# Patient Record
Sex: FEMALE | Race: WHITE | NOT HISPANIC OR LATINO | Employment: UNEMPLOYED | URBAN - NONMETROPOLITAN AREA
[De-identification: names, ages, dates, MRNs, and addresses within clinical notes are randomized per-mention and may not be internally consistent; named-entity substitution may affect disease eponyms.]

---

## 2017-01-17 ENCOUNTER — OFFICE VISIT (OUTPATIENT)
Dept: MEDICAL GROUP | Facility: PHYSICIAN GROUP | Age: 52
End: 2017-01-17
Payer: COMMERCIAL

## 2017-01-17 VITALS
HEIGHT: 70 IN | SYSTOLIC BLOOD PRESSURE: 140 MMHG | BODY MASS INDEX: 32.78 KG/M2 | OXYGEN SATURATION: 95 % | WEIGHT: 229 LBS | DIASTOLIC BLOOD PRESSURE: 90 MMHG | TEMPERATURE: 98.3 F | HEART RATE: 83 BPM

## 2017-01-17 DIAGNOSIS — E03.9 HYPOTHYROIDISM, UNSPECIFIED TYPE: ICD-10-CM

## 2017-01-17 DIAGNOSIS — Z86.718 HISTORY OF VENOUS THROMBOEMBOLISM: ICD-10-CM

## 2017-01-17 DIAGNOSIS — E78.5 HYPERLIPIDEMIA, UNSPECIFIED HYPERLIPIDEMIA TYPE: ICD-10-CM

## 2017-01-17 DIAGNOSIS — G62.9 NEUROPATHY: ICD-10-CM

## 2017-01-17 DIAGNOSIS — E11.22 CKD STAGE 4 DUE TO TYPE 2 DIABETES MELLITUS (HCC): ICD-10-CM

## 2017-01-17 DIAGNOSIS — E55.9 VITAMIN D DEFICIENCY DISEASE: ICD-10-CM

## 2017-01-17 DIAGNOSIS — F51.01 PRIMARY INSOMNIA: ICD-10-CM

## 2017-01-17 DIAGNOSIS — Z23 NEED FOR PNEUMOCOCCAL VACCINATION: ICD-10-CM

## 2017-01-17 DIAGNOSIS — E55.9 VITAMIN D DEFICIENCY: ICD-10-CM

## 2017-01-17 DIAGNOSIS — I10 ESSENTIAL HYPERTENSION: ICD-10-CM

## 2017-01-17 DIAGNOSIS — N18.4 CKD STAGE 4 DUE TO TYPE 2 DIABETES MELLITUS (HCC): ICD-10-CM

## 2017-01-17 DIAGNOSIS — D68.59 HYPERCOAGULABLE STATE (HCC): ICD-10-CM

## 2017-01-17 PROCEDURE — 99215 OFFICE O/P EST HI 40 MIN: CPT | Performed by: INTERNAL MEDICINE

## 2017-01-17 RX ORDER — DULOXETIN HYDROCHLORIDE 60 MG/1
60 CAPSULE, DELAYED RELEASE ORAL DAILY
Qty: 90 CAP | Refills: 2 | Status: SHIPPED | OUTPATIENT
Start: 2017-01-17 | End: 2017-04-14 | Stop reason: SDUPTHER

## 2017-01-17 RX ORDER — AMLODIPINE BESYLATE 10 MG/1
10 TABLET ORAL DAILY
COMMUNITY
End: 2017-02-09 | Stop reason: SDUPTHER

## 2017-01-17 RX ORDER — LEVOTHYROXINE SODIUM 112 UG/1
112 TABLET ORAL DAILY
Qty: 90 TAB | Refills: 2 | Status: SHIPPED | OUTPATIENT
Start: 2017-01-17 | End: 2017-04-14 | Stop reason: SDUPTHER

## 2017-01-17 RX ORDER — PRAVASTATIN SODIUM 40 MG
40 TABLET ORAL DAILY
Qty: 90 TAB | Refills: 3 | Status: ON HOLD | OUTPATIENT
Start: 2017-01-17 | End: 2018-07-09

## 2017-01-17 RX ORDER — CARVEDILOL 6.25 MG/1
6.25 TABLET ORAL 2 TIMES DAILY WITH MEALS
Qty: 180 TAB | Refills: 3 | Status: SHIPPED | OUTPATIENT
Start: 2017-01-17 | End: 2017-04-14

## 2017-01-17 RX ORDER — GABAPENTIN 600 MG/1
900 TABLET ORAL 3 TIMES DAILY
COMMUNITY
End: 2017-01-17 | Stop reason: SDUPTHER

## 2017-01-17 RX ORDER — ERGOCALCIFEROL 1.25 MG/1
50000 CAPSULE ORAL
Qty: 13 CAP | Refills: 1 | Status: ON HOLD | OUTPATIENT
Start: 2017-01-17 | End: 2018-07-09

## 2017-01-17 RX ORDER — MIRTAZAPINE 15 MG/1
15-30 TABLET, FILM COATED ORAL
Qty: 60 TAB | Refills: 3 | Status: SHIPPED | OUTPATIENT
Start: 2017-01-17 | End: 2017-04-14

## 2017-01-17 RX ORDER — CARVEDILOL 3.12 MG/1
3.12 TABLET ORAL 2 TIMES DAILY WITH MEALS
Qty: 180 TAB | Refills: 2 | Status: SHIPPED | OUTPATIENT
Start: 2017-01-17 | End: 2017-01-17 | Stop reason: SDUPTHER

## 2017-01-17 RX ORDER — GABAPENTIN 600 MG/1
900 TABLET ORAL 3 TIMES DAILY
Qty: 90 TAB | Refills: 2 | Status: SHIPPED | OUTPATIENT
Start: 2017-01-17 | End: 2017-01-23 | Stop reason: SDUPTHER

## 2017-01-17 NOTE — PATIENT INSTRUCTIONS
1. Cut trazodone to 1 pill per night for 1-2 nights, then switch to mirtazepine 1-2 pills at night for sleep.    2. Have labs today to assess kidney function and options for anti-coagulation.    3. Increase carvedilol to 6.25mg twice per day and decrease amlodipine to 5mg.    4. Follow up in 4 weeks.

## 2017-01-17 NOTE — PROGRESS NOTES
RN-FLAVIO Note  Type 2 Diabetes since 2007  Subjective:     Health changes since last visit/interval Hx: General Health good.  States went to [place] and detoxed off her pain medications.  She is now on Gabapentin for her neuropathy pain.    Medications (including changes made today)  Current Outpatient Prescriptions   Medication Sig Dispense Refill   • gabapentin (NEURONTIN) 600 MG tablet Take 900 mg by mouth 3 times a day.     • NOVOLOG, insulin aspart, (NOVOLOG FLEXPEN) 100 UNIT/ML Solution Pen-injector injection Inject 15-20 Units as instructed 3 times a day before meals.     • Insulin Glargine (TOUJEO SOLOSTAR) 300 UNIT/ML Solution Pen-injector Inject 60 Units as instructed every day. 18 mL 6   • vitamin D, Ergocalciferol, (DRISDOL) 15475 UNITS Cap capsule Take 1 Cap by mouth every 7 days. 13 Cap 1   • carvedilol (COREG) 3.125 MG Tab TAKE ONE TABLET BY MOUTH TWO TIMES A DAY WITH MEALS 60 Tab 5   • ergocalciferol (DRISDOL) 42709 UNIT capsule Take 1 Cap by mouth every 7 days. 12 Cap 0   • levothyroxine (SYNTHROID) 112 MCG Tab TAKE ONE TABLET BY MOUTH EVERY DAY. 90 Tab 1   • polyethylene glycol 3350 (MIRALAX) Powder Take 17 g by mouth 2 times a day. 3 Bottle 3   • pravastatin (PRAVACHOL) 40 MG tablet TAKE ONE TABLET BY MOUTH EVERY DAY. 90 Tab 3   • triamcinolone acetonide (KENALOG) 0.1 % CREA Apply in a thin layer to psoriasis twice per day x 2 weeks on then 2 weeks off 1 Tube 3   • glucose blood (FREESTYLE LITE) strip 1 Each by Other route 4 times a day.     • aspirin EC (ECOTRIN) 81 MG TBEC Take 81 mg by mouth every day.     • DULoxetine HCl (CYMBALTA PO) Take 90 mg by mouth every day.     • Misc. Devices Misc Freestyle lite test strips; patient has DM type 2 and tests 3 times per day 100 Strip 3   • Misc. Devices Misc Unilateral diagnostic mammogram right breast with ultrasound if indicated; fax [phone] 1 Device 0   • lisinopril (PRINIVIL, ZESTRIL) 40 MG tablet Take 1 Tab by mouth every day. 90 Tab 1   • lisinopril  (PRINIVIL) 10 MG Tab TAKE ONE TABLET BY MOUTH EVERY DAY. 90 Tab 1   • fluticasone (FLONASE) 50 MCG/ACT nasal spray Spray 2 Sprays in nose every day. 16 g 11     No current facility-administered medications for this visit.         Taking daily ASA: Yes  Taking above medications as prescribed: Yes   Patient Denies side effects of medication.    Exercise: Going to the gym and doing yoga.  Diet: meals per day on average: 2 meals.    Health Maintenance:   Health Maintenance Topics with due status: Overdue       Topic Date Due    IMM HEP B VACCINE 1965    RETINAL SCREENING 07/19/1982    IMM DTaP/Tdap/Td Vaccine 07/19/1983    IMM PNEUMOCOCCAL 19-64 (ADULT) HIGHEST RISK SERIES 03/17/2011    COLONOSCOPY 07/19/2014    MAMMOGRAM 04/17/2015    IMM INFLUENZA 09/01/2016    A1C SCREENING 01/01/2017         DM:   Last A1c:   Lab Results   Component Value Date/Time    GLYCOHEMOGLOBIN 11.5* 07/01/2016 08:34 AM      A1c goal: < 7    Glucose monitoring frequency: Testing 3 times daily  fasting range: .  Running under 200 for the rest of the day.  Hypoglycemic episodes: yes - Decreased Lantus/Toujeo to 50 units      Last Retinal Exam: October 2015 Provider: Charlee  Daily Foot Exam: yes  Routine Dental Exams: yes    Lab Results   Component Value Date/Time    MICRO ALB CREAT RATIO 301* 07/01/2016 08:33 AM    MICROALBUMIN, URINE RANDOM 27.8 07/01/2016 08:33 AM        ACR Albumin/Creatinine Ratio goal <30     Diabetic complications: peripheral neuropathy    HTN:   Blood pressure goal <140/<80 .   Currently Rx ACE/ARB: No, potassium high    Dyslipidemia:    Lab Results   Component Value Date/Time    CHOLESTEROL, 07/01/2016 08:34 AM    LDL see below 07/01/2016 08:34 AM    HDL 35* 07/01/2016 08:34 AM    TRIGLYCERIDES 529* 07/01/2016 08:34 AM       Lab Results   Component Value Date/Time    SODIUM 133* 07/01/2016 08:34 AM    POTASSIUM 5.0 07/01/2016 08:34 AM    CHLORIDE 102 07/01/2016 08:34 AM    CO2 22 07/01/2016 08:34 AM     GLUCOSE 265* 07/01/2016 08:34 AM    BUN 35* 07/01/2016 08:34 AM    CREATININE 2.18* 07/01/2016 08:34 AM     Lab Results   Component Value Date/Time    ALKALINE PHOSPHATASE 103* 07/01/2016 08:34 AM    AST(SGOT) 9* 07/01/2016 08:34 AM    ALT(SGPT) 6 07/01/2016 08:34 AM    TOTAL BILIRUBIN 0.3 07/01/2016 08:34 AM        Currently Rx Statin: Yes      She  reports that she has never smoked. She has never used smokeless tobacco.    Objective:     Exam:  Monofilament: done  Monofilament testing with a 10 gram force: sensation intact: decreased bilaterally  Visual Inspection: Feet without maceration, ulcers, fissures.  Feet swelling since started Norvasc.   Pedal pulses: intact bilaterally      Plan:     - Diabetic diet discussed in detail-plate method.  - Home glucose monitoring.  - She will test and log  - She will walk for 20-30 minutes daily.  - Reviewed medications and advised to take metformin after meals to decrease   G.I.upset.   - Discussed importance of immunizations and yearly eye exams. Has not had her flu vaccine.  - Encouraged patient to attend diabetes education program.   -Educational material distributed.   - Advised daily foot exams. Educated on signs of infection.       Recommended medication changes: Needs flu vaccine.  Her blood pressure is running high and her feet swelling since she stopped her Lisinopril and was started on Norvasc 10 mg due to high potassium.  She will need her labs drawn .

## 2017-01-17 NOTE — MR AVS SNAPSHOT
"        Bryan Garcia   2017 2:00 PM   Office Visit   MRN: [id number]    Department:  [facility]   Dept Phone:  [phone]    Description:  Female : 1965   Provider:  Susy Eckert M.D.; [facility] RN           Reason for Visit     Diabetes           Allergies as of 2017     Allergen Noted Reactions    Toradol 2012   Hives    Levaquin 10/05/2011       Blood boils    Tape 2012         You were diagnosed with     Uncontrolled type 2 diabetes mellitus with complication, with long-term current use of insulin (CMS-HCC)   [1250054]       History of venous thromboembolism   [373308]       CKD stage 4 due to type 2 diabetes mellitus (CMS-HCC)   [9008568]       Primary insomnia   [375331]       Vitamin D deficiency disease   [546834]       Essential hypertension   [3248338]       Need for pneumococcal vaccination   [719827]         Vital Signs     Blood Pressure Pulse Temperature Height Weight Body Mass Index    140/90 mmHg 83 36.8 °C (98.3 °F) 1.778 m (5' 10\") 103.874 kg (229 lb) 32.86 kg/m2    Oxygen Saturation Smoking Status                95% Never Smoker           Basic Information     Date Of Birth Sex Race Ethnicity Preferred Language    1965 Female White Non- English      Your appointments     2017  2:40 PM   Established Patient with Susy Eckert M.D.   [address] [address] [phone]           You will be receiving a confirmation call a few days before your appointment from our automated call confirmation system.              Problem List              ICD-10-CM Priority Class Noted - Resolved    Hypothyroid E03.9   10/25/2011 - Present    GERD (gastroesophageal reflux disease) K21.9   10/25/2011 - Present    HTN (hypertension) I10   10/25/2011 - Present    Hyperlipemia E78.5   10/25/2011 - Present    Neuropathy in diabetes (CMS-HCC) E11.40   10/25/2011 - Present    Elevated " serum hCG in female, not pregnant E34.9   2/21/2013 - Present    Debilitated patient R53.81   2/10/2014 - Present    PKD (polycystic kidney disease) Q61.3   2/10/2014 - Present    Diabetes mellitus type 2, uncontrolled, with complications (CMS-HCC) E11.8, E11.65   2/10/2014 - Present    History of venous thromboembolism Z86.718   2/10/2014 - Present    Dyslipidemia E78.5   2/23/2014 - Present    Obesity (BMI 30-39.9) E66.9   2/23/2014 - Present    Nocturnal hypoxia G47.34   3/26/2014 - Present    Tibial plateau fracture S82.143A   3/31/2015 - Present    Slow transit constipation K59.01   5/12/2016 - Present    Seasonal allergic rhinitis J30.2   5/12/2016 - Present    Vitamin D deficiency E55.9   10/4/2016 - Present    CKD stage 4 due to type 2 diabetes mellitus E11.22, N18.4   10/4/2016 - Present    Lump of right breast N63   10/4/2016 - Present    Essential hypertension I10   10/4/2016 - Present      Health Maintenance        Date Due Completion Dates    IMM HEP B VACCINE (1 of 3 - Primary Series) 1965 ---    RETINAL SCREENING 7/19/1982 ---    IMM DTaP/Tdap/Td Vaccine (1 - Tdap) 7/19/1983 ---    IMM PNEUMOCOCCAL 19-64 (ADULT) HIGHEST RISK SERIES (2 of 3 - PCV13) 3/17/2011 3/17/2010    COLONOSCOPY 7/19/2014 ---    MAMMOGRAM 4/17/2015 4/17/2014    IMM INFLUENZA (1) 9/1/2016 2/20/2013, 3/17/2012    A1C SCREENING 1/1/2017 7/1/2016, 2/10/2014, 2/20/2013, 3/17/2012    FASTING LIPID PROFILE 7/1/2017 7/1/2016, 3/26/2014, 3/18/2012    URINE ACR / MICROALBUMIN 7/1/2017 7/1/2016, 2/10/2014    SERUM CREATININE 7/1/2017 7/1/2016, 3/26/2014, 2/10/2014, 2/23/2013, 2/21/2013, 2/20/2013, 2/20/2013, 2/19/2013, 5/8/2012, 5/7/2012, 5/6/2012, 5/5/2012, 3/27/2012, 3/26/2012, 3/25/2012, 3/24/2012, 3/23/2012, 3/22/2012, 3/21/2012, 3/20/2012, 3/19/2012, 3/18/2012, 3/17/2012    DIABETES MONOFILAMENT / LE EXAM 10/4/2017 10/4/2016            Current Immunizations     Influenza TIV (IM) 2/20/2013  3:48 AM, 3/17/2012  4:58 AM     Pneumococcal polysaccharide vaccine (PPSV-23)  Incomplete, 3/17/2010      Below and/or attached are the medications your provider expects you to take. Review all of your home medications and newly ordered medications with your provider and/or pharmacist. Follow medication instructions as directed by your provider and/or pharmacist. Please keep your medication list with you and share with your provider. Update the information when medications are discontinued, doses are changed, or new medications (including over-the-counter products) are added; and carry medication information at all times in the event of emergency situations     Allergies:  TORADOL - Hives     LEVAQUIN - (reactions not documented)     TAPE - (reactions not documented)               Medications  Valid as of: January 17, 2017 -  2:55 PM    Generic Name Brand Name Tablet Size Instructions for use    AmLODIPine Besylate (Tab) NORVASC 10 MG Take 10 mg by mouth every day.        Aspirin (Tablet Delayed Response) ECOTRIN 81 MG Take 81 mg by mouth every day.        Carvedilol (Tab) COREG 6.25 MG Take 1 Tab by mouth 2 times a day, with meals.        DULoxetine HCl   Take 90 mg by mouth every day.        DULoxetine HCl (Cap DR Particles) CYMBALTA 60 MG Take 1 Cap by mouth every day.        Ergocalciferol (Cap) DRISDOL 35709 UNIT Take 1 Cap by mouth every 7 days.        Ergocalciferol (Cap) DRISDOL 70491 UNITS Take 1 Cap by mouth every 7 days.        Fluticasone Propionate (Suspension) FLONASE 50 MCG/ACT Spray 2 Sprays in nose every day.        Gabapentin (Tab) NEURONTIN 600 MG Take 1.5 Tabs by mouth 3 times a day.        Glucose Blood (Strip) glucose blood  1 Each by Other route 4 times a day.        Insulin Aspart (Solution Pen-injector) NOVOLOG 100 UNIT/ML Inject 15-20 Units as instructed 3 times a day before meals.        Insulin Glargine (Solution Pen-injector) Insulin Glargine 300 UNIT/ML Inject 60 Units as instructed every day.        Insulin Glargine  (Solution Pen-injector) Insulin Glargine 300 UNIT/ML Inject 60 Units as instructed 1 time daily as needed.        Levothyroxine Sodium (Tab) SYNTHROID 112 MCG Take 1 Tab by mouth every day.        Lisinopril (Tab) PRINIVIL 10 MG TAKE ONE TABLET BY MOUTH EVERY DAY.        Lisinopril (Tab) PRINIVIL, ZESTRIL 40 MG Take 1 Tab by mouth every day.        Mirtazapine (Tab) REMERON 15 MG Take 1-2 Tabs by mouth every bedtime.        Misc. Devices (Misc) Misc. Devices  Unilateral diagnostic mammogram right breast with ultrasound if indicated; fax [phone]        Misc. Devices (Misc) Misc. Devices  Freestyle lite test strips; patient has DM type 2 and tests 3 times per day        Polyethylene Glycol 3350 (Powder) MIRALAX  Take 17 g by mouth 2 times a day.        Pravastatin Sodium (Tab) PRAVACHOL 40 MG Take 1 Tab by mouth every day.        Triamcinolone Acetonide (Cream) KENALOG 0.1 % Apply in a thin layer to psoriasis twice per day x 2 weeks on then 2 weeks off        .                 Medicines prescribed today were sent to:     [facility], [place] - [address] [place] [address]    Phone: [phone] Fax: [phone]    Open 24 Hours?: No      Medication refill instructions:       If your prescription bottle indicates you have medication refills left, it is not necessary to call your provider’s office. Please contact your pharmacy and they will refill your medication.    If your prescription bottle indicates you do not have any refills left, you may request refills at any time through one of the following ways: The online [employer] system (except Urgent Care), by calling your provider’s office, or by asking your pharmacy to contact your provider’s office with a refill request. Medication refills are processed only during regular business hours and may not be available until the next business day. Your provider may request additional information or to have a  follow-up visit with you prior to refilling your medication.   *Please Note: Medication refills are assigned a new Rx number when refilled electronically. Your pharmacy may indicate that no refills were authorized even though a new prescription for the same medication is available at the pharmacy. Please request the medicine by name with the pharmacy before contacting your provider for a refill.        Your To Do List     Future Labs/Procedures Complete By Expires    CBC WITH DIFFERENTIAL  As directed 1/17/2018    COMP METABOLIC PANEL  As directed 1/17/2018    HEMOGLOBIN A1C  As directed 1/17/2018    TSH  As directed 1/17/2018    VITAMIN D,25 HYDROXY  As directed 1/17/2018      Instructions    1. Cut trazodone to 1 pill per night for 1-2 nights, then switch to mirtazepine 1-2 pills at night for sleep.    2. Have labs today to assess kidney function and options for anti-coagulation.    3. Increase carvedilol to 6.25mg twice per day and decrease amlodipine to 5mg.    4. Follow up in 4 weeks.              TrialScope Access Code: [email or web address]  Expires: 1/21/2017  9:16 AM    TrialScope  A secure, online tool to manage your health information     [employer]’s TrialScope® is a secure, online tool that connects you to your personalized health information from the privacy of your home -- day or night - making it very easy for you to manage your healthcare. Once the activation process is completed, you can even access your medical information using the TrialScope colette, which is available for free in the Apple Colette store or Google Play store.     TrialScope provides the following levels of access (as shown below):   My Chart Features   [facility] Doctor [facility]  Urgent  Care [facility]  Primary Care  Doctor   Email your healthcare team securely and privately 24/7 X X X    Manage appointments: schedule your next appointment; view details of past/upcoming appointments X      Request prescription refills. X       View recent personal medical records, including lab and immunizations X X X X   View health record, including health history, allergies, medications X X X X   Read reports about your outpatient visits, procedures, consult and ER notes X X X X   See your discharge summary, which is a recap of your hospital and/or ER visit that includes your diagnosis, lab results, and care plan. X X       How to register for [employer]:  1. Go to  [email or web address].  2. Click on the Sign Up Now box, which takes you to the New Member Sign Up page. You will need to provide the following information:  a. Enter your SpunLive Access Code exactly as it appears at the top of this page. (You will not need to use this code after you’ve completed the sign-up process. If you do not sign up before the expiration date, you must request a new code.)   b. Enter your date of birth.   c. Enter your home email address.   d. Click Submit, and follow the next screen’s instructions.  3. Create a SpunLive ID. This will be your SpunLive login ID and cannot be changed, so think of one that is secure and easy to remember.  4. Create a SpunLive password. You can change your password at any time.  5. Enter your Password Reset Question and Answer. This can be used at a later time if you forget your password.   6. Enter your e-mail address. This allows you to receive e-mail notifications when new information is available in SpunLive.  7. Click Sign Up. You can now view your health information.    For assistance activating your SpunLive account, call [phone]

## 2017-01-18 NOTE — PROGRESS NOTES
Chief Complaint   Patient presents with   • Diabetes       HISTORY OF PRESENT ILLNESS: Patient is a 51 y.o. female established patient who presents today to be seen for acute and chronic issues.    Diabetes mellitus type 2, uncontrolled, with complications  Patient is a 51-year-old female with type 2 diabetes that has been poorly controlled. In the past she is on Coumadin and once year when I do not refill her medications without a visit. Patient had been on chronic narcotics for years for neuropathy. When I had seen her in the past she has been wheelchair-bound. She has been on toujeo 60 units a day and NovoLog 20 units prior to meals. Patient comes in today and looks remarkably improved. Since her last visit in October, she traveled to [place] and did an intensive pain medication rehabilitation program. Patient has now completely off of narcotics. She is not in a wheelchair and is walking fine. She notes that she feels like she got her life back. She is overall very happy with the change in her lifestyle. Her sugars based on her reports have been looking much improved. We discussed checking labs on her today. She does continue on statin.    Hypercoagulable state (CMS-HCC)  Patient is a history of saddle pulmonary embolism and DVT about 6 years ago. At that point she had a IVC filter placed. Initially she was supposed to have it removed but she never did. She saw vascular surgery when she was in [place]. They recommended that she be on eliquis to thin her blood. She had a bleed in the past on Coumadin. I discussed with her I would like to see her kidney function prior to starting the medication.    CKD stage 4 due to type 2 diabetes mellitus  Patient has had stage IV kidney disease on her most recent labs. Again she has not followed up on a regular basis. We discussed checking labs today. She still is in stage IV disease, she needs to establish with nephrology.    Essential hypertension  While in [place],  the patient had an elevated potassium of 7 and was taken off her ACE inhibitor. She is currently on amlodipine 10 mg a day and carvedilol 3.125 mg twice per day. She's had ankle swelling bilaterally on the amlodipine. We discussed decreasing this medication to 5 mg a day and increasing her carvedilol to 6.25 mg a day.      Patient Active Problem List    Diagnosis Date Noted   • Hypercoagulable state (CMS-HCC) 01/17/2017   • Vitamin D deficiency 10/04/2016   • CKD stage 4 due to type 2 diabetes mellitus 10/04/2016   • Lump of right breast 10/04/2016   • Essential hypertension 10/04/2016   • Slow transit constipation 05/12/2016   • Seasonal allergic rhinitis 05/12/2016   • Tibial plateau fracture 03/31/2015   • Nocturnal hypoxia 03/26/2014   • Dyslipidemia 02/23/2014   • Obesity (BMI 30-39.9) 02/23/2014   • Debilitated patient 02/10/2014   • PKD (polycystic kidney disease) 02/10/2014   • Diabetes mellitus type 2, uncontrolled, with complications (CMS-HCC) 02/10/2014   • History of venous thromboembolism 02/10/2014   • Elevated serum hCG in female, not pregnant 02/21/2013   • Hypothyroid 10/25/2011   • GERD (gastroesophageal reflux disease) 10/25/2011   • HTN (hypertension) 10/25/2011   • Hyperlipemia 10/25/2011   • Neuropathy in diabetes (CMS-HCC) 10/25/2011       Allergies:Toradol; Levaquin; and Tape    Current Outpatient Prescriptions Ordered in [facility]   Medication Sig Dispense Refill   • amlodipine (NORVASC) 10 MG Tab Take 10 mg by mouth every day.     • mirtazapine (REMERON) 15 MG Tab Take 1-2 Tabs by mouth every bedtime. 60 Tab 3   • carvedilol (COREG) 6.25 MG Tab Take 1 Tab by mouth 2 times a day, with meals. 180 Tab 3   • Insulin Glargine (TOUJEO SOLOSTAR) 300 UNIT/ML Solution Pen-injector Inject 60 Units as instructed every day. 18 mL 6   • ergocalciferol (DRISDOL) 85720 UNIT capsule Take 1 Cap by mouth every 7 days. 12 Cap 0   • polyethylene glycol 3350 (MIRALAX) Powder Take 17 g by mouth 2 times a day. 3  Bottle 3   • triamcinolone acetonide (KENALOG) 0.1 % CREA Apply in a thin layer to psoriasis twice per day x 2 weeks on then 2 weeks off 1 Tube 3   • glucose blood (FREESTYLE LITE) strip 1 Each by Other route 4 times a day.     • aspirin EC (ECOTRIN) 81 MG TBEC Take 81 mg by mouth every day.     • DULoxetine HCl (CYMBALTA PO) Take 90 mg by mouth every day.     • vitamin D, Ergocalciferol, (DRISDOL) 45293 UNITS Cap capsule Take 1 Cap by mouth every 7 days. 13 Cap 1   • pravastatin (PRAVACHOL) 40 MG tablet Take 1 Tab by mouth every day. 90 Tab 3   • NOVOLOG, insulin aspart, (NOVOLOG FLEXPEN) 100 UNIT/ML Solution Pen-injector injection Inject 15-20 Units as instructed 3 times a day before meals. 45 mL 2   • levothyroxine (SYNTHROID) 112 MCG Tab Take 1 Tab by mouth every day. 90 Tab 2   • gabapentin (NEURONTIN) 600 MG tablet Take 1.5 Tabs by mouth 3 times a day. 90 Tab 2   • Insulin Glargine (TOUJEO SOLOSTAR) 300 UNIT/ML Solution Pen-injector Inject 60 Units as instructed 1 time daily as needed. 54 mL ml   • duloxetine (CYMBALTA) 60 MG Cap DR Particles delayed-release capsule Take 1 Cap by mouth every day. 90 Cap 2   • Misc. Devices Misc Freestyle lite test strips; patient has DM type 2 and tests 3 times per day 100 Strip 3   • Misc. Devices Misc Unilateral diagnostic mammogram right breast with ultrasound if indicated; fax [phone] 1 Device 0   • lisinopril (PRINIVIL, ZESTRIL) 40 MG tablet Take 1 Tab by mouth every day. 90 Tab 1   • lisinopril (PRINIVIL) 10 MG Tab TAKE ONE TABLET BY MOUTH EVERY DAY. 90 Tab 1   • fluticasone (FLONASE) 50 MCG/ACT nasal spray Spray 2 Sprays in nose every day. 16 g 11     No current Epic-ordered facility-administered medications on file.       Past Medical History   Diagnosis Date   • Type II or unspecified type diabetes mellitus without mention of complication, not stated as uncontrolled 10/25/2011   • HTN (hypertension) 10/25/2011   • Neuropathy in diabetes (CMS-HCC) 10/25/2011   •  "Presence of IVC filter    • DVT (deep venous thrombosis) (CMS-HCC)    • PE (pulmonary embolism)    • Renal disorder    • Pneumonia    • Unspecified disorder of thyroid    • Other specified symptom associated with female genital organs        Social History   Substance Use Topics   • Smoking status: Never Smoker    • Smokeless tobacco: Never Used   • Alcohol Use: No       Family Status   Relation Status Death Age   • Mother     • Father Alive      Family History   Problem Relation Age of Onset   • Cancer Mother      Bone   • Heart Disease Father    • Cancer Father      Bladder, Prostate       ROS:  Review of Systems   Constitutional: Negative for fever and malaise/fatigue.   HENT: Negative for congestion  Respiratory: Negative for cough  Cardiovascular: Negative for chest pain  Gastrointestinal: Negative for nausea, vomiting and abdominal pain.  Musculoskeletal: Positive for neuropathy in feet  All other systems reviewed and are negative except as in HPI.      Exam:  Blood pressure 140/90, pulse 83, temperature 36.8 °C (98.3 °F), height 1.778 m (5' 10\"), weight 103.874 kg (229 lb), SpO2 95 %.  General: Obese female who looks markedly improved from last visit in NAD  Head is grossly normal.  Neck: Supple without JVD   Pulmonary: Clear to ausculation and percussion.  Normal effort. No rales, ronchi, or wheezing.  Cardiovascular: Regular rate and rhythm without murmur. Carotid and radial pulses are intact and equal bilaterally.  Extremities: no clubbing, cyanosis, or edema.    Hospital Outpatient Visit on 2016   Component Date Value Ref Range Status   • Glycohemoglobin 2016 11.5* 0.0 - 5.6 % Final    Comment: Increased risk for diabetes:  5.7 -6.4%  Diabetes:  >6.4%  Glycemic control for adults with diabetes:  <7.0%  The above interpretations are per ADA guidelines.  Diagnosis  of diabetes mellitus on the basis of elevated Hemoglobin A1c  should be confirmed by repeating the Hb A1c test.     • Est " Avg Glucose 07/01/2016 283   Final    Comment: The eAG calculation is based on the A1c-Derived Daily Glucose  (ADAG) study.  See the ADA's website for additional information.     • Cholesterol,Tot 07/01/2016 166  100 - 199 mg/dL Final   • Triglycerides 07/01/2016 529* 0 - 149 mg/dL Final   • HDL 07/01/2016 35* >=40 mg/dL Final   • LDL 07/01/2016 see below  <100 mg/dL Final    Comment: The calculated LDL value is invalid due to the triglyceride  value of >400 mg/dL.     • Sodium 07/01/2016 133* 135 - 145 mmol/L Final   • Potassium 07/01/2016 5.0  3.6 - 5.5 mmol/L Final   • Chloride 07/01/2016 102  96 - 112 mmol/L Final   • Co2 07/01/2016 22  20 - 33 mmol/L Final   • Anion Gap 07/01/2016 9.0  0.0 - 11.9 Final   • Glucose 07/01/2016 265* 65 - 99 mg/dL Final   • Bun 07/01/2016 35* 8 - 22 mg/dL Final   • Creatinine 07/01/2016 2.18* 0.50 - 1.40 mg/dL Final   • Calcium 07/01/2016 9.1  8.5 - 10.5 mg/dL Final   • AST(SGOT) 07/01/2016 9* 12 - 45 U/L Final   • ALT(SGPT) 07/01/2016 6  2 - 50 U/L Final   • Alkaline Phosphatase 07/01/2016 103* 30 - 99 U/L Final   • Total Bilirubin 07/01/2016 0.3  0.1 - 1.5 mg/dL Final   • Albumin 07/01/2016 3.7  3.2 - 4.9 g/dL Final   • Total Protein 07/01/2016 7.3  6.0 - 8.2 g/dL Final   • Globulin 07/01/2016 3.6* 1.9 - 3.5 g/dL Final   • A-G Ratio 07/01/2016 1.0   Final   • Creatinine, Urine 07/01/2016 92.30   Final   • Microalbumin, Urine Random 07/01/2016 27.8   Final   • Micro Alb Creat Ratio 07/01/2016 301* 0 - 30 mg/g Final   • WBC 07/01/2016 7.2  4.8 - 10.8 K/uL Final   • RBC 07/01/2016 4.97  4.20 - 5.40 M/uL Final   • Hemoglobin 07/01/2016 15.1  12.0 - 16.0 g/dL Final   • Hematocrit 07/01/2016 48.3* 37.0 - 47.0 % Final   • MCV 07/01/2016 97.2  81.4 - 97.8 fL Final   • MCH 07/01/2016 30.4  27.0 - 33.0 pg Final   • MCHC 07/01/2016 31.3* 33.6 - 35.0 g/dL Final   • RDW 07/01/2016 49.2  35.9 - 50.0 fL Final   • Platelet Count 07/01/2016 255  164 - 446 K/uL Final   • MPV 07/01/2016 11.4  9.0 -  12.9 fL Final   • Neutrophils-Polys 07/01/2016 44.00  44.00 - 72.00 % Final   • Lymphocytes 07/01/2016 43.20* 22.00 - 41.00 % Final   • Monocytes 07/01/2016 7.10  0.00 - 13.40 % Final   • Eosinophils 07/01/2016 4.20  0.00 - 6.90 % Final   • Basophils 07/01/2016 1.40  0.00 - 1.80 % Final   • Immature Granulocytes 07/01/2016 0.10  0.00 - 0.90 % Final   • Nucleated RBC 07/01/2016 0.00   Final   • Neutrophils (Absolute) 07/01/2016 3.15  2.00 - 7.15 K/uL Final    Includes immature neutrophils, if present.   • Lymphs (Absolute) 07/01/2016 3.09  1.00 - 4.80 K/uL Final   • Monos (Absolute) 07/01/2016 0.51  0.00 - 0.85 K/uL Final   • Eos (Absolute) 07/01/2016 0.30  0.00 - 0.51 K/uL Final   • Baso (Absolute) 07/01/2016 0.10  0.00 - 0.12 K/uL Final   • Immature Granulocytes (abs) 07/01/2016 0.01  0.00 - 0.11 K/uL Final   • NRBC (Absolute) 07/01/2016 0.00   Final   • 25-Hydroxy   Vitamin D 25 07/01/2016 4* 30 - 100 ng/mL Final    Comment: Adult Ranges:   <20 ng/mL - Deficiency  20-29 ng/mL - Insufficiency   ng/mL - Sufficiency  The [employer] Centaur Vitamin D Assay is standardized to the  [facility] reference measurement procedures, a  reference method for the Vitamin D Standardization Program  (VDSP).  The VDSP aligns patient results among 25 (OH)  Vitamin D methods.     • TSH 07/01/2016 2.730  0.300 - 3.700 uIU/mL Final   • GFR If  07/01/2016 29* >60 mL/min/1.73 m 2 Final   • GFR If Non  07/01/2016 24* >60 mL/min/1.73 m 2 Final     RN-CDE notes reviewed and discussed.    The total time spent seeing the patient in consultation, and formulating an action plan for this visit was 40 minutes.  50% of this time was spent counseling, discussing problems documented above, coordinating care and answering questions by the provider and registered nurse--certified diabetes educator.         Assessment/Plan:  1. Uncontrolled type 2 diabetes mellitus with complication, with long-term current use of  insulin (CMS-[facility])  COMP METABOLIC PANEL    HEMOGLOBIN A1C    TSH    Uncontrolled, will check labs   2. History of venous thromboembolism  CBC WITH DIFFERENTIAL    Uncontrolled, will get outside records, consider anticoagulation   3. CKD stage 4 due to type 2 diabetes mellitus      Uncontrolled, due for labs   4. Primary insomnia  mirtazapine (REMERON) 15 MG Tab    Uncontrolled, we'll try Remeron   5. Vitamin D deficiency disease  VITAMIN D,25 HYDROXY   6. Essential hypertension  carvedilol (COREG) 6.25 MG Tab    Uncontrolled, decreasing amlodipine and increasing carvedilol   7. Need for pneumococcal vaccination  PneumoVax PPV23 =>1yo   8. Hypercoagulable state (CMS-HCC)      Uncontrolled, will consider eliquis     Please note that this dictation was created using voice recognition software. I have made every reasonable attempt to correct obvious errors, but I expect that there are errors of grammar and possibly content that I did not discover before finalizing the note.

## 2017-01-18 NOTE — ASSESSMENT & PLAN NOTE
Patient is a 51-year-old female with type 2 diabetes that has been poorly controlled. In the past she is on Coumadin and once year when I do not refill her medications without a visit. Patient had been on chronic narcotics for years for neuropathy. When I had seen her in the past she has been wheelchair-bound. She has been on toujeo 60 units a day and NovoLog 20 units prior to meals. Patient comes in today and looks remarkably improved. Since her last visit in October, she traveled to [place] and did an intensive pain medication rehabilitation program. Patient has now completely off of narcotics. She is not in a wheelchair and is walking fine. She notes that she feels like she got her life back. She is overall very happy with the change in her lifestyle. Her sugars based on her reports have been looking much improved. We discussed checking labs on her today. She does continue on statin.

## 2017-01-18 NOTE — ASSESSMENT & PLAN NOTE
Patient is a history of saddle pulmonary embolism and DVT about 6 years ago. At that point she had a IVC filter placed. Initially she was supposed to have it removed but she never did. She saw vascular surgery when she was in [place]. They recommended that she be on eliquis to thin her blood. She had a bleed in the past on Coumadin. I discussed with her I would like to see her kidney function prior to starting the medication.

## 2017-01-18 NOTE — ASSESSMENT & PLAN NOTE
Patient has had stage IV kidney disease on her most recent labs. Again she has not followed up on a regular basis. We discussed checking labs today. She still is in stage IV disease, she needs to establish with nephrology.

## 2017-01-18 NOTE — ASSESSMENT & PLAN NOTE
While in [place], the patient had an elevated potassium of 7 and was taken off her ACE inhibitor. She is currently on amlodipine 10 mg a day and carvedilol 3.125 mg twice per day. She's had ankle swelling bilaterally on the amlodipine. We discussed decreasing this medication to 5 mg a day and increasing her carvedilol to 6.25 mg a day.

## 2017-01-19 ENCOUNTER — TELEPHONE (OUTPATIENT)
Dept: MEDICAL GROUP | Facility: PHYSICIAN GROUP | Age: 52
End: 2017-01-19

## 2017-01-19 NOTE — TELEPHONE ENCOUNTER
Tc with [facility] [phone] needing clarification on Coreg, Received 2 RX's, 1 for 3.125mg BID and 1 for 6.25 mg BID. Please clarify which is correct.

## 2017-01-23 DIAGNOSIS — G62.9 NEUROPATHY: ICD-10-CM

## 2017-01-23 RX ORDER — GABAPENTIN 600 MG/1
900 TABLET ORAL 4 TIMES DAILY
Qty: 540 TAB | Refills: 3 | Status: SHIPPED | OUTPATIENT
Start: 2017-01-23 | End: 2017-04-14 | Stop reason: SDUPTHER

## 2017-01-23 NOTE — TELEPHONE ENCOUNTER
1. Caller Name: [facility] in [place]                      Call Back Number: [phone]    2. Message: She is taking gabapentin 600 MG, 1.5 tabs 4 times a day.  It was sent over as 3 times a day. Could you please resend it?  Thank you!    3. Patient approves office to leave a detailed voicemail/MyChart message: N\A

## 2017-02-09 ENCOUNTER — OFFICE VISIT (OUTPATIENT)
Dept: MEDICAL GROUP | Facility: PHYSICIAN GROUP | Age: 52
End: 2017-02-09
Payer: COMMERCIAL

## 2017-02-09 VITALS
HEART RATE: 86 BPM | HEIGHT: 70 IN | DIASTOLIC BLOOD PRESSURE: 90 MMHG | SYSTOLIC BLOOD PRESSURE: 144 MMHG | BODY MASS INDEX: 32.35 KG/M2 | RESPIRATION RATE: 16 BRPM | TEMPERATURE: 98.8 F | OXYGEN SATURATION: 97 % | WEIGHT: 226 LBS

## 2017-02-09 DIAGNOSIS — I10 ESSENTIAL HYPERTENSION: ICD-10-CM

## 2017-02-09 DIAGNOSIS — S93.402A SPRAIN OF LEFT ANKLE, UNSPECIFIED LIGAMENT, INITIAL ENCOUNTER: ICD-10-CM

## 2017-02-09 DIAGNOSIS — F11.21 NARCOTIC DEPENDENCE, IN REMISSION (HCC): ICD-10-CM

## 2017-02-09 PROCEDURE — 99214 OFFICE O/P EST MOD 30 MIN: CPT | Performed by: INTERNAL MEDICINE

## 2017-02-09 RX ORDER — DICLOFENAC SODIUM 75 MG/1
75 TABLET, DELAYED RELEASE ORAL 2 TIMES DAILY
Qty: 60 TAB | Refills: 3 | Status: ON HOLD | OUTPATIENT
Start: 2017-02-09 | End: 2018-07-09

## 2017-02-09 RX ORDER — AMLODIPINE BESYLATE 10 MG/1
10 TABLET ORAL DAILY
Qty: 90 TAB | Refills: 3 | Status: SHIPPED | OUTPATIENT
Start: 2017-02-09

## 2017-02-09 RX ORDER — LIDOCAINE 50 MG/G
2 PATCH TOPICAL EVERY 24 HOURS
Qty: 20 PATCH | Refills: 3 | Status: ON HOLD | OUTPATIENT
Start: 2017-02-09 | End: 2020-10-12

## 2017-02-09 NOTE — PATIENT INSTRUCTIONS
1. Add back amlodipine; start with 1/2 tab once per day for a few days. If BP is still >140/90, then go up to 1 full pill.    2. Follow up in 2 weeks.

## 2017-02-09 NOTE — MR AVS SNAPSHOT
"Bryan Garcia   2017 1:20 PM   Office Visit   MRN: [id number]    Department:  [facility]   Dept Phone:  [phone]    Description:  Female : 1965   Provider:  Susy Eckert M.D.           Reason for Visit     Hospital Follow-up fv vasculitis, elevated BP, L foot injury      Allergies as of 2017     Allergen Noted Reactions    Toradol 2012   Hives    Levaquin 10/05/2011       Blood boils    Lisinopril 2017   Unspecified    Caused potassium build up in blood    Tape 2012         You were diagnosed with     Sprain of left ankle, unspecified ligament, initial encounter   [1918167]   Uncontrolled, will try anti-inflammatories, lidocaine patches. Patient will follow-up in 2 weeks    Essential hypertension   [5896213]   Uncontrolled, decreasing amlodipine and increasing carvedilol    Narcotic dependence, in remission (CMS-HCC)   [[id number]]   controlled, no narcotics given today      Vital Signs     Blood Pressure Pulse Temperature Respirations Height Weight    144/90 mmHg 86 37.1 °C (98.8 °F) 16 1.778 m (5' 10\") 102.513 kg (226 lb)    Body Mass Index Oxygen Saturation Smoking Status             32.43 kg/m2 97% Never Smoker          Basic Information     Date Of Birth Sex Race Ethnicity Preferred Language    1965 Female White Non- English      Your appointments     2017  2:40 PM   Established Patient with Susy Eckert M.D.   [address] [address] [phone]           You will be receiving a confirmation call a few days before your appointment from our automated call confirmation system.              Problem List              ICD-10-CM Priority Class Noted - Resolved    Hypothyroid E03.9   10/25/2011 - Present    GERD (gastroesophageal reflux disease) K21.9   10/25/2011 - Present    HTN (hypertension) I10   10/25/2011 - Present    Hyperlipemia E78.5   10/25/2011 - Present   " Neuropathy in diabetes (CMS-HCC) E11.40   10/25/2011 - Present    Elevated serum hCG in female, not pregnant E34.9   2/21/2013 - Present    Debilitated patient R53.81   2/10/2014 - Present    PKD (polycystic kidney disease) Q61.3   2/10/2014 - Present    Diabetes mellitus type 2, uncontrolled, with complications (CMS-HCC) E11.8, E11.65 High  2/10/2014 - Present    History of venous thromboembolism Z86.718   2/10/2014 - Present    Dyslipidemia E78.5   2/23/2014 - Present    Obesity (BMI 30-39.9) E66.9   2/23/2014 - Present    Nocturnal hypoxia G47.34   3/26/2014 - Present    Tibial plateau fracture S82.143A   3/31/2015 - Present    Slow transit constipation K59.01   5/12/2016 - Present    Seasonal allergic rhinitis J30.2   5/12/2016 - Present    Vitamin D deficiency E55.9   10/4/2016 - Present    CKD stage 4 due to type 2 diabetes mellitus E11.22, N18.4 High  10/4/2016 - Present    Lump of right breast N63   10/4/2016 - Present    Essential hypertension I10   10/4/2016 - Present    Hypercoagulable state (CMS-HCC) D68.59   1/17/2017 - Present    Sprain of left ankle S93.402A   2/9/2017 - Present    Narcotic dependence, in remission (CMS-HCC) F19.21 High  2/9/2017 - Present      Health Maintenance        Date Due Completion Dates    IMM HEP B VACCINE (1 of 3 - Primary Series) 1965 ---    RETINAL SCREENING 7/19/1982 ---    IMM DTaP/Tdap/Td Vaccine (1 - Tdap) 7/19/1983 ---    IMM PNEUMOCOCCAL 19-64 (ADULT) HIGHEST RISK SERIES (2 of 3 - PCV13) 3/17/2011 3/17/2010    MAMMOGRAM 4/17/2015 4/17/2014    IMM INFLUENZA (1) 9/1/2016 2/20/2013, 3/17/2012    A1C SCREENING 1/1/2017 7/1/2016, 2/10/2014, 2/20/2013, 3/17/2012    FASTING LIPID PROFILE 7/1/2017 7/1/2016, 3/26/2014, 3/18/2012    URINE ACR / MICROALBUMIN 7/1/2017 7/1/2016, 2/10/2014    SERUM CREATININE 7/1/2017 7/1/2016, 3/26/2014, 2/10/2014, 2/23/2013, 2/21/2013, 2/20/2013, 2/20/2013, 2/19/2013, 5/8/2012, 5/7/2012, 5/6/2012, 5/5/2012, 3/27/2012, 3/26/2012, 3/25/2012,  3/24/2012, 3/23/2012, 3/22/2012, 3/21/2012, 3/20/2012, 3/19/2012, 3/18/2012, 3/17/2012    DIABETES MONOFILAMENT / LE EXAM 10/4/2017 10/4/2016    COLONOSCOPY 2/22/2023 2/22/2013            Current Immunizations     Influenza TIV (IM) 2/20/2013  3:48 AM, 3/17/2012  4:58 AM    Pneumococcal polysaccharide vaccine (PPSV-23) 3/17/2010      Below and/or attached are the medications your provider expects you to take. Review all of your home medications and newly ordered medications with your provider and/or pharmacist. Follow medication instructions as directed by your provider and/or pharmacist. Please keep your medication list with you and share with your provider. Update the information when medications are discontinued, doses are changed, or new medications (including over-the-counter products) are added; and carry medication information at all times in the event of emergency situations     Allergies:  TORADOL - Hives     LEVAQUIN - (reactions not documented)     LISINOPRIL - Unspecified     TAPE - (reactions not documented)               Medications  Valid as of: February 09, 2017 -  1:51 PM    Generic Name Brand Name Tablet Size Instructions for use    AmLODIPine Besylate (Tab) NORVASC 10 MG Take 1 Tab by mouth every day.        Aspirin (Tablet Delayed Response) ECOTRIN 81 MG Take 81 mg by mouth every day.        Carvedilol (Tab) COREG 6.25 MG Take 1 Tab by mouth 2 times a day, with meals.        Diclofenac Sodium (Tablet Delayed Response) VOLTAREN 75 MG Take 1 Tab by mouth 2 times a day.        DULoxetine HCl   Take 90 mg by mouth every day.        DULoxetine HCl (Cap DR Particles) CYMBALTA 60 MG Take 1 Cap by mouth every day.        Ergocalciferol (Cap) DRISDOL 68578 UNIT Take 1 Cap by mouth every 7 days.        Ergocalciferol (Cap) DRISDOL 89967 UNITS Take 1 Cap by mouth every 7 days.        Fluticasone Propionate (Suspension) FLONASE 50 MCG/ACT Spray 2 Sprays in nose every day.        Gabapentin (Tab) NEURONTIN  600 MG Take 1.5 Tabs by mouth 4 times a day for 90 days.        Glucose Blood (Strip) glucose blood  1 Each by Other route 4 times a day.        Insulin Aspart (Solution Pen-injector) NOVOLOG 100 UNIT/ML Inject 15-20 Units as instructed 3 times a day before meals.        Insulin Glargine (Solution Pen-injector) Insulin Glargine 300 UNIT/ML Inject 60 Units as instructed every day.        Insulin Glargine (Solution Pen-injector) Insulin Glargine 300 UNIT/ML Inject 60 Units as instructed 1 time daily as needed.        Levothyroxine Sodium (Tab) SYNTHROID 112 MCG Take 1 Tab by mouth every day.        Lidocaine (Patch) LIDODERM 5 % Apply 2 Patches to skin as directed every 24 hours.        Lisinopril (Tab) PRINIVIL 10 MG TAKE ONE TABLET BY MOUTH EVERY DAY.        Lisinopril (Tab) PRINIVIL, ZESTRIL 40 MG Take 1 Tab by mouth every day.        Mirtazapine (Tab) REMERON 15 MG Take 1-2 Tabs by mouth every bedtime.        Misc. Devices (Misc) Misc. Devices  Unilateral diagnostic mammogram right breast with ultrasound if indicated; fax [phone]        Misc. Devices (Misc) Misc. Devices  Freestyle lite test strips; patient has DM type 2 and tests 3 times per day        Polyethylene Glycol 3350 (Powder) MIRALAX  Take 17 g by mouth 2 times a day.        Pravastatin Sodium (Tab) PRAVACHOL 40 MG Take 1 Tab by mouth every day.        Triamcinolone Acetonide (Cream) KENALOG 0.1 % Apply in a thin layer to psoriasis twice per day x 2 weeks on then 2 weeks off        .                 Medicines prescribed today were sent to:     [facility] [place] - [address]    [address] [facility] [address]    Phone: [phone] Fax: [phone]    Open 24 Hours?: No      Medication refill instructions:       If your prescription bottle indicates you have medication refills left, it is not necessary to call your provider’s office. Please contact your pharmacy and they will refill your medication.    If  your prescription bottle indicates you do not have any refills left, you may request refills at any time through one of the following ways: The online [facility] system (except Urgent Care), by calling your provider’s office, or by asking your pharmacy to contact your provider’s office with a refill request. Medication refills are processed only during regular business hours and may not be available until the next business day. Your provider may request additional information or to have a follow-up visit with you prior to refilling your medication.   *Please Note: Medication refills are assigned a new Rx number when refilled electronically. Your pharmacy may indicate that no refills were authorized even though a new prescription for the same medication is available at the pharmacy. Please request the medicine by name with the pharmacy before contacting your provider for a refill.        Instructions    1. Add back amlodipine; start with 1/2 tab once per day for a few days. If BP is still >140/90, then go up to 1 full pill.    2. Follow up in 2 weeks.          [facility] Status: Patient Declined

## 2017-02-09 NOTE — PROGRESS NOTES
Chief Complaint   Patient presents with   • Hospital Follow-up     fv vasculitis, elevated BP, L foot injury       HISTORY OF PRESENT ILLNESS: Patient is a 51 y.o. female established patient who presents today to be seen for acute and chronic issues.    Sprain of left ankle  Patient is a 51-year-old female who comes in today to follow-up from an ER visit. She notes about a month ago she developed a red rash on her legs bilaterally. It was very painful and when she went to the emergency room they give her prednisone which improved the rash. 2 weeks later she developed a GI bug and had nausea and vomiting. Patient notes she frequently has syncope when she is ill like that. She fainted and fell backwards onto her left ankle. Patient had the onset of left ankle pain. She went to the emergency room where x-ray did not show a fracture that she was diagnosed with a sprain. This occurred 3 days ago. Patient notes that since then she's had worsening pain in her ankle and swelling. She is on crutches and staying off of the joint. She cannot walk on it at this time. We had an extensive discussion today about ankle sprains in the length of time it can take to improve. I told the patient she may be on crutches for several weeks. She is not to weight-bear until she can do so without pain. She will start doing range of motion exercises next week when pain has improved. Patient is to use ice on the joint. She was given hydrocodone by the emergency room and did request medication for me today but the patient recently went through narcotic detox in [facility]. I discussed with her that I do not think narcotics are a good idea in her given the fact that she just underwent detoxification to get off of chronic narcotic use. Instead we will try diclofenac and lidocaine patches.    Essential hypertension  Patient notes her blood pressures been very elevated since she came back from [place]. She was taken off of lisinopril as  she had hyperkalemia there. She is currently only taking carvedilol 6.25 mg a day and is not on amlodipine which she was previously on is well. We discussed restarting her amlodipine.    Narcotic dependence, in remission (CMS-HCC)  As noted, patient recently was admitted for voluntary detoxification from narcotics. She had been on large doses from pain management for several years for neuropathy. I discussed with her today that I think we need to avoid them at all costs and that includes right now for the ankle sprain.      Patient Active Problem List    Diagnosis Date Noted   • Narcotic dependence, in remission (CMS-HCC) 02/09/2017     Priority: High   • CKD stage 4 due to type 2 diabetes mellitus 10/04/2016     Priority: High   • Diabetes mellitus type 2, uncontrolled, with complications (CMS-HCC) 02/10/2014     Priority: High   • Sprain of left ankle 02/09/2017   • Hypercoagulable state (CMS-HCC) 01/17/2017   • Vitamin D deficiency 10/04/2016   • Lump of right breast 10/04/2016   • Essential hypertension 10/04/2016   • Slow transit constipation 05/12/2016   • Seasonal allergic rhinitis 05/12/2016   • Tibial plateau fracture 03/31/2015   • Nocturnal hypoxia 03/26/2014   • Dyslipidemia 02/23/2014   • Obesity (BMI 30-39.9) 02/23/2014   • Debilitated patient 02/10/2014   • PKD (polycystic kidney disease) 02/10/2014   • History of venous thromboembolism 02/10/2014   • Elevated serum hCG in female, not pregnant 02/21/2013   • Hypothyroid 10/25/2011   • GERD (gastroesophageal reflux disease) 10/25/2011   • HTN (hypertension) 10/25/2011   • Hyperlipemia 10/25/2011   • Neuropathy in diabetes (CMS-HCC) 10/25/2011       Allergies:Toradol; Levaquin; Lisinopril; and Tape    Current Outpatient Prescriptions Ordered in [facility]   Medication Sig Dispense Refill   • diclofenac EC (VOLTAREN) 75 MG Tablet Delayed Response Take 1 Tab by mouth 2 times a day. 60 Tab 3   • lidocaine (LIDODERM) 5 % Patch Apply 2 Patches to skin as directed  every 24 hours. 20 Patch 3   • amlodipine (NORVASC) 10 MG Tab Take 1 Tab by mouth every day. 90 Tab 3   • gabapentin (NEURONTIN) 600 MG tablet Take 1.5 Tabs by mouth 4 times a day for 90 days. 540 Tab 3   • vitamin D, Ergocalciferol, (DRISDOL) 29559 UNITS Cap capsule Take 1 Cap by mouth every 7 days. 13 Cap 1   • pravastatin (PRAVACHOL) 40 MG tablet Take 1 Tab by mouth every day. 90 Tab 3   • NOVOLOG, insulin aspart, (NOVOLOG FLEXPEN) 100 UNIT/ML Solution Pen-injector injection Inject 15-20 Units as instructed 3 times a day before meals. 45 mL 2   • levothyroxine (SYNTHROID) 112 MCG Tab Take 1 Tab by mouth every day. 90 Tab 2   • Insulin Glargine (TOUJEO SOLOSTAR) 300 UNIT/ML Solution Pen-injector Inject 60 Units as instructed 1 time daily as needed. 54 mL ml   • duloxetine (CYMBALTA) 60 MG Cap DR Particles delayed-release capsule Take 1 Cap by mouth every day. 90 Cap 2   • mirtazapine (REMERON) 15 MG Tab Take 1-2 Tabs by mouth every bedtime. 60 Tab 3   • carvedilol (COREG) 6.25 MG Tab Take 1 Tab by mouth 2 times a day, with meals. 180 Tab 3   • Insulin Glargine (TOUJEO SOLOSTAR) 300 UNIT/ML Solution Pen-injector Inject 60 Units as instructed every day. 18 mL 6   • ergocalciferol (DRISDOL) 47252 UNIT capsule Take 1 Cap by mouth every 7 days. 12 Cap 0   • polyethylene glycol 3350 (MIRALAX) Powder Take 17 g by mouth 2 times a day. 3 Bottle 3   • fluticasone (FLONASE) 50 MCG/ACT nasal spray Spray 2 Sprays in nose every day. 16 g 11   • triamcinolone acetonide (KENALOG) 0.1 % CREA Apply in a thin layer to psoriasis twice per day x 2 weeks on then 2 weeks off 1 Tube 3   • glucose blood (FREESTYLE LITE) strip 1 Each by Other route 4 times a day.     • aspirin EC (ECOTRIN) 81 MG TBEC Take 81 mg by mouth every day.     • Misc. Devices Misc Freestyle lite test strips; patient has DM type 2 and tests 3 times per day 100 Strip 3   • Misc. Devices Misc Unilateral diagnostic mammogram right breast with ultrasound if indicated;  "fax [phone] 1 Device 0   • lisinopril (PRINIVIL, ZESTRIL) 40 MG tablet Take 1 Tab by mouth every day. 90 Tab 1   • lisinopril (PRINIVIL) 10 MG Tab TAKE ONE TABLET BY MOUTH EVERY DAY. 90 Tab 1   • DULoxetine HCl (CYMBALTA PO) Take 90 mg by mouth every day.       No current [facility]-ordered facility-administered medications on file.       Past Medical History   Diagnosis Date   • Type II or unspecified type diabetes mellitus without mention of complication, not stated as uncontrolled 10/25/2011   • HTN (hypertension) 10/25/2011   • Neuropathy in diabetes (CMS-HCC) 10/25/2011   • Presence of IVC filter    • DVT (deep venous thrombosis) (CMS-HCC)    • PE (pulmonary embolism)    • Renal disorder    • Pneumonia    • Unspecified disorder of thyroid    • Other specified symptom associated with female genital organs        Social History   Substance Use Topics   • Smoking status: Never Smoker    • Smokeless tobacco: Never Used   • Alcohol Use: No       Family Status   Relation Status Death Age   • Mother     • Father Alive      Family History   Problem Relation Age of Onset   • Cancer Mother      Bone   • Heart Disease Father    • Cancer Father      Bladder, Prostate       ROS:  Review of Systems   Constitutional: Negative for fever and malaise/fatigue.   HENT: Negative for congestion  Respiratory: Negative for cough  Cardiovascular: Negative for chest pain  Gastrointestinal: Negative for nausea, vomiting and abdominal pain.  Musculoskeletal: positive for left ankle pain  All other systems reviewed and are negative except as in HPI.      Exam:  Blood pressure 144/90, pulse 86, temperature 37.1 °C (98.8 °F), resp. rate 16, height 1.778 m (5' 10\"), weight 102.513 kg (226 lb), SpO2 97 %.  General:  obese female in NAD  Head is grossly normal.  Neck: Supple without JVD   Pulmonary: Clear to ausculation and percussion.  Normal effort. No rales, ronchi, or wheezing.  Cardiovascular: Regular rate and rhythm without murmur. " Carotid and radial pulses are intact and equal bilaterally.  Extremities: swelling of the left ankle, pain with palpation over the joint        Assessment/Plan:  1. Sprain of left ankle, unspecified ligament, initial encounter  diclofenac EC (VOLTAREN) 75 MG Tablet Delayed Response    lidocaine (LIDODERM) 5 % Patch    Uncontrolled, will try anti-inflammatories, lidocaine patches. Patient will follow-up in 2 weeks   2. Essential hypertension  amlodipine (NORVASC) 10 MG Tab    Uncontrolled, decreasing amlodipine and increasing carvedilol   3. Narcotic dependence, in remission (CMS-HCC)      controlled, no narcotics given today     Please note that this dictation was created using voice recognition software. I have made every reasonable attempt to correct obvious errors, but I expect that there are errors of grammar and possibly content that I did not discover before finalizing the note.

## 2017-02-09 NOTE — ASSESSMENT & PLAN NOTE
As noted, patient recently was admitted for voluntary detoxification from narcotics. She had been on large doses from pain management for several years for neuropathy. I discussed with her today that I think we need to avoid them at all costs and that includes right now for the ankle sprain.

## 2017-02-09 NOTE — ASSESSMENT & PLAN NOTE
Patient notes her blood pressures been very elevated since she came back from [place]. She was taken off of lisinopril as she had hyperkalemia there. She is currently only taking carvedilol 6.25 mg a day and is not on amlodipine which she was previously on is well. We discussed restarting her amlodipine.

## 2017-04-11 ENCOUNTER — TELEPHONE (OUTPATIENT)
Dept: MEDICAL GROUP | Facility: PHYSICIAN GROUP | Age: 52
End: 2017-04-11

## 2017-04-14 ENCOUNTER — OFFICE VISIT (OUTPATIENT)
Dept: MEDICAL GROUP | Facility: PHYSICIAN GROUP | Age: 52
End: 2017-04-14
Payer: COMMERCIAL

## 2017-04-14 VITALS
HEART RATE: 109 BPM | TEMPERATURE: 97.7 F | RESPIRATION RATE: 16 BRPM | SYSTOLIC BLOOD PRESSURE: 140 MMHG | OXYGEN SATURATION: 97 % | DIASTOLIC BLOOD PRESSURE: 90 MMHG

## 2017-04-14 DIAGNOSIS — E11.22 CKD STAGE 4 DUE TO TYPE 2 DIABETES MELLITUS (HCC): ICD-10-CM

## 2017-04-14 DIAGNOSIS — S82.892D CLOSED FRACTURE OF LEFT ANKLE WITH ROUTINE HEALING: ICD-10-CM

## 2017-04-14 DIAGNOSIS — R10.84 GENERALIZED ABDOMINAL PAIN: ICD-10-CM

## 2017-04-14 DIAGNOSIS — I10 ESSENTIAL HYPERTENSION: ICD-10-CM

## 2017-04-14 DIAGNOSIS — E78.5 HYPERLIPIDEMIA, UNSPECIFIED HYPERLIPIDEMIA TYPE: ICD-10-CM

## 2017-04-14 DIAGNOSIS — G62.9 NEUROPATHY: ICD-10-CM

## 2017-04-14 DIAGNOSIS — R19.7 DIARRHEA, UNSPECIFIED TYPE: ICD-10-CM

## 2017-04-14 DIAGNOSIS — N18.4 CKD STAGE 4 DUE TO TYPE 2 DIABETES MELLITUS (HCC): ICD-10-CM

## 2017-04-14 DIAGNOSIS — R60.9 EDEMA, UNSPECIFIED TYPE: ICD-10-CM

## 2017-04-14 DIAGNOSIS — E03.9 HYPOTHYROIDISM, UNSPECIFIED TYPE: ICD-10-CM

## 2017-04-14 DIAGNOSIS — F41.9 ANXIETY: ICD-10-CM

## 2017-04-14 PROCEDURE — 99214 OFFICE O/P EST MOD 30 MIN: CPT | Performed by: NURSE PRACTITIONER

## 2017-04-14 RX ORDER — FUROSEMIDE 40 MG/1
40 TABLET ORAL DAILY
COMMUNITY
End: 2017-04-14 | Stop reason: SDUPTHER

## 2017-04-14 RX ORDER — METRONIDAZOLE 500 MG/1
500 TABLET ORAL 3 TIMES DAILY
Qty: 30 TAB | Refills: 0 | Status: ON HOLD | OUTPATIENT
Start: 2017-04-14 | End: 2018-07-09

## 2017-04-14 RX ORDER — FUROSEMIDE 40 MG/1
40 TABLET ORAL DAILY
Qty: 90 TAB | Refills: 3 | Status: ON HOLD | OUTPATIENT
Start: 2017-04-14 | End: 2018-07-09

## 2017-04-14 RX ORDER — HYDROXYZINE HYDROCHLORIDE 25 MG/1
25 TABLET, FILM COATED ORAL 3 TIMES DAILY PRN
Qty: 60 TAB | Refills: 2 | Status: ON HOLD | OUTPATIENT
Start: 2017-04-14 | End: 2018-07-09

## 2017-04-14 RX ORDER — GABAPENTIN 600 MG/1
900 TABLET ORAL 4 TIMES DAILY
Qty: 540 TAB | Refills: 3 | Status: SHIPPED | OUTPATIENT
Start: 2017-04-14 | End: 2017-07-13

## 2017-04-14 RX ORDER — DULOXETIN HYDROCHLORIDE 60 MG/1
60 CAPSULE, DELAYED RELEASE ORAL DAILY
Qty: 90 CAP | Refills: 3 | Status: ON HOLD | OUTPATIENT
Start: 2017-04-14 | End: 2019-08-23

## 2017-04-14 RX ORDER — LEVOTHYROXINE SODIUM 112 UG/1
112 TABLET ORAL DAILY
Qty: 90 TAB | Refills: 3 | Status: SHIPPED | OUTPATIENT
Start: 2017-04-14

## 2017-04-14 RX ORDER — TEMAZEPAM 15 MG/1
15 CAPSULE ORAL NIGHTLY PRN
Status: ON HOLD | COMMUNITY
End: 2019-08-23

## 2017-04-14 RX ORDER — METOPROLOL SUCCINATE 25 MG/1
25 TABLET, EXTENDED RELEASE ORAL DAILY
COMMUNITY
End: 2017-04-14 | Stop reason: SDUPTHER

## 2017-04-14 RX ORDER — METOPROLOL SUCCINATE 25 MG/1
25 TABLET, EXTENDED RELEASE ORAL DAILY
Qty: 90 TAB | Refills: 3 | Status: ON HOLD | OUTPATIENT
Start: 2017-04-14 | End: 2018-07-09

## 2017-04-14 ASSESSMENT — PAIN SCALES - GENERAL: PAINLEVEL: 6=MODERATE PAIN

## 2017-04-14 ASSESSMENT — PATIENT HEALTH QUESTIONNAIRE - PHQ9: CLINICAL INTERPRETATION OF PHQ2 SCORE: 1

## 2017-04-14 NOTE — ASSESSMENT & PLAN NOTE
Chronic, stable on Norvasc 5 mg & metoprolol 25 mg daily. Her lisinopril was discontinued in [place] due to hyperkalemia. She has stage IV kidney disease due to type 2 diabetes and uncontrolled hypertension, she continues follow-up with her nephrologist Dr. Kumar who is managing her hypertension this point.

## 2017-04-14 NOTE — ASSESSMENT & PLAN NOTE
Patient continues on pravastatin 40 mg daily, her total cholesterol and LDL are within normal limits. When last checked July 2016, her triglycerides were elevated in the 500's. She will be due for repeat labs at follow up.

## 2017-04-14 NOTE — ASSESSMENT & PLAN NOTE
This is a chronic condition which is well controlled on medications. Patient is tolerating medications without side effects.  Will be due for repeat TSH 7/2017

## 2017-04-14 NOTE — ASSESSMENT & PLAN NOTE
This is a 51-year-old female who is here for follow-up of left ankle fracture. She reports that she had a fall in February 2017, she was diagnosed with an ankle sprain and walked around on the ankle for 2 weeks in a walking boot prior to having repeat x-ray which showed fractures in 3 places. She saw Dr. Jamil who placed external fixator and hardware. She continues in a walking boot, she is not using narcotics as she recently went through narcotic detox. She has been quite anxious since this injury and is requesting a something to calm her down. She does take temazepam for sleep but also reports that this helps calm her down. She is to follow-up with Dr. Jamil in 4 weeks.

## 2017-04-14 NOTE — PROGRESS NOTES
Reason ongoing symptoms now [facility]  Primary Care Office Visit - Problem-Oriented        History:     Bryan Garcia is a 51 y.o. female who is here today to discuss Establish Care and Foot Problem      Closed fracture of left ankle with routine healing  This is a 51-year-old female who is here for follow-up of left ankle fracture. She reports that she had a fall in February 2017, she was diagnosed with an ankle sprain and walked around on the ankle for 2 weeks in a walking boot prior to having repeat x-ray which showed fractures in 3 places. She saw Dr. Jamil who placed external fixator and hardware. She continues in a walking boot, she is not using narcotics as she recently went through narcotic detox. She has been quite anxious since this injury and is requesting a something to calm her down. She does take temazepam for sleep but also reports that this helps calm her down. She is to follow-up with Dr. Jamil in 4 weeks.          Generalized abdominal pain  As noted this is a 51-year-old female who is here for follow-up of left ankle fracture. She was discharged from hospital 2 weeks ago following external fixation of left ankle by Dr. Jamil. She was discharged on dual antibiotics, she was also treated with antibiotics in the hospital following surgery. Since discharge she has had 2 weeks of watery, frothy, foul-smelling, yellow diarrhea. She has had no fevers, hematochezia, rectal pain, unintentional weight loss.  Initially she did have some generalized abdominal cramping but this has resolved. We discussed stool studies, C. Difficile. I will treat her for C. Difficile with Flagyl given her recent hospitalization and multiple courses of antibiotics. She is to follow-up in 2-3 weeks, sooner if needed.    CKD stage 4 due to type 2 diabetes mellitus  Patient continues to be followed by nephrology, Dr. Kumar.     Hypothyroid  This is a chronic condition which is well controlled on  medications. Patient is tolerating medications without side effects.  Will be due for repeat TSH 7/2017     Essential hypertension  Chronic, stable on Norvasc 5 mg & metoprolol 25 mg daily. Her lisinopril was discontinued in [place] due to hyperkalemia. She has stage IV kidney disease due to type 2 diabetes and uncontrolled hypertension, she continues follow-up with her nephrologist Dr. Kumar who is managing her hypertension this point.    Hyperlipemia  Patient continues on pravastatin 40 mg daily, her total cholesterol and LDL are within normal limits. When last checked July 2016, her triglycerides were elevated in the 500's. She will be due for repeat labs at follow up.           Past Medical History   Diagnosis Date   • Type II or unspecified type diabetes mellitus without mention of complication, not stated as uncontrolled 10/25/2011   • HTN (hypertension) 10/25/2011   • Neuropathy in diabetes (CMS-HCC) 10/25/2011   • Presence of IVC filter    • DVT (deep venous thrombosis) (CMS-[facility])    • PE (pulmonary embolism)    • Renal disorder    • Pneumonia    • Unspecified disorder of thyroid    • Other specified symptom associated with female genital organs      Past Surgical History   Procedure Laterality Date   • Abdominal hysterectomy total       Right ovary remains   • Knee arthroscopy       3 surgeries right knee   • Cholecystectomy     • Gastroscopy  2/22/2013     Performed by Reid Yi D.O. at [facility]   • Colonoscopy  2/22/2013     Performed by Reid Yi D.O. at [facility]     Social History     Social History   • Marital Status:      Spouse Name: N/A   • Number of Children: N/A   • Years of Education: N/A     Occupational History   • Not on file.     Social History Main Topics   • Smoking status: Never Smoker    • Smokeless tobacco: Never Used   • Alcohol Use: No   • Drug Use: No   • Sexual Activity:     Partners: Male     Birth Control/ Protection:  Post-Menopausal     Other Topics Concern   • Not on file     Social History Narrative     History   Smoking status   • Never Smoker    Smokeless tobacco   • Never Used     Family History   Problem Relation Age of Onset   • Cancer Mother      Bone   • Heart Disease Father    • Cancer Father      Bladder, Prostate     Allergies   Allergen Reactions   • Toradol Hives   • Levaquin      Blood boils   • Lisinopril Unspecified     Caused potassium build up in blood   • Tape        Problem List:     Patient Active Problem List    Diagnosis Date Noted   • Narcotic dependence, in remission (CMS-HCC) 02/09/2017     Priority: High   • CKD stage 4 due to type 2 diabetes mellitus 10/04/2016     Priority: High   • Diabetes mellitus type 2, uncontrolled, with complications (CMS-HCC) 02/10/2014     Priority: High   • Closed fracture of left ankle with routine healing 04/14/2017   • Generalized abdominal pain 04/14/2017   • Sprain of left ankle 02/09/2017   • Hypercoagulable state (CMS-HCC) 01/17/2017   • Vitamin D deficiency 10/04/2016   • Lump of right breast 10/04/2016   • Essential hypertension 10/04/2016   • Slow transit constipation 05/12/2016   • Seasonal allergic rhinitis 05/12/2016   • Tibial plateau fracture 03/31/2015   • Nocturnal hypoxia 03/26/2014   • Dyslipidemia 02/23/2014   • Obesity (BMI 30-39.9) 02/23/2014   • Debilitated patient 02/10/2014   • PKD (polycystic kidney disease) 02/10/2014   • History of venous thromboembolism 02/10/2014   • Elevated serum hCG in female, not pregnant 02/21/2013   • Hypothyroid 10/25/2011   • GERD (gastroesophageal reflux disease) 10/25/2011   • Hyperlipemia 10/25/2011   • Neuropathy in diabetes (CMS-HCC) 10/25/2011         Medications:     Current outpatient prescriptions:   •  temazepam (RESTORIL) 15 MG Cap, Take 15 mg by mouth at bedtime as needed for Sleep., Disp: , Rfl:   •  hydrOXYzine (ATARAX) 25 MG Tab, Take 1 Tab by mouth 3 times a day as needed for Itching., Disp: 60 Tab,  Rfl: 2  •  metronidazole (FLAGYL) 500 MG Tab, Take 1 Tab by mouth 3 times a day., Disp: 30 Tab, Rfl: 0  •  metoprolol SR (TOPROL XL) 25 MG TABLET SR 24 HR, Take 1 Tab by mouth every day., Disp: 90 Tab, Rfl: 3  •  furosemide (LASIX) 40 MG Tab, Take 1 Tab by mouth every day., Disp: 90 Tab, Rfl: 3  •  gabapentin (NEURONTIN) 600 MG tablet, Take 1.5 Tabs by mouth 4 times a day for 90 days., Disp: 540 Tab, Rfl: 3  •  levothyroxine (SYNTHROID) 112 MCG Tab, Take 1 Tab by mouth every day., Disp: 90 Tab, Rfl: 3  •  Insulin Glargine (TOUJEO SOLOSTAR) 300 UNIT/ML Solution Pen-injector, Inject 60 Units as instructed 1 time daily as needed., Disp: 54 mL, Rfl: 5  •  duloxetine (CYMBALTA) 60 MG Cap DR Particles delayed-release capsule, Take 1 Cap by mouth every day., Disp: 90 Cap, Rfl: 3  •  NOVOLOG, insulin aspart, (NOVOLOG FLEXPEN) 100 UNIT/ML Solution Pen-injector injection, Inject 15-20 Units as instructed 3 times a day before meals., Disp: 45 mL, Rfl: 5  •  amlodipine (NORVASC) 10 MG Tab, Take 1 Tab by mouth every day., Disp: 90 Tab, Rfl: 3  •  vitamin D, Ergocalciferol, (DRISDOL) 65652 UNITS Cap capsule, Take 1 Cap by mouth every 7 days., Disp: 13 Cap, Rfl: 1  •  pravastatin (PRAVACHOL) 40 MG tablet, Take 1 Tab by mouth every day., Disp: 90 Tab, Rfl: 3  •  triamcinolone acetonide (KENALOG) 0.1 % CREA, Apply in a thin layer to psoriasis twice per day x 2 weeks on then 2 weeks off, Disp: 1 Tube, Rfl: 3  •  aspirin EC (ECOTRIN) 81 MG TBEC, Take 81 mg by mouth every day., Disp: , Rfl:   •  diclofenac EC (VOLTAREN) 75 MG Tablet Delayed Response, Take 1 Tab by mouth 2 times a day., Disp: 60 Tab, Rfl: 3  •  lidocaine (LIDODERM) 5 % Patch, Apply 2 Patches to skin as directed every 24 hours., Disp: 20 Patch, Rfl: 3  •  Misc. Devices Misc, Freestyle lite test strips; patient has DM type 2 and tests 3 times per day, Disp: 100 Strip, Rfl: 3  •  Insulin Glargine (TOUJEO SOLOSTAR) 300 UNIT/ML Solution Pen-injector, Inject 60 Units as  instructed every day., Disp: 18 mL, Rfl: 6  •  Misc. Devices Misc, Unilateral diagnostic mammogram right breast with ultrasound if indicated; fax [phone], Disp: 1 Device, Rfl: 0  •  ergocalciferol (DRISDOL) 18045 UNIT capsule, Take 1 Cap by mouth every 7 days., Disp: 12 Cap, Rfl: 0  •  polyethylene glycol 3350 (MIRALAX) Powder, Take 17 g by mouth 2 times a day., Disp: 3 Bottle, Rfl: 3  •  fluticasone (FLONASE) 50 MCG/ACT nasal spray, Spray 2 Sprays in nose every day., Disp: 16 g, Rfl: 11  •  glucose blood (FREESTYLE LITE) strip, 1 Each by Other route 4 times a day., Disp: , Rfl:       Review of Systems:     (positives in bold), all other systems reviewed and WNL     GI: nausea, vomiting, abdominal pain, greasy stools, blood in stool, diarrhea, constipation  MSK: muscle/joint pain, joint swelling  Psych: + anxiety     Physical Assessment:     VS: /90 mmHg  Pulse 109  Temp(Src) 36.5 °C (97.7 °F)  Resp 16  Ht   Wt   SpO2 97%    General: Well-developed, well-nourished female, sitting in WC   Head: PERRL, EOMI. Normocephalic. No facial asymmetry noted.  Cardiovasc:No JVD.  RRR, no MRG. No thrills or bruits. Pulses 2+ and symmetric at all distal extremities.  Pulmonary: Lungs clear bilaterally.  Normal respiratory effort. No wheeze or crackles.   Extremities: LLE walking boot.   Neuro: Alert and oriented  Skin:No rashes noted. Skin warm, dry, intact    Psych: Dressed appropriately for the weather, pleasant and conversant.  Affect, mood & judgment appropriate.      Assessment/Plan:   Bryan Carmona was seen today for establish care and foot problem.    Diagnoses and all orders for this visit:    Closed fracture of left ankle with routine healing, stable  - Continue to follow up with orthopedics, Dr. Jamil     Diarrhea, unspecified type, new   - Concern for C. difficile colitis given recent hospitalization and multiple courses of antibiotics. We'll obtain stool studies. Start Flagyl 500 mg 3 times a day ×10  days. Follow-up in 2 weeks if symptoms persist.  -     CDIFF BY PCR; Future  -     CULTURE STOOL; Future  -     metronidazole (FLAGYL) 500 MG Tab; Take 1 Tab by mouth 3 times a day.    Anxiety, ongoing, uncontrolled   -     hydrOXYzine (ATARAX) 25 MG Tab; Take 1 Tab by mouth 3 times a day as needed for Itching.    Uncontrolled type 2 diabetes mellitus with diabetic polyneuropathy, with long-term current use of insulin (CMS-[facility]), ongoing, unclear control   - continue current treatment, managed by nephrology? will need repeat labs at follow up  -     Insulin Glargine (TOUJEO SOLOSTAR) 300 UNIT/ML Solution Pen-injector; Inject 60 Units as instructed 1 time daily as needed.  -     NOVOLOG, insulin aspart, (NOVOLOG FLEXPEN) 100 UNIT/ML Solution Pen-injector injection; Inject 15-20 Units as instructed 3 times a day before meals.    Neuropathy (CMS-[facility]), chronic, stable   -     gabapentin (NEURONTIN) 600 MG tablet; Take 1.5 Tabs by mouth 4 times a day for 90 days.  -     duloxetine (CYMBALTA) 60 MG Cap DR Particles delayed-release capsule; Take 1 Cap by mouth every day.    Hypothyroidism, unspecified type, chronic, stable  -Continue current treatment, due for repeat labs  -     levothyroxine (SYNTHROID) 112 MCG Tab; Take 1 Tab by mouth every day.    Essential hypertension, chronic, stable on present medications  -     metoprolol SR (TOPROL XL) 25 MG TABLET SR 24 HR; Take 1 Tab by mouth every day.    Edema, unspecified type, chronic, intermittent, stable with when necessary Lasix  -     furosemide (LASIX) 40 MG Tab; Take 1 Tab by mouth every day.    CKD stage 4 due to type 2 diabetes mellitus, chronic, unclear control  - Managed by nephrology    Hyperlipidemia, unspecified hyperlipidemia type, chronic, unclear control  - Continue statin, due for repeat labs at follow-up.      Patient is agreeable to the above plan and voiced understanding. All questions answered.     Please note that this dictation was created using voice  recognition software. I have made every reasonable attempt to correct obvious errors, but I expect that there are errors of grammar and possibly content that I did not discover before finalizing the note.      HAO Timmons  4/14/2017, 9:31 AM

## 2017-04-14 NOTE — MR AVS SNAPSHOT
Bryan Carmona Jose   2017 8:00 AM   Office Visit   MRN: [id number]    Department:  [facility]   Dept Phone:  [phone]    Description:  Female : 1965   Provider:  ARIANE Melendrez           Reason for Visit     Establish Care [facility] - abdominal pain/diarrhea.     Foot Problem Broke in 3 places.       Allergies as of 2017     Allergen Noted Reactions    Toradol 2012   Hives    Levaquin 10/05/2011       Blood boils    Lisinopril 2017   Unspecified    Caused potassium build up in blood    Tape 2012         You were diagnosed with     Closed fracture of left ankle with routine healing   [4966067]       Generalized abdominal pain   [855008]       Anxiety   [630652]       Diarrhea, unspecified type   [6708916]       Neuropathy (CMS-HCC)   [776142]       Hypothyroidism, unspecified type   [3870907]       Uncontrolled type 2 diabetes mellitus with diabetic polyneuropathy, with long-term current use of insulin (CMS-HCC)   [8048582]       Essential hypertension   [0183608]       Edema, unspecified type   [3675470]         Vital Signs     Blood Pressure Pulse Temperature Respirations          140/90 mmHg 109 36.5 °C (97.7 °F) 16      Oxygen Saturation Smoking Status                97% Never Smoker           Basic Information     Date Of Birth Sex Race Ethnicity Preferred Language    1965 Female White Non- English      Problem List              ICD-10-CM Priority Class Noted - Resolved    Hypothyroid E03.9   10/25/2011 - Present    GERD (gastroesophageal reflux disease) K21.9   10/25/2011 - Present    HTN (hypertension) I10   10/25/2011 - Present    Hyperlipemia E78.5   10/25/2011 - Present    Neuropathy in diabetes (CMS-[facility]) E11.40   10/25/2011 - Present    Elevated serum hCG in female, not pregnant E34.9   2013 - Present    Debilitated patient R53.81   2/10/2014 - Present    PKD (polycystic kidney disease) Q61.3   2/10/2014 - Present    Diabetes mellitus type 2, uncontrolled, with complications (CMS-HCC) E11.8, E11.65 High  2/10/2014 - Present    History of venous thromboembolism Z86.718   2/10/2014 - Present    Dyslipidemia E78.5   2/23/2014 - Present    Obesity (BMI 30-39.9) E66.9   2/23/2014 - Present    Nocturnal hypoxia G47.34   3/26/2014 - Present    Tibial plateau fracture S82.143A   3/31/2015 - Present    Slow transit constipation K59.01   5/12/2016 - Present    Seasonal allergic rhinitis J30.2   5/12/2016 - Present    Vitamin D deficiency E55.9   10/4/2016 - Present    CKD stage 4 due to type 2 diabetes mellitus E11.22, N18.4 High  10/4/2016 - Present    Lump of right breast N63   10/4/2016 - Present    Essential hypertension I10   10/4/2016 - Present    Hypercoagulable state (CMS-HCC) D68.59   1/17/2017 - Present    Sprain of left ankle S93.402A   2/9/2017 - Present    Narcotic dependence, in remission (CMS-HCC) F19.21 High  2/9/2017 - Present    Closed fracture of left ankle with routine healing S82.892D   4/14/2017 - Present    Generalized abdominal pain R10.84   4/14/2017 - Present      Health Maintenance        Date Due Completion Dates    IMM HEP B VACCINE (1 of 3 - Primary Series) 1965 ---    RETINAL SCREENING 7/19/1982 ---    IMM DTaP/Tdap/Td Vaccine (1 - Tdap) 7/19/1983 ---    IMM PNEUMOCOCCAL 19-64 (ADULT) HIGHEST RISK SERIES (2 of 3 - PCV13) 3/17/2011 3/17/2010    MAMMOGRAM 4/17/2015 4/17/2014    A1C SCREENING 1/1/2017 7/1/2016, 2/10/2014, 2/20/2013, 3/17/2012    FASTING LIPID PROFILE 7/1/2017 7/1/2016, 3/26/2014, 3/18/2012    URINE ACR / MICROALBUMIN 7/1/2017 7/1/2016, 2/10/2014    SERUM CREATININE 7/1/2017 7/1/2016, 3/26/2014, 2/10/2014, 2/23/2013, 2/21/2013, 2/20/2013, 2/20/2013, 2/19/2013, 5/8/2012, 5/7/2012, 5/6/2012, 5/5/2012, 3/27/2012, 3/26/2012, 3/25/2012, 3/24/2012, 3/23/2012, 3/22/2012, 3/21/2012, 3/20/2012, 3/19/2012, 3/18/2012, 3/17/2012    DIABETES MONOFILAMENT / LE EXAM 10/4/2017 10/4/2016    COLONOSCOPY  2/22/2023 2/22/2013            Current Immunizations     Influenza TIV (IM) 2/20/2013  3:48 AM, 3/17/2012  4:58 AM    Pneumococcal polysaccharide vaccine (PPSV-23) 3/17/2010      Below and/or attached are the medications your provider expects you to take. Review all of your home medications and newly ordered medications with your provider and/or pharmacist. Follow medication instructions as directed by your provider and/or pharmacist. Please keep your medication list with you and share with your provider. Update the information when medications are discontinued, doses are changed, or new medications (including over-the-counter products) are added; and carry medication information at all times in the event of emergency situations     Allergies:  TORADOL - Hives     LEVAQUIN - (reactions not documented)     LISINOPRIL - Unspecified     TAPE - (reactions not documented)               Medications  Valid as of: April 14, 2017 -  8:30 AM    Generic Name Brand Name Tablet Size Instructions for use    AmLODIPine Besylate (Tab) NORVASC 10 MG Take 1 Tab by mouth every day.        Aspirin (Tablet Delayed Response) ECOTRIN 81 MG Take 81 mg by mouth every day.        Diclofenac Sodium (Tablet Delayed Response) VOLTAREN 75 MG Take 1 Tab by mouth 2 times a day.        DULoxetine HCl (Cap DR Particles) CYMBALTA 60 MG Take 1 Cap by mouth every day.        Ergocalciferol (Cap) DRISDOL 14184 UNIT Take 1 Cap by mouth every 7 days.        Ergocalciferol (Cap) DRISDOL 91478 UNITS Take 1 Cap by mouth every 7 days.        Fluticasone Propionate (Suspension) FLONASE 50 MCG/ACT Spray 2 Sprays in nose every day.        Furosemide (Tab) LASIX 40 MG Take 1 Tab by mouth every day.        Gabapentin (Tab) NEURONTIN 600 MG Take 1.5 Tabs by mouth 4 times a day for 90 days.        Glucose Blood (Strip) glucose blood  1 Each by Other route 4 times a day.        HydrOXYzine HCl (Tab) ATARAX 25 MG Take 1 Tab by mouth 3 times a day as needed for  Itching.        Insulin Aspart (Solution Pen-injector) NOVOLOG 100 UNIT/ML Inject 15-20 Units as instructed 3 times a day before meals.        Insulin Glargine (Solution Pen-injector) Insulin Glargine 300 UNIT/ML Inject 60 Units as instructed every day.        Insulin Glargine (Solution Pen-injector) Insulin Glargine 300 UNIT/ML Inject 60 Units as instructed 1 time daily as needed.        Levothyroxine Sodium (Tab) SYNTHROID 112 MCG Take 1 Tab by mouth every day.        Lidocaine (Patch) LIDODERM 5 % Apply 2 Patches to skin as directed every 24 hours.        Metoprolol Succinate (TABLET SR 24 HR) TOPROL XL 25 MG Take 1 Tab by mouth every day.        MetroNIDAZOLE (Tab) FLAGYL 500 MG Take 1 Tab by mouth 3 times a day.        Misc. Devices (Misc) Misc. Devices  Unilateral diagnostic mammogram right breast with ultrasound if indicated; fax [phone]        Misc. Devices (Misc) Misc. Devices  Freestyle lite test strips; patient has DM type 2 and tests 3 times per day        Polyethylene Glycol 3350 (Powder) MIRALAX  Take 17 g by mouth 2 times a day.        Pravastatin Sodium (Tab) PRAVACHOL 40 MG Take 1 Tab by mouth every day.        Temazepam (Cap) RESTORIL 15 MG Take 15 mg by mouth at bedtime as needed for Sleep.        Triamcinolone Acetonide (Cream) KENALOG 0.1 % Apply in a thin layer to psoriasis twice per day x 2 weeks on then 2 weeks off        .                 Medicines prescribed today were sent to:     [facility] [facility] [place] - [address] [facility] [address]    [address] [place] [address]    Phone: [phone] Fax: [phone]    Open 24 Hours?: No      Medication refill instructions:       If your prescription bottle indicates you have medication refills left, it is not necessary to call your provider’s office. Please contact your pharmacy and they will refill your medication.    If your prescription bottle indicates you do not have any refills left, you may request refills at  any time through one of the following ways: The online YellowBrck system (except Urgent Care), by calling your provider’s office, or by asking your pharmacy to contact your provider’s office with a refill request. Medication refills are processed only during regular business hours and may not be available until the next business day. Your provider may request additional information or to have a follow-up visit with you prior to refilling your medication.   *Please Note: Medication refills are assigned a new Rx number when refilled electronically. Your pharmacy may indicate that no refills were authorized even though a new prescription for the same medication is available at the pharmacy. Please request the medicine by name with the pharmacy before contacting your provider for a refill.        Your To Do List     Future Labs/Procedures Complete By Expires    CDIFF BY PCR  As directed 4/15/2017    CULTURE STOOL  As directed 4/15/2018         YellowBrck Access Code: [email or web address]  Expires: 4/18/2017 10:50 AM    YellowBrck  A secure, online tool to manage your health information     [employer]’s YellowBrck® is a secure, online tool that connects you to your personalized health information from the privacy of your home -- day or night - making it very easy for you to manage your healthcare. Once the activation process is completed, you can even access your medical information using the YellowBrck colette, which is available for free in the Apple Colette store or Google Play store.     YellowBrck provides the following levels of access (as shown below):   My Chart Features   [facility] Doctor [facility]  Specialists [facility]  Urgent  Care Non-[facility]  Primary Care  Doctor   Email your healthcare team securely and privately 24/7 X X X    Manage appointments: schedule your next appointment; view details of past/upcoming appointments X      Request prescription refills. X      View recent personal medical records, including lab and  immunizations X X X X   View health record, including health history, allergies, medications X X X X   Read reports about your outpatient visits, procedures, consult and ER notes X X X X   See your discharge summary, which is a recap of your hospital and/or ER visit that includes your diagnosis, lab results, and care plan. X X       How to register for [employer]:  1. Go to  [email or web address].  2. Click on the Sign Up Now box, which takes you to the New Member Sign Up page. You will need to provide the following information:  a. Enter your [employer] Access Code exactly as it appears at the top of this page. (You will not need to use this code after you’ve completed the sign-up process. If you do not sign up before the expiration date, you must request a new code.)   b. Enter your date of birth.   c. Enter your home email address.   d. Click Submit, and follow the next screen’s instructions.  3. Create a [employer] ID. This will be your [employer] login ID and cannot be changed, so think of one that is secure and easy to remember.  4. Create a [employer] password. You can change your password at any time.  5. Enter your Password Reset Question and Answer. This can be used at a later time if you forget your password.   6. Enter your e-mail address. This allows you to receive e-mail notifications when new information is available in [employer].  7. Click Sign Up. You can now view your health information.    For assistance activating your [employer] account, call [phone]

## 2017-04-14 NOTE — ASSESSMENT & PLAN NOTE
As noted this is a 51-year-old female who is here for follow-up of left ankle fracture. She was discharged from hospital 2 weeks ago following external fixation of left ankle by Dr. Jamil. She was discharged on dual antibiotics, she was also treated with antibiotics in the hospital following surgery. Since discharge she has had 2 weeks of watery, frothy, foul-smelling, yellow diarrhea. She has had no fevers, hematochezia, rectal pain, unintentional weight loss.  Initially she did have some generalized abdominal cramping but this has resolved. We discussed stool studies, C. Difficile. I will treat her for C. Difficile with Flagyl given her recent hospitalization and multiple courses of antibiotics. She is to follow-up in 2-3 weeks, sooner if needed.

## 2017-12-14 RX ORDER — BLOOD-GLUCOSE METER
KIT MISCELLANEOUS
Qty: 200 STRIP | Refills: 2 | Status: SHIPPED | OUTPATIENT
Start: 2017-12-14 | End: 2018-04-30 | Stop reason: SDUPTHER

## 2017-12-14 NOTE — TELEPHONE ENCOUNTER
Was the patient seen in the last year in this department? Yes Lov 4/14/2017    Does patient have an active prescription for medications requested? No     Received Request Via: Pharmacy

## 2018-01-05 ENCOUNTER — HOSPITAL ENCOUNTER (OUTPATIENT)
Dept: HOSPITAL 8 - CFH | Age: 53
End: 2018-01-05
Attending: ORTHOPAEDIC SURGERY
Payer: COMMERCIAL
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
## 2018-01-05 DIAGNOSIS — M19.071: ICD-10-CM

## 2018-01-05 DIAGNOSIS — S82.842P: Primary | ICD-10-CM

## 2018-04-30 NOTE — TELEPHONE ENCOUNTER
Was the patient seen in the last year in this department? No     Does patient have an active prescription for medications requested? No     Received Request Via: Pharmacy     Pt has not been seen since 4/14/17 does not have OV scheduled.

## 2018-06-19 ENCOUNTER — HOSPITAL ENCOUNTER (OUTPATIENT)
Facility: MEDICAL CENTER | Age: 53
DRG: 064 | End: 2018-06-19
Admitting: HOSPITALIST
Payer: COMMERCIAL

## 2018-06-19 ENCOUNTER — HOSPITAL ENCOUNTER (INPATIENT)
Facility: MEDICAL CENTER | Age: 53
LOS: 20 days | DRG: 064 | End: 2018-07-09
Attending: HOSPITALIST | Admitting: FAMILY MEDICINE
Payer: COMMERCIAL

## 2018-06-19 DIAGNOSIS — S82.892D CLOSED FRACTURE OF LEFT ANKLE WITH ROUTINE HEALING: ICD-10-CM

## 2018-06-19 DIAGNOSIS — E13.49 OTHER DIABETIC NEUROLOGICAL COMPLICATION ASSOCIATED WITH OTHER SPECIFIED DIABETES MELLITUS (HCC): ICD-10-CM

## 2018-06-19 DIAGNOSIS — H53.8 VISION BLURRED: ICD-10-CM

## 2018-06-19 DIAGNOSIS — I63.9 CEREBROVASCULAR ACCIDENT (CVA), UNSPECIFIED MECHANISM (HCC): ICD-10-CM

## 2018-06-19 DIAGNOSIS — I63.89 CEREBROVASCULAR ACCIDENT (CVA) DUE TO OTHER MECHANISM (HCC): ICD-10-CM

## 2018-06-19 DIAGNOSIS — K59.01 SLOW TRANSIT CONSTIPATION: ICD-10-CM

## 2018-06-19 DIAGNOSIS — Z87.19 HISTORY OF GI BLEED: ICD-10-CM

## 2018-06-19 DIAGNOSIS — R11.2 NON-INTRACTABLE VOMITING WITH NAUSEA, UNSPECIFIED VOMITING TYPE: ICD-10-CM

## 2018-06-19 DIAGNOSIS — R13.10 DYSPHAGIA, UNSPECIFIED TYPE: ICD-10-CM

## 2018-06-19 PROCEDURE — 99223 1ST HOSP IP/OBS HIGH 75: CPT | Performed by: FAMILY MEDICINE

## 2018-06-19 PROCEDURE — 770020 HCHG ROOM/CARE - TELE (206)

## 2018-06-19 ASSESSMENT — COGNITIVE AND FUNCTIONAL STATUS - GENERAL
TURNING FROM BACK TO SIDE WHILE IN FLAT BAD: A LITTLE
PERSONAL GROOMING: A LITTLE
MOBILITY SCORE: 17
EATING MEALS: A LITTLE
MOVING FROM LYING ON BACK TO SITTING ON SIDE OF FLAT BED: A LITTLE
DRESSING REGULAR UPPER BODY CLOTHING: A LITTLE
DRESSING REGULAR LOWER BODY CLOTHING: A LITTLE
SUGGESTED CMS G CODE MODIFIER MOBILITY: CK
STANDING UP FROM CHAIR USING ARMS: A LITTLE
DAILY ACTIVITIY SCORE: 17
TOILETING: A LOT
HELP NEEDED FOR BATHING: A LITTLE
WALKING IN HOSPITAL ROOM: A LOT
SUGGESTED CMS G CODE MODIFIER DAILY ACTIVITY: CK
CLIMB 3 TO 5 STEPS WITH RAILING: A LOT

## 2018-06-19 ASSESSMENT — PAIN SCALES - GENERAL: PAINLEVEL_OUTOF10: 7

## 2018-06-19 ASSESSMENT — LIFESTYLE VARIABLES
ALCOHOL_USE: NO
EVER_SMOKED: NEVER

## 2018-06-19 ASSESSMENT — PATIENT HEALTH QUESTIONNAIRE - PHQ9
1. LITTLE INTEREST OR PLEASURE IN DOING THINGS: NOT AT ALL
2. FEELING DOWN, DEPRESSED, IRRITABLE, OR HOPELESS: NOT AT ALL
SUM OF ALL RESPONSES TO PHQ9 QUESTIONS 1 AND 2: 0

## 2018-06-20 ENCOUNTER — APPOINTMENT (OUTPATIENT)
Dept: RADIOLOGY | Facility: MEDICAL CENTER | Age: 53
DRG: 064 | End: 2018-06-20
Attending: NURSE PRACTITIONER
Payer: COMMERCIAL

## 2018-06-20 ENCOUNTER — APPOINTMENT (OUTPATIENT)
Dept: RADIOLOGY | Facility: MEDICAL CENTER | Age: 53
DRG: 064 | End: 2018-06-20
Attending: HOSPITALIST
Payer: COMMERCIAL

## 2018-06-20 PROBLEM — Z87.19 HISTORY OF GI BLEED: Status: ACTIVE | Noted: 2018-06-20

## 2018-06-20 PROBLEM — E11.9 TYPE II DIABETES MELLITUS (HCC): Chronic | Status: ACTIVE | Noted: 2018-06-20

## 2018-06-20 PROBLEM — R13.10 DYSPHAGIA: Status: ACTIVE | Noted: 2018-06-20

## 2018-06-20 PROBLEM — Z86.718 HISTORY OF DVT (DEEP VEIN THROMBOSIS): Chronic | Status: ACTIVE | Noted: 2018-06-20

## 2018-06-20 PROBLEM — I63.9 STROKE (HCC): Status: ACTIVE | Noted: 2018-06-20

## 2018-06-20 LAB
ALBUMIN SERPL BCP-MCNC: 3 G/DL (ref 3.2–4.9)
ALBUMIN/GLOB SERPL: 1.1 G/DL
ALP SERPL-CCNC: 78 U/L (ref 30–99)
ALT SERPL-CCNC: <5 U/L (ref 2–50)
ANION GAP SERPL CALC-SCNC: 9 MMOL/L (ref 0–11.9)
AST SERPL-CCNC: 5 U/L (ref 12–45)
BASOPHILS # BLD AUTO: 0.7 % (ref 0–1.8)
BASOPHILS # BLD: 0.06 K/UL (ref 0–0.12)
BILIRUB SERPL-MCNC: 0.4 MG/DL (ref 0.1–1.5)
BNP SERPL-MCNC: 104 PG/ML (ref 0–100)
BUN SERPL-MCNC: 41 MG/DL (ref 8–22)
CALCIUM SERPL-MCNC: 8.4 MG/DL (ref 8.5–10.5)
CHLORIDE SERPL-SCNC: 112 MMOL/L (ref 96–112)
CO2 SERPL-SCNC: 22 MMOL/L (ref 20–33)
CREAT SERPL-MCNC: 2.59 MG/DL (ref 0.5–1.4)
CREAT UR-MCNC: 86.3 MG/DL
EOSINOPHIL # BLD AUTO: 0.13 K/UL (ref 0–0.51)
EOSINOPHIL NFR BLD: 1.5 % (ref 0–6.9)
ERYTHROCYTE [DISTWIDTH] IN BLOOD BY AUTOMATED COUNT: 50.9 FL (ref 35.9–50)
EST. AVERAGE GLUCOSE BLD GHB EST-MCNC: 192 MG/DL
GLOBULIN SER CALC-MCNC: 2.8 G/DL (ref 1.9–3.5)
GLUCOSE BLD-MCNC: 132 MG/DL (ref 65–99)
GLUCOSE BLD-MCNC: 145 MG/DL (ref 65–99)
GLUCOSE BLD-MCNC: 152 MG/DL (ref 65–99)
GLUCOSE BLD-MCNC: 98 MG/DL (ref 65–99)
GLUCOSE SERPL-MCNC: 178 MG/DL (ref 65–99)
HBA1C MFR BLD: 8.3 % (ref 0–5.6)
HCT VFR BLD AUTO: 45.6 % (ref 37–47)
HGB BLD-MCNC: 14.5 G/DL (ref 12–16)
IMM GRANULOCYTES # BLD AUTO: 0.03 K/UL (ref 0–0.11)
IMM GRANULOCYTES NFR BLD AUTO: 0.3 % (ref 0–0.9)
LYMPHOCYTES # BLD AUTO: 2.37 K/UL (ref 1–4.8)
LYMPHOCYTES NFR BLD: 27.6 % (ref 22–41)
MCH RBC QN AUTO: 30 PG (ref 27–33)
MCHC RBC AUTO-ENTMCNC: 31.8 G/DL (ref 33.6–35)
MCV RBC AUTO: 94.2 FL (ref 81.4–97.8)
MICROALBUMIN UR-MCNC: 632.6 MG/DL
MICROALBUMIN/CREAT UR: 7330 MG/G (ref 0–30)
MONOCYTES # BLD AUTO: 0.52 K/UL (ref 0–0.85)
MONOCYTES NFR BLD AUTO: 6.1 % (ref 0–13.4)
NEUTROPHILS # BLD AUTO: 5.47 K/UL (ref 2–7.15)
NEUTROPHILS NFR BLD: 63.8 % (ref 44–72)
NRBC # BLD AUTO: 0 K/UL
NRBC BLD-RTO: 0 /100 WBC
PLATELET # BLD AUTO: 214 K/UL (ref 164–446)
PMV BLD AUTO: 10.6 FL (ref 9–12.9)
POTASSIUM SERPL-SCNC: 4.7 MMOL/L (ref 3.6–5.5)
PROCALCITONIN SERPL-MCNC: 1.77 NG/ML
PROT SERPL-MCNC: 5.8 G/DL (ref 6–8.2)
RBC # BLD AUTO: 4.84 M/UL (ref 4.2–5.4)
SODIUM SERPL-SCNC: 143 MMOL/L (ref 135–145)
TSH SERPL DL<=0.005 MIU/L-ACNC: 1.75 UIU/ML (ref 0.38–5.33)
WBC # BLD AUTO: 8.6 K/UL (ref 4.8–10.8)

## 2018-06-20 PROCEDURE — A9270 NON-COVERED ITEM OR SERVICE: HCPCS | Performed by: FAMILY MEDICINE

## 2018-06-20 PROCEDURE — 99233 SBSQ HOSP IP/OBS HIGH 50: CPT | Performed by: HOSPITALIST

## 2018-06-20 PROCEDURE — 82570 ASSAY OF URINE CREATININE: CPT

## 2018-06-20 PROCEDURE — 99255 IP/OBS CONSLTJ NEW/EST HI 80: CPT | Performed by: INTERNAL MEDICINE

## 2018-06-20 PROCEDURE — G8987 SELF CARE CURRENT STATUS: HCPCS | Mod: CJ

## 2018-06-20 PROCEDURE — 92610 EVALUATE SWALLOWING FUNCTION: CPT

## 2018-06-20 PROCEDURE — 85025 COMPLETE CBC W/AUTO DIFF WBC: CPT

## 2018-06-20 PROCEDURE — A9270 NON-COVERED ITEM OR SERVICE: HCPCS | Performed by: HOSPITALIST

## 2018-06-20 PROCEDURE — 36415 COLL VENOUS BLD VENIPUNCTURE: CPT

## 2018-06-20 PROCEDURE — 82962 GLUCOSE BLOOD TEST: CPT | Mod: 91

## 2018-06-20 PROCEDURE — 700111 HCHG RX REV CODE 636 W/ 250 OVERRIDE (IP): Performed by: FAMILY MEDICINE

## 2018-06-20 PROCEDURE — 82043 UR ALBUMIN QUANTITATIVE: CPT

## 2018-06-20 PROCEDURE — 84443 ASSAY THYROID STIM HORMONE: CPT

## 2018-06-20 PROCEDURE — 83880 ASSAY OF NATRIURETIC PEPTIDE: CPT

## 2018-06-20 PROCEDURE — 700105 HCHG RX REV CODE 258: Performed by: NURSE PRACTITIONER

## 2018-06-20 PROCEDURE — G8996 SWALLOW CURRENT STATUS: HCPCS | Mod: CM

## 2018-06-20 PROCEDURE — G8988 SELF CARE GOAL STATUS: HCPCS | Mod: CI

## 2018-06-20 PROCEDURE — 97165 OT EVAL LOW COMPLEX 30 MIN: CPT

## 2018-06-20 PROCEDURE — 83036 HEMOGLOBIN GLYCOSYLATED A1C: CPT

## 2018-06-20 PROCEDURE — 770020 HCHG ROOM/CARE - TELE (206)

## 2018-06-20 PROCEDURE — 700102 HCHG RX REV CODE 250 W/ 637 OVERRIDE(OP): Performed by: FAMILY MEDICINE

## 2018-06-20 PROCEDURE — 700102 HCHG RX REV CODE 250 W/ 637 OVERRIDE(OP): Performed by: HOSPITALIST

## 2018-06-20 PROCEDURE — 71045 X-RAY EXAM CHEST 1 VIEW: CPT

## 2018-06-20 PROCEDURE — 84145 PROCALCITONIN (PCT): CPT

## 2018-06-20 PROCEDURE — 80053 COMPREHEN METABOLIC PANEL: CPT

## 2018-06-20 PROCEDURE — G8997 SWALLOW GOAL STATUS: HCPCS | Mod: CI

## 2018-06-20 PROCEDURE — 94760 N-INVAS EAR/PLS OXIMETRY 1: CPT

## 2018-06-20 RX ORDER — ASPIRIN 81 MG/1
324 TABLET, CHEWABLE ORAL DAILY
Status: DISCONTINUED | OUTPATIENT
Start: 2018-06-21 | End: 2018-07-09 | Stop reason: HOSPADM

## 2018-06-20 RX ORDER — AMOXICILLIN 250 MG
2 CAPSULE ORAL 2 TIMES DAILY
Status: DISCONTINUED | OUTPATIENT
Start: 2018-06-20 | End: 2018-06-20

## 2018-06-20 RX ORDER — INSULIN GLARGINE 100 [IU]/ML
48 INJECTION, SOLUTION SUBCUTANEOUS DAILY
Status: DISCONTINUED | OUTPATIENT
Start: 2018-06-20 | End: 2018-06-25

## 2018-06-20 RX ORDER — PROMETHAZINE HYDROCHLORIDE 25 MG/1
12.5-25 TABLET ORAL EVERY 4 HOURS PRN
Status: DISCONTINUED | OUTPATIENT
Start: 2018-06-20 | End: 2018-06-20

## 2018-06-20 RX ORDER — TEMAZEPAM 15 MG/1
15 CAPSULE ORAL NIGHTLY PRN
Status: DISCONTINUED | OUTPATIENT
Start: 2018-06-20 | End: 2018-06-20

## 2018-06-20 RX ORDER — ONDANSETRON 4 MG/1
4 TABLET, ORALLY DISINTEGRATING ORAL EVERY 4 HOURS PRN
Status: DISCONTINUED | OUTPATIENT
Start: 2018-06-20 | End: 2018-07-09 | Stop reason: HOSPADM

## 2018-06-20 RX ORDER — PROMETHAZINE HYDROCHLORIDE 25 MG/1
12.5-25 TABLET ORAL EVERY 4 HOURS PRN
Status: DISCONTINUED | OUTPATIENT
Start: 2018-06-20 | End: 2018-07-09 | Stop reason: HOSPADM

## 2018-06-20 RX ORDER — FUROSEMIDE 40 MG/1
40 TABLET ORAL DAILY
Status: DISCONTINUED | OUTPATIENT
Start: 2018-06-21 | End: 2018-06-21

## 2018-06-20 RX ORDER — ASPIRIN 300 MG/1
300 SUPPOSITORY RECTAL DAILY
Status: DISCONTINUED | OUTPATIENT
Start: 2018-06-20 | End: 2018-06-20

## 2018-06-20 RX ORDER — ONDANSETRON 2 MG/ML
4 INJECTION INTRAMUSCULAR; INTRAVENOUS EVERY 4 HOURS PRN
Status: DISCONTINUED | OUTPATIENT
Start: 2018-06-20 | End: 2018-07-09 | Stop reason: HOSPADM

## 2018-06-20 RX ORDER — ATORVASTATIN CALCIUM 80 MG/1
80 TABLET, FILM COATED ORAL EVERY EVENING
Status: DISCONTINUED | OUTPATIENT
Start: 2018-06-20 | End: 2018-06-20

## 2018-06-20 RX ORDER — LEVOTHYROXINE SODIUM 112 UG/1
112 TABLET ORAL DAILY
Status: DISCONTINUED | OUTPATIENT
Start: 2018-06-20 | End: 2018-06-20

## 2018-06-20 RX ORDER — HYDROXYZINE HYDROCHLORIDE 25 MG/1
25 TABLET, FILM COATED ORAL 3 TIMES DAILY PRN
Status: DISCONTINUED | OUTPATIENT
Start: 2018-06-20 | End: 2018-06-20

## 2018-06-20 RX ORDER — DULOXETIN HYDROCHLORIDE 60 MG/1
60 CAPSULE, DELAYED RELEASE ORAL DAILY
Status: DISCONTINUED | OUTPATIENT
Start: 2018-06-21 | End: 2018-06-21

## 2018-06-20 RX ORDER — DULOXETIN HYDROCHLORIDE 60 MG/1
60 CAPSULE, DELAYED RELEASE ORAL DAILY
Status: DISCONTINUED | OUTPATIENT
Start: 2018-06-20 | End: 2018-06-20

## 2018-06-20 RX ORDER — BISACODYL 10 MG
10 SUPPOSITORY, RECTAL RECTAL
Status: DISCONTINUED | OUTPATIENT
Start: 2018-06-20 | End: 2018-06-20

## 2018-06-20 RX ORDER — HYDRALAZINE HYDROCHLORIDE 20 MG/ML
10 INJECTION INTRAMUSCULAR; INTRAVENOUS
Status: DISCONTINUED | OUTPATIENT
Start: 2018-06-20 | End: 2018-06-21

## 2018-06-20 RX ORDER — SODIUM CHLORIDE 9 MG/ML
INJECTION, SOLUTION INTRAVENOUS CONTINUOUS
Status: DISCONTINUED | OUTPATIENT
Start: 2018-06-20 | End: 2018-06-21

## 2018-06-20 RX ORDER — POLYETHYLENE GLYCOL 3350 17 G/17G
1 POWDER, FOR SOLUTION ORAL
Status: DISCONTINUED | OUTPATIENT
Start: 2018-06-20 | End: 2018-06-20

## 2018-06-20 RX ORDER — DEXTROSE MONOHYDRATE 25 G/50ML
25 INJECTION, SOLUTION INTRAVENOUS
Status: DISCONTINUED | OUTPATIENT
Start: 2018-06-20 | End: 2018-07-09 | Stop reason: HOSPADM

## 2018-06-20 RX ORDER — ASPIRIN 300 MG/1
300 SUPPOSITORY RECTAL DAILY
Status: DISCONTINUED | OUTPATIENT
Start: 2018-06-21 | End: 2018-07-09 | Stop reason: HOSPADM

## 2018-06-20 RX ORDER — TEMAZEPAM 15 MG/1
15 CAPSULE ORAL NIGHTLY PRN
Status: DISCONTINUED | OUTPATIENT
Start: 2018-06-20 | End: 2018-07-09 | Stop reason: HOSPADM

## 2018-06-20 RX ORDER — LABETALOL HYDROCHLORIDE 5 MG/ML
10 INJECTION, SOLUTION INTRAVENOUS EVERY 4 HOURS PRN
Status: DISCONTINUED | OUTPATIENT
Start: 2018-06-20 | End: 2018-07-09 | Stop reason: HOSPADM

## 2018-06-20 RX ORDER — ASPIRIN 81 MG/1
324 TABLET, CHEWABLE ORAL DAILY
Status: DISCONTINUED | OUTPATIENT
Start: 2018-06-20 | End: 2018-06-20

## 2018-06-20 RX ORDER — HEPARIN SODIUM 5000 [USP'U]/ML
5000 INJECTION, SOLUTION INTRAVENOUS; SUBCUTANEOUS EVERY 8 HOURS
Status: DISCONTINUED | OUTPATIENT
Start: 2018-06-20 | End: 2018-07-09 | Stop reason: HOSPADM

## 2018-06-20 RX ORDER — LEVOTHYROXINE SODIUM 112 UG/1
112 TABLET ORAL DAILY
Status: DISCONTINUED | OUTPATIENT
Start: 2018-06-21 | End: 2018-07-09 | Stop reason: HOSPADM

## 2018-06-20 RX ORDER — ASPIRIN 325 MG
325 TABLET ORAL DAILY
Status: DISCONTINUED | OUTPATIENT
Start: 2018-06-21 | End: 2018-07-09 | Stop reason: HOSPADM

## 2018-06-20 RX ORDER — MORPHINE SULFATE 4 MG/ML
4 INJECTION, SOLUTION INTRAMUSCULAR; INTRAVENOUS EVERY 4 HOURS PRN
Status: DISCONTINUED | OUTPATIENT
Start: 2018-06-20 | End: 2018-06-23

## 2018-06-20 RX ORDER — ATORVASTATIN CALCIUM 80 MG/1
80 TABLET, FILM COATED ORAL EVERY EVENING
Status: DISCONTINUED | OUTPATIENT
Start: 2018-06-20 | End: 2018-07-09 | Stop reason: HOSPADM

## 2018-06-20 RX ORDER — BISACODYL 10 MG
10 SUPPOSITORY, RECTAL RECTAL
Status: DISCONTINUED | OUTPATIENT
Start: 2018-06-20 | End: 2018-07-08

## 2018-06-20 RX ORDER — POLYETHYLENE GLYCOL 3350 17 G/17G
1 POWDER, FOR SOLUTION ORAL
Status: DISCONTINUED | OUTPATIENT
Start: 2018-06-20 | End: 2018-07-08

## 2018-06-20 RX ORDER — FUROSEMIDE 40 MG/1
40 TABLET ORAL DAILY
Status: DISCONTINUED | OUTPATIENT
Start: 2018-06-20 | End: 2018-06-20

## 2018-06-20 RX ORDER — HYDROXYZINE HYDROCHLORIDE 25 MG/1
25 TABLET, FILM COATED ORAL 3 TIMES DAILY PRN
Status: DISCONTINUED | OUTPATIENT
Start: 2018-06-20 | End: 2018-07-09 | Stop reason: HOSPADM

## 2018-06-20 RX ORDER — PROMETHAZINE HYDROCHLORIDE 25 MG/1
12.5-25 SUPPOSITORY RECTAL EVERY 4 HOURS PRN
Status: DISCONTINUED | OUTPATIENT
Start: 2018-06-20 | End: 2018-07-09 | Stop reason: HOSPADM

## 2018-06-20 RX ORDER — AMOXICILLIN 250 MG
2 CAPSULE ORAL 2 TIMES DAILY
Status: DISCONTINUED | OUTPATIENT
Start: 2018-06-20 | End: 2018-07-08

## 2018-06-20 RX ORDER — FAMOTIDINE 20 MG/1
20 TABLET, FILM COATED ORAL DAILY
Status: DISCONTINUED | OUTPATIENT
Start: 2018-06-20 | End: 2018-06-21

## 2018-06-20 RX ORDER — ASPIRIN 325 MG
325 TABLET ORAL DAILY
Status: DISCONTINUED | OUTPATIENT
Start: 2018-06-20 | End: 2018-06-20

## 2018-06-20 RX ADMIN — ONDANSETRON 4 MG: 2 INJECTION INTRAMUSCULAR; INTRAVENOUS at 11:04

## 2018-06-20 RX ADMIN — ONDANSETRON 4 MG: 2 INJECTION INTRAMUSCULAR; INTRAVENOUS at 15:57

## 2018-06-20 RX ADMIN — SODIUM CHLORIDE: 9 INJECTION, SOLUTION INTRAVENOUS at 12:29

## 2018-06-20 RX ADMIN — MORPHINE SULFATE 4 MG: 4 INJECTION INTRAVENOUS at 20:50

## 2018-06-20 RX ADMIN — ATORVASTATIN CALCIUM 80 MG: 80 TABLET, FILM COATED ORAL at 21:19

## 2018-06-20 RX ADMIN — MORPHINE SULFATE 4 MG: 4 INJECTION INTRAVENOUS at 11:04

## 2018-06-20 RX ADMIN — SODIUM CHLORIDE: 9 INJECTION, SOLUTION INTRAVENOUS at 20:50

## 2018-06-20 RX ADMIN — HEPARIN SODIUM 5000 UNITS: 5000 INJECTION, SOLUTION INTRAVENOUS; SUBCUTANEOUS at 21:31

## 2018-06-20 RX ADMIN — ASPIRIN 300 MG: 300 SUPPOSITORY RECTAL at 08:50

## 2018-06-20 RX ADMIN — SENNOSIDES AND DOCUSATE SODIUM 2 TABLET: 8.6; 5 TABLET ORAL at 21:19

## 2018-06-20 RX ADMIN — MORPHINE SULFATE 4 MG: 4 INJECTION INTRAVENOUS at 15:57

## 2018-06-20 RX ADMIN — ONDANSETRON 4 MG: 2 INJECTION INTRAMUSCULAR; INTRAVENOUS at 01:45

## 2018-06-20 RX ADMIN — HEPARIN SODIUM 5000 UNITS: 5000 INJECTION, SOLUTION INTRAVENOUS; SUBCUTANEOUS at 15:57

## 2018-06-20 RX ADMIN — MORPHINE SULFATE 4 MG: 4 INJECTION INTRAVENOUS at 06:21

## 2018-06-20 RX ADMIN — MORPHINE SULFATE 4 MG: 4 INJECTION INTRAVENOUS at 00:58

## 2018-06-20 RX ADMIN — HEPARIN SODIUM 5000 UNITS: 5000 INJECTION, SOLUTION INTRAVENOUS; SUBCUTANEOUS at 06:14

## 2018-06-20 ASSESSMENT — LIFESTYLE VARIABLES: EVER_SMOKED: NEVER

## 2018-06-20 ASSESSMENT — PATIENT HEALTH QUESTIONNAIRE - PHQ9
2. FEELING DOWN, DEPRESSED, IRRITABLE, OR HOPELESS: NOT AT ALL
SUM OF ALL RESPONSES TO PHQ9 QUESTIONS 1 AND 2: 0
1. LITTLE INTEREST OR PLEASURE IN DOING THINGS: NOT AT ALL

## 2018-06-20 ASSESSMENT — COGNITIVE AND FUNCTIONAL STATUS - GENERAL
DRESSING REGULAR LOWER BODY CLOTHING: A LITTLE
HELP NEEDED FOR BATHING: A LITTLE
DAILY ACTIVITIY SCORE: 22
SUGGESTED CMS G CODE MODIFIER DAILY ACTIVITY: CJ

## 2018-06-20 ASSESSMENT — ENCOUNTER SYMPTOMS
SPEECH CHANGE: 0
PALPITATIONS: 0
BRUISES/BLEEDS EASILY: 0
DIZZINESS: 1
DEPRESSION: 0
ABDOMINAL PAIN: 0
HEARTBURN: 0
BLURRED VISION: 1
STRIDOR: 0
WEIGHT LOSS: 0
BLOOD IN STOOL: 0
SHORTNESS OF BREATH: 1
SORE THROAT: 1
MYALGIAS: 0
SPUTUM PRODUCTION: 1
NAUSEA: 1
CHILLS: 0
DIZZINESS: 0
HEMOPTYSIS: 0
NAUSEA: 0
LOSS OF CONSCIOUSNESS: 0
PHOTOPHOBIA: 0
FOCAL WEAKNESS: 0
WHEEZING: 0
FEVER: 0
NECK PAIN: 0
VOMITING: 0
DOUBLE VISION: 0
COUGH: 0
COUGH: 1
ORTHOPNEA: 0
DOUBLE VISION: 1
SEIZURES: 0
HEADACHES: 0

## 2018-06-20 ASSESSMENT — PAIN SCALES - GENERAL
PAINLEVEL_OUTOF10: 7
PAINLEVEL_OUTOF10: 0
PAINLEVEL_OUTOF10: 5
PAINLEVEL_OUTOF10: 4
PAINLEVEL_OUTOF10: 8
PAINLEVEL_OUTOF10: 9
PAINLEVEL_OUTOF10: 7
PAINLEVEL_OUTOF10: 9

## 2018-06-20 ASSESSMENT — COPD QUESTIONNAIRES
DO YOU EVER COUGH UP ANY MUCUS OR PHLEGM?: NO/ONLY WITH OCCASIONAL COLDS OR INFECTIONS
HAVE YOU SMOKED AT LEAST 100 CIGARETTES IN YOUR ENTIRE LIFE: NO/DON'T KNOW
DURING THE PAST 4 WEEKS HOW MUCH DID YOU FEEL SHORT OF BREATH: SOME OF THE TIME
COPD SCREENING SCORE: 2

## 2018-06-20 ASSESSMENT — ACTIVITIES OF DAILY LIVING (ADL): TOILETING: INDEPENDENT

## 2018-06-20 NOTE — DISCHARGE PLANNING
PMR order received from Dr. Jay.  No Payor is shown for her medical provider.  Presented from an outside hospital for a medial left cerebellar acute or subacute infarction with no evidence of hemorrhage.  Work-up remains in progress.  Would welcome [place] adilson once appropriate. This referral will not be forwarded to Physiatry.  TCC remains following.  I do appreciate the referral.

## 2018-06-20 NOTE — DIETARY
Nutrition Services: Nutrition Support Assessment  Day 1 of admit.  Bryan Garcia is a 52 y.o. female with admitting DX of Cerebellar Stroke, Stroke due to embolism of cerebellar artery      Current problem list:  1. Stroke  2. CKD stage 4 due to type 2 diabetes mellitus  3. Type 2 diabetes mellitus  4. History of DVT  5. History of GI bleed  6. Essential HTN  7. Hypothyroid  8. Hyperlipidemia     Assessment:  Estimated Nutritional Needs: based on: Ht: 177.8 cm, Wt : 87.5 kg via stand up scale, IBW: 68.04 kg, BMI: 27.68     Calculation/Equation: REE per MSJ x 1.2 = 1874 kcal/day  Total Calories / day: 1800 - 2100 (Calories / k - 24)  Total Grams Protein / day: 88 - 105 (Grams Protein / k - 1.2)  Total Fluids ml / day: 2191.4 ml    Evaluation:   1. Consult received for TF assessment. Pt failed SLP eval.   2. Enteral access: Cortrak to be placed.    3. Labs: Glucose 178, BUN 41, Creatinine 2.59, POC glucose 145  4. Meds: lipitor, cymbalta, pepcid, lasix, lantus, humulin R, synthroid, pericolace  5. Skin: Per flow sheets: pressure ulcer sacrum DTI POA, wound POA left medial foot. - Wound team consult pending, will await wound staging to make recommendations if appropriate.   6. Diabetisource AC appropriate to aide in blood glucose control.      Recommendations/Plan:  Start Diabetisource AC @ 25 ml/hr and advance per protocol to 65 ml/hr (goal rate) to provide 1872 kcal (21 kcal/kg), 94 grams protein (1.1 gm/kg), and 1279 ml free water per day.   Fluids per MD.   Diet upgrades per SLP  Monitor weight.      RD following

## 2018-06-20 NOTE — ASSESSMENT & PLAN NOTE
SLP following, failed swallow, patient has PEG .  Nausea better- cw tfeeds  IV reglan  Rubinol to decrease secretions.   Treat constipation.

## 2018-06-20 NOTE — ASSESSMENT & PLAN NOTE
A1c 8.3 - good BS control  cw Insulin-sliding scale as needed  Serial accu-checks   hypoglycemia protocol.

## 2018-06-20 NOTE — ASSESSMENT & PLAN NOTE
Hx of DVT ? PE , s/p IVC filter placement.  Hx of GI bleeding in past, Anticoagulation contraindicated.  No signs of active bleed.   Follow clinically

## 2018-06-20 NOTE — ASSESSMENT & PLAN NOTE
MRI of the brain outlying facility w Left medial cerebellar acute or subacute infarction with no hemorrhage- improved strength w dysarthria, dysphagia, left weakness.   Carotid Duplex: Antegrade left vertebral flow with high resistant may indicate distal occlusion  Continue ASA/Statin.  Pt/OT  cw G tube feeds.

## 2018-06-20 NOTE — PROGRESS NOTES
Direct Admit from Dr. Richter at [facility]. Dr. Beltran accepting for Cerebellar Stroke. ADT signed and held and will need to be released upon patient arrival. Patient arriving via ground transport.

## 2018-06-20 NOTE — PROGRESS NOTES
[facility] Hospitalist Progress Note    Date of Service: 2018    Chief Complaint  52 y.o. female admitted 2018 with left medial left cerebellar CVA. Patient was initially seen at an outside facility for nausea, vomiting, vision changes and vertigo. 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     Interval Problem Update  Cerebellar infarct - Neuro consulted - workup ongoing. No images on file.  IDDM - SSI  CKD-IV - Neph consulted  Hx GIB - no active signs of bleeding - restarted ASA  HTN - stable  Lipids - pending. Max statin    Consultants/Specialty  Neuro  Neph    Disposition  Rehab        Review of Systems   Constitutional: Negative for chills, fever and weight loss.   HENT: Positive for sore throat.         Positive for dysphonia   Eyes: Positive for blurred vision. Negative for double vision and photophobia.   Respiratory: Positive for cough, sputum production and shortness of breath. Negative for wheezing and stridor.    Cardiovascular: Negative for chest pain, palpitations and orthopnea.   Gastrointestinal: Positive for nausea. Negative for abdominal pain, blood in stool, heartburn, melena and vomiting.   Genitourinary: Negative for dysuria.   Neurological: Positive for dizziness. Negative for speech change, focal weakness, seizures, loss of consciousness and headaches.   Endo/Heme/Allergies: Does not bruise/bleed easily.      Physical Exam  Laboratory/Imaging   Hemodynamics  Temp (24hrs), Av.8 °C (98.3 °F), Min:36.4 °C (97.6 °F), Max:37.5 °C (99.5 °F)   Temperature: 36.8 °C (98.2 °F)  Pulse  Av  Min: 57  Max: 64    Blood Pressure: 153/66      Respiratory      Respiration: 16, Pulse Oximetry: 95 %        RUL Breath Sounds: Coarse Crackles, RML Breath Sounds: Diminished, RLL Breath Sounds: Diminished, DIANNE Breath Sounds: Coarse Crackles, LLL Breath  Sounds: Diminished    Fluids  No intake or output data in the 24 hours ending 06/20/18 1046    Nutrition  Orders Placed This Encounter   Procedures   • Diet NPO     Standing Status:   Standing     Number of Occurrences:   1     Order Specific Question:   Restrict to:     Answer:   Strict [1]     Physical Exam   Constitutional: She is oriented to person, place, and time. She appears well-developed and well-nourished. No distress.   HENT:   Head: Normocephalic and atraumatic.   Eyes: EOM are normal. Pupils are equal, round, and reactive to light.   Neck: Neck supple.   Cardiovascular: Normal rate, regular rhythm and normal heart sounds.    Pulmonary/Chest: Effort normal and breath sounds normal. No respiratory distress. She has no wheezes. She has no rales.   Abdominal: Soft. She exhibits no distension. There is no tenderness.   Neurological: She is alert and oriented to person, place, and time. No cranial nerve deficit. GCS eye subscore is 4. GCS verbal subscore is 5. GCS motor subscore is 6.   Left dysmetria   Skin: Skin is warm and dry.   Psychiatric: She has a normal mood and affect. Her speech is normal and behavior is normal. Thought content normal. Cognition and memory are normal.       Recent Labs      06/20/18   0433   WBC  8.6   RBC  4.84   HEMOGLOBIN  14.5   HEMATOCRIT  45.6   MCV  94.2   MCH  30.0   MCHC  31.8*   RDW  50.9*   PLATELETCT  214   MPV  10.6     Recent Labs      06/20/18   0434   SODIUM  143   POTASSIUM  4.7   CHLORIDE  112   CO2  22   GLUCOSE  178*   BUN  41*   CREATININE  2.59*   CALCIUM  8.4*                      Assessment/Plan     * Stroke (HCC)- (present on admission)   Assessment & Plan    MRI of the brain from other facility showed left medial cerebellar acute or subacute infarction with no hemorrhage.   Aspirin initiated at 325 - carefull observation for bleed.   PT/OT. Speech.  Carotid - pend  Echo - pend  Statin - max          CKD stage 4 due to type 2 diabetes mellitus- (present on  admission)   Assessment & Plan    Avoid nephrotoxins. Renal dose all medications.  Nephrology consult        Dysphagia   Assessment & Plan    Failed SLP swallow eval  Cortrak and nutrition consult  CXR 6/20 neg for asp PNA - follow closely  Procalcitonin pending          History of GI bleed   Assessment & Plan    Pepcid for PUD prophylaxis        History of DVT (deep vein thrombosis)   Assessment & Plan    And PE. Status post IVC filter placement. Intolerant to anticoagulation due to history of GI bleed.  Heparin Q8 with close monitoring for bleeding  Prior hyper-coaguable state workup from 3/2012 negative        Type II diabetes mellitus (HCC)   Assessment & Plan    Check hemoglobin A1c. Continue with sliding scale insulin.        Essential hypertension- (present on admission)   Assessment & Plan    Monitor blood pressure. Allow for permissive hypertension for now.        Hyperlipemia- (present on admission)   Assessment & Plan    Statin - max dose        Hypothyroid- (present on admission)   Assessment & Plan    Cont replacement  Normal TSH on admit          Quality-Core Measures   Reviewed items::  EKG reviewed, Labs reviewed, Medications reviewed and Radiology images reviewed  Wallace catheter::  No Wallace  DVT prophylaxis pharmacological::  Heparin  Ulcer Prophylaxis::  Yes  Assessed for rehabilitation services:  Patient was assess for and/or received rehabilitation services during this hospitalization

## 2018-06-20 NOTE — THERAPY
"Speech Language Therapy Clinical Swallow Evaluation completed.  Functional Status: Clinical swallow evaluation completed on this date.  Patient pleasant and agreeable.  Vocal quality characterized as hoarse and whispered.  She followed directives to the oral [facility] exam with no gross deficits appreciated.  Prior to PO, the patient stated she was having difficulty managing her secretions; suction had been set up at bedside. Presentation of PO included ice chips x5 and nectars x2 sips.  The patient presented with OVERT s/sx of aspiration as seen by wet, gurgly vocal quality, coughing and sputtering.  She completed oral suctioning multiple times via [employer] but continued to have wet gurgly coughing for several minutes.  Swallow was noisy, initiation of swallow trigger delayed up to 3 seconds and laryngeal elevation was palpated as weak.  Provided instruction and training to dysphagia exercises with written directives left at bedside.  At this time, the patient is NOT at the level for a PO diet, given overt s/sx of aspiration.  Recommend she continue NPO with alternative source of nutrition via Cortrak.  SLP following closely for aggressive dysphagia therapy.     Recommendations - Diet: Diet / Liquid Recommendation: NPO, Pre-Feeding Trials with SLP Only                          Strategies: To be assessed                          Medication Administration: Medication Administration : Via Gastric Tube  Plan of Care: Will benefit from Speech Therapy 5 times per week  Post-Acute Therapy: To be determined.     See \"Rehab Therapy-Acute\" Patient Summary Report for complete documentation.   "

## 2018-06-20 NOTE — H&P
Hospital Medicine History and Physical    Date of Service  6/19/2018    Chief Complaint  Nausea and vomiting, Vision changes.    History of Presenting Illness  52 y.o. female with past medical history of type II diabetes mellitus, hypertension, history of DVT and PE status post IVC filter placement not tolerant to anticoagulation due to history of GI bleed who was transferred from another facility for acute stroke. Patient was at the other facility for evaluation of nausea and vomiting then developed vision changes. CT scan of the brain was negative for any acute intracranial abnormalities. MRI of the brain showed medial left cerebellar acute or subacute infarction with no evidence of hemorrhage. Patient was transferred here for higher level of care. Apparently patient was out of the window for TPA. Upon arrival here her NIH score was between 0 and 1. Patient does not show any focal deficit at this point.   Primary Care Physician  ORA Melendrez.    Consultants  Neurology    Code Status  Full    Review of Systems  Review of Systems   Constitutional: Negative for chills and fever.   HENT: Negative for hearing loss and tinnitus.    Eyes: Positive for blurred vision and double vision.   Respiratory: Negative for cough and hemoptysis.    Cardiovascular: Negative for chest pain.   Gastrointestinal: Negative for heartburn and nausea.   Genitourinary: Negative for dysuria and urgency.   Musculoskeletal: Negative for myalgias and neck pain.   Skin: Negative for rash.   Neurological: Negative for dizziness and headaches.   Endo/Heme/Allergies: Negative for environmental allergies. Does not bruise/bleed easily.   Psychiatric/Behavioral: Negative for depression and suicidal ideas.        Past Medical History  Past Medical History:   Diagnosis Date   • Type II or unspecified type diabetes mellitus without mention of complication, not stated as uncontrolled 10/25/2011   • HTN (hypertension) 10/25/2011   • Neuropathy  in diabetes (HCC) 10/25/2011   • Arthritis    • Cataract    • DVT (deep venous thrombosis) ([facility])    • Other specified symptom associated with female genital organs    • PE (pulmonary embolism)    • Pneumonia    • Presence of IVC filter    • Renal disorder    • Unspecified disorder of thyroid        Surgical History  Past Surgical History:   Procedure Laterality Date   • GASTROSCOPY  2/22/2013    Performed by Reid Yi D.O. at [facility]   • COLONOSCOPY  2/22/2013    Performed by Reid Yi D.O. at [facility]   • ABDOMINAL HYSTERECTOMY TOTAL      Right ovary remains   • CHOLECYSTECTOMY     • KNEE ARTHROSCOPY      3 surgeries right knee       Medications  No current facility-administered medications on file prior to encounter.      Current Outpatient Prescriptions on File Prior to Encounter   Medication Sig Dispense Refill   • glucose blood (FREESTYLE LITE) strip TEST BLOOD SUGAR THREE TIMES A  Strip 0   • temazepam (RESTORIL) 15 MG Cap Take 15 mg by mouth at bedtime as needed for Sleep.     • hydrOXYzine (ATARAX) 25 MG Tab Take 1 Tab by mouth 3 times a day as needed for Itching. 60 Tab 2   • metronidazole (FLAGYL) 500 MG Tab Take 1 Tab by mouth 3 times a day. 30 Tab 0   • metoprolol SR (TOPROL XL) 25 MG TABLET SR 24 HR Take 1 Tab by mouth every day. 90 Tab 3   • furosemide (LASIX) 40 MG Tab Take 1 Tab by mouth every day. 90 Tab 3   • levothyroxine (SYNTHROID) 112 MCG Tab Take 1 Tab by mouth every day. 90 Tab 3   • Insulin Glargine (TOUJEO SOLOSTAR) 300 UNIT/ML Solution Pen-injector Inject 60 Units as instructed 1 time daily as needed. 54 mL 5   • duloxetine (CYMBALTA) 60 MG Cap DR Particles delayed-release capsule Take 1 Cap by mouth every day. 90 Cap 3   • NOVOLOG, insulin aspart, (NOVOLOG FLEXPEN) 100 UNIT/ML Solution Pen-injector injection Inject 15-20 Units as instructed 3 times a day before meals. 45 mL 5   • diclofenac EC (VOLTAREN) 75 MG Tablet Delayed  Response Take 1 Tab by mouth 2 times a day. 60 Tab 3   • lidocaine (LIDODERM) 5 % Patch Apply 2 Patches to skin as directed every 24 hours. 20 Patch 3   • amlodipine (NORVASC) 10 MG Tab Take 1 Tab by mouth every day. 90 Tab 3   • vitamin D, Ergocalciferol, (DRISDOL) 94800 UNITS Cap capsule Take 1 Cap by mouth every 7 days. 13 Cap 1   • pravastatin (PRAVACHOL) 40 MG tablet Take 1 Tab by mouth every day. 90 Tab 3   • Misc. Devices Misc Freestyle lite test strips; patient has DM type 2 and tests 3 times per day 100 Strip 3   • Insulin Glargine (TOUJEO SOLOSTAR) 300 UNIT/ML Solution Pen-injector Inject 60 Units as instructed every day. 18 mL 6   • Misc. Devices Misc Unilateral diagnostic mammogram right breast with ultrasound if indicated; fax [phone] Device 0   • ergocalciferol (DRISDOL) 65731 UNIT capsule Take 1 Cap by mouth every 7 days. 12 Cap 0   • polyethylene glycol 3350 (MIRALAX) Powder Take 17 g by mouth 2 times a day. 3 Bottle 3   • fluticasone (FLONASE) 50 MCG/ACT nasal spray Spray 2 Sprays in nose every day. 16 g 11   • triamcinolone acetonide (KENALOG) 0.1 % CREA Apply in a thin layer to psoriasis twice per day x 2 weeks on then 2 weeks off 1 Tube 3   • aspirin EC (ECOTRIN) 81 MG TBEC Take 81 mg by mouth every day.         Family History  Family History   Problem Relation Age of Onset   • Cancer Mother      Bone   • Heart Disease Father    • Cancer Father      Bladder, Prostate       Social History  Social History   Substance Use Topics   • Smoking status: Never Smoker   • Smokeless tobacco: Never Used   • Alcohol use No       Allergies  Allergies   Allergen Reactions   • Toradol Hives   • Levaquin      Blood boils   • Lisinopril Unspecified     Caused potassium build up in blood   • Tape         Physical Exam  Laboratory   Hemodynamics  Temp (24hrs), Av.5 °C (97.7 °F), Min:36.4 °C (97.6 °F), Max:36.6 °C (97.8 °F)   Temperature: 36.4 °C (97.6 °F)  Pulse  Av.5  Min: 57  Max: 60    Blood Pressure:  150/70      Respiratory      Respiration: 16, Pulse Oximetry: 98 %        RUL Breath Sounds: Diminished, RML Breath Sounds: Diminished, RLL Breath Sounds: Diminished, DIANNE Breath Sounds: Diminished, LLL Breath Sounds: Diminished    Physical Exam   Constitutional: She is oriented to person, place, and time. She appears well-developed. No distress.   HENT:   Head: Normocephalic.   Mouth/Throat: No oropharyngeal exudate.   Eyes: Pupils are equal, round, and reactive to light. No scleral icterus.   Neck: No thyromegaly present.   Cardiovascular: Normal rate and regular rhythm.  Exam reveals no friction rub.    Pulmonary/Chest: Effort normal. No respiratory distress.   Abdominal: Soft. She exhibits no distension.   Musculoskeletal: Normal range of motion. She exhibits no deformity.   Neurological: She is alert and oriented to person, place, and time. No cranial nerve deficit.   No pronator drift.  No dysmetria.   Skin: Skin is dry. No erythema.   Psychiatric: She has a normal mood and affect. Her behavior is normal.               No results for input(s): ALTSGPT, ASTSGOT, ALKPHOSPHAT, TBILIRUBIN, DBILIRUBIN, GAMMAGT, AMYLASE, LIPASE, ALB, PREALBUMIN, GLUCOSE in the last 72 hours.              Lab Results   Component Value Date    TROPONINI 0.01 03/20/2012     Urinalysis:    Lab Results  Component Value Date/Time   SPECGRAVITY 1.009 02/20/2013 1130   GLUCOSEUR 150 (A) 02/20/2013 1130   KETONES Negative 02/20/2013 1130   NITRITE Negative 02/20/2013 1130   WBCURINE 2-5 03/18/2012 1120   RBCURINE 0-2 03/18/2012 1120   BACTERIA Few (A) 03/18/2012 1120   EPITHELCELL Few 03/18/2012 1120        Imaging  Reviewed    Assessment/Plan     I anticipate this patient will require at least 2 midnight stay for appropriate medical management.    * Stroke (HCC)- (present on admission)   Assessment & Plan    MRI of the brain from other facility showed left medial cerebellar acute or subacute infarction with no hemorrhage. Aspirin stand.  PT/OT. Speech.        CKD stage 4 due to type 2 diabetes mellitus- (present on admission)   Assessment & Plan    Avoid nephrotoxins. Renal dose all medications.        History of DVT (deep vein thrombosis)   Assessment & Plan    And PE. Status post IVC filter placement. Intolerant to anticoagulation due to history of GI bleed.        Type II diabetes mellitus (HCC)   Assessment & Plan    Check hemoglobin A1c. Continue with sliding scale insulin.        Essential hypertension- (present on admission)   Assessment & Plan    Monitor blood pressure. Allow for permissive hypertension for now.            VTE prophylaxis: Heparin

## 2018-06-20 NOTE — WOUND TEAM
"[facility]  Inpatient Services  Initial Wound and Skin Care Evaluation    Admission Date:    06/19/2018  HPI, PMH, SH: Reviewed  Unit where seen by Wound Team: Neuroscience.    WOUND CONSULT RELATED TO:  Left ankle wound.     SUBJECTIVE:  \"I had surgery on my foot, and there is hardware in there. The surgical boot I was wearing rubbed a wound on the ankle.\"    Self Report / Pain Level: 0/10 at wound.    OBJECTIVE: Very pleasant, currently able to verbalize in a whisper.     WOUND TYPE, LOCATION, CHARACTERISTICS (Pressure ulcers: location, stage, POA or date identified)    Location and type of wound:  Left medial malleolus: Stage 3 pressure injury. POA.        Periwound:     Hyperpigmented, scarring.  Drainage:     Scant, serous.  Tissue Type and %:    100% red.  Wound Edges:    Attached.  Odor:      None.  Exposed structure(s):  None.  S&S of Infection:   None.      Measurements:   06/20/2018.  Length:     0.5 cm  Width:      0.7 cm  Depth:     0.1 cm      INTERVENTIONS BY WOUND TEAM: Removed sock and dressing. Assessed wound while patient described cause of wound. Patient had been treating it with hydrofera blue and adhesive foams. Cleaned wound with NS and gauze. Dressed wound with hydrofera blue and a small adhesive silicone foam. Replaced sock.       Interdisciplinary consultation: Patient and nursing.     EVALUATION: Hydrofera blue provides topical antimicrobial treatment and absorption of exudate.     Factors affecting wound healing: Pressure and friction, neuropathy.   Goals: Wound will decrease in measurements weekly.     NURSING PLAN OF CARE ORDERS (X):    Dressing changes: See Dressing Care orders: X  Skin care: See Skin Care orders:   Rectal tube care: See Rectal Tube Care orders:   Other orders:    RSKIN: CURRENT (X) ORDERED (O)  Q shift Manuel:  X  Q shift pressure point assessments:  X  Pressure redistribution mattress     X   JON      Bariatric JON      Bariatric foam        Heel float " boots       Heels floated on pillows      Barrier wipes      Barrier Cream      Barrier paste      Sacral silicone dressing      Silicone O2 tubing    X  Anchorfast      Trach with Optifoam split foam       Waffle cushion      Rectal tube or BMS      Antifungal tx    Turn q 2 hours   X  Up to chair     Ambulate   PT/OT     Dietician      PO     TF X   TPN     PVN    NPO   # days   Other       WOUND TEAM PLAN OF CARE (X):   NPWT change 3 x week:        Dressing changes by wound team:       Follow up as needed:   X    Other (explain):    Anticipated discharge plans (X):  To be determined.  SNF:           Home Care:           Outpatient Wound Center:            Self Care:            Other:

## 2018-06-20 NOTE — CARE PLAN
Problem: Knowledge Deficit  Goal: Knowledge of disease process/condition, treatment plan, diagnostic tests, and medications will improve    Intervention: Assess knowledge level of disease process/condition, treatment plan, diagnostic tests, and medications  Provided stroke education to pt, reviewed POC, all questions answered at this time      Problem: Pain Management  Goal: Pain level will decrease to patient's comfort goal    Intervention: Follow pain managment plan developed in collaboration with patient and Interdisciplinary Team  Medicating pt per MAR for headache, pt in low-light setting, decreased stimuli

## 2018-06-21 ENCOUNTER — APPOINTMENT (OUTPATIENT)
Dept: RADIOLOGY | Facility: MEDICAL CENTER | Age: 53
DRG: 064 | End: 2018-06-21
Attending: NURSE PRACTITIONER
Payer: COMMERCIAL

## 2018-06-21 ENCOUNTER — HOSPITAL ENCOUNTER (INPATIENT)
Facility: REHABILITATION | Age: 53
End: 2018-06-21
Attending: PHYSICAL MEDICINE & REHABILITATION | Admitting: PHYSICAL MEDICINE & REHABILITATION
Payer: COMMERCIAL

## 2018-06-21 LAB
ALBUMIN SERPL BCP-MCNC: 3 G/DL (ref 3.2–4.9)
ALBUMIN/GLOB SERPL: 1 G/DL
ALP SERPL-CCNC: 85 U/L (ref 30–99)
ALT SERPL-CCNC: <5 U/L (ref 2–50)
ANION GAP SERPL CALC-SCNC: 13 MMOL/L (ref 0–11.9)
AST SERPL-CCNC: 8 U/L (ref 12–45)
BASOPHILS # BLD AUTO: 0.8 % (ref 0–1.8)
BASOPHILS # BLD: 0.05 K/UL (ref 0–0.12)
BILIRUB SERPL-MCNC: 0.5 MG/DL (ref 0.1–1.5)
BNP SERPL-MCNC: 59 PG/ML (ref 0–100)
BUN SERPL-MCNC: 43 MG/DL (ref 8–22)
CALCIUM SERPL-MCNC: 8.6 MG/DL (ref 8.5–10.5)
CHLORIDE SERPL-SCNC: 113 MMOL/L (ref 96–112)
CHOLEST SERPL-MCNC: 211 MG/DL (ref 100–199)
CO2 SERPL-SCNC: 18 MMOL/L (ref 20–33)
CREAT SERPL-MCNC: 2.96 MG/DL (ref 0.5–1.4)
EOSINOPHIL # BLD AUTO: 0.1 K/UL (ref 0–0.51)
EOSINOPHIL NFR BLD: 1.6 % (ref 0–6.9)
ERYTHROCYTE [DISTWIDTH] IN BLOOD BY AUTOMATED COUNT: 51.3 FL (ref 35.9–50)
GLOBULIN SER CALC-MCNC: 3 G/DL (ref 1.9–3.5)
GLUCOSE BLD-MCNC: 127 MG/DL (ref 65–99)
GLUCOSE BLD-MCNC: 130 MG/DL (ref 65–99)
GLUCOSE BLD-MCNC: 142 MG/DL (ref 65–99)
GLUCOSE BLD-MCNC: 142 MG/DL (ref 65–99)
GLUCOSE SERPL-MCNC: 176 MG/DL (ref 65–99)
HCT VFR BLD AUTO: 43.6 % (ref 37–47)
HDLC SERPL-MCNC: 37 MG/DL
HGB BLD-MCNC: 13.8 G/DL (ref 12–16)
IMM GRANULOCYTES # BLD AUTO: 0.02 K/UL (ref 0–0.11)
IMM GRANULOCYTES NFR BLD AUTO: 0.3 % (ref 0–0.9)
LDLC SERPL CALC-MCNC: 135 MG/DL
LV EJECT FRACT  99904: 75
LV EJECT FRACT MOD 2C 99903: 77.52
LV EJECT FRACT MOD 4C 99902: 76.11
LV EJECT FRACT MOD BP 99901: 75.98
LYMPHOCYTES # BLD AUTO: 1.06 K/UL (ref 1–4.8)
LYMPHOCYTES NFR BLD: 16.9 % (ref 22–41)
MAGNESIUM SERPL-MCNC: 1.8 MG/DL (ref 1.5–2.5)
MCH RBC QN AUTO: 30.1 PG (ref 27–33)
MCHC RBC AUTO-ENTMCNC: 31.7 G/DL (ref 33.6–35)
MCV RBC AUTO: 95 FL (ref 81.4–97.8)
MONOCYTES # BLD AUTO: 0.32 K/UL (ref 0–0.85)
MONOCYTES NFR BLD AUTO: 5.1 % (ref 0–13.4)
NEUTROPHILS # BLD AUTO: 4.71 K/UL (ref 2–7.15)
NEUTROPHILS NFR BLD: 75.3 % (ref 44–72)
NRBC # BLD AUTO: 0 K/UL
NRBC BLD-RTO: 0 /100 WBC
PHOSPHATE SERPL-MCNC: 3.3 MG/DL (ref 2.5–4.5)
PLATELET # BLD AUTO: 206 K/UL (ref 164–446)
PMV BLD AUTO: 10.7 FL (ref 9–12.9)
POTASSIUM SERPL-SCNC: 4.6 MMOL/L (ref 3.6–5.5)
PROT SERPL-MCNC: 6 G/DL (ref 6–8.2)
RBC # BLD AUTO: 4.59 M/UL (ref 4.2–5.4)
SODIUM SERPL-SCNC: 144 MMOL/L (ref 135–145)
TRIGL SERPL-MCNC: 195 MG/DL (ref 0–149)
WBC # BLD AUTO: 6.3 K/UL (ref 4.8–10.8)

## 2018-06-21 PROCEDURE — 700105 HCHG RX REV CODE 258: Performed by: NURSE PRACTITIONER

## 2018-06-21 PROCEDURE — 700102 HCHG RX REV CODE 250 W/ 637 OVERRIDE(OP): Performed by: INTERNAL MEDICINE

## 2018-06-21 PROCEDURE — 80053 COMPREHEN METABOLIC PANEL: CPT

## 2018-06-21 PROCEDURE — 92526 ORAL FUNCTION THERAPY: CPT

## 2018-06-21 PROCEDURE — 99232 SBSQ HOSP IP/OBS MODERATE 35: CPT | Performed by: HOSPITALIST

## 2018-06-21 PROCEDURE — 700102 HCHG RX REV CODE 250 W/ 637 OVERRIDE(OP): Performed by: HOSPITALIST

## 2018-06-21 PROCEDURE — 80061 LIPID PANEL: CPT

## 2018-06-21 PROCEDURE — G8978 MOBILITY CURRENT STATUS: HCPCS | Mod: CK

## 2018-06-21 PROCEDURE — 97162 PT EVAL MOD COMPLEX 30 MIN: CPT

## 2018-06-21 PROCEDURE — 93306 TTE W/DOPPLER COMPLETE: CPT

## 2018-06-21 PROCEDURE — 700102 HCHG RX REV CODE 250 W/ 637 OVERRIDE(OP): Performed by: NURSE PRACTITIONER

## 2018-06-21 PROCEDURE — 700111 HCHG RX REV CODE 636 W/ 250 OVERRIDE (IP): Performed by: FAMILY MEDICINE

## 2018-06-21 PROCEDURE — A9270 NON-COVERED ITEM OR SERVICE: HCPCS | Performed by: INTERNAL MEDICINE

## 2018-06-21 PROCEDURE — 770020 HCHG ROOM/CARE - TELE (206)

## 2018-06-21 PROCEDURE — 99233 SBSQ HOSP IP/OBS HIGH 50: CPT | Performed by: INTERNAL MEDICINE

## 2018-06-21 PROCEDURE — G8979 MOBILITY GOAL STATUS: HCPCS | Mod: CI

## 2018-06-21 PROCEDURE — 700111 HCHG RX REV CODE 636 W/ 250 OVERRIDE (IP): Performed by: INTERNAL MEDICINE

## 2018-06-21 PROCEDURE — A9270 NON-COVERED ITEM OR SERVICE: HCPCS | Performed by: HOSPITALIST

## 2018-06-21 PROCEDURE — 36415 COLL VENOUS BLD VENIPUNCTURE: CPT

## 2018-06-21 PROCEDURE — 82962 GLUCOSE BLOOD TEST: CPT

## 2018-06-21 PROCEDURE — 83880 ASSAY OF NATRIURETIC PEPTIDE: CPT

## 2018-06-21 PROCEDURE — 700102 HCHG RX REV CODE 250 W/ 637 OVERRIDE(OP): Performed by: FAMILY MEDICINE

## 2018-06-21 PROCEDURE — 700105 HCHG RX REV CODE 258: Performed by: INTERNAL MEDICINE

## 2018-06-21 PROCEDURE — 83735 ASSAY OF MAGNESIUM: CPT

## 2018-06-21 PROCEDURE — 71045 X-RAY EXAM CHEST 1 VIEW: CPT

## 2018-06-21 PROCEDURE — A9270 NON-COVERED ITEM OR SERVICE: HCPCS | Performed by: NURSE PRACTITIONER

## 2018-06-21 PROCEDURE — 93306 TTE W/DOPPLER COMPLETE: CPT | Mod: 26 | Performed by: INTERNAL MEDICINE

## 2018-06-21 PROCEDURE — 93880 EXTRACRANIAL BILAT STUDY: CPT

## 2018-06-21 PROCEDURE — 85025 COMPLETE CBC W/AUTO DIFF WBC: CPT

## 2018-06-21 PROCEDURE — 84100 ASSAY OF PHOSPHORUS: CPT

## 2018-06-21 RX ORDER — CLONIDINE HYDROCHLORIDE 0.1 MG/1
0.1 TABLET ORAL EVERY 6 HOURS PRN
Status: DISCONTINUED | OUTPATIENT
Start: 2018-06-21 | End: 2018-07-09 | Stop reason: HOSPADM

## 2018-06-21 RX ORDER — BUTALBITAL, ACETAMINOPHEN AND CAFFEINE 50; 325; 40 MG/1; MG/1; MG/1
1 TABLET ORAL EVERY 6 HOURS PRN
Status: DISCONTINUED | OUTPATIENT
Start: 2018-06-21 | End: 2018-07-09 | Stop reason: HOSPADM

## 2018-06-21 RX ORDER — HYDRALAZINE HYDROCHLORIDE 20 MG/ML
20 INJECTION INTRAMUSCULAR; INTRAVENOUS EVERY 6 HOURS PRN
Status: DISCONTINUED | OUTPATIENT
Start: 2018-06-21 | End: 2018-07-06

## 2018-06-21 RX ORDER — AMLODIPINE BESYLATE 5 MG/1
10 TABLET ORAL
Status: DISCONTINUED | OUTPATIENT
Start: 2018-06-22 | End: 2018-07-09 | Stop reason: HOSPADM

## 2018-06-21 RX ORDER — DULOXETIN HYDROCHLORIDE 30 MG/1
30 CAPSULE, DELAYED RELEASE ORAL DAILY
Status: DISPENSED | OUTPATIENT
Start: 2018-06-22 | End: 2018-06-24

## 2018-06-21 RX ORDER — SODIUM CHLORIDE 450 MG/100ML
INJECTION, SOLUTION INTRAVENOUS CONTINUOUS
Status: DISCONTINUED | OUTPATIENT
Start: 2018-06-21 | End: 2018-06-23

## 2018-06-21 RX ORDER — FAMOTIDINE 20 MG/1
20 TABLET, FILM COATED ORAL DAILY
Status: DISCONTINUED | OUTPATIENT
Start: 2018-06-22 | End: 2018-07-09 | Stop reason: HOSPADM

## 2018-06-21 RX ORDER — FUROSEMIDE 20 MG/1
20 TABLET ORAL DAILY
Status: DISCONTINUED | OUTPATIENT
Start: 2018-06-21 | End: 2018-06-21

## 2018-06-21 RX ORDER — AMLODIPINE BESYLATE 5 MG/1
10 TABLET ORAL
Status: DISCONTINUED | OUTPATIENT
Start: 2018-06-22 | End: 2018-06-21

## 2018-06-21 RX ORDER — BUTALBITAL, ACETAMINOPHEN AND CAFFEINE 50; 325; 40 MG/1; MG/1; MG/1
1 TABLET ORAL EVERY 6 HOURS PRN
Status: DISCONTINUED | OUTPATIENT
Start: 2018-06-21 | End: 2018-06-21

## 2018-06-21 RX ORDER — CLONIDINE HYDROCHLORIDE 0.1 MG/1
0.1 TABLET ORAL EVERY 6 HOURS PRN
Status: DISCONTINUED | OUTPATIENT
Start: 2018-06-21 | End: 2018-06-21

## 2018-06-21 RX ORDER — AMLODIPINE BESYLATE 5 MG/1
5 TABLET ORAL
Status: DISCONTINUED | OUTPATIENT
Start: 2018-06-21 | End: 2018-06-21

## 2018-06-21 RX ORDER — DULOXETIN HYDROCHLORIDE 30 MG/1
30 CAPSULE, DELAYED RELEASE ORAL DAILY
Status: DISCONTINUED | OUTPATIENT
Start: 2018-06-21 | End: 2018-06-21

## 2018-06-21 RX ADMIN — BUTALBITAL, ACETAMINOPHEN, AND CAFFEINE 1 TABLET: 50; 325; 40 TABLET ORAL at 22:24

## 2018-06-21 RX ADMIN — SODIUM CHLORIDE: 4.5 INJECTION, SOLUTION INTRAVENOUS at 17:17

## 2018-06-21 RX ADMIN — MORPHINE SULFATE 4 MG: 4 INJECTION INTRAVENOUS at 07:08

## 2018-06-21 RX ADMIN — LEVOTHYROXINE SODIUM 112 MCG: 112 TABLET ORAL at 08:17

## 2018-06-21 RX ADMIN — MORPHINE SULFATE 4 MG: 4 INJECTION INTRAVENOUS at 02:26

## 2018-06-21 RX ADMIN — ATORVASTATIN CALCIUM 80 MG: 80 TABLET, FILM COATED ORAL at 20:22

## 2018-06-21 RX ADMIN — HEPARIN SODIUM 5000 UNITS: 5000 INJECTION, SOLUTION INTRAVENOUS; SUBCUTANEOUS at 13:56

## 2018-06-21 RX ADMIN — SODIUM CHLORIDE: 9 INJECTION, SOLUTION INTRAVENOUS at 04:40

## 2018-06-21 RX ADMIN — HEPARIN SODIUM 5000 UNITS: 5000 INJECTION, SOLUTION INTRAVENOUS; SUBCUTANEOUS at 20:22

## 2018-06-21 RX ADMIN — INSULIN GLARGINE 48 UNITS: 100 INJECTION, SOLUTION SUBCUTANEOUS at 08:31

## 2018-06-21 RX ADMIN — MORPHINE SULFATE 4 MG: 4 INJECTION INTRAVENOUS at 15:19

## 2018-06-21 RX ADMIN — SENNOSIDES AND DOCUSATE SODIUM 2 TABLET: 8.6; 5 TABLET ORAL at 20:22

## 2018-06-21 RX ADMIN — MORPHINE SULFATE 4 MG: 4 INJECTION INTRAVENOUS at 11:08

## 2018-06-21 RX ADMIN — ONDANSETRON 4 MG: 2 INJECTION INTRAMUSCULAR; INTRAVENOUS at 07:08

## 2018-06-21 RX ADMIN — ONDANSETRON 4 MG: 2 INJECTION INTRAMUSCULAR; INTRAVENOUS at 11:08

## 2018-06-21 RX ADMIN — ASPIRIN 325 MG: 325 TABLET ORAL at 08:17

## 2018-06-21 RX ADMIN — FAMOTIDINE 20 MG: 20 TABLET ORAL at 08:17

## 2018-06-21 RX ADMIN — HYDRALAZINE HYDROCHLORIDE 20 MG: 20 INJECTION INTRAMUSCULAR; INTRAVENOUS at 01:49

## 2018-06-21 RX ADMIN — AMLODIPINE BESYLATE 5 MG: 5 TABLET ORAL at 11:08

## 2018-06-21 RX ADMIN — ONDANSETRON 4 MG: 2 INJECTION INTRAMUSCULAR; INTRAVENOUS at 00:42

## 2018-06-21 RX ADMIN — ONDANSETRON 4 MG: 2 INJECTION INTRAMUSCULAR; INTRAVENOUS at 15:18

## 2018-06-21 RX ADMIN — DULOXETINE HYDROCHLORIDE 30 MG: 30 CAPSULE, DELAYED RELEASE ORAL at 10:08

## 2018-06-21 RX ADMIN — SENNOSIDES AND DOCUSATE SODIUM 2 TABLET: 8.6; 5 TABLET ORAL at 08:17

## 2018-06-21 RX ADMIN — ONDANSETRON 4 MG: 2 INJECTION INTRAMUSCULAR; INTRAVENOUS at 19:11

## 2018-06-21 RX ADMIN — ONDANSETRON 4 MG: 2 INJECTION INTRAMUSCULAR; INTRAVENOUS at 23:38

## 2018-06-21 RX ADMIN — MORPHINE SULFATE 4 MG: 4 INJECTION INTRAVENOUS at 23:38

## 2018-06-21 RX ADMIN — HYDRALAZINE HYDROCHLORIDE 20 MG: 20 INJECTION INTRAMUSCULAR; INTRAVENOUS at 15:18

## 2018-06-21 RX ADMIN — HEPARIN SODIUM 5000 UNITS: 5000 INJECTION, SOLUTION INTRAVENOUS; SUBCUTANEOUS at 05:44

## 2018-06-21 RX ADMIN — MORPHINE SULFATE 4 MG: 4 INJECTION INTRAVENOUS at 19:11

## 2018-06-21 RX ADMIN — BUTALBITAL, ACETAMINOPHEN, AND CAFFEINE 1 TABLET: 50; 325; 40 TABLET ORAL at 01:13

## 2018-06-21 ASSESSMENT — ENCOUNTER SYMPTOMS
SPEECH CHANGE: 0
FEVER: 0
VOMITING: 0
MYALGIAS: 0
DOUBLE VISION: 0
FOCAL WEAKNESS: 0
CHILLS: 0
ABDOMINAL PAIN: 0
WEIGHT LOSS: 0
COUGH: 0
HEADACHES: 0
SORE THROAT: 1
WHEEZING: 0
DIZZINESS: 1
HEARTBURN: 0
SHORTNESS OF BREATH: 0
BLURRED VISION: 1
BLOOD IN STOOL: 0
ORTHOPNEA: 0
PHOTOPHOBIA: 0
BACK PAIN: 0
BRUISES/BLEEDS EASILY: 0
STRIDOR: 0
NAUSEA: 1
SEIZURES: 0
SENSORY CHANGE: 0
FLANK PAIN: 0
LOSS OF CONSCIOUSNESS: 0
COUGH: 1
PALPITATIONS: 0
NAUSEA: 0

## 2018-06-21 ASSESSMENT — COGNITIVE AND FUNCTIONAL STATUS - GENERAL
WALKING IN HOSPITAL ROOM: A LOT
SUGGESTED CMS G CODE MODIFIER MOBILITY: CK
STANDING UP FROM CHAIR USING ARMS: A LITTLE
MOVING FROM LYING ON BACK TO SITTING ON SIDE OF FLAT BED: A LITTLE
MOBILITY SCORE: 18
CLIMB 3 TO 5 STEPS WITH RAILING: A LOT

## 2018-06-21 ASSESSMENT — PAIN SCALES - GENERAL
PAINLEVEL_OUTOF10: 10
PAINLEVEL_OUTOF10: 9
PAINLEVEL_OUTOF10: 8
PAINLEVEL_OUTOF10: 0
PAINLEVEL_OUTOF10: 8
PAINLEVEL_OUTOF10: 5
PAINLEVEL_OUTOF10: 0
PAINLEVEL_OUTOF10: 10
PAINLEVEL_OUTOF10: 9

## 2018-06-21 ASSESSMENT — GAIT ASSESSMENTS
DEVIATION: STEP TO;BRADYKINETIC;DECREASED HEEL STRIKE;DECREASED TOE OFF
ASSISTIVE DEVICE: FRONT WHEEL WALKER
DISTANCE (FEET): 8
GAIT LEVEL OF ASSIST: MINIMAL ASSIST

## 2018-06-21 NOTE — CONSULTS
DATE OF SERVICE:  06/20/2018    REQUESTING PHYSICIAN:  Yoni Amador MD    REASON FOR CONSULTATION:  Stroke.    HISTORY OF PRESENT ILLNESS:  The patient is a 52-year-old right-handed female   with past medical history significant for poorly controlled diabetes   associated with diabetic neuropathy, history of hypertension, history of DVT   and PE in the past, status post IVC filter placement who is not on   anticoagulation due to history of gastrointestinal bleed, was transferred from   outside hospital for evaluation of acute stroke.  Apparently, she had some   visual changes associated with nausea and vomiting.  She went to outside   facility and underwent a brain CT, which did not reveal acute abnormality.    Subsequently, she had a brain MRI, which revealed medial left cerebellar   acute/subacute infarction without evidence of hemorrhage.  The patient was   transferred to [facility] for stroke workup and management.  Apparently, she was   not a candidate for administration of TPA.  She tells me at the time she had   PE and DVT, she underwent hypercoagulable workup, which was unrevealing.    PAST MEDICAL HISTORY:  Significant for  1.  Type 2 diabetes associated with diabetic neuropathy.  2.  History of hypertension.  3.  History of arthritis.  4.  History of deep venous thrombosis and pulmonary embolism in the past.  5.  Status post IVC filter placement.    MEDICATIONS:  Reviewed as per MAR.    ALLERGIES:  TORADOL, LEVAQUIN, LISINOPRIL, and TAPE.    SOCIAL HISTORY:  She has no history of smoking, drinking, or drug abuse.    FAMILY HISTORY:  Reviewed and noncontributory.  There is no history of   premature heart attack or stroke.    REVIEW OF SYSTEMS:  As above.  All other systems are normal.  Please also see   Dr. Jay review of systems in H and P dated 06/19/2018.    PHYSICAL EXAMINATION:  VITAL SIGNS:  Today, heart rate 70 and regular, blood pressure 168/65,   respirations 16, O2 sat 94% on room air,  temperature 98.3.  NECK:  Supple.  No carotid bruit.  CARDIOVASCULAR:  Regular rate and rhythm.  LUNGS:  Clear.  ABDOMEN:  Soft.  EXTREMITIES:  No cyanosis or clubbing.  NEUROLOGIC:  She is awake, alert, and fully oriented.  Speech and memory   within normal limits.  Facial motor and sensation intact.  Extraocular   movements are full.  Visual fields are full to confrontation.  Hearing is   intact to finger rub bilaterally.  Tongue midline and protrudes symmetrically.    Palate elevates symmetrically.  Shoulder shrugs are normal.  Motor   examination revealed normal strength to direct testing of both upper and lower   extremities, proximal and distal.  Sensation intact to light touch,   temperature, and pinprick, although she has a slight decreased sensation in   stocking pattern in the lower extremity.  Coordination:  She has slight   dysmetria to finger-to-nose and heel-to-shin testing on the left side.  She   performed finger-to-nose and heel-to-shin testing without problem on the right   side.  Deep tendon reflexes are diffusely diminished.  Plantar reflexes are   downgoing bilaterally.    ASSESSMENT AND PLAN:  A 52-year-old female with evidence of medial left   cerebellar acute/subacute infarct on MRI without hemorrhage.  Etiology likely   atherosclerotic disease due to multiple poorly controlled risk factors   including hypertension, diabetes, and hyperlipidemia.  She was started on   aspirin and statin.  She will undergo echocardiogram and carotid ultrasound.    Apparently, she had hypercoagulable workup in the past, which has been   unremarkable.  She will be evaluated by physical therapy and occupational   therapy.  She will be on sequential compression devices for deep venous   thrombosis prevention.       ____________________________________     MD TREVA Morris / DAYAN    DD:  06/20/2018 17:12:04  DT:  06/20/2018 17:34:05    D#:  [id number]  Job#:  [id number]

## 2018-06-21 NOTE — THERAPY
"Speech Language Therapy dysphagia treatment completed.     Functional Status:  Pt continues to have whispered, dysphonic vocal quality, however appears to be managing secretions with improvement.  Pt completed exercises targeting base of tongue retraction, laryngeal elevation and pharyngeal constriction with \"fair to good\" accuracy, 5/5 times each.  Pt reports practicing her exercises with regularity.  Pt was given 4 single ice chips, and pt continues to present with OVERT s/sx of aspiration in 100% of trials, even with use of breath hold and effortful swallow strategies.  Pt is not safe to start a PO diet.  Recommend to continue and NPO/cortrak.  Pt was educated regarding her current swallow status, risks of aspiration and recs, and verbalized good understanding.  She is motivated to improve.      Recommendations: NPO/cortrak     Plan of Care: Will benefit from Speech Therapy 5 times per week    Post-Acute Therapy: Discharge to a transitional care facility for continued skilled therapy services.    See \"Rehab Therapy-Acute\" Patient Summary Report for complete documentation.     "

## 2018-06-21 NOTE — PROGRESS NOTES
Pt is A&Ox4 pt denies pain at this time. Pt has cortrak in place and tube feeding going. Hourly rounding in place.

## 2018-06-21 NOTE — CONSULTS
Medical chart review completed. Patient is a 52 y.o. year-old female  with  a past medical history significant for adult onset diabetes mellitus, recent GI bleed hypertension, hyperlipidemia history of DVT and PE status post IVC C filter placement which can't tolerate anticoagulation admitted to [facility]  On 6/19/2018  5:20 PM who was transferred from another facility where she originally presented for workup of nausea and vomiting. CT scan of the head was unremarkable on MRI of the brain demonstrated a medial left cerebellar acute/subacute infarction without evidence of hemorrhage. She was transferred to our facility for further care. Physical medicine rehabilitation is being consulted for further recommendations regarding rehabilitative care    Furthering physician we discussed is that she was noted to have an elevated creatinine and was seen by nephrology hypothesizing and acute kidney injury superimposed on chronic kidney injury stage IV    PMH:  Past Medical History:   Diagnosis Date   • Arthritis    • Cataract    • DVT (deep venous thrombosis) ([facility])    • HTN (hypertension) 10/25/2011   • Neuropathy in diabetes (HCC) 10/25/2011   • Other specified symptom associated with female genital organs    • PE (pulmonary embolism)    • Pneumonia    • Presence of IVC filter    • Renal disorder    • Type II or unspecified type diabetes mellitus without mention of complication, not stated as uncontrolled 10/25/2011   • Unspecified disorder of thyroid        PSH:  Past Surgical History:   Procedure Laterality Date   • GASTROSCOPY  2/22/2013    Performed by Reid Yi D.O. at SURGERY [facility]   • COLONOSCOPY  2/22/2013    Performed by Reid Yi D.O. at SURGERY [facility]   • ABDOMINAL HYSTERECTOMY TOTAL      Right ovary remains   • CHOLECYSTECTOMY     • KNEE ARTHROSCOPY      3 surgeries right knee       FAMILY HISTORY:  Is positive for hypertension, cancer and cardiac  disease    MEDICATIONS:  Current Facility-Administered Medications   Medication Dose   • hydrALAZINE (APRESOLINE) injection 20 mg  20 mg   • acetaminophen/caffeine/butalbital 325-40-50 mg (FIORICET) -40 MG per tablet 1 Tab  1 Tab   • DULoxetine (CYMBALTA) capsule 30 mg  30 mg   • amLODIPine (NORVASC) tablet 5 mg  5 mg   • morphine (pf) 4 mg/ml injection 4 mg  4 mg   • labetalol (NORMODYNE,TRANDATE) injection 10 mg  10 mg   • heparin injection 5,000 Units  5,000 Units   • ondansetron (ZOFRAN) syringe/vial injection 4 mg  4 mg   • ondansetron (ZOFRAN ODT) dispertab 4 mg  4 mg   • promethazine (PHENERGAN) suppository 12.5-25 mg  12.5-25 mg   • prochlorperazine (COMPAZINE) injection 5-10 mg  5-10 mg   • insulin regular (HUMULIN R) injection 1-6 Units  1-6 Units    And   • glucose 4 g chewable tablet 16 g  16 g    And   • dextrose 50% (D50W) injection 25 mL  25 mL   • Respiratory Care per Protocol     • insulin glargine (LANTUS) injection 48 Units  48 Units   • Pharmacy Consult: Enteral tube feeding - review meds/change route/product selection  1 Each   • magnesium hydroxide (MILK OF MAGNESIA) suspension 30 mL  30 mL    And   • senna-docusate (PERICOLACE or SENOKOT S) 8.6-50 MG per tablet 2 Tab  2 Tab    And   • polyethylene glycol/lytes (MIRALAX) PACKET 1 Packet  1 Packet    And   • bisacodyl (DULCOLAX) suppository 10 mg  10 mg   • levothyroxine (SYNTHROID) tablet 112 mcg  112 mcg   • promethazine (PHENERGAN) tablet 12.5-25 mg  12.5-25 mg   • aspirin (ASA) chewable tab 324 mg  324 mg    Or   • aspirin (ASA) tablet 325 mg  325 mg    Or   • aspirin (ASA) suppository 300 mg  300 mg   • temazepam (RESTORIL) capsule 15 mg  15 mg   • hydrOXYzine HCl (ATARAX) tablet 25 mg  25 mg   • atorvastatin (LIPITOR) tablet 80 mg  80 mg   • famotidine (PEPCID) tablet 20 mg  20 mg       ALLERGIES:  Toradol; Levaquin; Lisinopril; and Tape    PSYCHOSOCIAL HISTORY:  Living Site:  Home  Living With: 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 Alcohol and tobacco      The patient presents  with   functional deficits in mobility/self-cares/swallowing/speech, and Minimal  de-conditioning. 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 The patient's current diet is nothing by mouth. Today The patient is   a Good candidate for an acute inpatient rehabilitation program with a coordinated program of care at an intensity and frequency not available at a lower level of care. However she is at such a high level there is a possibility of the time insurance approves her she may be too high level and can be seen as an outpatient     This recommendation is substantiated by the patient's current medical condition with intervention and assessment of medical issues requiring an acute level of care for patient's safety and maximum outcome. A coordinated program of care will be provided by an interdisciplinary team including physical therapy, occupational therapy, speech language pathology, physiatry, rehab nursing and rehab psychology. Rehab goals include improved swallowing, mobility, self-care management, strength and conditioning/endurance, pain management, bowel and bladder management, mood and affect, and safety with independent home management including caregiver training. Estimated length of stay is approximately 7 days. Rehab potential: Good. Disposition: to pre-morbid independent living setting with supportive care of patient's  family and community resources. We will continue to follow with you in anticipation of discharge to acute inpatient rehabilitation when medically stable to do so at the discretion of her attending physician. Thank you for allowing us to participate in her care. Please call with any questions regarding this recommendation.    Ren Alcala M.D.

## 2018-06-21 NOTE — CARE PLAN
Problem: Pain Management  Goal: Pain level will decrease to patient's comfort goal  Outcome: PROGRESSING AS EXPECTED      Problem: Fluid Volume:  Goal: Will maintain balanced intake and output  Outcome: PROGRESSING AS EXPECTED

## 2018-06-21 NOTE — PROGRESS NOTES
[facility] Hospitalist Progress Note    Date of Service: 2018    Chief Complaint  52 y.o. female admitted 2018 with acute to subacute left medial cerebellar CVA. Patient was initially seen at an outside facility for nausea, vomiting, vision changes and vertigo. 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 Prior hyper-coaguable state workup negative.    Interval Problem Update  Cerebellar infarct - Neuro consulted - workup ongoing. Reports in chart.  IDDM - SSI  CKD-IV - Neph consulted. Creatinine rising - tighten BP control. DC fluids/lasix  Hx GIB - no active signs of bleeding - restarted ASA  HTN - worsening - added CCB.   Lipids - uncontrolled. Max statin    Consultants/Specialty  Neuro  Neph    Disposition  Rehab        Review of Systems   Constitutional: Negative for chills, fever and weight loss.   HENT: Positive for sore throat.         Positive for dysphonia   Eyes: Positive for blurred vision. Negative for double vision and photophobia.   Respiratory: Positive for cough. Negative for wheezing and stridor.    Cardiovascular: Negative for chest pain, palpitations and orthopnea.   Gastrointestinal: Positive for nausea. Negative for abdominal pain, blood in stool, heartburn, melena and vomiting.   Genitourinary: Negative for dysuria.   Neurological: Positive for dizziness. Negative for speech change, focal weakness, seizures, loss of consciousness and headaches.   Endo/Heme/Allergies: Does not bruise/bleed easily.      Physical Exam  Laboratory/Imaging   Hemodynamics  Temp (24hrs), Av.8 °C (98.2 °F), Min:36.2 °C (97.2 °F), Max:37.1 °C (98.8 °F)   Temperature: 36.2 °C (97.2 °F)  Pulse  Av.9  Min: 57  Max: 84    Blood Pressure: 157/63      Respiratory      Respiration: 15, Pulse Oximetry: 95 %, O2 Daily Delivery Respiratory : Silicone Nasal Cannula      Work Of Breathing / Effort: Mild  RUL Breath Sounds: Coarse Crackles, RML Breath Sounds: Coarse Crackles, RLL Breath Sounds: Diminished, DIANNE Breath Sounds: Coarse Crackles, LLL Breath Sounds: Diminished    Fluids    Intake/Output Summary (Last 24 hours) at 06/21/18 1146  Last data filed at 06/21/18 0835   Gross per 24 hour   Intake             1750 ml   Output                0 ml   Net             1750 ml       Nutrition  Orders Placed This Encounter   Procedures   • Diet NPO     Standing Status:   Standing     Number of Occurrences:   1     Order Specific Question:   Restrict to:     Answer:   Strict [1]     Physical Exam   Constitutional: She is oriented to person, place, and time. She appears well-developed and well-nourished. No distress.   HENT:   Head: Normocephalic and atraumatic.   Eyes: EOM are normal. Pupils are equal, round, and reactive to light.   Neck: Neck supple.   Cardiovascular: Normal rate, regular rhythm and normal heart sounds.    Pulmonary/Chest: Effort normal and breath sounds normal. No respiratory distress. She has no wheezes. She has no rales.   Abdominal: Soft. She exhibits no distension. There is no tenderness.   Neurological: She is alert and oriented to person, place, and time. No cranial nerve deficit. GCS eye subscore is 4. GCS verbal subscore is 5. GCS motor subscore is 6.   Left dysmetria   Skin: Skin is warm and dry.   Psychiatric: She has a normal mood and affect. Her speech is normal and behavior is normal. Thought content normal. Cognition and memory are normal.       Recent Labs      06/20/18   0433  06/21/18   0453   WBC  8.6  6.3   RBC  4.84  4.59   HEMOGLOBIN  14.5  13.8   HEMATOCRIT  45.6  43.6   MCV  94.2  95.0   MCH  30.0  30.1   MCHC  31.8*  31.7*   RDW  50.9*  51.3*   PLATELETCT  214  206   MPV  10.6  10.7     Recent Labs      06/20/18   0434  06/21/18   0453   SODIUM  143  144   POTASSIUM  4.7  4.6   CHLORIDE  112  113*   CO2  22  18*   GLUCOSE  178*  176*   BUN  41*   43*   CREATININE  2.59*  2.96*   CALCIUM  8.4*  8.6         Recent Labs      06/20/18   0433   BNPBTYPENAT  104*     Recent Labs      06/21/18   0453   TRIGLYCERIDE  195*   HDL  37*   LDL  135*          Assessment/Plan     * Stroke (HCC)- (present on admission)   Assessment & Plan    MRI of the brain from other facility showed left medial cerebellar acute or subacute infarction with no hemorrhage.   Aspirin initiated at 325 - carefull observation for bleed.   PT/OT. Speech.  Carotid - pend  Echo - pend  Statin - max          CKD stage 4 due to type 2 diabetes mellitus- (present on admission)   Assessment & Plan    Acute on chronic renal failure - worsening  Discussed w Neph - advancing HTN control  Goal Sys 140  Daily BNP/BMP  Strict I/Os  Daily weight  DC lasix  Avoid nephrotoxins. Renal dose medications  Appreciate nephrology assistance          Dysphagia   Assessment & Plan    Failed SLP swallow eval  Cortrak and nutrition consult  CXR 6/20 neg for asp PNA - follow closely - repeat today also neg            History of GI bleed   Assessment & Plan    Pepcid for PUD prophylaxis        History of DVT (deep vein thrombosis)   Assessment & Plan    And PE. Status post IVC filter placement. Intolerant to anticoagulation due to history of GI bleed.  Heparin Q8 with close monitoring for bleeding  Prior hyper-coaguable state workup from 3/2012 negative        Type II diabetes mellitus (HCC)   Assessment & Plan    A1c 8.3   Sliding scale insulin.  POC  - well controlled          Essential hypertension- (present on admission)   Assessment & Plan    Will carefully titrate BP meds to goal sys 140  Added Norvasc 6/21  ACE or ARB when creatinine stablizes        Hyperlipemia- (present on admission)   Assessment & Plan    Statin - max dose        Hypothyroid- (present on admission)   Assessment & Plan    Cont replacement  Normal TSH on admit        Diabetic neuropathy (HCC)- (present on admission)   Assessment & Plan     Decreased Cymbalta from 60mg to 30 given acute/chronic renal failure  Consider switch to Gabapentin - max dose 300mg/24hr          Quality-Core Measures   Reviewed items::  EKG reviewed, Labs reviewed, Medications reviewed and Radiology images reviewed  Wallace catheter::  No Wallace  DVT prophylaxis pharmacological::  Heparin  DVT prophylaxis - mechanical:  SCDs  Ulcer Prophylaxis::  Yes  Assessed for rehabilitation services:  Patient was assess for and/or received rehabilitation services during this hospitalization

## 2018-06-21 NOTE — PROGRESS NOTES
Pt A&Ox4, denies N/T, c/o occasional nausea that is alleviated with Zofran. Pt c/o headache, attempted to alleviate with Fioricet, ordered overnight with MD Johnson, no positive results. Continues to medicate with Morphine with positive results. Pt continues to have vertigo and unsteadiness when ambulating. Pt using bedpan, calls appropriately. TF running at 25, rate changed at 0530 to 50 ml/hr, tolerating well. Bed alarm on, call light and personal belongings within reach

## 2018-06-21 NOTE — THERAPY
"Physical Therapy Evaluation completed.   Bed Mobility:  Supine to Sit: Stand by Assist  Transfers: Sit to Stand: Stand by Assist  Gait: Level Of Assist: Minimal Assist with Front-Wheel Walker       Plan of Care: Will benefit from Physical Therapy 4 times per week  Discharge Recommendations: Equipment: Will Continue to Assess for Equipment Needs.     See \"Rehab Therapy-Acute\" Patient Summary Report for complete documentation.     RN notified of PT visit, cleared for PT evaluation. Pt presented with for primary risk reduction for LOB/falling and with a recent diagnosis of a cerebellar CVA. Pt presented with impaired balance, imparied coordination, impaired gait, nausea/dizziness, and poor activity tolerance. Pt was primarily limited by nausea and dizziness during functional mobility. The eplys manuvear was implemented prior to functional mobility. Pt reported of decreased dizziness symptoms when turning her head to the left. Attempted ambulation, however, pt reported of dizziness/nausea once she got to door and reqiured a seat. Required Min A for standing and ambulation at this time due to nausea and dizziness. Attempted eply manuvear a second time and pt reported of improved symptoms. Pt reports she had her L ankle recently fused and reports she is only allowed to put 30 lbs on the L ankle. No WB precautions have been mentioned in the chart, RN is aware of WB status situation. Pt will benefit from continued skilled PT while in house, will recommend post acute rehab therapy services at this time given current objective findings. Pt appears to be able to tolerate 3 hours of intensive therapy at this time. Will cotinue to follow and update POC as pt improves in activity tolerance.   "

## 2018-06-21 NOTE — PROGRESS NOTES
NEUROLOGY PROGRESS NOTE      Background:  52 y.o. female was admitted on 6/19/2018  5:20 PM for Cerebellar Stroke.    SUBJECTIVE: No significant changes, no new complaints.  She failed swallow eval and has a core track in place.    NEUROLOGICAL EXAM:  She is awake, alert, and fully oriented.  Speech and memory   within normal limits.  Facial motor and sensation intact.  Extraocular   movements are full.  Visual fields are full to confrontation.  Hearing is   intact to finger rub bilaterally.  Tongue midline and protrudes symmetrically.    Palate elevates symmetrically.  Shoulder shrugs are normal.  Motor   examination revealed normal strength to direct testing of both upper and lower   extremities, proximal and distal.  Sensation intact to light touch,   temperature, and pinprick, although she has a slight decreased sensation in   stocking pattern in the lower extremity.  Coordination:  She has slight   dysmetria to finger-to-nose and heel-to-shin testing on the left side.  She   performed finger-to-nose and heel-to-shin testing without problem on the right   side.  Deep tendon reflexes are diffusely diminished.  Plantar reflexes are   downgoing bilaterally.    OBJECTIVE:     MEDICATIONS:  Current Facility-Administered Medications   Medication Dose   • hydrALAZINE (APRESOLINE) injection 20 mg  20 mg   • acetaminophen/caffeine/butalbital 325-40-50 mg (FIORICET) -40 MG per tablet 1 Tab  1 Tab   • DULoxetine (CYMBALTA) capsule 30 mg  30 mg   • morphine (pf) 4 mg/ml injection 4 mg  4 mg   • labetalol (NORMODYNE,TRANDATE) injection 10 mg  10 mg   • heparin injection 5,000 Units  5,000 Units   • ondansetron (ZOFRAN) syringe/vial injection 4 mg  4 mg   • ondansetron (ZOFRAN ODT) dispertab 4 mg  4 mg   • promethazine (PHENERGAN) suppository 12.5-25 mg  12.5-25 mg   • prochlorperazine (COMPAZINE) injection 5-10 mg  5-10 mg   • insulin regular (HUMULIN R) injection 1-6 Units  1-6 Units    And   • glucose 4 g chewable  "tablet 16 g  16 g    And   • dextrose 50% (D50W) injection 25 mL  25 mL   • Respiratory Care per Protocol     • insulin glargine (LANTUS) injection 48 Units  48 Units   • Pharmacy Consult: Enteral tube feeding - review meds/change route/product selection  1 Each   • magnesium hydroxide (MILK OF MAGNESIA) suspension 30 mL  30 mL    And   • senna-docusate (PERICOLACE or SENOKOT S) 8.6-50 MG per tablet 2 Tab  2 Tab    And   • polyethylene glycol/lytes (MIRALAX) PACKET 1 Packet  1 Packet    And   • bisacodyl (DULCOLAX) suppository 10 mg  10 mg   • levothyroxine (SYNTHROID) tablet 112 mcg  112 mcg   • promethazine (PHENERGAN) tablet 12.5-25 mg  12.5-25 mg   • aspirin (ASA) chewable tab 324 mg  324 mg    Or   • aspirin (ASA) tablet 325 mg  325 mg    Or   • aspirin (ASA) suppository 300 mg  300 mg   • temazepam (RESTORIL) capsule 15 mg  15 mg   • hydrOXYzine HCl (ATARAX) tablet 25 mg  25 mg   • atorvastatin (LIPITOR) tablet 80 mg  80 mg   • famotidine (PEPCID) tablet 20 mg  20 mg       Blood pressure 157/63, pulse 76, temperature 36.2 °C (97.2 °F), resp. rate 15, height 1.778 m (5' 10\"), weight 87.5 kg (192 lb 14.4 oz), SpO2 95 %, not currently breastfeeding.    Recent Labs      06/20/18 0433   BNPBTYPENAT  104*     Recent Labs      06/20/18 0433 06/21/18 0453   WBC  8.6  6.3   RBC  4.84  4.59   HEMOGLOBIN  14.5  13.8   HEMATOCRIT  45.6  43.6   MCV  94.2  95.0   MCH  30.0  30.1   MCHC  31.8*  31.7*   RDW  50.9*  51.3*   PLATELETCT  214  206   MPV  10.6  10.7     Recent Labs      06/20/18 0434  06/21/18 0453   SODIUM  143  144   POTASSIUM  4.7  4.6   CHLORIDE  112  113*   CO2  22  18*   GLUCOSE  178*  176*   BUN  41*  43*       Results for orders placed or performed during the hospital encounter of 02/19/13   ECHOCARDIOGRAM COMP W/O CONT   Result Value Ref Range    Eject.Frac. MOD 4C 72.73         ASSESSMENT AND PLAN:   A 52-year-old female with evidence of medial left   cerebellar acute/subacute infarct on MRI " without hemorrhage.  Etiology likely   atherosclerotic disease due to multiple poorly controlled risk factors   including hypertension, diabetes, and hyperlipidemia.    She will continue with aspirin and Lipitor.  Echocardiogram and carotid ultrasounds are pending.  She will continue with physical therapy and occupational   therapy and speech therapy.  She will be on sequential compression devices for deep venous   thrombosis prevention.

## 2018-06-21 NOTE — PROGRESS NOTES
Nephrology Progress Note, Adult, Complex               Author: Darlyn Dominguezziprema Date & Time created: 6/21/2018  3:46 PM     Interval History:  51 y/o female with CAMPOS/CKD IV, DMN, HTN, admitted with left cerebellar stroke  Elevated BP over night  Creat worse  UOP -not monitored  Renal US susp for PCKD  Review of Systems:  Review of Systems   Constitutional: Negative for chills and fever.   HENT: Negative.    Respiratory: Negative for cough and shortness of breath.    Cardiovascular: Negative for chest pain, palpitations, orthopnea and leg swelling.   Gastrointestinal: Negative for abdominal pain, nausea and vomiting.   Genitourinary: Negative for dysuria, flank pain, frequency, hematuria and urgency.   Musculoskeletal: Negative for back pain, joint pain and myalgias.   Skin: Negative.    Neurological: Positive for dizziness. Negative for sensory change, focal weakness and headaches.       Physical Exam:  Physical Exam   Constitutional: She is oriented to person, place, and time. She appears well-developed and well-nourished. No distress.   HENT:   Head: Normocephalic and atraumatic.   Nose: Nose normal.   Mouth/Throat: Oropharynx is clear and moist.   NGT   Eyes: Conjunctivae and EOM are normal. Pupils are equal, round, and reactive to light. No scleral icterus.   Neck: Normal range of motion. Neck supple. No thyromegaly present.   Cardiovascular: Normal rate and regular rhythm.  Exam reveals no gallop and no friction rub.    Pulmonary/Chest: Effort normal and breath sounds normal. No respiratory distress. She has no wheezes. She has no rales.   Abdominal: Soft. Bowel sounds are normal. She exhibits no distension. There is no tenderness.   Musculoskeletal: Normal range of motion. She exhibits no edema.   Lymphadenopathy:     She has no cervical adenopathy.   Neurological: She is alert and oriented to person, place, and time.   Skin: Skin is warm. No rash noted. No erythema.   Nursing note and vitals reviewed.      Labs:         Invalid input(s): [id number]  Recent Labs      18   BNPBTYPENAT  104*  59     Recent Labs      18   SODIUM  143  144   POTASSIUM  4.7  4.6   CHLORIDE  112  113*   CO2  22  18*   BUN  41*  43*   CREATININE  2.59*  2.96*   MAGNESIUM   --   1.8   PHOSPHORUS   --   3.3   CALCIUM  8.4*  8.6     Recent Labs      18   ALTSGPT  <5  <5   ASTSGOT  5*  8*   ALKPHOSPHAT  78  85   TBILIRUBIN  0.4  0.5   GLUCOSE  178*  176*     Recent Labs      18   RBC  4.84  4.59   HEMOGLOBIN  14.5  13.8   HEMATOCRIT  45.6  43.6   PLATELETCT  214  206     Recent Labs      18   WBC  8.6   --   6.3   NEUTSPOLYS  63.80   --   75.30*   LYMPHOCYTES  27.60   --   16.90*   MONOCYTES  6.10   --   5.10   EOSINOPHILS  1.50   --   1.60   BASOPHILS  0.70   --   0.80   ASTSGOT   --   5*  8*   ALTSGPT   --   <5  <5   ALKPHOSPHAT   --   78  85   TBILIRUBIN   --   0.4  0.5           Hemodynamics:  Temp (24hrs), Av.7 °C (98.1 °F), Min:36.2 °C (97.2 °F), Max:37.1 °C (98.8 °F)  Temperature: 36.3 °C (97.4 °F)  Pulse  Av.6  Min: 57  Max: 84   Blood Pressure: (!) 181/76     Respiratory:    Respiration: 16, Pulse Oximetry: 93 %     Work Of Breathing / Effort: Mild  RUL Breath Sounds: Coarse Crackles, RML Breath Sounds: Coarse Crackles, RLL Breath Sounds: Diminished, DIANNE Breath Sounds: Coarse Crackles, LLL Breath Sounds: Diminished  Fluids:    Intake/Output Summary (Last 24 hours) at 18 1546  Last data filed at 18 1330   Gross per 24 hour   Intake             1750 ml   Output              200 ml   Net             1550 ml        GI/Nutrition:  Orders Placed This Encounter   Procedures   • Diet NPO     Standing Status:   Standing     Number of Occurrences:   1     Order Specific Question:   Restrict to:     Answer:   Strict [1]     Medical Decision Making, by Problem:  Active  Hospital Problems    Diagnosis   • *Stroke (HCC) [I63.9]   • CKD stage 4 due to type 2 diabetes mellitus [E11.22, N18.4]   • Dysphagia [R13.10]   • Diabetic neuropathy (HCC) [E11.40]   • Type II diabetes mellitus (HCC) [E11.9]   • History of DVT (deep vein thrombosis) [Z86.718]   • History of GI bleed [Z87.19]   • Essential hypertension [I10]   • Hyperlipemia [E78.5]   • Hypothyroid [E03.9]       Quality-Core Measures   Reviewed items::  Labs reviewed and Medications reviewed    Assessment and plan:    1.CAMPOS/CKD IV /PCKD --restart IVF with 1/2 NS at 80 cc/hr  2.HTN: elevated BP -add amlodipine  3.Electrolytes: borderline elevated Na -d/c NS -change to 1/2  4.Anemia: Hb   5.Volume:low BNP -restart IVF      Recs: amlodipine 5 mg po QD -increase to 10 mg if BP still elevated             Daily BMP             1/2 NS at 80 cc/hr              Free water 200 cc Q 4 H              Will follow

## 2018-06-21 NOTE — PREADMISSION SCREENING NOTE
Pre-Admission Screening Form    Patient Information:   Name: Bryan Garcia     MRN: [id number]       : 1965      Age: 52 y.o.   Gender: female      Race: White [7]       Marital Status:  [2]  Family Contact: Francisco GarciaYuliana AlanisKristine        Relationship: Spouse [17]  Relative [11]  Grandparent [7]  Home Phone: [phone]           Cell Phone:       Advanced Directives: None  Code Status:  FULL  Current Attending Provider: Yoni Amador M.D.  Referring Physician: Dr. Jay   Physiatrist Consult: Dr. Alcala    Referral Date: 18  Primary Payor Source:  [employer]ET[employer]  Secondary Payor Source:      Medical Information:   Date of Admission to Acute Care [place]  Room Number: S192/01  Rehabilitation Diagnosis: 01.2 (R) Body Involvement (L) Brain   Immunization History   Administered Date(s) Administered   • Influenza TIV (IM) 2012, 2013   • Pneumococcal polysaccharide vaccine (PPSV-23) 2010     Allergies   Allergen Reactions   • Toradol Hives   • Levaquin      Blood boils   • Lisinopril Unspecified     Caused potassium build up in blood   • Tape      Past Medical History:   Diagnosis Date   • Arthritis    • Cataract    • DVT (deep venous thrombosis) (HCC)    • HTN (hypertension) 10/25/2011   • Neuropathy in diabetes (HCC) 10/25/2011   • Other specified symptom associated with female genital organs    • PE (pulmonary embolism)    • Pneumonia    • Presence of IVC filter    • Renal disorder    • Type II or unspecified type diabetes mellitus without mention of complication, not stated as uncontrolled 10/25/2011   • Unspecified disorder of thyroid      Past Surgical History:   Procedure Laterality Date   • GASTROSCOPY  2013    Performed by Reid Yi D.O. at SURGERY [facility]   • COLONOSCOPY  2013    Performed by Reid Yi D.O. at SURGERY [facility]   • ABDOMINAL HYSTERECTOMY TOTAL      Right ovary  remains   • CHOLECYSTECTOMY     • KNEE ARTHROSCOPY      3 surgeries right knee       History Leading to Admission, Conditions that Caused the Need for Rehab (CMS):     Michelle Jay M.D. [facility]ician [facility]  H&P Date of Service: 6/19/2018 10:12 PM      Expand All Collapse All    []Hide copied text  []Hover for attribution information   [facility] Medicine History and Physical     Date of Service  6/19/2018     Chief Complaint  Nausea and vomiting, Vision changes.     History of Presenting Illness  52 y.o. female with past medical history of type II diabetes mellitus, hypertension, history of DVT and PE status post IVC filter placement not tolerant to anticoagulation due to history of GI bleed who was transferred from another facility for acute stroke. Patient was at the other facility for evaluation of nausea and vomiting then developed vision changes. CT scan of the brain was negative for any acute intracranial abnormalities. MRI of the brain showed medial left cerebellar acute or subacute infarction with no evidence of hemorrhage. Patient was transferred here for higher level of care. Apparently patient was out of the window for TPA. Upon arrival here her NIH score was between 0 and 1. Patient does not show any focal deficit at this point.    Primary Care Physician  ORA Melendrez.     Consultants  Neurology     Code Status  Full     Review of Systems  Review of Systems   Constitutional: Negative for chills and fever.   HENT: Negative for hearing loss and tinnitus.    Eyes: Positive for blurred vision and double vision.   Respiratory: Negative for cough and hemoptysis.    Cardiovascular: Negative for chest pain.   Gastrointestinal: Negative for heartburn and nausea.   Genitourinary: Negative for dysuria and urgency.   Musculoskeletal: Negative for myalgias and neck pain.   Skin: Negative for rash.   Neurological: Negative for dizziness and headaches.   Endo/Heme/Allergies: Negative  for environmental allergies. Does not bruise/bleed easily.   Psychiatric/Behavioral: Negative for depression and suicidal ideas.          Past Medical History       Past Medical History:   Diagnosis Date   • Type II or unspecified type diabetes mellitus without mention of complication, not stated as uncontrolled 10/25/2011   • HTN (hypertension) 10/25/2011   • Neuropathy in diabetes (HCC) 10/25/2011   • Arthritis     • Cataract     • DVT (deep venous thrombosis) ([facility])     • Other specified symptom associated with female genital organs     • PE (pulmonary embolism)     • Pneumonia     • Presence of IVC filter     • Renal disorder     • Unspecified disorder of thyroid           Surgical History        Past Surgical History:   Procedure Laterality Date   • GASTROSCOPY   2/22/2013     Performed by Reid Yi D.O. at SURGERY [facility]   • COLONOSCOPY   2/22/2013     Performed by Reid Yi D.O. at SURGERY [facility]   • ABDOMINAL HYSTERECTOMY TOTAL         Right ovary remains   • CHOLECYSTECTOMY       • KNEE ARTHROSCOPY         3 surgeries right knee         Medications  No current facility-administered medications on file prior to encounter.                 Current Outpatient Prescriptions on File Prior to Encounter   Medication Sig Dispense Refill   • glucose blood (FREESTYLE LITE) strip TEST BLOOD SUGAR THREE TIMES A  Strip 0   • temazepam (RESTORIL) 15 MG Cap Take 15 mg by mouth at bedtime as needed for Sleep.       • hydrOXYzine (ATARAX) 25 MG Tab Take 1 Tab by mouth 3 times a day as needed for Itching. 60 Tab 2   • metronidazole (FLAGYL) 500 MG Tab Take 1 Tab by mouth 3 times a day. 30 Tab 0   • metoprolol SR (TOPROL XL) 25 MG TABLET SR 24 HR Take 1 Tab by mouth every day. 90 Tab 3   • furosemide (LASIX) 40 MG Tab Take 1 Tab by mouth every day. 90 Tab 3   • levothyroxine (SYNTHROID) 112 MCG Tab Take 1 Tab by mouth every day. 90 Tab 3   • Insulin Glargine (TOUJEO SOLOSTAR) 300  UNIT/ML Solution Pen-injector Inject 60 Units as instructed 1 time daily as needed. 54 mL 5   • duloxetine (CYMBALTA) 60 MG Cap DR Particles delayed-release capsule Take 1 Cap by mouth every day. 90 Cap 3   • NOVOLOG, insulin aspart, (NOVOLOG FLEXPEN) 100 UNIT/ML Solution Pen-injector injection Inject 15-20 Units as instructed 3 times a day before meals. 45 mL 5   • diclofenac EC (VOLTAREN) 75 MG Tablet Delayed Response Take 1 Tab by mouth 2 times a day. 60 Tab 3   • lidocaine (LIDODERM) 5 % Patch Apply 2 Patches to skin as directed every 24 hours. 20 Patch 3   • amlodipine (NORVASC) 10 MG Tab Take 1 Tab by mouth every day. 90 Tab 3   • vitamin D, Ergocalciferol, (DRISDOL) 40597 UNITS Cap capsule Take 1 Cap by mouth every 7 days. 13 Cap 1   • pravastatin (PRAVACHOL) 40 MG tablet Take 1 Tab by mouth every day. 90 Tab 3   • Misc. Devices Misc Freestyle lite test strips; patient has DM type 2 and tests 3 times per day 100 Strip 3   • Insulin Glargine (TOUJEO SOLOSTAR) 300 UNIT/ML Solution Pen-injector Inject 60 Units as instructed every day. 18 mL 6   • Misc. Devices Misc Unilateral diagnostic mammogram right breast with ultrasound if indicated; fax [phone] Device 0   • ergocalciferol (DRISDOL) 00174 UNIT capsule Take 1 Cap by mouth every 7 days. 12 Cap 0   • polyethylene glycol 3350 (MIRALAX) Powder Take 17 g by mouth 2 times a day. 3 Bottle 3   • fluticasone (FLONASE) 50 MCG/ACT nasal spray Spray 2 Sprays in nose every day. 16 g 11   • triamcinolone acetonide (KENALOG) 0.1 % CREA Apply in a thin layer to psoriasis twice per day x 2 weeks on then 2 weeks off 1 Tube 3   • aspirin EC (ECOTRIN) 81 MG TBEC Take 81 mg by mouth every day.              Family History  Family History            Family History   Problem Relation Age of Onset   • Cancer Mother         Bone   • Heart Disease Father     • Cancer Father         Bladder, Prostate            Social History       Social History   Substance Use Topics   • Smoking  status: Never Smoker   • Smokeless tobacco: Never Used   • Alcohol use No         Allergies        Allergies   Allergen Reactions   • Toradol Hives   • Levaquin         Blood boils   • Lisinopril Unspecified       Caused potassium build up in blood   • Tape            Physical Exam   Laboratory   Hemodynamics  Temp (24hrs), Av.5 °C (97.7 °F), Min:36.4 °C (97.6 °F), Max:36.6 °C (97.8 °F)   Temperature: 36.4 °C (97.6 °F)  Pulse  Av.5  Min: 57  Max: 60    Blood Pressure: 150/70       Respiratory      Respiration: 16, Pulse Oximetry: 98 %  RUL Breath Sounds: Diminished, RML Breath Sounds: Diminished, RLL Breath Sounds: Diminished, DIANNE Breath Sounds: Diminished, LLL Breath Sounds: Diminished     Physical Exam   Constitutional: She is oriented to person, place, and time. She appears well-developed. No distress.   HENT:   Head: Normocephalic.   Mouth/Throat: No oropharyngeal exudate.   Eyes: Pupils are equal, round, and reactive to light. No scleral icterus.   Neck: No thyromegaly present.   Cardiovascular: Normal rate and regular rhythm.  Exam reveals no friction rub.    Pulmonary/Chest: Effort normal. No respiratory distress.   Abdominal: Soft. She exhibits no distension.   Musculoskeletal: Normal range of motion. She exhibits no deformity.   Neurological: She is alert and oriented to person, place, and time. No cranial nerve deficit.   No pronator drift.  No dysmetria.   Skin: Skin is dry. No erythema.   Psychiatric: She has a normal mood and affect. Her behavior is normal.                  No results for input(s): ALTSGPT, ASTSGOT, ALKPHOSPHAT, TBILIRUBIN, DBILIRUBIN, GAMMAGT, AMYLASE, LIPASE, ALB, PREALBUMIN, GLUCOSE in the last 72 hours.                    Lab Results   Component Value Date     TROPONINI 0.01 2012      Urinalysis:     Lab Results  Component Value Date/Time   SPECGRAVITY 1.009 2013 1130   GLUCOSEUR 150 (A) 2013 1130   KETONES Negative 2013 1130   NITRITE Negative  02/20/2013 1130   WBCURINE 2-5 03/18/2012 1120   RBCURINE 0-2 03/18/2012 1120   BACTERIA Few (A) 03/18/2012 1120   EPITHELCELL Few 03/18/2012 1120         Imaging  Reviewed    Assessment/Plan       I anticipate this patient will require at least 2 midnight stay for appropriate medical management.         * Stroke (HCC)- (present on admission)   Assessment & Plan     MRI of the brain from other facility showed left medial cerebellar acute or subacute infarction with no hemorrhage. Aspirin stand. PT/OT. Speech.          CKD stage 4 due to type 2 diabetes mellitus- (present on admission)   Assessment & Plan     Avoid nephrotoxins. Renal dose all medications.          History of DVT (deep vein thrombosis)   Assessment & Plan     And PE. Status post IVC filter placement. Intolerant to anticoagulation due to history of GI bleed.          Type II diabetes mellitus (HCC)   Assessment & Plan     Check hemoglobin A1c. Continue with sliding scale insulin.          Essential hypertension- (present on admission)   Assessment & Plan     Monitor blood pressure. Allow for permissive hypertension for now.                VTE prophylaxis: Heparin                 Ren Alcala M.D. Physician Signed Physical Medicine & Rehab  Consults Date of Service: 6/21/2018  1:08 PM   Consult Orders:   IP CONSULT FOR PHYSIATRY [[id number]] ordered by Michelle Jay M.D. at 06/21/18 0848      Expand All Collapse All    []Hide copied text  []Hover for attribution information  Medical chart review completed. Patient is a 52 y.o. year-old female  with  a past medical history significant for adult onset diabetes mellitus, recent GI bleed hypertension, hyperlipidemia history of DVT and PE status post IVC C filter placement which can't tolerate anticoagulation admitted to [facility]  On 6/19/2018  5:20 PM who was transferred from another facility where she originally presented for workup of nausea and vomiting. CT scan of the head was  unremarkable on MRI of the brain demonstrated a medial left cerebellar acute/subacute infarction without evidence of hemorrhage. She was transferred to our facility for further care. Physical medicine rehabilitation is being consulted for further recommendations regarding rehabilitative care     Furthering physician we discussed is that she was noted to have an elevated creatinine and was seen by nephrology hypothesizing and acute kidney injury superimposed on chronic kidney injury stage IV     PMH:  Past Medical History        Past Medical History:   Diagnosis Date   • Arthritis     • Cataract     • DVT (deep venous thrombosis) ([facility])     • HTN (hypertension) 10/25/2011   • Neuropathy in diabetes (HCC) 10/25/2011   • Other specified symptom associated with female genital organs     • PE (pulmonary embolism)     • Pneumonia     • Presence of IVC filter     • Renal disorder     • Type II or unspecified type diabetes mellitus without mention of complication, not stated as uncontrolled 10/25/2011   • Unspecified disorder of thyroid              PSH:  Past Surgical History         Past Surgical History:   Procedure Laterality Date   • GASTROSCOPY   2/22/2013     Performed by Reid Yi D.O. at [facility]   • COLONOSCOPY   2/22/2013     Performed by Reid Yi D.O. at SURGERY [facility]   • ABDOMINAL HYSTERECTOMY TOTAL         Right ovary remains   • CHOLECYSTECTOMY       • KNEE ARTHROSCOPY         3 surgeries right knee            FAMILY HISTORY:  Is positive for hypertension, cancer and cardiac disease     MEDICATIONS:       Current Facility-Administered Medications   Medication Dose   • hydrALAZINE (APRESOLINE) injection 20 mg  20 mg   • acetaminophen/caffeine/butalbital 325-40-50 mg (FIORICET) -40 MG per tablet 1 Tab  1 Tab   • DULoxetine (CYMBALTA) capsule 30 mg  30 mg   • amLODIPine (NORVASC) tablet 5 mg  5 mg   • morphine (pf) 4 mg/ml injection 4 mg  4 mg   • labetalol  (NORMODYNE,TRANDATE) injection 10 mg  10 mg   • heparin injection 5,000 Units  5,000 Units   • ondansetron (ZOFRAN) syringe/vial injection 4 mg  4 mg   • ondansetron (ZOFRAN ODT) dispertab 4 mg  4 mg   • promethazine (PHENERGAN) suppository 12.5-25 mg  12.5-25 mg   • prochlorperazine (COMPAZINE) injection 5-10 mg  5-10 mg   • insulin regular (HUMULIN R) injection 1-6 Units  1-6 Units     And   • glucose 4 g chewable tablet 16 g  16 g     And   • dextrose 50% (D50W) injection 25 mL  25 mL   • Respiratory Care per Protocol     • insulin glargine (LANTUS) injection 48 Units  48 Units   • Pharmacy Consult: Enteral tube feeding - review meds/change route/product selection  1 Each   • magnesium hydroxide (MILK OF MAGNESIA) suspension 30 mL  30 mL     And   • senna-docusate (PERICOLACE or SENOKOT S) 8.6-50 MG per tablet 2 Tab  2 Tab     And   • polyethylene glycol/lytes (MIRALAX) PACKET 1 Packet  1 Packet     And   • bisacodyl (DULCOLAX) suppository 10 mg  10 mg   • levothyroxine (SYNTHROID) tablet 112 mcg  112 mcg   • promethazine (PHENERGAN) tablet 12.5-25 mg  12.5-25 mg   • aspirin (ASA) chewable tab 324 mg  324 mg     Or   • aspirin (ASA) tablet 325 mg  325 mg     Or   • aspirin (ASA) suppository 300 mg  300 mg   • temazepam (RESTORIL) capsule 15 mg  15 mg   • hydrOXYzine HCl (ATARAX) tablet 25 mg  25 mg   • atorvastatin (LIPITOR) tablet 80 mg  80 mg   • famotidine (PEPCID) tablet 20 mg  20 mg         ALLERGIES:  Toradol; Levaquin; Lisinopril; and Tape     PSYCHOSOCIAL HISTORY:  Living Site:  Home  Living With: 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 Alcohol and tobacco        The patient presents  with   functional deficits in mobility/self-cares/swallowing/speech, and Minimal  de-conditioning. 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 The patient's current diet is nothing by mouth. Today The patient is   a Good candidate for an acute inpatient rehabilitation program with a coordinated program of care at an intensity and frequency not available at a lower level of care. However she is at such a high level there is a possibility of the time insurance approves her she may be too high level and can be seen as an outpatient      This recommendation is substantiated by the patient's current medical condition with intervention and assessment of medical issues requiring an acute level of care for patient's safety and maximum outcome. A coordinated program of care will be provided by an interdisciplinary team including physical therapy, occupational therapy, speech language pathology, physiatry, rehab nursing and rehab psychology. Rehab goals include improved swallowing, mobility, self-care management, strength and conditioning/endurance, pain management, bowel and bladder management, mood and affect, and safety with independent home management including caregiver training. Estimated length of stay is approximately 7 days. Rehab potential: Good. Disposition: to pre-morbid independent living setting with supportive care of patient's family and community resources. We will continue to follow with you in anticipation of discharge to acute inpatient rehabilitation when medically stable to do so at the discretion of her attending physician. Thank you for allowing us to participate in her care. Please call with any questions regarding this recommendation.     Ren Alcala M.D.        Dr. Sykes  consult:  ASSESSMENT AND PLAN:  A 52-year-old female with evidence of medial left   cerebellar acute/subacute infarct on MRI without hemorrhage.  Etiology likely   atherosclerotic disease due to multiple poorly controlled risk factors   including hypertension, diabetes, and hyperlipidemia.  She was started on   aspirin and statin.  She will undergo echocardiogram and carotid ultrasound.    Apparently, she had hypercoagulable workup in the past, which has been   unremarkable.  She will be evaluated by physical therapy and occupational   therapy.  She will be on sequential compression devices for deep venous   thrombosis prevention    ASSESSMENT AND PLAN:  The patient is a 52-year-old female with long-term   history of diabetes mellitus type 2, hypertension, also with chronic kidney   disease stage IV, with worsening serum creatinine level, admitted with   left-sided cerebral stroke.     PROBLEMS:  1.  Acute kidney injury on chronic kidney disease stage IV versus progression   of advanced kidney disease.  To monitor closely.  Provide education.  Monitor   basic metabolic panel daily.  2.  Electrolytes.  Remain well controlled  3.  Anemia.  Hemoglobin within normal limits.  4.  Diabetes mellitus type 2 with microalbuminuria.  Monitor closely.  5.  Hypertension.  Slightly elevated blood pressure.  To monitor.  6.  Volume.  Continue IV fluids.     RECOMMENDATIONS:  Continue current treatment.  Monitor daily basic metabolic   panel.  Monitor her urine microalbumin creatinine ratio.  We will follow the   patient closely.    Co-morbidities: See PMH  Potential Risk - Complications: Aphasia, Cognitive Impairment, Contractures, Deep Vein Thrombosis, Dysphagia, Incontinence, Malnutrition, Pain, Perceptual Impairment, Pneumonia, Pressure Ulcer and Urinary Tract Infection  Level of Risk: High    Ongoing Medical Management Needed (Medical/Nursing Needs):   Patient Active Problem List    Diagnosis Date Noted   • Stroke (HCC)  "06/20/2018     Priority: High   • Narcotic dependence, in remission ([facility]) 02/09/2017     Priority: High   • CKD stage 4 due to type 2 diabetes mellitus 10/04/2016     Priority: High   • Diabetes mellitus type 2, uncontrolled, with complications ([facility]) 02/10/2014     Priority: High   • Dysphagia 06/20/2018     Priority: Medium   • Diabetic neuropathy ([facility]) 10/25/2011     Priority: Low   • Type II diabetes mellitus ([facility]) 06/20/2018   • History of DVT (deep vein thrombosis) 06/20/2018   • History of GI bleed 06/20/2018   • Closed fracture of left ankle with routine healing 04/14/2017   • Generalized abdominal pain 04/14/2017   • Sprain of left ankle 02/09/2017   • Hypercoagulable state ([facility]) 01/17/2017   • Vitamin D deficiency 10/04/2016   • Lump of right breast 10/04/2016   • Essential hypertension 10/04/2016   • Slow transit constipation 05/12/2016   • Seasonal allergic rhinitis 05/12/2016   • Tibial plateau fracture 03/31/2015   • Nocturnal hypoxia 03/26/2014   • Dyslipidemia 02/23/2014   • Obesity (BMI 30-39.9) 02/23/2014   • Debilitated patient 02/10/2014   • PKD (polycystic kidney disease) 02/10/2014   • History of venous thromboembolism 02/10/2014   • Elevated serum hCG in female, not pregnant 02/21/2013   • Hypothyroid 10/25/2011   • GERD (gastroesophageal reflux disease) 10/25/2011   • Hyperlipemia 10/25/2011     A & O    Current Vital Signs:   Temperature: 36.3 °C (97.4 °F) Pulse: 66 Respiration: 16 Blood Pressure: (!) 181/76  Weight: 87.5 kg (192 lb 14.4 oz) Height: 177.8 cm (5' 10\")  Pulse Oximetry: 93 % O2 (LPM): 1      Completed Laboratory Reports:  Recent Labs      06/20/18   0433  06/20/18   0434  06/20/18   0612  06/20/18   1225  06/20/18   1719  06/20/18   2136  06/21/18   0453  06/21/18   0550  06/21/18   1107   WBC  8.6   --    --    --    --    --   6.3   --    --    HEMOGLOBIN  14.5   --    --    --    --    --   13.8   --    --    HEMATOCRIT  45.6   --    --    --    --    --   43.6   --    --  "   PLATELETCT  214   --    --    --    --    --   206   --    --    SODIUM   --   143   --    --    --    --   144   --    --    POTASSIUM   --   4.7   --    --    --    --   4.6   --    --    BUN   --   41*   --    --    --    --   43*   --    --    CREATININE   --   2.59*   --    --    --    --   2.96*   --    --    ALBUMIN   --   3.0*   --    --    --    --   3.0*   --    --    GLUCOSE   --   178*   --    --    --    --   176*   --    --    POCGLUCOSE   --    --   145*  152*  132*  98   --   142*  130*     Additional Labs: Not Applicable    Prior Living Situation:   Housing / Facility: 1 Story House  Steps Into Home: 4  Lives with - Patient's Self Care Capacity: Other (Comments), Adult Children  Equipment Owned: 4-Wheel Walker    Prior Level of Function / Living Situation:   Physical Therapy: Prior Services: None  Housing / Facility: 1 Story House  Steps Into Home: 4  Rail: Right Rail (Steps into Home)  Equipment Owned: 4-Wheel Walker  Lives with - Patient's Self Care Capacity: Other (Comments), Adult Children  Bed Mobility: Independent  Transfer Status: Independent  Ambulation: Independent  Distance Ambulation (Feet):  (community )  Assistive Devices Used: 4-Wheel Walker  Stairs: Independent  Current Level of Function:   Level Of Assist: Minimal Assist  Assistive Device: Front Wheel Walker  Distance (Feet): 8  Deviation: Step To, Bradykinetic, Decreased Heel Strike, Decreased Toe Off  Weight Bearing Status: fwb  Supine to Sit: Stand by Assist  Sit to Supine: Stand by Assist  Scooting: Stand by Assist  Rolling: Supervised  Sit to Stand: Stand by Assist  Bed, Chair, Wheelchair Transfer: Minimal Assist  Sitting in Chair: NT  Sitting Edge of Bed: 10 mins  Standing: 3 mins   Occupational Therapy:   Self Feeding: Independent  Grooming / Hygiene: Independent  Bathing: Independent  Dressing: Independent  Toileting: Independent  Medication Management: Independent  Laundry: Independent  Kitchen Mobility:  Independent  Finances: Independent  Home Management: Independent  Shopping: Independent  Prior Level Of Mobility: Independent Without Device in Community  Prior Services: None  Housing / Facility: 1 Story House  Occupation (Pre-Hospital Vocational): Not Employed  Current Level of Function:   Upper Body Dressing: Supervision  Lower Body Dressing: Contact Guard Assist  Speech Language Pathology:   Assessment : Clinical swallow evaluation completed on this date.  Patient pleasant and agreeable.  Vocal quality characterized as hoarse and whispered.  She followed directives to the oral [facility] exam with no gross deficits appreciated.  Prior to PO, the patient stated she was having difficulty managing her secretions; suction had been set up at bedside. Presentation of PO included ice chips x5 and nectars x2 sips.  The patient presented with OVERT s/sx of aspiration as seen by wet, gurgly vocal quality, coughing and sputtering.  She completed oral suctioning multiple times via [place] but continued to have wet gurgly coughing for several minutes.  Swallow was noisy, initiation of swallow trigger delayed up to 3 seconds and laryngeal elevation was palpated as weak.  Provided instruction and training to dysphagia exercises with written directives left at bedside.  At this time, the patient is NOT at the level for a PO diet, given overt s/sx of aspiration.  Recommend she continue NPO with alternative source of nutrition via Cortrak.  SLP following closely for aggressive dysphagia therapy.     Problem List: Dysphagia  Diet / Liquid Recommendation: NPO, Pre-Feeding Trials with SLP Only  Comments: Pt was seen for dysphagia therapy this date.  Pt continues to have whispered, dysphonic vocal quality, however appears to be managing secretions with improvement.  Pt completed exercises targeting base of tongue retraction, laryngeal elevation and pharyngeal constriction  Rehabilitation Prognosis/Potential: Good  Estimated Length of  Stay: 7 days    Nursing:   Orientation : Oriented x 4  Continent    Scope/Intensity of Services Recommended:  Physical Therapy: 1 hr / day  5 days / week. Therapeutic Interventions Required: Maximize Endurance, Mobility, Strength and Safety  Occupational Therapy: 1 hr / day 5 days / week. Therapeutic Interventions Required: Maximize Self Care, ADLs, IADLs and Energy Conservation  Speech & Language Pathology: 1 hr / day 5 days / week. Therapeutic Interventions Required: Maximize Cognition, Swallowing and Safety  Rehabilitation Nursin/7. Therapeutic Interventions Required: Monitor Pain, Skin, Vital Signs, Intake and Output, Labs, Safety, Aspiration Risk and Family Training  Rehabilitation Physician: 3 - 5 days / week. Therapeutic Interventions Required: Medical Management  Respiratory Care: Pulmonary Toileting. Therapeutic Interventions Required: Pulmonary Toileting, O2 Weaning and Aspiration Risk  Dietician: Tube Feedings. Therapeutic Interventions Required: .    Rehabilitation Goals and Plan (Expected frequency & duration of treatment in the IRF):   Return to the Community, Modified Independent Level of Care and Family Able to Provide  Assistance  Anticipated Date of Rehabilitation Admission: 18  Patient/Family oriented IRF level of care/facility/plan: Yes  Patient/Family willing to participate in IRF care/facility/plan: Yes  Patient able to tolerate IRF level of care proposed: Yes  Patient has potential to benefit IRF level of care proposed: Yes  Comments: Not Applicable    Special Needs or Precautions - Medical Necessity:  Tube Feedings   Safety Concerns/Precautions:  Fall Risk / High Risk for Falls, Balance, Cognition and Bed / Chair Alarm  Pain Management  Special Equipment: R Cortrak  IV Site: Peripheral  Requires Oxygen  Current Medications:    Current Facility-Administered Medications Ordered in Epic   Medication Dose Route Frequency Provider Last Rate Last Dose   • hydrALAZINE (APRESOLINE)  injection 20 mg  20 mg Intravenous Q6HRS REED Johnson M.D.   20 mg at 06/21/18 0149   • acetaminophen/caffeine/butalbital 325-40-50 mg (FIORICET) -40 MG per tablet 1 Tab  1 Tab Oral Q6HRS REED Johnson M.D.   1 Tab at 06/21/18 0113   • DULoxetine (CYMBALTA) capsule 30 mg  30 mg Oral DAILY Alvarado Greene, A.P.N.   30 mg at 06/21/18 1008   • amLODIPine (NORVASC) tablet 5 mg  5 mg Oral Q DAY Alvarado Greene, A.P.N.   5 mg at 06/21/18 1108   • morphine (pf) 4 mg/ml injection 4 mg  4 mg Intravenous Q4HRS REED Jay M.D.   4 mg at 06/21/18 1108   • labetalol (NORMODYNE,TRANDATE) injection 10 mg  10 mg Intravenous Q4HRS REED Jay M.D.       • heparin injection 5,000 Units  5,000 Units Subcutaneous Q8HRS Michelle Jay M.D.   5,000 Units at 06/21/18 1356   • ondansetron (ZOFRAN) syringe/vial injection 4 mg  4 mg Intravenous Q4HRS REED Jya M.D.   4 mg at 06/21/18 1108   • ondansetron (ZOFRAN ODT) dispertab 4 mg  4 mg Oral Q4HRS REED Jay M.D.       • promethazine (PHENERGAN) suppository 12.5-25 mg  12.5-25 mg Rectal Q4HRS REED Jay M.D.       • prochlorperazine (COMPAZINE) injection 5-10 mg  5-10 mg Intravenous Q4HRS REED Jay M.D.       • insulin regular (HUMULIN R) injection 1-6 Units  1-6 Units Subcutaneous 4X/DAY BRE Jay M.D.   Stopped at 06/20/18 0700    And   • glucose 4 g chewable tablet 16 g  16 g Oral Q15 MIN PRN Michelle Jay M.D.        And   • dextrose 50% (D50W) injection 25 mL  25 mL Intravenous Q15 MIN PRN Michelle Jay M.D.       • Respiratory Care per Protocol   Nebulization Continuous RT Alvarado Greene, A.P.N.       • insulin glargine (LANTUS) injection 48 Units  48 Units Subcutaneous DAILY Michelle Jay M.D.   48 Units at 06/21/18 0831   • Pharmacy Consult: Enteral tube feeding - review meds/change route/product selection  1 Each Other PRN Alvarado Greene,  A.P.N.       • magnesium hydroxide (MILK OF MAGNESIA) suspension 30 mL  30 mL Per NG Tube QDAY PRN Yoni Amador M.D.        And   • senna-docusate (PERICOLACE or SENOKOT S) 8.6-50 MG per tablet 2 Tab  2 Tab Per NG Tube BID Yoni Amador M.D.   2 Tab at 06/21/18 0817    And   • polyethylene glycol/lytes (MIRALAX) PACKET 1 Packet  1 Packet Per NG Tube QDAY PRN Yoni Amador M.D.        And   • bisacodyl (DULCOLAX) suppository 10 mg  10 mg Rectal QDAY PRN Yoni Amador M.D.       • levothyroxine (SYNTHROID) tablet 112 mcg  112 mcg Per NG Tube DAILY Yoni Amador M.D.   112 mcg at 06/21/18 0817   • promethazine (PHENERGAN) tablet 12.5-25 mg  12.5-25 mg Per NG Tube Q4HRS PRN Yoni Amador M.D.       • aspirin (ASA) chewable tab 324 mg  324 mg Per NG Tube DAILY Yoni Amador M.D.        Or   • aspirin (ASA) tablet 325 mg  325 mg Per NG Tube DAILY Yoni Amador M.D.   325 mg at 06/21/18 0817    Or   • aspirin (ASA) suppository 300 mg  300 mg Rectal DAILY Yoni Amador M.D.       • temazepam (RESTORIL) capsule 15 mg  15 mg Per NG Tube HS PRN Yoni Amador M.D.       • hydrOXYzine HCl (ATARAX) tablet 25 mg  25 mg Per NG Tube TID PRN Yoni Amador M.D.       • atorvastatin (LIPITOR) tablet 80 mg  80 mg Per NG Tube Q EVENING Yoni Amador M.D.   80 mg at 06/20/18 2119   • famotidine (PEPCID) tablet 20 mg  20 mg Oral DAILY Alvarado Greene A.P.N.   20 mg at 06/21/18 0817     No current [facility]-ordered outpatient prescriptions on file.     Diet:   DIET ORDERS (Through next 24h)    Start     Ordered    06/20/18 1117  DIET TUBE FEEDING  CONTINUOUS DIET     Question Answer Comment   Which Rate/Volume? 65 ml/hr    Formula: DIABETISOURCE AC    Specify Type: CONTINUOUS        06/20/18 1116    06/20/18 0120  Diet NPO  ALL MEALS     Question:  Restrict to:  Answer:  Strict    06/20/18 0120          Anticipated Discharge Destination / Patient/Family Goal:  Destination: Home with Assistance Support System: Family   Anticipated home  health services: OT, PT and SLP  Previously used HH service/ provider: Not Applicable  Anticipated DME Needs: Oxygen, Walker and Life [employer]  Outpatient Services: OT, PT and SLP  Alternative resources to address additional identified needs:     Pre-Screen Completed: 6/21/2018 2:32 PM Claudio Sahy L.P.N.

## 2018-06-21 NOTE — CONSULTS
DATE OF SERVICE:  06/20/2018    NEPHROLOGY CONSULTATION    REQUESTING PHYSICIAN:  Dr. Yoni Amador.    REASON FOR CONSULTATION:  To evaluate the patient with elevated serum   creatinine level.    HISTORY OF PRESENT ILLNESS:  The patient is a 52-year-old female with   longstanding history of diabetes mellitus type 2 and hypertension, noticed   elevated creatinine level in the year of 2014 of 1.3, 1.8, and in the year of   2016, creatinine was up to 2.18 with chronic kidney disease stage IV.    Currently, the creatinine is up to 2.59.  Patient has not been seen by a   nephrologist in the past.  Admitted to the hospital, suffering from left-sided   cerebral stroke.  Currently, the patient is complaining of dizziness, not   feeling well, not able to swallow.  There is NG tube feedings.  No   difficulties in urination.  No dysuria, no increased frequency or urgency.  No   flank pain.    REVIEW OF SYSTEMS:  GENERAL:  No fever or chills.  No weight loss.  Positive for fatigue.  HEENT:  No sore throat.  No sinus pain or congestion, no hearing loss.  Eyes,   positive for double and blurry vision.  RESPIRATORY:  No cough, hemoptysis, or shortness of breath.  CARDIOVASCULAR:  No chest pain, no dyspnea, no edema.  GASTROINTESTINAL:  No heartburn.  No nausea, vomiting, diarrhea.  GENITOURINARY:  No dysuria or increased urgency or frequency to urinate.  MUSCULOSKELETAL:  No myalgias, no arthralgias, no back pain.  SKIN:  No rash, no pruritus.  NEUROLOGICAL:  No headache.  Positive for dizziness, unsteady gait.  All other systems reviewed are negative.    PAST MEDICAL HISTORY:  Diabetes mellitus type 2, hypertension, diabetic   neuropathy, arthritis, deep venous thromboses, pulmonary emboli,   pneumonia, chronic kidney disease stage IV, thyroid disorder.    PAST SURGICAL HISTORY:  Gastroscopy, colonoscopy, hysterectomy,   cholecystectomy, knee arthroscopy.    FAMILY HISTORY:  Positive for prostate and bladder cancer, heart  disease, and   hypertension.    SOCIAL HISTORY:  No tobacco, alcohol, or drug use.    ALLERGIES:  Allergic to TORADOL, LEVAQUIN, LISINOPRIL.    OUTPATIENT MEDICATIONS:  Reviewed in the chart.    PHYSICAL EXAMINATION:  VITAL SIGNS:  Blood pressure 153/60, heart rate 64, temperature 37 Celsius.  GENERAL APPEARANCE:  Well-developed, well-nourished female, in no acute   distress.  HEENT:  Normocephalic, atraumatic.  Pupils equal, round, reactive to light.    Extraocular movement intact.  Nares patent.  NG tube in place.  Oropharynx   clear.  Moist mucosa, no erythema or exudate.  NECK:  Supple, no lymphadenopathy, no thyromegaly appreciated.  LUNGS:  Clear to auscultation bilaterally.  No rales, wheezes, or rhonchi.  HEART:  Regular rate.  No rub or gallop.  ABDOMEN:  Soft, nontender, nondistended.  Bowel sounds present.  EXTREMITIES:  No cyanosis, clubbing, or edema.  NEUROLOGIC:  Alert and oriented x3.  No focal deficits.    LABORATORY RESULTS:  Hemoglobin level 14.5.  Sodium 143, potassium 4.7, BUN   41, creatinine 2.59.    Brain natriuretic peptic 104.    ASSESSMENT AND PLAN:  The patient is a 52-year-old female with long-term   history of diabetes mellitus type 2, hypertension, also with chronic kidney   disease stage IV, with worsening serum creatinine level, admitted with   left-sided cerebral stroke.    PROBLEMS:  1.  Acute kidney injury on chronic kidney disease stage IV versus progression   of advanced kidney disease.  To monitor closely.  Provide education.  Monitor   basic metabolic panel daily.  2.  Electrolytes.  Remain well controlled  3.  Anemia.  Hemoglobin within normal limits.  4.  Diabetes mellitus type 2 with microalbuminuria.  Monitor closely.  5.  Hypertension.  Slightly elevated blood pressure.  To monitor.  6.  Volume.  Continue IV fluids.    RECOMMENDATIONS:  Continue current treatment.  Monitor daily basic metabolic   panel.  Monitor her urine microalbumin creatinine ratio.  We will follow the    patient closely.    Thank you for the consult.       ____________________________________     MD TK KRISHNAMURTHY / DAYAN    DD:  06/20/2018 17:02:40  DT:  06/20/2018 18:11:41    D#:  [id number]  Job#:  [id number]

## 2018-06-21 NOTE — THERAPY
"Occupational Therapy Evaluation completed.   Functional Status:  min A with ADLs and txfs, limited by nausea.  Plan of Care: Will benefit from Occupational Therapy 4 times per week  Discharge Recommendations:  Equipment: Will Continue to Assess for Equipment Needs. Post-acute therapy Discharge to home with outpatient or home health for additional skilled therapy services.    See \"Rehab Therapy-Acute\" Patient Summary Report for complete documentation.    "

## 2018-06-22 LAB
ANION GAP SERPL CALC-SCNC: 12 MMOL/L (ref 0–11.9)
BNP SERPL-MCNC: 118 PG/ML (ref 0–100)
BUN SERPL-MCNC: 44 MG/DL (ref 8–22)
CALCIUM SERPL-MCNC: 8.8 MG/DL (ref 8.5–10.5)
CHLORIDE SERPL-SCNC: 114 MMOL/L (ref 96–112)
CO2 SERPL-SCNC: 17 MMOL/L (ref 20–33)
CREAT SERPL-MCNC: 2.72 MG/DL (ref 0.5–1.4)
GLUCOSE BLD-MCNC: 108 MG/DL (ref 65–99)
GLUCOSE BLD-MCNC: 118 MG/DL (ref 65–99)
GLUCOSE BLD-MCNC: 134 MG/DL (ref 65–99)
GLUCOSE BLD-MCNC: 171 MG/DL (ref 65–99)
GLUCOSE SERPL-MCNC: 186 MG/DL (ref 65–99)
POTASSIUM SERPL-SCNC: 4.4 MMOL/L (ref 3.6–5.5)
SODIUM SERPL-SCNC: 143 MMOL/L (ref 135–145)

## 2018-06-22 PROCEDURE — 92526 ORAL FUNCTION THERAPY: CPT

## 2018-06-22 PROCEDURE — 99232 SBSQ HOSP IP/OBS MODERATE 35: CPT | Performed by: HOSPITALIST

## 2018-06-22 PROCEDURE — 700111 HCHG RX REV CODE 636 W/ 250 OVERRIDE (IP): Performed by: INTERNAL MEDICINE

## 2018-06-22 PROCEDURE — 700102 HCHG RX REV CODE 250 W/ 637 OVERRIDE(OP): Performed by: FAMILY MEDICINE

## 2018-06-22 PROCEDURE — 700105 HCHG RX REV CODE 258: Performed by: INTERNAL MEDICINE

## 2018-06-22 PROCEDURE — 36415 COLL VENOUS BLD VENIPUNCTURE: CPT

## 2018-06-22 PROCEDURE — 700102 HCHG RX REV CODE 250 W/ 637 OVERRIDE(OP): Performed by: HOSPITALIST

## 2018-06-22 PROCEDURE — 99233 SBSQ HOSP IP/OBS HIGH 50: CPT | Performed by: INTERNAL MEDICINE

## 2018-06-22 PROCEDURE — A9270 NON-COVERED ITEM OR SERVICE: HCPCS | Performed by: HOSPITALIST

## 2018-06-22 PROCEDURE — 82962 GLUCOSE BLOOD TEST: CPT

## 2018-06-22 PROCEDURE — 83880 ASSAY OF NATRIURETIC PEPTIDE: CPT

## 2018-06-22 PROCEDURE — 80048 BASIC METABOLIC PNL TOTAL CA: CPT

## 2018-06-22 PROCEDURE — 700111 HCHG RX REV CODE 636 W/ 250 OVERRIDE (IP): Performed by: FAMILY MEDICINE

## 2018-06-22 PROCEDURE — 770020 HCHG ROOM/CARE - TELE (206)

## 2018-06-22 RX ORDER — OXYCODONE HYDROCHLORIDE AND ACETAMINOPHEN 5; 325 MG/1; MG/1
1 TABLET ORAL EVERY 4 HOURS PRN
Status: DISCONTINUED | OUTPATIENT
Start: 2018-06-22 | End: 2018-06-22

## 2018-06-22 RX ORDER — OXYCODONE HYDROCHLORIDE AND ACETAMINOPHEN 5; 325 MG/1; MG/1
1 TABLET ORAL EVERY 4 HOURS PRN
Status: DISCONTINUED | OUTPATIENT
Start: 2018-06-22 | End: 2018-06-23

## 2018-06-22 RX ADMIN — PROCHLORPERAZINE EDISYLATE 10 MG: 5 INJECTION INTRAMUSCULAR; INTRAVENOUS at 19:50

## 2018-06-22 RX ADMIN — SODIUM CHLORIDE: 4.5 INJECTION, SOLUTION INTRAVENOUS at 07:11

## 2018-06-22 RX ADMIN — MORPHINE SULFATE 4 MG: 4 INJECTION INTRAVENOUS at 07:16

## 2018-06-22 RX ADMIN — ONDANSETRON 4 MG: 2 INJECTION INTRAMUSCULAR; INTRAVENOUS at 17:46

## 2018-06-22 RX ADMIN — HEPARIN SODIUM 5000 UNITS: 5000 INJECTION, SOLUTION INTRAVENOUS; SUBCUTANEOUS at 13:39

## 2018-06-22 RX ADMIN — FAMOTIDINE 20 MG: 20 TABLET ORAL at 08:43

## 2018-06-22 RX ADMIN — HEPARIN SODIUM 5000 UNITS: 5000 INJECTION, SOLUTION INTRAVENOUS; SUBCUTANEOUS at 06:07

## 2018-06-22 RX ADMIN — ASPIRIN 325 MG: 325 TABLET ORAL at 08:43

## 2018-06-22 RX ADMIN — HYDRALAZINE HYDROCHLORIDE 20 MG: 20 INJECTION INTRAMUSCULAR; INTRAVENOUS at 01:35

## 2018-06-22 RX ADMIN — BUTALBITAL, ACETAMINOPHEN, AND CAFFEINE 1 TABLET: 50; 325; 40 TABLET ORAL at 07:11

## 2018-06-22 RX ADMIN — ONDANSETRON 4 MG: 2 INJECTION INTRAMUSCULAR; INTRAVENOUS at 07:11

## 2018-06-22 RX ADMIN — ONDANSETRON 4 MG: 2 INJECTION INTRAMUSCULAR; INTRAVENOUS at 23:45

## 2018-06-22 RX ADMIN — ONDANSETRON 4 MG: 2 INJECTION INTRAMUSCULAR; INTRAVENOUS at 13:43

## 2018-06-22 RX ADMIN — SODIUM CHLORIDE: 4.5 INJECTION, SOLUTION INTRAVENOUS at 18:05

## 2018-06-22 RX ADMIN — MORPHINE SULFATE 4 MG: 4 INJECTION INTRAVENOUS at 13:43

## 2018-06-22 RX ADMIN — LEVOTHYROXINE SODIUM 112 MCG: 112 TABLET ORAL at 08:43

## 2018-06-22 RX ADMIN — HEPARIN SODIUM 5000 UNITS: 5000 INJECTION, SOLUTION INTRAVENOUS; SUBCUTANEOUS at 21:12

## 2018-06-22 RX ADMIN — MORPHINE SULFATE 4 MG: 4 INJECTION INTRAVENOUS at 17:46

## 2018-06-22 RX ADMIN — SENNOSIDES AND DOCUSATE SODIUM 2 TABLET: 8.6; 5 TABLET ORAL at 11:04

## 2018-06-22 RX ADMIN — ATORVASTATIN CALCIUM 80 MG: 80 TABLET, FILM COATED ORAL at 19:54

## 2018-06-22 RX ADMIN — INSULIN GLARGINE 48 UNITS: 100 INJECTION, SOLUTION SUBCUTANEOUS at 08:41

## 2018-06-22 RX ADMIN — AMLODIPINE BESYLATE 10 MG: 5 TABLET ORAL at 08:43

## 2018-06-22 RX ADMIN — INSULIN HUMAN 1 UNITS: 100 INJECTION, SOLUTION PARENTERAL at 06:17

## 2018-06-22 ASSESSMENT — ENCOUNTER SYMPTOMS
SHORTNESS OF BREATH: 0
WHEEZING: 0
FOCAL WEAKNESS: 0
PHOTOPHOBIA: 0
VOMITING: 0
ORTHOPNEA: 0
BACK PAIN: 0
ABDOMINAL PAIN: 0
NAUSEA: 0
BLOOD IN STOOL: 0
LOSS OF CONSCIOUSNESS: 0
COUGH: 0
WEIGHT LOSS: 0
HEADACHES: 0
MYALGIAS: 0
HEARTBURN: 0
SORE THROAT: 1
FEVER: 0
SEIZURES: 0
FLANK PAIN: 0
CHILLS: 0
BLURRED VISION: 1
PALPITATIONS: 0
SENSORY CHANGE: 0
DOUBLE VISION: 0
DIZZINESS: 0
SPEECH CHANGE: 0
HEADACHES: 1
BRUISES/BLEEDS EASILY: 0
DIZZINESS: 1
STRIDOR: 0
NAUSEA: 1

## 2018-06-22 ASSESSMENT — PAIN SCALES - GENERAL
PAINLEVEL_OUTOF10: 6
PAINLEVEL_OUTOF10: 5
PAINLEVEL_OUTOF10: 5
PAINLEVEL_OUTOF10: 8
PAINLEVEL_OUTOF10: 0
PAINLEVEL_OUTOF10: 6

## 2018-06-22 NOTE — CARE PLAN
Problem: Nutritional:  Goal: Nutrition support tolerated and meeting greater than 85% of estimated needs  Outcome: MET Date Met: 06/22/18  TF is running @ goal.

## 2018-06-22 NOTE — PROGRESS NOTES
Pt A/Ox4, c/o headache and n&v medicated per MAR. /73 @2330 PRN iv hydralazine administered. Will continue close monitor BP. Pt NPO and self swab. Feeding running at 65.

## 2018-06-22 NOTE — THERAPY
"Speech Language Therapy dysphagia treatment completed.     Functional Status:  Pt was seen for dysphagia therapy on this date. She was AAOx4 and was agreeable to tx. Pt noted to have Cortrak in place and running. Pt exhibited breathy voice, and was unable to maintain consistent voicing during conversation. She reported that she has been practicing her swallow exercises several times per day. Pt consumed PO trials of single ice chips x8 with coughing/choking exhibited post 4/8 trials. Upon palpation, pt's initiation of swallow was minimally delayed and laryngeal elevation was weak. She exhibited 2 swallows per bolus. Pt completed lingual, labial, and laryngeal exercises with \"good\" overall accuracy min assist. Pt is not safe for an oral diet at this time.     Recommendations: Recommend continuation of NPO w/ Cortrak. SLP following for aggressive dysphagia therapy 5xwk. Pt was able to verbalize understanding of all education provided.    Plan of Care: Will benefit from Speech Therapy 5 times per week  Post-Acute Therapy: Discharge to a transitional care facility for continued skilled therapy services.    See \"Rehab Therapy-Acute\" Patient Summary Report for complete documentation.     "

## 2018-06-22 NOTE — PROGRESS NOTES
Nephrology Progress Note, Adult, Complex               Author: Darlyn Patti Date & Time created: 6/22/2018  3:04 PM     Interval History:  53 y/o female with CAMPOS/CKD IV, DMN, HTN, admitted with left cerebellar stroke  Elevated BP under better control  Creat better  UOP -better  Renal US (+) for PCKD -d/w Pt -admitted diagnosed with PCKD several years ago  -no nephrology f/u  FHx: grandmother with PCKD  Review of Systems:  Review of Systems   Constitutional: Negative for chills and fever.   HENT: Negative.    Respiratory: Negative for cough and shortness of breath.    Cardiovascular: Negative for chest pain, palpitations, orthopnea and leg swelling.   Gastrointestinal: Negative for abdominal pain, nausea and vomiting.   Genitourinary: Negative for dysuria, flank pain, frequency, hematuria and urgency.   Musculoskeletal: Negative for back pain, joint pain and myalgias.   Skin: Negative.    Neurological: Negative for dizziness, sensory change, focal weakness and headaches.       Physical Exam:  Physical Exam   Constitutional: She is oriented to person, place, and time. She appears well-developed and well-nourished. No distress.   HENT:   Head: Normocephalic and atraumatic.   Nose: Nose normal.   Mouth/Throat: Oropharynx is clear and moist.   NGT   Eyes: Conjunctivae and EOM are normal. Pupils are equal, round, and reactive to light. No scleral icterus.   Neck: Normal range of motion. Neck supple. No thyromegaly present.   Cardiovascular: Normal rate and regular rhythm.  Exam reveals no gallop and no friction rub.    Pulmonary/Chest: Effort normal and breath sounds normal. No respiratory distress. She has no wheezes. She has no rales.   Abdominal: Soft. Bowel sounds are normal. She exhibits no distension. There is no tenderness.   Musculoskeletal: Normal range of motion. She exhibits no edema.   Lymphadenopathy:     She has no cervical adenopathy.   Neurological: She is alert and oriented to person, place, and time. No  cranial nerve deficit. Coordination normal.   Skin: Skin is warm. No rash noted. No erythema.   Nursing note and vitals reviewed.      Labs:        Invalid input(s): [id number]  Recent Labs      18   BNPBTYPENAT  104*  59  118*     Recent Labs      18   SODIUM  143  144  143   POTASSIUM  4.7  4.6  4.4   CHLORIDE  112  113*  114*   CO2  22  18*  17*   BUN  41*  43*  44*   CREATININE  2.59*  2.96*  2.72*   MAGNESIUM   --   1.8   --    PHOSPHORUS   --   3.3   --    CALCIUM  8.4*  8.6  8.8     Recent Labs      18   ALTSGPT  <5  <5   --    ASTSGOT  5*  8*   --    ALKPHOSPHAT  78  85   --    TBILIRUBIN  0.4  0.5   --    GLUCOSE  178*  176*  186*     Recent Labs      18   RBC  4.84  4.59   HEMOGLOBIN  14.5  13.8   HEMATOCRIT  45.6  43.6   PLATELETCT  214  206     Recent Labs      18   WBC  8.6   --   6.3   NEUTSPOLYS  63.80   --   75.30*   LYMPHOCYTES  27.60   --   16.90*   MONOCYTES  6.10   --   5.10   EOSINOPHILS  1.50   --   1.60   BASOPHILS  0.70   --   0.80   ASTSGOT   --   5*  8*   ALTSGPT   --   <5  <5   ALKPHOSPHAT   --   78  85   TBILIRUBIN   --   0.4  0.5           Hemodynamics:  Temp (24hrs), Av.7 °C (98.1 °F), Min:36.4 °C (97.6 °F), Max:37 °C (98.6 °F)  Temperature: 36.9 °C (98.5 °F)  Pulse  Av.1  Min: 57  Max: 84   Blood Pressure: 134/78     Respiratory:    Respiration: 18, Pulse Oximetry: 94 %     Work Of Breathing / Effort: Mild  RUL Breath Sounds: Clear, RML Breath Sounds: Clear, RLL Breath Sounds: Diminished, DIANNE Breath Sounds: Clear, LLL Breath Sounds: Diminished  Fluids:    Intake/Output Summary (Last 24 hours) at 18 1504  Last data filed at 18 1441   Gross per 24 hour   Intake              780 ml   Output             1050 ml   Net             -270 ml     Weight:  87.4 kg (192 lb 10.9 oz)  GI/Nutrition:  Orders Placed This Encounter   Procedures   • Diet NPO     Standing Status:   Standing     Number of Occurrences:   1     Order Specific Question:   Restrict to:     Answer:   Strict [1]     Medical Decision Making, by Problem:  Active Hospital Problems    Diagnosis   • *Stroke (HCC) [I63.9]   • CKD stage 4 due to type 2 diabetes mellitus [E11.22, N18.4]   • Dysphagia [R13.10]   • Diabetic neuropathy (HCC) [E11.40]   • Type II diabetes mellitus (HCC) [E11.9]   • History of DVT (deep vein thrombosis) [Z86.718]   • History of GI bleed [Z87.19]   • Essential hypertension [I10]   • Hyperlipemia [E78.5]   • Hypothyroid [E03.9]       Quality-Core Measures   Reviewed items::  Labs reviewed and Medications reviewed    Assessment and plan:    1.CAMPOS/CKD IV /PCKD -continue IVF with 1/2 NS at 80 cc/hr  2.HTN: elevated BP -added amlodipine -well controlled  3.Electrolytes: borderline elevated Na -better  4.Anemia: Hb WNL  5.Volume: continue IVFIVF      Recs: continue current tretament             Daily BMP             Will follow             Needs close f/u in Nephrology Clinic after d/c home

## 2018-06-22 NOTE — CARE PLAN
Problem: Skin Integrity  Goal: Risk for impaired skin integrity will decrease  Outcome: PROGRESSING AS EXPECTED      Problem: Fluid Volume:  Goal: Will maintain balanced intake and output  Outcome: PROGRESSING AS EXPECTED

## 2018-06-22 NOTE — PROGRESS NOTES
[facility]ist Progress Note    Date of Service: 2018    Chief Complaint  52 y.o. female admitted 2018 with acute to subacute left medial cerebellar CVA. Patient was initially seen at an outside facility for nausea, vomiting, vision changes and vertigo. 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 Prior hyper-coaguable state workup negative.    Interval Problem Update  Cerebellar infarct - Neuro consulted - workup ongoing. Reports in chart.  IDDM - SSI  CKD-IV - Neph consulted. Creatinine stable - Follow BP/glucose closely.  PCKD - neph following  Hx GIB - no active signs of bleeding - restarted ASA  HTN - CCB - advanced dose. ACEI when cleared w Neph   Lipids - uncontrolled. Max statin  Pain - changed IVMS to oral Perc 5. Follow    Consultants/Specialty  Neuro  Neph    Disposition  Rehab - when medically stable on oral therapies        Review of Systems   Constitutional: Negative for chills, fever and weight loss.   HENT: Positive for sore throat.         Positive for dysphonia   Eyes: Positive for blurred vision. Negative for double vision and photophobia.   Respiratory: Negative for cough, wheezing and stridor.    Cardiovascular: Negative for chest pain, palpitations and orthopnea.   Gastrointestinal: Positive for nausea. Negative for abdominal pain, blood in stool, heartburn, melena and vomiting.   Genitourinary: Negative for dysuria.   Neurological: Positive for dizziness and headaches. Negative for speech change, focal weakness, seizures and loss of consciousness.   Endo/Heme/Allergies: Does not bruise/bleed easily.      Physical Exam  Laboratory/Imaging   Hemodynamics  Temp (24hrs), Av.6 °C (97.9 °F), Min:36.3 °C (97.4 °F), Max:37 °C (98.6 °F)   Temperature: 36.4 °C (97.6 °F)  Pulse  Av.8  Min: 57  Max: 84    Blood Pressure: 119/68       Respiratory      Respiration: 17, Pulse Oximetry: 96 %     Work Of Breathing / Effort: Mild  RUL Breath Sounds: Clear, RML Breath Sounds: Clear, RLL Breath Sounds: Diminished, DIANNE Breath Sounds: Clear, LLL Breath Sounds: Diminished    Fluids    Intake/Output Summary (Last 24 hours) at 06/22/18 1113  Last data filed at 06/22/18 0840   Gross per 24 hour   Intake              780 ml   Output             1050 ml   Net             -270 ml       Nutrition  Orders Placed This Encounter   Procedures   • Diet NPO     Standing Status:   Standing     Number of Occurrences:   1     Order Specific Question:   Restrict to:     Answer:   Strict [1]     Physical Exam   Constitutional: She is oriented to person, place, and time. She appears well-developed and well-nourished. No distress.   HENT:   Head: Normocephalic and atraumatic.   Eyes: EOM are normal. Pupils are equal, round, and reactive to light.   Neck: Neck supple.   Cardiovascular: Normal rate, regular rhythm and normal heart sounds.    Pulmonary/Chest: Effort normal and breath sounds normal. No respiratory distress. She has no wheezes. She has no rales.   Abdominal: Soft. She exhibits no distension. There is no tenderness.   Neurological: She is alert and oriented to person, place, and time. No cranial nerve deficit. GCS eye subscore is 4. GCS verbal subscore is 5. GCS motor subscore is 6.   Skin: Skin is warm and dry.   Psychiatric: She has a normal mood and affect. Her speech is normal and behavior is normal. Thought content normal. Cognition and memory are normal.       Recent Labs      06/20/18   0433  06/21/18   0453   WBC  8.6  6.3   RBC  4.84  4.59   HEMOGLOBIN  14.5  13.8   HEMATOCRIT  45.6  43.6   MCV  94.2  95.0   MCH  30.0  30.1   MCHC  31.8*  31.7*   RDW  50.9*  51.3*   PLATELETCT  214  206   MPV  10.6  10.7     Recent Labs      06/20/18   0434  06/21/18   0453  06/22/18   0515   SODIUM  143  144  143   POTASSIUM  4.7  4.6  4.4   CHLORIDE  112  113*  114*    CO2  22  18*  17*   GLUCOSE  178*  176*  186*   BUN  41*  43*  44*   CREATININE  2.59*  2.96*  2.72*   CALCIUM  8.4*  8.6  8.8         Recent Labs      06/20/18   0433  06/21/18   0453  06/22/18   0515   BNPBTYPENAT  104*  59  118*     Recent Labs      06/21/18   0453   TRIGLYCERIDE  195*   HDL  37*   LDL  135*          Assessment/Plan     * Stroke (HCC)- (present on admission)   Assessment & Plan    MRI of the brain from other facility showed left medial cerebellar acute or subacute infarction with no hemorrhage.   Aspirin initiated at 325 - carefull observation for bleed.   PT/OT. Speech.  Carotid - pend  Echo - pend  Statin - max          CKD stage 4 due to type 2 diabetes mellitus- (present on admission)   Assessment & Plan    Acute on chronic renal failure - stable  Discussed w Neph - advancing HTN control  Goal Sys 140  Daily BNP/BMP  Strict I/Os  Daily weight  DC lasix  Avoid nephrotoxins. Renal dose medications  Appreciate nephrology assistance          Dysphagia   Assessment & Plan    Failed SLP swallow eval  Cortrak and nutrition consult  CXR 6/20 neg for asp PNA - follow closely - repeat today also neg            History of GI bleed   Assessment & Plan    Pepcid for PUD prophylaxis        History of DVT (deep vein thrombosis)   Assessment & Plan    And PE. Status post IVC filter placement. Intolerant to anticoagulation due to history of GI bleed.  Heparin Q8 with close monitoring for bleeding  Prior hyper-coaguable state workup from 3/2012 negative        Type II diabetes mellitus (HCC)   Assessment & Plan    A1c 8.3   Sliding scale insulin.  POC  - well controlled          Essential hypertension- (present on admission)   Assessment & Plan    Will carefully titrate BP meds to goal sys 140  Added Norvasc 6/21  ACE or ARB when creatinine stablizes        Polycystic kidney disease- (present on admission)   Assessment & Plan    Neph following        Hyperlipemia- (present on admission)   Assessment &  Plan    Statin - max dose        Hypothyroid- (present on admission)   Assessment & Plan    Cont replacement  Normal TSH on admit        Diabetic neuropathy (HCC)- (present on admission)   Assessment & Plan    Decreased Cymbalta from 60mg to 30 given acute/chronic renal failure  Consider switch to Gabapentin - max dose 300mg/24hr          Quality-Core Measures   Reviewed items::  EKG reviewed, Labs reviewed, Medications reviewed and Radiology images reviewed  Wallace catheter::  No Wallace  DVT prophylaxis pharmacological::  Heparin  DVT prophylaxis - mechanical:  SCDs  Ulcer Prophylaxis::  Yes  Assessed for rehabilitation services:  Patient was assess for and/or received rehabilitation services during this hospitalization

## 2018-06-22 NOTE — PROGRESS NOTES
NEUROLOGY PROGRESS NOTE      Background:  52 y.o. female was admitted on 6/19/2018  5:20 PM for Cerebellar Stroke.    SUBJECTIVE: No significant changes, no new complaints.      NEUROLOGICAL EXAM:  She is awake, alert, and fully oriented.  Speech is slightly dysarthric and memory   within normal limits.  Facial motor and sensation intact.  Extraocular   movements are full.  Visual fields are full to confrontation.  Hearing is   intact to finger rub bilaterally.  Tongue midline and protrudes symmetrically.    Palate elevates symmetrically.  Shoulder shrugs are normal.  Motor   examination revealed normal strength to direct testing of both upper and lower   extremities, proximal and distal.  Sensation intact to light touch,   temperature, and pinprick, although she has a slight decreased sensation in   stocking pattern in the lower extremity.  Coordination:  She has slight   dysmetria to finger-to-nose and heel-to-shin testing on the left side.  She   performed finger-to-nose and heel-to-shin testing without problem on the right   side.  Deep tendon reflexes are diffusely diminished.  Plantar reflexes are   downgoing bilaterally.    OBJECTIVE:   Carotid ultrasound:  FINDINGS   Right carotid.   Very mild plaque of the carotid bifurcation. Doppler velocities are normal.    Antegrade right vertebral flow.      Left carotid.    Very mild plaque of the carotid bifurcation. Doppler velocities are normal.    Subclavian artery stenosis with elevated velocities may be consistent with    stenosis >50%.    Antegrade left vertebral flow with high resistant flow may be indicating    distal occlusion..       MEDICATIONS:  Current Facility-Administered Medications   Medication Dose   • oxyCODONE-acetaminophen (PERCOCET) 5-325 MG per tablet 1 Tab  1 Tab   • hydrALAZINE (APRESOLINE) injection 20 mg  20 mg   • 1/2 NS infusion     • amLODIPine (NORVASC) tablet 10 mg  10 mg   • DULoxetine (CYMBALTA) capsule 30 mg  30 mg   • famotidine  "(PEPCID) tablet 20 mg  20 mg   • acetaminophen/caffeine/butalbital 325-40-50 mg (FIORICET) -40 MG per tablet 1 Tab  1 Tab   • cloNIDine (CATAPRES) tablet 0.1 mg  0.1 mg   • morphine (pf) 4 mg/ml injection 4 mg  4 mg   • labetalol (NORMODYNE,TRANDATE) injection 10 mg  10 mg   • heparin injection 5,000 Units  5,000 Units   • ondansetron (ZOFRAN) syringe/vial injection 4 mg  4 mg   • ondansetron (ZOFRAN ODT) dispertab 4 mg  4 mg   • promethazine (PHENERGAN) suppository 12.5-25 mg  12.5-25 mg   • prochlorperazine (COMPAZINE) injection 5-10 mg  5-10 mg   • insulin regular (HUMULIN R) injection 1-6 Units  1-6 Units    And   • glucose 4 g chewable tablet 16 g  16 g    And   • dextrose 50% (D50W) injection 25 mL  25 mL   • Respiratory Care per Protocol     • insulin glargine (LANTUS) injection 48 Units  48 Units   • Pharmacy Consult: Enteral tube feeding - review meds/change route/product selection  1 Each   • magnesium hydroxide (MILK OF MAGNESIA) suspension 30 mL  30 mL    And   • senna-docusate (PERICOLACE or SENOKOT S) 8.6-50 MG per tablet 2 Tab  2 Tab    And   • polyethylene glycol/lytes (MIRALAX) PACKET 1 Packet  1 Packet    And   • bisacodyl (DULCOLAX) suppository 10 mg  10 mg   • levothyroxine (SYNTHROID) tablet 112 mcg  112 mcg   • promethazine (PHENERGAN) tablet 12.5-25 mg  12.5-25 mg   • aspirin (ASA) chewable tab 324 mg  324 mg    Or   • aspirin (ASA) tablet 325 mg  325 mg    Or   • aspirin (ASA) suppository 300 mg  300 mg   • temazepam (RESTORIL) capsule 15 mg  15 mg   • hydrOXYzine HCl (ATARAX) tablet 25 mg  25 mg   • atorvastatin (LIPITOR) tablet 80 mg  80 mg       Blood pressure 119/68, pulse 78, temperature 36.4 °C (97.6 °F), resp. rate 17, height 1.778 m (5' 10\"), weight 87.4 kg (192 lb 10.9 oz), SpO2 96 %, not currently breastfeeding.    Recent Labs      06/20/18   0433  06/21/18   0453  06/22/18   0515   BNPBTYPENAT  104*  59  118*     Recent Labs      06/20/18   0433  06/21/18   0453   WBC  8.6  " 6.3   RBC  4.84  4.59   HEMOGLOBIN  14.5  13.8   HEMATOCRIT  45.6  43.6   MCV  94.2  95.0   MCH  30.0  30.1   MCHC  31.8*  31.7*   RDW  50.9*  51.3*   PLATELETCT  214  206   MPV  10.6  10.7     Recent Labs      06/20/18   0434  06/21/18   0453  06/22/18   0515   SODIUM  143  144  143   POTASSIUM  4.7  4.6  4.4   CHLORIDE  112  113*  114*   CO2  22  18*  17*   GLUCOSE  178*  176*  186*   BUN  41*  43*  44*       Results for orders placed or performed during the hospital encounter of 02/19/13   ECHOCARDIOGRAM COMP W/O CONT   Result Value Ref Range    Eject.Frac. MOD 4C 72.73         ASSESSMENT AND PLAN:   A 52-year-old female with evidence of medial left   cerebellar acute/subacute infarct on MRI without hemorrhage.  Etiology likely   atherosclerotic disease due to multiple poorly controlled risk factors   including hypertension, diabetes, and hyperlipidemia.    She will continue with aspirin and Lipitor.  She will continue with physical therapy and occupational   therapy and speech therapy.  She will be on sequential compression devices for deep venous   thrombosis prevention.  Acute rehabilitation consultation.  Neurology will sign off, please call us if there is any question.

## 2018-06-22 NOTE — PROGRESS NOTES
Pt is A&Ox4 and on 1L O2 via NC. Pt recently medicated for pain and nausea. Pt states symptoms are improving. Pt uses bedpan for urination. Pt has cortrak in place with tube feeding running at goal rate. Call light within reach, hourly rounding in place

## 2018-06-22 NOTE — DISCHARGE PLANNING
Insurance remains pending.  IV B/P meds and IV pain meds are a barrier to RIRF.  TCC remains monitoring.

## 2018-06-22 NOTE — THERAPY
Attempted to see pt for OT tx. Pt declined at this time d/t feeling dizzy. Pt agreeable to later time. Will try again later as able. Pt encouraged to continue sitting up w/ nrsg as tolerated.

## 2018-06-23 PROBLEM — R11.2 NON-INTRACTABLE VOMITING WITH NAUSEA: Status: ACTIVE | Noted: 2018-06-23

## 2018-06-23 LAB
ANION GAP SERPL CALC-SCNC: 11 MMOL/L (ref 0–11.9)
BNP SERPL-MCNC: 74 PG/ML (ref 0–100)
BUN SERPL-MCNC: 43 MG/DL (ref 8–22)
CALCIUM SERPL-MCNC: 8.7 MG/DL (ref 8.5–10.5)
CHLORIDE SERPL-SCNC: 109 MMOL/L (ref 96–112)
CO2 SERPL-SCNC: 22 MMOL/L (ref 20–33)
CREAT SERPL-MCNC: 2.76 MG/DL (ref 0.5–1.4)
GLUCOSE BLD-MCNC: 122 MG/DL (ref 65–99)
GLUCOSE BLD-MCNC: 161 MG/DL (ref 65–99)
GLUCOSE BLD-MCNC: 88 MG/DL (ref 65–99)
GLUCOSE SERPL-MCNC: 166 MG/DL (ref 65–99)
POTASSIUM SERPL-SCNC: 4.9 MMOL/L (ref 3.6–5.5)
SODIUM SERPL-SCNC: 142 MMOL/L (ref 135–145)

## 2018-06-23 PROCEDURE — 770020 HCHG ROOM/CARE - TELE (206)

## 2018-06-23 PROCEDURE — 700111 HCHG RX REV CODE 636 W/ 250 OVERRIDE (IP): Performed by: INTERNAL MEDICINE

## 2018-06-23 PROCEDURE — 700105 HCHG RX REV CODE 258: Performed by: INTERNAL MEDICINE

## 2018-06-23 PROCEDURE — 700102 HCHG RX REV CODE 250 W/ 637 OVERRIDE(OP): Performed by: FAMILY MEDICINE

## 2018-06-23 PROCEDURE — 700102 HCHG RX REV CODE 250 W/ 637 OVERRIDE(OP): Performed by: HOSPITALIST

## 2018-06-23 PROCEDURE — 700111 HCHG RX REV CODE 636 W/ 250 OVERRIDE (IP): Performed by: HOSPITALIST

## 2018-06-23 PROCEDURE — 83880 ASSAY OF NATRIURETIC PEPTIDE: CPT

## 2018-06-23 PROCEDURE — 700111 HCHG RX REV CODE 636 W/ 250 OVERRIDE (IP): Performed by: FAMILY MEDICINE

## 2018-06-23 PROCEDURE — 80048 BASIC METABOLIC PNL TOTAL CA: CPT

## 2018-06-23 PROCEDURE — 99232 SBSQ HOSP IP/OBS MODERATE 35: CPT | Performed by: HOSPITALIST

## 2018-06-23 PROCEDURE — 99233 SBSQ HOSP IP/OBS HIGH 50: CPT | Performed by: INTERNAL MEDICINE

## 2018-06-23 PROCEDURE — 700102 HCHG RX REV CODE 250 W/ 637 OVERRIDE(OP): Performed by: NURSE PRACTITIONER

## 2018-06-23 PROCEDURE — 36415 COLL VENOUS BLD VENIPUNCTURE: CPT

## 2018-06-23 PROCEDURE — A9270 NON-COVERED ITEM OR SERVICE: HCPCS | Performed by: HOSPITALIST

## 2018-06-23 PROCEDURE — A6209 FOAM DRSG <=16 SQ IN W/O BDR: HCPCS

## 2018-06-23 PROCEDURE — A9270 NON-COVERED ITEM OR SERVICE: HCPCS | Performed by: NURSE PRACTITIONER

## 2018-06-23 PROCEDURE — 82962 GLUCOSE BLOOD TEST: CPT | Mod: 91

## 2018-06-23 RX ORDER — FUROSEMIDE 10 MG/ML
80 INJECTION INTRAMUSCULAR; INTRAVENOUS
Status: DISCONTINUED | OUTPATIENT
Start: 2018-06-23 | End: 2018-06-24

## 2018-06-23 RX ORDER — METOPROLOL SUCCINATE 25 MG/1
25 TABLET, EXTENDED RELEASE ORAL
Status: DISCONTINUED | OUTPATIENT
Start: 2018-06-23 | End: 2018-06-23

## 2018-06-23 RX ORDER — METOCLOPRAMIDE HYDROCHLORIDE 5 MG/ML
10 INJECTION INTRAMUSCULAR; INTRAVENOUS EVERY 6 HOURS
Status: DISCONTINUED | OUTPATIENT
Start: 2018-06-23 | End: 2018-07-06

## 2018-06-23 RX ORDER — OXYCODONE HYDROCHLORIDE AND ACETAMINOPHEN 5; 325 MG/1; MG/1
1-2 TABLET ORAL EVERY 4 HOURS PRN
Status: DISCONTINUED | OUTPATIENT
Start: 2018-06-23 | End: 2018-06-25

## 2018-06-23 RX ADMIN — SENNOSIDES AND DOCUSATE SODIUM 2 TABLET: 8.6; 5 TABLET ORAL at 07:34

## 2018-06-23 RX ADMIN — ASPIRIN 325 MG: 325 TABLET ORAL at 07:33

## 2018-06-23 RX ADMIN — METOPROLOL TARTRATE 12.5 MG: 25 TABLET, FILM COATED ORAL at 10:35

## 2018-06-23 RX ADMIN — SODIUM CHLORIDE: 4.5 INJECTION, SOLUTION INTRAVENOUS at 06:19

## 2018-06-23 RX ADMIN — OXYCODONE AND ACETAMINOPHEN 2 TABLET: 5; 325 TABLET ORAL at 10:35

## 2018-06-23 RX ADMIN — MORPHINE SULFATE 4 MG: 4 INJECTION INTRAVENOUS at 03:38

## 2018-06-23 RX ADMIN — INSULIN GLARGINE 48 UNITS: 100 INJECTION, SOLUTION SUBCUTANEOUS at 07:36

## 2018-06-23 RX ADMIN — BUTALBITAL, ACETAMINOPHEN, AND CAFFEINE 1 TABLET: 50; 325; 40 TABLET ORAL at 08:04

## 2018-06-23 RX ADMIN — FUROSEMIDE 80 MG: 10 INJECTION, SOLUTION INTRAMUSCULAR; INTRAVENOUS at 15:19

## 2018-06-23 RX ADMIN — METOCLOPRAMIDE 10 MG: 5 INJECTION, SOLUTION INTRAMUSCULAR; INTRAVENOUS at 07:41

## 2018-06-23 RX ADMIN — SENNOSIDES AND DOCUSATE SODIUM 2 TABLET: 8.6; 5 TABLET ORAL at 21:43

## 2018-06-23 RX ADMIN — HEPARIN SODIUM 5000 UNITS: 5000 INJECTION, SOLUTION INTRAVENOUS; SUBCUTANEOUS at 15:20

## 2018-06-23 RX ADMIN — DULOXETINE HYDROCHLORIDE 30 MG: 30 CAPSULE, DELAYED RELEASE ORAL at 07:40

## 2018-06-23 RX ADMIN — PROCHLORPERAZINE EDISYLATE 10 MG: 5 INJECTION INTRAMUSCULAR; INTRAVENOUS at 03:38

## 2018-06-23 RX ADMIN — METOCLOPRAMIDE 10 MG: 5 INJECTION, SOLUTION INTRAMUSCULAR; INTRAVENOUS at 18:35

## 2018-06-23 RX ADMIN — HEPARIN SODIUM 5000 UNITS: 5000 INJECTION, SOLUTION INTRAVENOUS; SUBCUTANEOUS at 21:44

## 2018-06-23 RX ADMIN — INSULIN HUMAN 1 UNITS: 100 INJECTION, SOLUTION PARENTERAL at 10:58

## 2018-06-23 RX ADMIN — AMLODIPINE BESYLATE 10 MG: 5 TABLET ORAL at 07:34

## 2018-06-23 RX ADMIN — OXYCODONE AND ACETAMINOPHEN 1 TABLET: 5; 325 TABLET ORAL at 21:57

## 2018-06-23 RX ADMIN — METOCLOPRAMIDE 10 MG: 5 INJECTION, SOLUTION INTRAMUSCULAR; INTRAVENOUS at 15:18

## 2018-06-23 RX ADMIN — METOPROLOL TARTRATE 12.5 MG: 25 TABLET, FILM COATED ORAL at 21:46

## 2018-06-23 RX ADMIN — ATORVASTATIN CALCIUM 80 MG: 80 TABLET, FILM COATED ORAL at 21:43

## 2018-06-23 RX ADMIN — PROMETHAZINE HYDROCHLORIDE 25 MG: 25 TABLET ORAL at 08:03

## 2018-06-23 RX ADMIN — LEVOTHYROXINE SODIUM 112 MCG: 112 TABLET ORAL at 07:35

## 2018-06-23 RX ADMIN — FAMOTIDINE 20 MG: 20 TABLET ORAL at 07:34

## 2018-06-23 RX ADMIN — ONDANSETRON 4 MG: 2 INJECTION INTRAMUSCULAR; INTRAVENOUS at 07:27

## 2018-06-23 ASSESSMENT — PAIN SCALES - GENERAL
PAINLEVEL_OUTOF10: 7
PAINLEVEL_OUTOF10: 4
PAINLEVEL_OUTOF10: 7
PAINLEVEL_OUTOF10: 8
PAINLEVEL_OUTOF10: 0

## 2018-06-23 ASSESSMENT — ENCOUNTER SYMPTOMS
SORE THROAT: 1
DIZZINESS: 0
ORTHOPNEA: 0
SENSORY CHANGE: 0
WHEEZING: 0
LOSS OF CONSCIOUSNESS: 0
NAUSEA: 1
FLANK PAIN: 0
PALPITATIONS: 0
STRIDOR: 0
HEADACHES: 1
CHILLS: 0
COUGH: 0
VOMITING: 1
PHOTOPHOBIA: 0
VOMITING: 0
DOUBLE VISION: 0
NAUSEA: 0
HEADACHES: 0
BLURRED VISION: 1
BLOOD IN STOOL: 0
SEIZURES: 0
BACK PAIN: 0
FOCAL WEAKNESS: 0
MYALGIAS: 0
FEVER: 0
SHORTNESS OF BREATH: 1
SPEECH CHANGE: 0
ABDOMINAL PAIN: 0
WEIGHT LOSS: 0
HEARTBURN: 0
BRUISES/BLEEDS EASILY: 0
DIZZINESS: 1

## 2018-06-23 NOTE — CARE PLAN
Problem: Pain Management  Goal: Pain level will decrease to patient's comfort goal    Intervention: Follow pain managment plan developed in collaboration with patient and Interdisciplinary Team  Percocet better than Fioricet for pain.

## 2018-06-23 NOTE — DISCHARGE PLANNING
Insurance auth for inpatient rehab is pending. Do not not anticipate a determination until Monday at the earliest. TCC will continue to follow for updates.

## 2018-06-23 NOTE — PROGRESS NOTES
Patient's starting to tolerate tube feeding better. Last emesis was circa 0800am. No nausea reported since.

## 2018-06-23 NOTE — PROGRESS NOTES
[facility] Hospitalist Progress Note    Date of Service: 2018    Chief Complaint  52 y.o. female admitted 2018 with acute to subacute left medial cerebellar CVA. Patient was initially seen at an outside facility for nausea, vomiting, vision changes and vertigo. 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 Prior hyper-coaguable state workup negative.    Interval Problem Update  Cerebellar infarct - Neuro consulted - workup ongoing. Reports in chart.  IDDM - SSI  CKD-IV - Neph consulted. Creatinine stable - Follow BP/glucose closely.  PCKD - neph following  Hx GIB - no active signs of bleeding - restarted ASA  HTN - CCB - advanced dose. ACEI when cleared w Neph   Lipids - uncontrolled. Max statin  Pain - changed IVMS to oral Perc 5. Follow    Consultants/Specialty  Neuro  Neph    Disposition  Rehab - when medically stable on oral therapies - received one dose of IV MS 0300. DC'd today. Awaiting auth        Review of Systems   Constitutional: Negative for chills, fever and weight loss.   HENT: Positive for sore throat.         Positive for dysphonia   Eyes: Positive for blurred vision. Negative for double vision and photophobia.   Respiratory: Negative for cough, wheezing and stridor.    Cardiovascular: Negative for chest pain, palpitations and orthopnea.   Gastrointestinal: Positive for nausea and vomiting. Negative for abdominal pain, blood in stool, heartburn and melena.   Genitourinary: Negative for dysuria.   Neurological: Positive for dizziness and headaches. Negative for speech change, focal weakness, seizures and loss of consciousness.   Endo/Heme/Allergies: Does not bruise/bleed easily.      Physical Exam  Laboratory/Imaging   Hemodynamics  Temp (24hrs), Av.7 °C (98.1 °F), Min:36.4 °C (97.6 °F), Max:36.9 °C (98.5 °F)   Temperature: 36.4 °C (97.6  °F)  Pulse  Av.9  Min: 57  Max: 84    Blood Pressure: 156/68      Respiratory      Respiration: 17, Pulse Oximetry: 94 %     Work Of Breathing / Effort: Mild  RUL Breath Sounds: Clear, RML Breath Sounds: Clear, RLL Breath Sounds: Clear, DIANNE Breath Sounds: Clear, LLL Breath Sounds: Clear    Fluids    Intake/Output Summary (Last 24 hours) at 18 0939  Last data filed at 18 0807   Gross per 24 hour   Intake              200 ml   Output             1050 ml   Net             -850 ml       Nutrition  Orders Placed This Encounter   Procedures   • Diet NPO     Standing Status:   Standing     Number of Occurrences:   1     Order Specific Question:   Restrict to:     Answer:   Strict [1]     Physical Exam   Constitutional: She is oriented to person, place, and time. She appears well-developed and well-nourished. No distress.   HENT:   Head: Normocephalic and atraumatic.   Eyes: EOM are normal. Pupils are equal, round, and reactive to light.   Neck: Neck supple.   Cardiovascular: Normal rate, regular rhythm and normal heart sounds.    Pulmonary/Chest: Effort normal and breath sounds normal. No respiratory distress. She has no wheezes. She has no rales.   Abdominal: Soft. She exhibits no distension. There is no tenderness.   Neurological: She is alert and oriented to person, place, and time. No cranial nerve deficit. GCS eye subscore is 4. GCS verbal subscore is 5. GCS motor subscore is 6.   Skin: Skin is warm and dry.   Psychiatric: She has a normal mood and affect. Her speech is normal and behavior is normal. Thought content normal. Cognition and memory are normal.       Recent Labs      18   0453   WBC  6.3   RBC  4.59   HEMOGLOBIN  13.8   HEMATOCRIT  43.6   MCV  95.0   MCH  30.1   MCHC  31.7*   RDW  51.3*   PLATELETCT  206   MPV  10.7     Recent Labs      18   0453  18   0515  18   0246   SODIUM  144  143  142   POTASSIUM  4.6  4.4  4.9   CHLORIDE  113*  114*  109   CO2  18*  17*  22    GLUCOSE  176*  186*  166*   BUN  43*  44*  43*   CREATININE  2.96*  2.72*  2.76*   CALCIUM  8.6  8.8  8.7         Recent Labs      06/21/18   0453  06/22/18   0515  06/23/18   0246   BNPBTYPENAT  59  118*  74     Recent Labs      06/21/18   0453   TRIGLYCERIDE  195*   HDL  37*   LDL  135*          Assessment/Plan     * Stroke (HCC)- (present on admission)   Assessment & Plan    MRI of the brain from other facility showed left medial cerebellar acute or subacute infarction with no hemorrhage.   Aspirin initiated at 325 - carefull observation for bleed.   PT/OT. Speech.  Carotid - Antegrade left vertebral flow with high resistant may indicate distal occlusion; no pathology noted on right vert  Echo - hyperdynamic; no heart failure of valvular disease; no shunt  Statin - max          CKD stage 4 due to type 2 diabetes mellitus- (present on admission)   Assessment & Plan    Acute on chronic renal failure - stable  Superimposed on PCKD  Discussed w Neph - advancing HTN control  Goal Sys 140  Daily BMP  Strict I/Os  Daily weight  Avoid nephrotoxins. Renal dose medications  Appreciate nephrology assistance          Non-intractable vomiting with nausea   Assessment & Plan    No associated abd pain, diarrhea or hematemsis  Started reglan  IV or ODT ondansetron  Likely related to cerebellar CVA  Improving with meds        Dysphagia   Assessment & Plan    Failed SLP swallow eval  Cortrak and nutrition consult  CXR 6/20 neg for asp PNA - follow closely - repeat also neg            History of GI bleed   Assessment & Plan    Pepcid for PUD prophylaxis        History of DVT (deep vein thrombosis)   Assessment & Plan    And PE. Status post IVC filter placement. Intolerant to anticoagulation due to history of GI bleed.  Heparin Q8 with close monitoring for bleeding  Prior hyper-coaguable state workup from 3/2012 negative        Type II diabetes mellitus (HCC)   Assessment & Plan    A1c 8.3   Sliding scale insulin.  Controlled           Essential hypertension- (present on admission)   Assessment & Plan    Will carefully titrate BP meds to goal sys 140  Norvasc  Added toprol 6/23  ACE or ARB when creatinine stablizes        Polycystic kidney disease- (present on admission)   Assessment & Plan    Neph following- see above        Hyperlipemia- (present on admission)   Assessment & Plan    Statin - max dose        Hypothyroid- (present on admission)   Assessment & Plan    Cont replacement  Normal TSH on admit        Diabetic neuropathy (HCC)- (present on admission)   Assessment & Plan    Decreased Cymbalta from 60mg to 30 given acute/chronic renal failure  Consider switch to Gabapentin - max dose 300mg/24hr          Quality-Core Measures   Reviewed items::  EKG reviewed, Labs reviewed, Medications reviewed and Radiology images reviewed  Wallace catheter::  No Wallace  DVT prophylaxis pharmacological::  Heparin  DVT prophylaxis - mechanical:  SCDs  Ulcer Prophylaxis::  Yes  Assessed for rehabilitation services:  Patient was assess for and/or received rehabilitation services during this hospitalization

## 2018-06-23 NOTE — PROGRESS NOTES
Patient not tolerating tube feeding. Attempted to run Diabetasource at 30 ml/hr due to the fact that she did not tolerate it at the goal of 65 ml/hr. Patient is still not tolerating feeding at 30 ml/hr. Patient states she is very nauseous. Patient is vomiting. 70 ml of residual noted. Tube feeding is currently stopped. Will continue to monitor.

## 2018-06-23 NOTE — PROGRESS NOTES
Nephrology Progress Note, Adult, Complex               Author: Fadi Najjar Date & Time created: 6/23/2018  1:47 PM     Interval History:  53 y/o female with CAMPOS/CKD IV, DMN, HTN, admitted with left cerebellar stroke  Elevated BP under better control  Creat better  UOP -better  Renal US (+) for PCKD -d/w Pt -admitted diagnosed with PCKD several years ago    C/O SOB  Review of Systems:  Review of Systems   Constitutional: Negative for chills and fever.   HENT: Negative.    Respiratory: Positive for shortness of breath. Negative for cough.    Cardiovascular: Negative for chest pain, palpitations, orthopnea and leg swelling.   Gastrointestinal: Negative for abdominal pain, nausea and vomiting.   Genitourinary: Negative for dysuria, flank pain, frequency, hematuria and urgency.   Musculoskeletal: Negative for back pain, joint pain and myalgias.   Skin: Negative.    Neurological: Negative for dizziness, sensory change, focal weakness and headaches.   All other systems reviewed and are negative.      Physical Exam:  Physical Exam   Constitutional: She is oriented to person, place, and time. She appears well-developed and well-nourished. No distress.   HENT:   Head: Normocephalic and atraumatic.   Nose: Nose normal.   Mouth/Throat: Oropharynx is clear and moist.   NGT   Eyes: Conjunctivae and EOM are normal. Pupils are equal, round, and reactive to light. Right eye exhibits no discharge. Left eye exhibits no discharge. No scleral icterus.   Neck: Normal range of motion. Neck supple. No thyromegaly present.   Cardiovascular: Normal rate and regular rhythm.  Exam reveals no gallop and no friction rub.    Pulmonary/Chest: Effort normal. No respiratory distress. She has no wheezes. She has rhonchi. She has rales.   Abdominal: Soft. Bowel sounds are normal. She exhibits no distension. There is no tenderness.   Musculoskeletal: She exhibits edema.   Lymphadenopathy:     She has no cervical adenopathy.   Neurological: She is alert  and oriented to person, place, and time. No cranial nerve deficit. Coordination normal.   Skin: Skin is warm. No rash noted. No erythema.   Nursing note and vitals reviewed.      Labs:        Invalid input(s): [id number]  Recent Labs      186   BNPBTYPENAT  59  118*  74     Recent Labs      1815  18   0246   SODIUM  144  143  142   POTASSIUM  4.6  4.4  4.9   CHLORIDE  113*  114*  109   CO2  18*  17*  22   BUN  43*  44*  43*   CREATININE  2.96*  2.72*  2.76*   MAGNESIUM  1.8   --    --    PHOSPHORUS  3.3   --    --    CALCIUM  8.6  8.8  8.7     Recent Labs      1815  18   0246   ALTSGPT  <5   --    --    ASTSGOT  8*   --    --    ALKPHOSPHAT  85   --    --    TBILIRUBIN  0.5   --    --    GLUCOSE  176*  186*  166*     Recent Labs      18   RBC  4.59   HEMOGLOBIN  13.8   HEMATOCRIT  43.6   PLATELETCT  206     Recent Labs      18   WBC  6.3   NEUTSPOLYS  75.30*   LYMPHOCYTES  16.90*   MONOCYTES  5.10   EOSINOPHILS  1.60   BASOPHILS  0.80   ASTSGOT  8*   ALTSGPT  <5   ALKPHOSPHAT  85   TBILIRUBIN  0.5           Hemodynamics:  Temp (24hrs), Av.7 °C (98.1 °F), Min:36.4 °C (97.6 °F), Max:36.9 °C (98.5 °F)  Temperature: 36.8 °C (98.3 °F)  Pulse  Av.8  Min: 57  Max: 84   Blood Pressure: 125/57     Respiratory:    Respiration: 17, Pulse Oximetry: 96 %     Work Of Breathing / Effort: Mild  RUL Breath Sounds: Rhonchi, RML Breath Sounds: Clear, RLL Breath Sounds: Clear, DIANNE Breath Sounds: Coarse Crackles, LLL Breath Sounds: Clear  Fluids:    Intake/Output Summary (Last 24 hours) at 18 1347  Last data filed at 18 0807   Gross per 24 hour   Intake              200 ml   Output             1050 ml   Net             -850 ml        GI/Nutrition:  Orders Placed This Encounter   Procedures   • Diet NPO     Standing Status:   Standing     Number of Occurrences:   1     Order  Specific Question:   Restrict to:     Answer:   Strict [1]     Medical Decision Making, by Problem:  Active Hospital Problems    Diagnosis   • *Stroke (HCC) [I63.9]   • CKD stage 4 due to type 2 diabetes mellitus [E11.22, N18.4]   • Dysphagia [R13.10]   • Diabetic neuropathy (HCC) [E11.40]   • Type II diabetes mellitus (HCC) [E11.9]   • History of DVT (deep vein thrombosis) [Z86.718]   • History of GI bleed [Z87.19]   • Essential hypertension [I10]   • Hyperlipemia [E78.5]   • Hypothyroid [E03.9]       Quality-Core Measures   Reviewed items::  Labs reviewed and Medications reviewed    Assessment and plan:    1.CAMPOS/CKD IV /PCKD:no signs of renal recovery  2.HTN: elevated BP,stop IVF  3.Electrolytes:OK  4.Anemia: Hb WNL  5.Volume overload  6 resp failure    Recs:   no acute need for HD  Stop IVF  IV Lasix  Daily labs  Renal dose all meds  Avoid nephrotoxins  Prognosis guarded

## 2018-06-23 NOTE — PROGRESS NOTES
Monitor Summary: SR 72-80, AZ .14, QRS .08, QT .40 with occasional PVC and rare couplets, per strip from monitor room

## 2018-06-23 NOTE — CARE PLAN
Problem: Respiratory:  Goal: Respiratory status will improve    Intervention: Assess and monitor pulmonary status  Lungs sound congested. See Assessment Flowsheet.

## 2018-06-23 NOTE — ASSESSMENT & PLAN NOTE
Improved.   Xray abd cw constipation  Vs ileus.   Continue sched reglan  laxities.   IV anti emetics prn

## 2018-06-24 ENCOUNTER — APPOINTMENT (OUTPATIENT)
Dept: RADIOLOGY | Facility: MEDICAL CENTER | Age: 53
DRG: 064 | End: 2018-06-24
Attending: HOSPITALIST
Payer: COMMERCIAL

## 2018-06-24 LAB
ANION GAP SERPL CALC-SCNC: 9 MMOL/L (ref 0–11.9)
BNP SERPL-MCNC: 60 PG/ML (ref 0–100)
BUN SERPL-MCNC: 46 MG/DL (ref 8–22)
CALCIUM SERPL-MCNC: 8.1 MG/DL (ref 8.5–10.5)
CHLORIDE SERPL-SCNC: 107 MMOL/L (ref 96–112)
CO2 SERPL-SCNC: 19 MMOL/L (ref 20–33)
CREAT SERPL-MCNC: 2.67 MG/DL (ref 0.5–1.4)
GLUCOSE BLD-MCNC: 111 MG/DL (ref 65–99)
GLUCOSE BLD-MCNC: 137 MG/DL (ref 65–99)
GLUCOSE BLD-MCNC: 72 MG/DL (ref 65–99)
GLUCOSE BLD-MCNC: 80 MG/DL (ref 65–99)
GLUCOSE SERPL-MCNC: 71 MG/DL (ref 65–99)
POTASSIUM SERPL-SCNC: 4.4 MMOL/L (ref 3.6–5.5)
SODIUM SERPL-SCNC: 135 MMOL/L (ref 135–145)

## 2018-06-24 PROCEDURE — 700111 HCHG RX REV CODE 636 W/ 250 OVERRIDE (IP): Performed by: HOSPITALIST

## 2018-06-24 PROCEDURE — 82962 GLUCOSE BLOOD TEST: CPT | Mod: 91

## 2018-06-24 PROCEDURE — 700111 HCHG RX REV CODE 636 W/ 250 OVERRIDE (IP): Performed by: FAMILY MEDICINE

## 2018-06-24 PROCEDURE — A9270 NON-COVERED ITEM OR SERVICE: HCPCS | Performed by: HOSPITALIST

## 2018-06-24 PROCEDURE — 80048 BASIC METABOLIC PNL TOTAL CA: CPT

## 2018-06-24 PROCEDURE — 83880 ASSAY OF NATRIURETIC PEPTIDE: CPT

## 2018-06-24 PROCEDURE — 700102 HCHG RX REV CODE 250 W/ 637 OVERRIDE(OP): Performed by: HOSPITALIST

## 2018-06-24 PROCEDURE — 770020 HCHG ROOM/CARE - TELE (206)

## 2018-06-24 PROCEDURE — 700111 HCHG RX REV CODE 636 W/ 250 OVERRIDE (IP): Performed by: INTERNAL MEDICINE

## 2018-06-24 PROCEDURE — A9270 NON-COVERED ITEM OR SERVICE: HCPCS | Performed by: NURSE PRACTITIONER

## 2018-06-24 PROCEDURE — 99232 SBSQ HOSP IP/OBS MODERATE 35: CPT | Performed by: HOSPITALIST

## 2018-06-24 PROCEDURE — 99233 SBSQ HOSP IP/OBS HIGH 50: CPT | Performed by: INTERNAL MEDICINE

## 2018-06-24 PROCEDURE — 36415 COLL VENOUS BLD VENIPUNCTURE: CPT

## 2018-06-24 PROCEDURE — 700102 HCHG RX REV CODE 250 W/ 637 OVERRIDE(OP): Performed by: NURSE PRACTITIONER

## 2018-06-24 RX ORDER — FUROSEMIDE 20 MG/1
60 TABLET ORAL
Status: DISCONTINUED | OUTPATIENT
Start: 2018-06-24 | End: 2018-06-25

## 2018-06-24 RX ADMIN — OXYCODONE AND ACETAMINOPHEN 2 TABLET: 5; 325 TABLET ORAL at 14:32

## 2018-06-24 RX ADMIN — METOPROLOL TARTRATE 12.5 MG: 25 TABLET, FILM COATED ORAL at 20:58

## 2018-06-24 RX ADMIN — FAMOTIDINE 20 MG: 20 TABLET ORAL at 08:40

## 2018-06-24 RX ADMIN — METOPROLOL TARTRATE 12.5 MG: 25 TABLET, FILM COATED ORAL at 08:40

## 2018-06-24 RX ADMIN — OXYCODONE AND ACETAMINOPHEN 2 TABLET: 5; 325 TABLET ORAL at 23:02

## 2018-06-24 RX ADMIN — LEVOTHYROXINE SODIUM 112 MCG: 112 TABLET ORAL at 08:40

## 2018-06-24 RX ADMIN — SENNOSIDES AND DOCUSATE SODIUM 2 TABLET: 8.6; 5 TABLET ORAL at 20:58

## 2018-06-24 RX ADMIN — HEPARIN SODIUM 5000 UNITS: 5000 INJECTION, SOLUTION INTRAVENOUS; SUBCUTANEOUS at 05:31

## 2018-06-24 RX ADMIN — FUROSEMIDE 60 MG: 20 TABLET ORAL at 16:57

## 2018-06-24 RX ADMIN — HEPARIN SODIUM 5000 UNITS: 5000 INJECTION, SOLUTION INTRAVENOUS; SUBCUTANEOUS at 14:33

## 2018-06-24 RX ADMIN — AMLODIPINE BESYLATE 10 MG: 5 TABLET ORAL at 08:40

## 2018-06-24 RX ADMIN — ASPIRIN 325 MG: 325 TABLET ORAL at 08:40

## 2018-06-24 RX ADMIN — FUROSEMIDE 80 MG: 10 INJECTION, SOLUTION INTRAMUSCULAR; INTRAVENOUS at 05:30

## 2018-06-24 RX ADMIN — OXYCODONE AND ACETAMINOPHEN 2 TABLET: 5; 325 TABLET ORAL at 10:03

## 2018-06-24 RX ADMIN — HEPARIN SODIUM 5000 UNITS: 5000 INJECTION, SOLUTION INTRAVENOUS; SUBCUTANEOUS at 20:59

## 2018-06-24 RX ADMIN — SENNOSIDES AND DOCUSATE SODIUM 2 TABLET: 8.6; 5 TABLET ORAL at 08:40

## 2018-06-24 RX ADMIN — METOCLOPRAMIDE 10 MG: 5 INJECTION, SOLUTION INTRAMUSCULAR; INTRAVENOUS at 22:54

## 2018-06-24 RX ADMIN — METOCLOPRAMIDE 10 MG: 5 INJECTION, SOLUTION INTRAMUSCULAR; INTRAVENOUS at 12:46

## 2018-06-24 RX ADMIN — METOCLOPRAMIDE 10 MG: 5 INJECTION, SOLUTION INTRAMUSCULAR; INTRAVENOUS at 18:24

## 2018-06-24 RX ADMIN — METOCLOPRAMIDE 10 MG: 5 INJECTION, SOLUTION INTRAMUSCULAR; INTRAVENOUS at 05:30

## 2018-06-24 RX ADMIN — METOCLOPRAMIDE 10 MG: 5 INJECTION, SOLUTION INTRAMUSCULAR; INTRAVENOUS at 01:36

## 2018-06-24 RX ADMIN — OXYCODONE AND ACETAMINOPHEN 2 TABLET: 5; 325 TABLET ORAL at 18:24

## 2018-06-24 RX ADMIN — OXYCODONE AND ACETAMINOPHEN 2 TABLET: 5; 325 TABLET ORAL at 01:51

## 2018-06-24 RX ADMIN — OXYCODONE AND ACETAMINOPHEN 2 TABLET: 5; 325 TABLET ORAL at 05:31

## 2018-06-24 RX ADMIN — ATORVASTATIN CALCIUM 80 MG: 80 TABLET, FILM COATED ORAL at 20:58

## 2018-06-24 ASSESSMENT — ENCOUNTER SYMPTOMS
DOUBLE VISION: 0
DIZZINESS: 1
SHORTNESS OF BREATH: 1
HEADACHES: 0
VOMITING: 0
BLURRED VISION: 1
CHILLS: 0
HEARTBURN: 0
SENSORY CHANGE: 0
FOCAL WEAKNESS: 0
PALPITATIONS: 0
LOSS OF CONSCIOUSNESS: 0
SPEECH CHANGE: 0
BLOOD IN STOOL: 0
HEADACHES: 1
SORE THROAT: 1
BACK PAIN: 0
FLANK PAIN: 0
ABDOMINAL PAIN: 0
COUGH: 1
NAUSEA: 1
FEVER: 0
WHEEZING: 0
DIZZINESS: 0
SEIZURES: 0
NAUSEA: 0
MYALGIAS: 0
COUGH: 0
WEIGHT LOSS: 0
STRIDOR: 0
PHOTOPHOBIA: 0
BRUISES/BLEEDS EASILY: 0
ORTHOPNEA: 0

## 2018-06-24 ASSESSMENT — PAIN SCALES - GENERAL
PAINLEVEL_OUTOF10: 6
PAINLEVEL_OUTOF10: 9
PAINLEVEL_OUTOF10: 8
PAINLEVEL_OUTOF10: 8
PAINLEVEL_OUTOF10: 9
PAINLEVEL_OUTOF10: 7

## 2018-06-24 NOTE — PROGRESS NOTES
Dr. Amador stated that the cortrack was in the stomach and ok to continue use. Feeding restarted.

## 2018-06-24 NOTE — PROGRESS NOTES
Pt A&O, able to call when she needs help. Tube feeding at 35 did not increase overnight. Pt did not vomit but is still coughing up secretions and using bedside sx. Denied feeling Nauseated. Medicated for pain x 3. IV site changed this am. Lt arm IV red and swollen. Still on O2. Tele SR.

## 2018-06-24 NOTE — CARE PLAN
Problem: Bowel/Gastric:  Goal: Will not experience complications related to bowel motility  Outcome: PROGRESSING SLOWER THAN EXPECTED  Less vomiting today, stool softener at HS    Problem: Pain Management  Goal: Pain level will decrease to patient's comfort goal  Outcome: PROGRESSING AS EXPECTED  Pain medication at HS

## 2018-06-24 NOTE — PROGRESS NOTES
Nephrology Progress Note, Adult, Complex               Author: Fadi Najjar Date & Time created: 6/24/2018  12:49 PM     Interval History:  53 y/o female with CAMPOS/CKD IV, DMN, HTN, admitted with left cerebellar stroke  Elevated BP under better control  Creat better  UOP -better  Renal US (+) for PCKD -d/w Pt -admitted diagnosed with PCKD several years ago  Doing better today.  SOB improved  Review of Systems:  Review of Systems   Constitutional: Negative for chills and fever.   Respiratory: Positive for cough and shortness of breath.    Cardiovascular: Negative for chest pain, palpitations, orthopnea and leg swelling.   Gastrointestinal: Negative for abdominal pain, nausea and vomiting.   Genitourinary: Negative for dysuria, flank pain, frequency, hematuria and urgency.   Musculoskeletal: Negative for back pain, joint pain and myalgias.   Neurological: Negative for dizziness, sensory change, focal weakness and headaches.   All other systems reviewed and are negative.      Physical Exam:  Physical Exam   Constitutional: She is oriented to person, place, and time. She appears well-developed and well-nourished. No distress.   HENT:   Head: Normocephalic and atraumatic.   Nose: Nose normal.   Mouth/Throat: Oropharynx is clear and moist.   NGT   Eyes: Conjunctivae and EOM are normal. Pupils are equal, round, and reactive to light. Right eye exhibits no discharge. Left eye exhibits no discharge. No scleral icterus.   Neck: Normal range of motion. Neck supple. No thyromegaly present.   Cardiovascular: Normal rate and regular rhythm.  Exam reveals no gallop and no friction rub.    Pulmonary/Chest: Effort normal. No respiratory distress. She has no wheezes. She has rhonchi. She has rales.   Abdominal: Soft. Bowel sounds are normal. She exhibits no distension. There is no tenderness.   Musculoskeletal: She exhibits edema.   Lymphadenopathy:     She has no cervical adenopathy.   Neurological: She is alert and oriented to  person, place, and time. No cranial nerve deficit. Coordination normal.   Skin: Skin is warm. No rash noted. No erythema.   Nursing note and vitals reviewed.      Labs:        Invalid input(s): [id number]  Recent Labs      18   0515  18   0246  18   0502   BNPBTYPENAT  118*  74  60     Recent Labs      18   0515  18   0246  18   0502   SODIUM  143  142  135   POTASSIUM  4.4  4.9  4.4   CHLORIDE  114*  109  107   CO2  17*  22  19*   BUN  44*  43*  46*   CREATININE  2.72*  2.76*  2.67*   CALCIUM  8.8  8.7  8.1*     Recent Labs      18   0515  18   0246  18   0502   GLUCOSE  186*  166*  71     No results for input(s): RBC, HEMOGLOBIN, HEMATOCRIT, PLATELETCT, PROTHROMBTM, APTT, INR, IRON, FERRITIN, TOTIRONBC in the last 72 hours.            Hemodynamics:  Temp (24hrs), Av.8 °C (98.3 °F), Min:36.5 °C (97.7 °F), Max:37.2 °C (98.9 °F)  Temperature: 36.5 °C (97.7 °F)  Pulse  Av  Min: 57  Max: 84   Blood Pressure: 131/62     Respiratory:    Respiration: 14, Pulse Oximetry: 95 %     Work Of Breathing / Effort: Mild  RUL Breath Sounds: Rhonchi, RML Breath Sounds: Diminished, RLL Breath Sounds: Rhonchi, DIANNE Breath Sounds: Coarse Crackles, LLL Breath Sounds: Diminished  Fluids:    Intake/Output Summary (Last 24 hours) at 18 1249  Last data filed at 18 0720   Gross per 24 hour   Intake             1890 ml   Output              900 ml   Net              990 ml        GI/Nutrition:  Orders Placed This Encounter   Procedures   • Diet NPO     Standing Status:   Standing     Number of Occurrences:   1     Order Specific Question:   Restrict to:     Answer:   Strict [1]     Medical Decision Making, by Problem:  Active Hospital Problems    Diagnosis   • *Stroke (HCC) [I63.9]   • CKD stage 4 due to type 2 diabetes mellitus [E11.22, N18.4]   • Dysphagia [R13.10]   • Diabetic neuropathy (HCC) [E11.40]   • Type II diabetes mellitus (HCC) [E11.9]   • History of  DVT (deep vein thrombosis) [Z86.718]   • History of GI bleed [Z87.19]   • Essential hypertension [I10]   • Hyperlipemia [E78.5]   • Hypothyroid [E03.9]       Quality-Core Measures   Reviewed items::  Labs reviewed and Medications reviewed    Assessment and plan:    1.CAMPOS/CKD IV /PCKD:cr  Is still elevated,no signs of renal recovery  2.HTN: BP is better  3.Electrolytes:OK  4.Anemia: Hb WNL  5.Volume overload  6 resp failure    Recs:   no acute need for HD  Continue IV Lasix  Daily labs  Renal dose all meds  Avoid nephrotoxins  Prognosis guarded

## 2018-06-24 NOTE — PROGRESS NOTES
[facility] Hospitalist Progress Note    Date of Service: 2018    Chief Complaint  52 y.o. female admitted 2018 with acute to subacute left medial cerebellar CVA. Patient was initially seen at an outside facility for nausea, vomiting, vision changes and vertigo. 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 Prior hyper-coaguable state workup negative.    Interval Problem Update  Cerebellar infarct - Stable. Neuro signed off.   IDDM - SSI  CKD-IV - Neph consulted. Creatinine stable - Follow BP/glucose closely.  PCKD - neph following  Hx GIB - no active signs of bleeding - restarted ASA  HTN - Stable on oral CCB/BB. ACEI when cleared w Neph   Lipids - Max statin  Pain - on oral pain meds    Consultants/Specialty  Neuro  Neph    Disposition  Rehab - Awaiting auth        Review of Systems   Constitutional: Negative for chills, fever and weight loss.   HENT: Positive for sore throat.         Positive for dysphonia   Eyes: Positive for blurred vision. Negative for double vision and photophobia.   Respiratory: Negative for cough, wheezing and stridor.    Cardiovascular: Negative for chest pain, palpitations and orthopnea.   Gastrointestinal: Positive for nausea. Negative for abdominal pain, blood in stool, heartburn, melena and vomiting.   Genitourinary: Negative for dysuria.   Neurological: Positive for dizziness and headaches. Negative for speech change, focal weakness, seizures and loss of consciousness.   Endo/Heme/Allergies: Does not bruise/bleed easily.      Physical Exam  Laboratory/Imaging   Hemodynamics  Temp (24hrs), Av.8 °C (98.3 °F), Min:36.5 °C (97.7 °F), Max:37.2 °C (98.9 °F)   Temperature: 36.5 °C (97.7 °F)  Pulse  Av  Min: 57  Max: 84    Blood Pressure: 131/62      Respiratory      Respiration: 14, Pulse Oximetry: 95 %     Work Of Breathing /  Effort: Mild  RUL Breath Sounds: Rhonchi, RML Breath Sounds: Diminished, RLL Breath Sounds: Rhonchi, LLL Breath Sounds: Diminished    Fluids    Intake/Output Summary (Last 24 hours) at 06/24/18 0843  Last data filed at 06/24/18 0720   Gross per 24 hour   Intake             2090 ml   Output             1200 ml   Net              890 ml       Nutrition  Orders Placed This Encounter   Procedures   • Diet NPO     Standing Status:   Standing     Number of Occurrences:   1     Order Specific Question:   Restrict to:     Answer:   Strict [1]     Physical Exam   Constitutional: She is oriented to person, place, and time. She appears well-developed and well-nourished. No distress.   HENT:   Head: Normocephalic and atraumatic.   Eyes: EOM are normal. Pupils are equal, round, and reactive to light.   Neck: Neck supple.   Cardiovascular: Normal rate, regular rhythm and normal heart sounds.    Pulmonary/Chest: Effort normal. No respiratory distress. She has no wheezes. She has rhonchi. She has no rales.   Abdominal: Soft. She exhibits no distension. There is no tenderness.   Neurological: She is alert and oriented to person, place, and time. No cranial nerve deficit. GCS eye subscore is 4. GCS verbal subscore is 5. GCS motor subscore is 6.   Skin: Skin is warm and dry.   Psychiatric: She has a normal mood and affect. Her speech is normal and behavior is normal. Thought content normal. Cognition and memory are normal.           Recent Labs      06/22/18   0515  06/23/18   0246  06/24/18   0502   SODIUM  143  142  135   POTASSIUM  4.4  4.9  4.4   CHLORIDE  114*  109  107   CO2  17*  22  19*   GLUCOSE  186*  166*  71   BUN  44*  43*  46*   CREATININE  2.72*  2.76*  2.67*   CALCIUM  8.8  8.7  8.1*         Recent Labs      06/22/18   0515  06/23/18   0246  06/24/18   0502   BNPBTYPENAT  118*  74  60              Assessment/Plan     * Stroke (HCC)- (present on admission)   Assessment & Plan    MRI of the brain from other facility  showed left medial cerebellar acute or subacute infarction with no hemorrhage.   Aspirin initiated at 325 - carefull observation for bleed.   PT/OT. Speech.  Carotid - Antegrade left vertebral flow with high resistant may indicate distal occlusion; no pathology noted on right vert  Echo - hyperdynamic; no heart failure of valvular disease; no shunt  Statin - max  Neuro consulted - now signed off          CKD stage 4 due to type 2 diabetes mellitus- (present on admission)   Assessment & Plan    Acute on chronic renal failure - stable  Superimposed on PCKD  Discussed w Neph - HTN controlled  Goal Sys 140  Daily BMP  Strict I/Os  Daily weight  Avoid nephrotoxins. Renal dose medications  Appreciate nephrology assistance          Non-intractable vomiting with nausea   Assessment & Plan    No associated abd pain, diarrhea or hematemsis  Started reglan  IV or ODT ondansetron  Likely related to cerebellar CVA  Improving with meds        Dysphagia   Assessment & Plan    Failed SLP swallow eval  Cortrak and nutrition consult  CXR 6/20 neg for asp PNA - follow closely          History of GI bleed   Assessment & Plan    Pepcid for PUD prophylaxis        History of DVT (deep vein thrombosis)   Assessment & Plan    And PE. Status post IVC filter placement. Intolerant to anticoagulation due to history of GI bleed.  Heparin Q8 with close monitoring for bleeding  Prior hyper-coaguable state workup from 3/2012 negative        Type II diabetes mellitus (HCC)   Assessment & Plan    A1c 8.3   Sliding scale insulin.  Controlled          Essential hypertension- (present on admission)   Assessment & Plan    Will carefully titrate BP meds to goal sys 140  Norvasc, Toprol - stable  ACE or ARB when Neph recommends        Polycystic kidney disease- (present on admission)   Assessment & Plan    Neph following- see above        Hyperlipemia- (present on admission)   Assessment & Plan    Statin - max dose        Hypothyroid- (present on  admission)   Assessment & Plan    Cont replacement  Normal TSH on admit        Diabetic neuropathy (HCC)- (present on admission)   Assessment & Plan    Decreased Cymbalta from 60mg to 30 given acute/chronic renal failure            Quality-Core Measures   Reviewed items::  EKG reviewed, Labs reviewed, Medications reviewed and Radiology images reviewed  Wallace catheter::  No Wallace  DVT prophylaxis pharmacological::  Heparin  DVT prophylaxis - mechanical:  SCDs  Ulcer Prophylaxis::  Yes  Assessed for rehabilitation services:  Patient was assess for and/or received rehabilitation services during this hospitalization

## 2018-06-24 NOTE — CARE PLAN
Problem: Safety  Goal: Will remain free from falls  Outcome: PROGRESSING AS EXPECTED      Problem: Infection  Goal: Will remain free from infection  Outcome: PROGRESSING AS EXPECTED

## 2018-06-24 NOTE — PROGRESS NOTES
Pt is A&Ox4, has cortrack in place at 76, I notified Dr. Amador that is was at 70cm when I had the pt on Friday. He ordered a tube placement xray. Tube feeding placed on hold. Pt on 2L O2 via NC. Pt c/o HA and rates it a 9/10. Pt states that she feels like she has phlegm in her throat and she is working on getting it up. Pt has suction at bedside and is using it appropriately. Call light within reach. Hourly rounding in place.

## 2018-06-25 LAB
ANION GAP SERPL CALC-SCNC: 7 MMOL/L (ref 0–11.9)
BNP SERPL-MCNC: 64 PG/ML (ref 0–100)
BUN SERPL-MCNC: 57 MG/DL (ref 8–22)
CALCIUM SERPL-MCNC: 8.6 MG/DL (ref 8.5–10.5)
CHLORIDE SERPL-SCNC: 102 MMOL/L (ref 96–112)
CO2 SERPL-SCNC: 27 MMOL/L (ref 20–33)
CREAT SERPL-MCNC: 3.23 MG/DL (ref 0.5–1.4)
CRP SERPL HS-MCNC: 16.26 MG/DL (ref 0–0.75)
GLUCOSE BLD-MCNC: 130 MG/DL (ref 65–99)
GLUCOSE BLD-MCNC: 149 MG/DL (ref 65–99)
GLUCOSE BLD-MCNC: 172 MG/DL (ref 65–99)
GLUCOSE BLD-MCNC: 183 MG/DL (ref 65–99)
GLUCOSE SERPL-MCNC: 179 MG/DL (ref 65–99)
POTASSIUM SERPL-SCNC: 4.5 MMOL/L (ref 3.6–5.5)
PREALB SERPL-MCNC: 8 MG/DL (ref 18–38)
SODIUM SERPL-SCNC: 136 MMOL/L (ref 135–145)

## 2018-06-25 PROCEDURE — 84134 ASSAY OF PREALBUMIN: CPT

## 2018-06-25 PROCEDURE — 770020 HCHG ROOM/CARE - TELE (206)

## 2018-06-25 PROCEDURE — 92526 ORAL FUNCTION THERAPY: CPT

## 2018-06-25 PROCEDURE — 99233 SBSQ HOSP IP/OBS HIGH 50: CPT | Performed by: INTERNAL MEDICINE

## 2018-06-25 PROCEDURE — 700111 HCHG RX REV CODE 636 W/ 250 OVERRIDE (IP): Performed by: FAMILY MEDICINE

## 2018-06-25 PROCEDURE — 700111 HCHG RX REV CODE 636 W/ 250 OVERRIDE (IP): Performed by: HOSPITALIST

## 2018-06-25 PROCEDURE — 97116 GAIT TRAINING THERAPY: CPT

## 2018-06-25 PROCEDURE — 700102 HCHG RX REV CODE 250 W/ 637 OVERRIDE(OP): Performed by: NURSE PRACTITIONER

## 2018-06-25 PROCEDURE — A9270 NON-COVERED ITEM OR SERVICE: HCPCS | Performed by: HOSPITALIST

## 2018-06-25 PROCEDURE — 97530 THERAPEUTIC ACTIVITIES: CPT

## 2018-06-25 PROCEDURE — 99232 SBSQ HOSP IP/OBS MODERATE 35: CPT | Performed by: HOSPITALIST

## 2018-06-25 PROCEDURE — 80048 BASIC METABOLIC PNL TOTAL CA: CPT

## 2018-06-25 PROCEDURE — A9270 NON-COVERED ITEM OR SERVICE: HCPCS | Performed by: NURSE PRACTITIONER

## 2018-06-25 PROCEDURE — 700102 HCHG RX REV CODE 250 W/ 637 OVERRIDE(OP): Performed by: HOSPITALIST

## 2018-06-25 PROCEDURE — 82962 GLUCOSE BLOOD TEST: CPT | Mod: 91

## 2018-06-25 PROCEDURE — 700111 HCHG RX REV CODE 636 W/ 250 OVERRIDE (IP): Performed by: INTERNAL MEDICINE

## 2018-06-25 PROCEDURE — 86140 C-REACTIVE PROTEIN: CPT

## 2018-06-25 PROCEDURE — 36415 COLL VENOUS BLD VENIPUNCTURE: CPT

## 2018-06-25 PROCEDURE — 83880 ASSAY OF NATRIURETIC PEPTIDE: CPT

## 2018-06-25 RX ORDER — LORAZEPAM 2 MG/ML
0.5 INJECTION INTRAMUSCULAR ONCE
Status: COMPLETED | OUTPATIENT
Start: 2018-06-25 | End: 2018-06-25

## 2018-06-25 RX ORDER — OXYCODONE AND ACETAMINOPHEN 10; 325 MG/1; MG/1
1 TABLET ORAL EVERY 4 HOURS PRN
Status: DISCONTINUED | OUTPATIENT
Start: 2018-06-25 | End: 2018-06-26

## 2018-06-25 RX ORDER — MECLIZINE HYDROCHLORIDE 25 MG/1
25 TABLET ORAL 3 TIMES DAILY PRN
Status: DISCONTINUED | OUTPATIENT
Start: 2018-06-25 | End: 2018-06-26

## 2018-06-25 RX ORDER — MORPHINE SULFATE 4 MG/ML
4 INJECTION, SOLUTION INTRAMUSCULAR; INTRAVENOUS ONCE
Status: COMPLETED | OUTPATIENT
Start: 2018-06-25 | End: 2018-06-25

## 2018-06-25 RX ORDER — HYDROCODONE BITARTRATE AND ACETAMINOPHEN 5; 325 MG/1; MG/1
1-2 TABLET ORAL EVERY 6 HOURS PRN
Status: DISCONTINUED | OUTPATIENT
Start: 2018-06-25 | End: 2018-06-25

## 2018-06-25 RX ADMIN — HEPARIN SODIUM 5000 UNITS: 5000 INJECTION, SOLUTION INTRAVENOUS; SUBCUTANEOUS at 06:02

## 2018-06-25 RX ADMIN — LORAZEPAM 0.5 MG: 2 INJECTION INTRAMUSCULAR; INTRAVENOUS at 22:49

## 2018-06-25 RX ADMIN — HEPARIN SODIUM 5000 UNITS: 5000 INJECTION, SOLUTION INTRAVENOUS; SUBCUTANEOUS at 14:25

## 2018-06-25 RX ADMIN — MECLIZINE HYDROCHLORIDE 25 MG: 25 TABLET ORAL at 12:07

## 2018-06-25 RX ADMIN — OXYCODONE HYDROCHLORIDE AND ACETAMINOPHEN 1 TABLET: 10; 325 TABLET ORAL at 17:43

## 2018-06-25 RX ADMIN — ONDANSETRON 4 MG: 2 INJECTION INTRAMUSCULAR; INTRAVENOUS at 22:56

## 2018-06-25 RX ADMIN — OXYCODONE AND ACETAMINOPHEN 2 TABLET: 5; 325 TABLET ORAL at 11:58

## 2018-06-25 RX ADMIN — FUROSEMIDE 60 MG: 20 TABLET ORAL at 06:02

## 2018-06-25 RX ADMIN — METOPROLOL TARTRATE 12.5 MG: 25 TABLET, FILM COATED ORAL at 07:33

## 2018-06-25 RX ADMIN — INSULIN HUMAN 1 UNITS: 100 INJECTION, SOLUTION PARENTERAL at 11:59

## 2018-06-25 RX ADMIN — METOCLOPRAMIDE 10 MG: 5 INJECTION, SOLUTION INTRAMUSCULAR; INTRAVENOUS at 06:02

## 2018-06-25 RX ADMIN — CLONIDINE 1 PATCH: 0.1 PATCH TRANSDERMAL at 15:12

## 2018-06-25 RX ADMIN — HYDROCODONE BITARTRATE AND ACETAMINOPHEN 2 TABLET: 5; 325 TABLET ORAL at 14:29

## 2018-06-25 RX ADMIN — OXYCODONE AND ACETAMINOPHEN 2 TABLET: 5; 325 TABLET ORAL at 03:03

## 2018-06-25 RX ADMIN — ONDANSETRON 4 MG: 2 INJECTION INTRAMUSCULAR; INTRAVENOUS at 15:11

## 2018-06-25 RX ADMIN — OXYCODONE AND ACETAMINOPHEN 2 TABLET: 5; 325 TABLET ORAL at 07:32

## 2018-06-25 RX ADMIN — FAMOTIDINE 20 MG: 20 TABLET ORAL at 07:33

## 2018-06-25 RX ADMIN — MORPHINE SULFATE 4 MG: 4 INJECTION INTRAVENOUS at 22:50

## 2018-06-25 RX ADMIN — MECLIZINE HYDROCHLORIDE 25 MG: 25 TABLET ORAL at 17:43

## 2018-06-25 RX ADMIN — INSULIN HUMAN 1 UNITS: 100 INJECTION, SOLUTION PARENTERAL at 17:51

## 2018-06-25 RX ADMIN — AMLODIPINE BESYLATE 10 MG: 5 TABLET ORAL at 07:32

## 2018-06-25 RX ADMIN — BISACODYL 10 MG: 10 SUPPOSITORY RECTAL at 14:25

## 2018-06-25 RX ADMIN — HEPARIN SODIUM 5000 UNITS: 5000 INJECTION, SOLUTION INTRAVENOUS; SUBCUTANEOUS at 21:56

## 2018-06-25 RX ADMIN — ASPIRIN 324 MG: 81 TABLET, CHEWABLE ORAL at 07:32

## 2018-06-25 RX ADMIN — ONDANSETRON 4 MG: 2 INJECTION INTRAMUSCULAR; INTRAVENOUS at 09:11

## 2018-06-25 RX ADMIN — LEVOTHYROXINE SODIUM 112 MCG: 112 TABLET ORAL at 07:33

## 2018-06-25 RX ADMIN — SENNOSIDES AND DOCUSATE SODIUM 2 TABLET: 8.6; 5 TABLET ORAL at 07:32

## 2018-06-25 RX ADMIN — METOCLOPRAMIDE 10 MG: 5 INJECTION, SOLUTION INTRAMUSCULAR; INTRAVENOUS at 17:43

## 2018-06-25 RX ADMIN — METOCLOPRAMIDE 10 MG: 5 INJECTION, SOLUTION INTRAMUSCULAR; INTRAVENOUS at 11:59

## 2018-06-25 ASSESSMENT — ENCOUNTER SYMPTOMS
LOSS OF CONSCIOUSNESS: 0
VOMITING: 1
SORE THROAT: 1
HEARTBURN: 0
SEIZURES: 0
PALPITATIONS: 0
COUGH: 0
MYALGIAS: 0
ORTHOPNEA: 0
BRUISES/BLEEDS EASILY: 0
BLURRED VISION: 1
WHEEZING: 0
NAUSEA: 1
FLANK PAIN: 0
FOCAL WEAKNESS: 0
FEVER: 0
COUGH: 1
BLOOD IN STOOL: 0
HEADACHES: 1
ABDOMINAL PAIN: 0
DIZZINESS: 1
WEIGHT LOSS: 0
DOUBLE VISION: 0
CHILLS: 0
BACK PAIN: 0
VOMITING: 0
SPEECH CHANGE: 0
PHOTOPHOBIA: 0
STRIDOR: 0
HEADACHES: 0
SHORTNESS OF BREATH: 1

## 2018-06-25 ASSESSMENT — PAIN SCALES - GENERAL
PAINLEVEL_OUTOF10: 5
PAINLEVEL_OUTOF10: 8
PAINLEVEL_OUTOF10: 8
PAINLEVEL_OUTOF10: 5
PAINLEVEL_OUTOF10: 8
PAINLEVEL_OUTOF10: 6
PAINLEVEL_OUTOF10: 5
PAINLEVEL_OUTOF10: 8

## 2018-06-25 ASSESSMENT — GAIT ASSESSMENTS
DEVIATION: STEP TO;BRADYKINETIC;DECREASED HEEL STRIKE;DECREASED TOE OFF
ASSISTIVE DEVICE: FRONT WHEEL WALKER
DISTANCE (FEET): 5
GAIT LEVEL OF ASSIST: MINIMAL ASSIST

## 2018-06-25 ASSESSMENT — COGNITIVE AND FUNCTIONAL STATUS - GENERAL
STANDING UP FROM CHAIR USING ARMS: A LITTLE
MOBILITY SCORE: 17
WALKING IN HOSPITAL ROOM: A LOT
MOVING FROM LYING ON BACK TO SITTING ON SIDE OF FLAT BED: A LITTLE
CLIMB 3 TO 5 STEPS WITH RAILING: TOTAL
SUGGESTED CMS G CODE MODIFIER MOBILITY: CK

## 2018-06-25 NOTE — THERAPY
Attempted to see pt for OT tx. RN asked to hold at this time d/t pt already sitting up in chair this AM and after getting BTB was vomiting. Will try again later as able.

## 2018-06-25 NOTE — PROGRESS NOTES
Pt back to bed. C/O dizziness. Emesis after returning to bed. Medicated w/ Zofran IV PRN. Dr. Amador updated concerning pt's situation.

## 2018-06-25 NOTE — PROGRESS NOTES
[facility] Hospitalist Progress Note    Date of Service: 2018    Chief Complaint  52 y.o. female admitted 2018 with acute to subacute left medial cerebellar CVA. Patient was initially seen at an outside facility for nausea, vomiting, vision changes and vertigo. 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 Prior hyper-coaguable state workup negative.    Interval Problem Update  Cerebellar infarct - Stable. Neuro signed off.   Dysphagia - continues to struggle w secretions - emesis. Abd Xray  NGT ok. Added NGT suction of upper airway  IDDM - SSI  CKD-IV - Neph consulted. Creatinine waxing/waning - Follow BP/glucose closely.  PCKD - neph following  Hx GIB - no active signs of bleeding - restarted ASA  HTN - slightly worse. Add clonidine. CCB/BB. ACEI when cleared w Neph   Lipids - Max statin  Pain - on oral pain meds    Consultants/Specialty  Neuro  Neph    Disposition  Rehab - Awaiting auth        Review of Systems   Constitutional: Negative for chills, fever and weight loss.   HENT: Positive for sore throat.         Positive for dysphonia   Eyes: Positive for blurred vision. Negative for double vision and photophobia.   Respiratory: Negative for cough, wheezing and stridor.    Cardiovascular: Negative for chest pain, palpitations and orthopnea.   Gastrointestinal: Positive for nausea. Negative for abdominal pain, blood in stool, heartburn, melena and vomiting.   Genitourinary: Negative for dysuria.   Neurological: Positive for dizziness and headaches. Negative for speech change, focal weakness, seizures and loss of consciousness.   Endo/Heme/Allergies: Does not bruise/bleed easily.      Physical Exam  Laboratory/Imaging   Hemodynamics  Temp (24hrs), Av.9 °C (98.5 °F), Min:36.1 °C (97 °F), Max:37.9 °C (100.2 °F)   Temperature: 36.1 °C (97 °F)  Pulse  Avg:  70.5  Min: 57  Max: 84    Blood Pressure: 151/69      Respiratory      Respiration: 14, Pulse Oximetry: 96 %     Work Of Breathing / Effort: Mild  RUL Breath Sounds: Rhonchi, RML Breath Sounds: Diminished, RLL Breath Sounds: Rhonchi, LLL Breath Sounds: Diminished    Fluids    Intake/Output Summary (Last 24 hours) at 06/25/18 1236  Last data filed at 06/25/18 1100   Gross per 24 hour   Intake             1730 ml   Output             1985 ml   Net             -255 ml       Nutrition  Orders Placed This Encounter   Procedures   • Diet NPO     Standing Status:   Standing     Number of Occurrences:   1     Order Specific Question:   Restrict to:     Answer:   Strict [1]     Physical Exam   Constitutional: She is oriented to person, place, and time. She appears well-developed and well-nourished. No distress.   HENT:   Head: Normocephalic and atraumatic.   Eyes: EOM are normal. Pupils are equal, round, and reactive to light.   Neck: Neck supple.   Cardiovascular: Normal rate, regular rhythm and normal heart sounds.    Pulmonary/Chest: Effort normal. No respiratory distress. She has no wheezes. She has rhonchi. She has no rales.   Abdominal: Soft. She exhibits no distension. There is no tenderness.   Neurological: She is alert and oriented to person, place, and time. No cranial nerve deficit. GCS eye subscore is 4. GCS verbal subscore is 5. GCS motor subscore is 6.   Skin: Skin is warm and dry.   Psychiatric: She has a normal mood and affect. Her speech is normal and behavior is normal. Thought content normal. Cognition and memory are normal.           Recent Labs      06/23/18   0246  06/24/18   0502  06/25/18   0331   SODIUM  142  135  136   POTASSIUM  4.9  4.4  4.5   CHLORIDE  109  107  102   CO2  22  19*  27   GLUCOSE  166*  71  179*   BUN  43*  46*  57*   CREATININE  2.76*  2.67*  3.23*   CALCIUM  8.7  8.1*  8.6         Recent Labs      06/23/18   0246  06/24/18   0502  06/25/18   0452   BNPBTYPENAT  74  60  64               Assessment/Plan     * Stroke (HCC)- (present on admission)   Assessment & Plan    MRI of the brain from other facility showed left medial cerebellar acute or subacute infarction with no hemorrhage.   Aspirin initiated at 325 - carefull observation for bleed.   PT/OT. Speech.  Carotid - Antegrade left vertebral flow with high resistant may indicate distal occlusion; no pathology noted on right vert  Echo - hyperdynamic; no heart failure of valvular disease; no shunt  Statin - max  Neuro consulted - now signed off          CKD stage 4 due to type 2 diabetes mellitus- (present on admission)   Assessment & Plan    Acute on chronic renal failure  Superimposed on PCKD  Discussed w Neph - titrate HTN meds  Goal Sys 140  Daily BMP  Strict I/Os  Daily weight  Avoid nephrotoxins. Renal dose medications  Appreciate nephrology assistance          Non-intractable vomiting with nausea   Assessment & Plan    No associated abd pain, diarrhea or hematemsis  Started reglan  IV or ODT ondansetron  Likely related to cerebellar CVA  Still a problem despite reglan/zofran. Switch oral pain meds to from oxy to hydrocodone        Dysphagia   Assessment & Plan    Failed SLP swallow eval  Cortrak and nutrition consult  CXR 6/20 and 6j/21 neg for asp PNA - follow closely  Abx Xray 6/24 shows NGT placed appropriately        History of GI bleed   Assessment & Plan    Pepcid for PUD prophylaxis        History of DVT (deep vein thrombosis)   Assessment & Plan    And PE. Status post IVC filter placement. Intolerant to anticoagulation due to history of GI bleed.  Heparin Q8 with close monitoring for bleeding  Prior hyper-coaguable state workup from 3/2012 negative        Type II diabetes mellitus (HCC)   Assessment & Plan    A1c 8.3   Sliding scale insulin.  Controlled          Essential hypertension- (present on admission)   Assessment & Plan    Will carefully titrate BP meds to goal sys 140  Norvasc, Toprol - stable  ACE or ARB when Neph  recommends        Polycystic kidney disease- (present on admission)   Assessment & Plan    Neph following- see above        Hyperlipemia- (present on admission)   Assessment & Plan    Statin - max dose        Hypothyroid- (present on admission)   Assessment & Plan    Cont replacement  Normal TSH on admit        Diabetic neuropathy (HCC)- (present on admission)   Assessment & Plan    Cymbalta dc'd. Consider Guilherme PRN          Quality-Core Measures   Reviewed items::  EKG reviewed, Labs reviewed, Medications reviewed and Radiology images reviewed  Wallace catheter::  No Wallace  DVT prophylaxis pharmacological::  Heparin  DVT prophylaxis - mechanical:  SCDs  Ulcer Prophylaxis::  Yes  Assessed for rehabilitation services:  Patient was assess for and/or received rehabilitation services during this hospitalization

## 2018-06-25 NOTE — PROGRESS NOTES
Nephrology Progress Note, Adult, Complex               Author: Fadi Najjar Date & Time created: 6/25/2018  11:44 AM     Interval History:  53 y/o female with CAMPOS/CKD IV, DMN, HTN, admitted with left cerebellar stroke  Elevated BP under better control  Creat better  UOP -better  Renal US (+) for PCKD -d/w Pt -admitted diagnosed with PCKD several years ago  Still with vertigo, N/V this morning  SOB improved  Review of Systems:  Review of Systems   Constitutional: Negative for chills and fever.   Respiratory: Positive for cough and shortness of breath.    Cardiovascular: Negative for chest pain, palpitations, orthopnea and leg swelling.   Gastrointestinal: Positive for nausea and vomiting. Negative for abdominal pain.   Genitourinary: Negative for dysuria, flank pain, frequency, hematuria and urgency.   Musculoskeletal: Negative for back pain, joint pain and myalgias.   Neurological: Positive for dizziness. Negative for headaches.        Vertigo    All other systems reviewed and are negative.      Physical Exam:  Physical Exam   Constitutional: She is oriented to person, place, and time. She appears well-developed and well-nourished. No distress.   HENT:   Head: Normocephalic and atraumatic.   Nose: Nose normal.   Mouth/Throat: Oropharynx is clear and moist.   NGT   Eyes: Conjunctivae and EOM are normal. Pupils are equal, round, and reactive to light. Right eye exhibits no discharge. Left eye exhibits no discharge. No scleral icterus.   Neck: Normal range of motion. Neck supple. No thyromegaly present.   Cardiovascular: Normal rate and regular rhythm.  Exam reveals no gallop and no friction rub.    Pulmonary/Chest: Effort normal. No respiratory distress. She has no wheezes. She has rhonchi. She has rales.   Abdominal: Soft. Bowel sounds are normal. She exhibits no distension. There is no tenderness.   Musculoskeletal: She exhibits edema.   Lymphadenopathy:     She has no cervical adenopathy.   Neurological: She is  alert and oriented to person, place, and time. No cranial nerve deficit. Coordination normal.   Skin: Skin is warm. No rash noted. She is not diaphoretic. No erythema.   Nursing note and vitals reviewed.      Labs:        Invalid input(s): [id number]  Recent Labs      18   0246  18   0502  18   0452   BNPBTYPENAT  74  60  64     Recent Labs      18   0246  18   0502  18   0331   SODIUM  142  135  136   POTASSIUM  4.9  4.4  4.5   CHLORIDE  109  107  102   CO2  22  19*  27   BUN  43*  46*  57*   CREATININE  2.76*  2.67*  3.23*   CALCIUM  8.7  8.1*  8.6     Recent Labs      18   02418   0502  18   0331   PREALBUMIN   --    --   8.0*   GLUCOSE  166*  71  179*     No results for input(s): RBC, HEMOGLOBIN, HEMATOCRIT, PLATELETCT, PROTHROMBTM, APTT, INR, IRON, FERRITIN, TOTIRONBC in the last 72 hours.            Hemodynamics:  Temp (24hrs), Av.9 °C (98.4 °F), Min:36.1 °C (97 °F), Max:37.9 °C (100.2 °F)  Temperature: 36.1 °C (97 °F)  Pulse  Av.5  Min: 57  Max: 84   Blood Pressure: 151/69     Respiratory:    Respiration: 14, Pulse Oximetry: 96 %     Work Of Breathing / Effort: Mild  RUL Breath Sounds: Rhonchi, RML Breath Sounds: Diminished, RLL Breath Sounds: Rhonchi, LLL Breath Sounds: Diminished  Fluids:    Intake/Output Summary (Last 24 hours) at 18 1144  Last data filed at 18 1100   Gross per 24 hour   Intake             1730 ml   Output             1985 ml   Net             -255 ml        GI/Nutrition:  Orders Placed This Encounter   Procedures   • Diet NPO     Standing Status:   Standing     Number of Occurrences:   1     Order Specific Question:   Restrict to:     Answer:   Strict [1]     Medical Decision Making, by Problem:  Active Hospital Problems    Diagnosis   • *Stroke (HCC) [I63.9]   • CKD stage 4 due to type 2 diabetes mellitus [E11.22, N18.4]   • Dysphagia [R13.10]   • Diabetic neuropathy (HCC) [E11.40]   • Type II diabetes  mellitus (HCC) [E11.9]   • History of DVT (deep vein thrombosis) [Z86.718]   • History of GI bleed [Z87.19]   • Essential hypertension [I10]   • Hyperlipemia [E78.5]   • Hypothyroid [E03.9]       Quality-Core Measures   Reviewed items::  Labs reviewed and Medications reviewed    Assessment and plan:    1.CAMPOS/CKD IV /PCKD:cr  Is higher today ,no signs of renal recovery  2.HTN: BP is high  3.Electrolytes:OK  4.Anemia: Hb WNL  5.Volume overload:better  6 resp failure    Recs:   no acute need for HD  stop IV Lasix  Daily labs  Renal dose all meds  Avoid nephrotoxins  Prognosis guarded  D/W Dr Amador

## 2018-06-25 NOTE — PROGRESS NOTES
Received care of pt from NOC RN this am. Pt is A+O. Medicated for pain control this am. Up to chair assist of 2 this am.

## 2018-06-25 NOTE — PROGRESS NOTES
Pt requested Pain medications about every 4 hours. Still coughing up and using suction. Oxygen was increased on days to 2L. Pt denies any nausea and tube feeding at 50 will increase to goal this am. Tele SR. Still just using the bedpan has not been out of bed at night. Dressing on Left ankle intact due to be changed on 26th.

## 2018-06-25 NOTE — THERAPY
"Physical Therapy Treatment completed.   Bed Mobility:  Supine to Sit: Stand by Assist  Transfers: Sit to Stand: Stand by Assist  Gait: Level Of Assist: Minimal Assist with Front-Wheel Walker       Plan of Care: Will benefit from Physical Therapy 4 times per week  Discharge Recommendations: Equipment: Will Continue to Assess for Equipment Needs.     See \"Rehab Therapy-Acute\" Patient Summary Report for complete documentation.     Pt is progressing slower than expected demonstrating continued nausea and dizziness which appears to be limiting functional mobility. Pt was able to ambulate for 5 steps forward w/FWW with Min A, however, lost her balance towards the L and reqiured Mod A from PT for correction. Pt was then transferred to the chair w/FWW, however, demonstrated LOB towards the L and required to be guided down to chair w/Max A. Pt continues to demonstrate poor WB on LLE and poor coordination during functional mobility; pt was educated on working in //bars today, however, pt declined to progress with therapy at this time due to nauea and dizziness and was transferred back to bed. Rn aware of sympotms. Niyah cleveland was deferred today; due to concerns of excessive sputum production. Will continue to follow while in house, with previous recommendations in place.   "

## 2018-06-25 NOTE — CARE PLAN
Problem: Safety  Goal: Will remain free from falls  Outcome: PROGRESSING AS EXPECTED  Pt calls, uses bedpan    Problem: Pain Management  Goal: Pain level will decrease to patient's comfort goal  Outcome: PROGRESSING AS EXPECTED  Medicated on request

## 2018-06-25 NOTE — THERAPY
"Speech Language Therapy dysphagia treatment completed.     Functional Status:  Pt was seen for dysphagia therapy on this date. She was AAOx4 and was agreeable to therapy. Upon entering, the pt exhibited a severely wet vocal quality and severely wet cough. She independently utilized oral suction after coughing and was able to remove secretions. However, coughing and suctioning did not resolve vocal wetness, coughing episodes, or audible breathing. D/t frequent coughing and worsening vocal quality, pt was not safe for oral intake. PO trials were not completed during this session. Pt participated in oral mech exercises. Lingual, labial, and pharyngeal/laryngeal exercises were completed to target swallow function. Pt completed swallowing exercises with \"good\" overall quality and stated that she completes them as often as she can throughout the day.     Recommendations:  Recommend that the pt remain STRICT NPO at this time. Please encourage the pt to complete swallowing exercises. SLP to continue following for aggressive therapy.     Pt's vocal wetness, audible breathing, and frequency of coughing appears worse than when seen for dysphagia therapy on 6/22. Spoke with RN and MD regarding possibility of f/u CXR.    Plan of Care: Will benefit from Speech Therapy 5 times per week  Post-Acute Therapy: Discharge to a transitional care facility for continued skilled therapy services.    See \"Rehab Therapy-Acute\" Patient Summary Report for complete documentation.     "

## 2018-06-25 NOTE — CARE PLAN
Problem: Knowledge Deficit  Goal: Knowledge of disease process/condition, treatment plan, diagnostic tests, and medications will improve  Outcome: PROGRESSING AS EXPECTED  Discussed plan of care for today w/ pt this am, she is A+O, she verbalizes understanding of her plan of care, questions answered. Emotional support given. Will monitor for educational needs. Discussed pt's care with Hospitalist, orders received this am.       Problem: Pain Management  Goal: Pain level will decrease to patient's comfort goal  Outcome: PROGRESSING AS EXPECTED  Assessing every four hrs for pain per protocol; see doc flowsheets, pt receiving PO PRN pain medications for pain control today.

## 2018-06-26 ENCOUNTER — APPOINTMENT (OUTPATIENT)
Dept: RADIOLOGY | Facility: MEDICAL CENTER | Age: 53
DRG: 064 | End: 2018-06-26
Attending: HOSPITALIST
Payer: COMMERCIAL

## 2018-06-26 LAB
ANION GAP SERPL CALC-SCNC: 9 MMOL/L (ref 0–11.9)
BUN SERPL-MCNC: 66 MG/DL (ref 8–22)
CALCIUM SERPL-MCNC: 8.9 MG/DL (ref 8.5–10.5)
CHLORIDE SERPL-SCNC: 103 MMOL/L (ref 96–112)
CO2 SERPL-SCNC: 25 MMOL/L (ref 20–33)
CREAT SERPL-MCNC: 3.16 MG/DL (ref 0.5–1.4)
GLUCOSE BLD-MCNC: 140 MG/DL (ref 65–99)
GLUCOSE BLD-MCNC: 145 MG/DL (ref 65–99)
GLUCOSE BLD-MCNC: 168 MG/DL (ref 65–99)
GLUCOSE SERPL-MCNC: 157 MG/DL (ref 65–99)
POTASSIUM SERPL-SCNC: 4.3 MMOL/L (ref 3.6–5.5)
SODIUM SERPL-SCNC: 137 MMOL/L (ref 135–145)

## 2018-06-26 PROCEDURE — 700102 HCHG RX REV CODE 250 W/ 637 OVERRIDE(OP): Performed by: HOSPITALIST

## 2018-06-26 PROCEDURE — 99232 SBSQ HOSP IP/OBS MODERATE 35: CPT | Performed by: INTERNAL MEDICINE

## 2018-06-26 PROCEDURE — 97535 SELF CARE MNGMENT TRAINING: CPT

## 2018-06-26 PROCEDURE — 99232 SBSQ HOSP IP/OBS MODERATE 35: CPT | Performed by: HOSPITALIST

## 2018-06-26 PROCEDURE — A9270 NON-COVERED ITEM OR SERVICE: HCPCS | Performed by: HOSPITALIST

## 2018-06-26 PROCEDURE — 82962 GLUCOSE BLOOD TEST: CPT | Mod: 91

## 2018-06-26 PROCEDURE — 770020 HCHG ROOM/CARE - TELE (206)

## 2018-06-26 PROCEDURE — 700111 HCHG RX REV CODE 636 W/ 250 OVERRIDE (IP): Performed by: FAMILY MEDICINE

## 2018-06-26 PROCEDURE — 700111 HCHG RX REV CODE 636 W/ 250 OVERRIDE (IP): Performed by: HOSPITALIST

## 2018-06-26 PROCEDURE — 80048 BASIC METABOLIC PNL TOTAL CA: CPT

## 2018-06-26 PROCEDURE — 36415 COLL VENOUS BLD VENIPUNCTURE: CPT

## 2018-06-26 RX ORDER — OXYCODONE AND ACETAMINOPHEN 10; 325 MG/1; MG/1
1 TABLET ORAL EVERY 4 HOURS PRN
Status: DISCONTINUED | OUTPATIENT
Start: 2018-06-26 | End: 2018-06-29

## 2018-06-26 RX ORDER — MECLIZINE HYDROCHLORIDE 25 MG/1
25 TABLET ORAL 3 TIMES DAILY PRN
Status: DISCONTINUED | OUTPATIENT
Start: 2018-06-26 | End: 2018-07-08

## 2018-06-26 RX ADMIN — METOPROLOL TARTRATE 12.5 MG: 25 TABLET, FILM COATED ORAL at 07:34

## 2018-06-26 RX ADMIN — ASPIRIN 324 MG: 81 TABLET, CHEWABLE ORAL at 07:33

## 2018-06-26 RX ADMIN — OXYCODONE HYDROCHLORIDE AND ACETAMINOPHEN 1 TABLET: 10; 325 TABLET ORAL at 11:03

## 2018-06-26 RX ADMIN — ATORVASTATIN CALCIUM 80 MG: 80 TABLET, FILM COATED ORAL at 20:40

## 2018-06-26 RX ADMIN — OXYCODONE HYDROCHLORIDE AND ACETAMINOPHEN 1 TABLET: 10; 325 TABLET ORAL at 06:47

## 2018-06-26 RX ADMIN — METOPROLOL TARTRATE 12.5 MG: 25 TABLET, FILM COATED ORAL at 20:40

## 2018-06-26 RX ADMIN — METOCLOPRAMIDE 10 MG: 5 INJECTION, SOLUTION INTRAMUSCULAR; INTRAVENOUS at 01:00

## 2018-06-26 RX ADMIN — PROCHLORPERAZINE EDISYLATE 10 MG: 5 INJECTION INTRAMUSCULAR; INTRAVENOUS at 11:03

## 2018-06-26 RX ADMIN — METOCLOPRAMIDE 10 MG: 5 INJECTION, SOLUTION INTRAMUSCULAR; INTRAVENOUS at 11:03

## 2018-06-26 RX ADMIN — SENNOSIDES AND DOCUSATE SODIUM 2 TABLET: 8.6; 5 TABLET ORAL at 20:40

## 2018-06-26 RX ADMIN — MECLIZINE HYDROCHLORIDE 25 MG: 25 TABLET ORAL at 07:33

## 2018-06-26 RX ADMIN — OXYCODONE HYDROCHLORIDE AND ACETAMINOPHEN 1 TABLET: 10; 325 TABLET ORAL at 17:12

## 2018-06-26 RX ADMIN — MECLIZINE HYDROCHLORIDE 25 MG: 25 TABLET ORAL at 17:09

## 2018-06-26 RX ADMIN — METOCLOPRAMIDE 10 MG: 5 INJECTION, SOLUTION INTRAMUSCULAR; INTRAVENOUS at 06:37

## 2018-06-26 RX ADMIN — FAMOTIDINE 20 MG: 20 TABLET ORAL at 07:34

## 2018-06-26 RX ADMIN — LEVOTHYROXINE SODIUM 112 MCG: 112 TABLET ORAL at 07:34

## 2018-06-26 RX ADMIN — HEPARIN SODIUM 5000 UNITS: 5000 INJECTION, SOLUTION INTRAVENOUS; SUBCUTANEOUS at 14:00

## 2018-06-26 RX ADMIN — ONDANSETRON 4 MG: 2 INJECTION INTRAMUSCULAR; INTRAVENOUS at 20:42

## 2018-06-26 RX ADMIN — HEPARIN SODIUM 5000 UNITS: 5000 INJECTION, SOLUTION INTRAVENOUS; SUBCUTANEOUS at 22:37

## 2018-06-26 RX ADMIN — HEPARIN SODIUM 5000 UNITS: 5000 INJECTION, SOLUTION INTRAVENOUS; SUBCUTANEOUS at 06:32

## 2018-06-26 RX ADMIN — GUAIFENESIN 200 MG: 100 SOLUTION ORAL at 08:27

## 2018-06-26 RX ADMIN — OXYCODONE HYDROCHLORIDE AND ACETAMINOPHEN 1 TABLET: 10; 325 TABLET ORAL at 21:10

## 2018-06-26 RX ADMIN — OXYCODONE HYDROCHLORIDE AND ACETAMINOPHEN 1 TABLET: 10; 325 TABLET ORAL at 03:09

## 2018-06-26 RX ADMIN — AMLODIPINE BESYLATE 10 MG: 5 TABLET ORAL at 07:33

## 2018-06-26 RX ADMIN — METOCLOPRAMIDE 10 MG: 5 INJECTION, SOLUTION INTRAMUSCULAR; INTRAVENOUS at 17:12

## 2018-06-26 ASSESSMENT — COGNITIVE AND FUNCTIONAL STATUS - GENERAL
HELP NEEDED FOR BATHING: A LITTLE
DAILY ACTIVITIY SCORE: 22
DRESSING REGULAR LOWER BODY CLOTHING: A LITTLE
SUGGESTED CMS G CODE MODIFIER DAILY ACTIVITY: CJ

## 2018-06-26 ASSESSMENT — ENCOUNTER SYMPTOMS
MEMORY LOSS: 0
PHOTOPHOBIA: 0
EYE PAIN: 0
DOUBLE VISION: 0
DEPRESSION: 0
SPUTUM PRODUCTION: 0
WEIGHT LOSS: 0
SHORTNESS OF BREATH: 0
HEADACHES: 0
DIZZINESS: 1
MYALGIAS: 0
BLOOD IN STOOL: 0
TINGLING: 0
FOCAL WEAKNESS: 0
CHILLS: 0
LOSS OF CONSCIOUSNESS: 0
HEMOPTYSIS: 0
COUGH: 0
PALPITATIONS: 0
WEAKNESS: 1
FLANK PAIN: 0
ABDOMINAL PAIN: 0
SORE THROAT: 1
VOMITING: 0
HEARTBURN: 0
NAUSEA: 0
SENSORY CHANGE: 0
BLURRED VISION: 1
BRUISES/BLEEDS EASILY: 0
STRIDOR: 0
NECK PAIN: 0
CLAUDICATION: 0
TREMORS: 0
ORTHOPNEA: 0
FEVER: 0
SPEECH CHANGE: 0
PND: 0
BACK PAIN: 0
NERVOUS/ANXIOUS: 0
SEIZURES: 0
NAUSEA: 1
CONSTIPATION: 0
WHEEZING: 0

## 2018-06-26 ASSESSMENT — PAIN SCALES - GENERAL: PAINLEVEL_OUTOF10: 0

## 2018-06-26 NOTE — DISCHARGE PLANNING
Anticipated Discharge Disposition: IRF    Action: LSW notified that [facility] will not be pursing out of network authorization.  Andrés OVALLES with [employer] can be reached at [phone] per Amada in bed day.    LSW made tc to CM with [employer]. LM for call back    Barriers to Discharge: Accepting facility in network with pt's medical insurance    Plan: LSW to f/u with pt's insurance Andrés OVALLES

## 2018-06-26 NOTE — PROGRESS NOTES
TF running @ 35 ml / hr. Pt does not want it changed. Discussed notifying dietician w/ NOC RN. Pt using bed pan, does not want to attempt to use commode. Pt medicated for pain control this evening. Report given to JASON Sauer.

## 2018-06-26 NOTE — DIETARY
Nutrition support weekly update:    Day 7 of admit. Bryan Garcia is a 52 y.o. female with admitting DX . Tube feeding initiated on 6/20. Current TF via small bowel cortrak Diabetisource AC @ 65 ml/hr was providing 1872 kcal, 94 g of protein and 1279 mL of free fluids.    Pt was tolerating TF formula and rate as of 6/22 but started to show s/sx of TF intolerance such as nausea and vomiting; she requested to turn TF rate down to 35 ml/hr earlier yesterday morning due to the previous. Pt to benefit from a low-volume, high-calorie/protein formula to meet estimated nutritional needs.    Assessment:  Weight: 87.4 kg (192 lb) via bed scale on 6/21 - weight fairly stable since admit.     Evaluation:   1. Pt noted with s/sx of TF intolerance; questioning if nausea and vomiting is from TF since it is via small bowel.   2. Glucose levels of 157, BUN 66, creatinine 3.16 on 6/26. Pre-albumin of 8.0 and CRP of 16.26 on 6/25 - indicating presence of inflammation.   3. Pt on regular insulin, reglan per MAR.   4. Pt is provided with 200 mL water flushes every 4 hours.   5. Pt noted with a POA left medial malleolus stage 3 pressure injury; Wound Team note reviewed, they will follow as needed.  6. Pt to benefit from high-protein specialized formula and TF will provide 100% of recommended daily intakes for vitamin C and daily vitamins and minerals intake for wound healing.   7. Pt noted with hx of T2DM, renal disorder, HTN and arthritis.     Recommendations/Plan:  1. Change TF formula and rate: Impact Peptide 1.5 @ 50 ml/hr which will be providing 1800 kcal, 113 g of protein and 924 mL of free fluids.   2. Fluids per MD   3. Monitor TF tolerance   4. Monitor weight  5. Advanced diet as medically feasible     RD monitoring

## 2018-06-26 NOTE — PROGRESS NOTES
Nephrology Progress Note, Adult, Complex               Author: Bladimir Najjar Date & Time created: 6/26/2018  2:57 PM     Interval History:  51 y/o female with CAMPOS/CKD IV, DMN, HTN, admitted with left cerebellar stroke  Elevated BP under better control  Creat better  UOP -better  Renal US (+) for PCKD -d/w Pt -admitted diagnosed with PCKD several years ago  Still with vertigo, N/V this morning  SOB improved  Review of Systems:  Review of Systems   Constitutional: Negative for chills and fever.   Respiratory: Negative for cough and shortness of breath.    Cardiovascular: Negative for chest pain, palpitations, orthopnea and leg swelling.   Gastrointestinal: Positive for nausea. Negative for abdominal pain and vomiting.   Genitourinary: Negative for dysuria, flank pain, frequency, hematuria and urgency.   Musculoskeletal: Negative for back pain, joint pain and myalgias.   Neurological: Positive for dizziness. Negative for headaches.        Vertigo        Physical Exam:  Physical Exam   Constitutional: She is oriented to person, place, and time. She appears well-developed and well-nourished. No distress.   HENT:   Nose: Nose normal.   NGT   Eyes: Conjunctivae are normal. No scleral icterus.   Neck: No thyromegaly present.   Cardiovascular: Normal rate and regular rhythm.  Exam reveals no gallop and no friction rub.    Pulmonary/Chest: Effort normal. No respiratory distress. She has no wheezes. She has rhonchi. She has no rales.   Abdominal: Soft. Bowel sounds are normal. She exhibits no distension. There is no tenderness.   Musculoskeletal: She exhibits edema.   Lymphadenopathy:     She has no cervical adenopathy.   Neurological: She is alert and oriented to person, place, and time.   Skin: Skin is warm. She is not diaphoretic. No erythema.   Nursing note and vitals reviewed.      Labs:        Invalid input(s): [id number]  Recent Labs      06/24/18   0502  06/25/18   0452   BNPBTYPENAT  60  64     Recent Labs       18   0502  18   0331  18   0515   SODIUM  135  136  137   POTASSIUM  4.4  4.5  4.3   CHLORIDE  107  102  103   CO2  19*  27  25   BUN  46*  57*  66*   CREATININE  2.67*  3.23*  3.16*   CALCIUM  8.1*  8.6  8.9     Recent Labs      18   0502  18   0331  18   0515   PREALBUMIN   --   8.0*   --    GLUCOSE  71  179*  157*     No results for input(s): RBC, HEMOGLOBIN, HEMATOCRIT, PLATELETCT, PROTHROMBTM, APTT, INR, IRON, FERRITIN, TOTIRONBC in the last 72 hours.            Hemodynamics:  Temp (24hrs), Av.7 °C (98 °F), Min:36.4 °C (97.5 °F), Max:37.1 °C (98.7 °F)  Temperature: 36.6 °C (97.8 °F)  Pulse  Av.7  Min: 56  Max: 86   Blood Pressure: 131/64     Respiratory:    Respiration: 18, Pulse Oximetry: 92 %           Fluids:    Intake/Output Summary (Last 24 hours) at 18 1457  Last data filed at 18 1900   Gross per 24 hour   Intake              540 ml   Output              250 ml   Net              290 ml        GI/Nutrition:  Orders Placed This Encounter   Procedures   • Diet NPO     Standing Status:   Standing     Number of Occurrences:   1     Order Specific Question:   Restrict to:     Answer:   Strict [1]     Medical Decision Making, by Problem:  Active Hospital Problems    Diagnosis   • *Stroke (HCC) [I63.9]   • CKD stage 4 due to type 2 diabetes mellitus [E11.22, N18.4]   • Dysphagia [R13.10]   • Diabetic neuropathy (HCC) [E11.40]   • Type II diabetes mellitus (HCC) [E11.9]   • History of DVT (deep vein thrombosis) [Z86.718]   • History of GI bleed [Z87.19]   • Essential hypertension [I10]   • Hyperlipemia [E78.5]   • Hypothyroid [E03.9]       Quality-Core Measures   Reviewed items::  Labs reviewed    Assessment and plan:    1.CAMPOS/CKD IV /PCKD:cr is better today  2.HTN: BP is high  3.Electrolytes:OK  4.Anemia: Hb WNL  5.Volume overload:better  6 resp failure    Recs:   no acute need for HD  Daily labs  Renal dose all meds  Avoid nephrotoxins  Prognosis  guarded  D/W Dr Avery

## 2018-06-26 NOTE — PROGRESS NOTES
Pt Cortrak came out  Pt needing pain med called hospitalist and received order for x 1 dose of morphine 4 mg IV and ativan 0.5 to give before placing Cortrak.

## 2018-06-26 NOTE — DISCHARGE PLANNING
Call from Andrés @ [facility] with concerns over discharge planning/placement. With request for OT/PT and speech notes and I will fax to [phone]. AYO left for Marissa @ [phone] in regards to Jovon ROLLINS and tramaine Bowen's phone # of [phone].

## 2018-06-26 NOTE — THERAPY
"Occupational Therapy Treatment completed with focus on ADLs, ADL transfers and patient education.  Functional Status:  Pt seen for OT tx. SBA supine > sit, SBA sit > stand, pt c/o feeling dizzy during session. Pt able to demonstrate ability to complete functional mobility and light ADLs w/ SBA but limited by dizziness and nausea. Pt also lethargic during session d/t RN just giving nausea medications. Pt motivated to regain strength, endurance and independence in ADLs and ADL transfers but continues to be limited by dizziness and nausea limiting OOB activity tolerance.   Plan of Care: Will benefit from Occupational Therapy 3 times per week  Discharge Recommendations:  Equipment Will Continue to Assess for Equipment Needs.     See \"Rehab Therapy-Acute\" Patient Summary Report for complete documentation.   "

## 2018-06-26 NOTE — DISCHARGE PLANNING
Follow up for rehab case discussed with KOLBY, Sheri, and Dr. Alcala. [facility] is not seeking out of network authorization for IRF level of care. Please refer to an in network program. SW notified [id number].

## 2018-06-26 NOTE — PROGRESS NOTES
Cortrak Placement    Tube Team verified patient name and medical record number prior to tube placement.  Cortrak tube (43 inches, 12 Citizen of [place]) placed at 70 cm in right nare.  Per Cortrak picture, tube appears to be in the stomach.  Nursing Instructions: Awaiting KUB to confirm placement before use for medications or feeding. Once placement confirmed, flush tube with 30 ml of water, and then remove and save stylet, in patient medication drawer.

## 2018-06-26 NOTE — CARE PLAN
Problem: Nutritional:  Goal: Nutrition support tolerated and meeting greater than 85% of estimated needs  Outcome: NOT MET  Pt running Diabetisource @35 ml/hr per request.   See full note for new formula/goal.

## 2018-06-27 ENCOUNTER — APPOINTMENT (OUTPATIENT)
Dept: RADIOLOGY | Facility: MEDICAL CENTER | Age: 53
DRG: 064 | End: 2018-06-27
Attending: HOSPITALIST
Payer: COMMERCIAL

## 2018-06-27 LAB
ALBUMIN SERPL BCP-MCNC: 3.1 G/DL (ref 3.2–4.9)
ALBUMIN/GLOB SERPL: 0.8 G/DL
ALP SERPL-CCNC: 86 U/L (ref 30–99)
ALT SERPL-CCNC: <5 U/L (ref 2–50)
ANION GAP SERPL CALC-SCNC: 10 MMOL/L (ref 0–11.9)
AST SERPL-CCNC: 12 U/L (ref 12–45)
BASOPHILS # BLD AUTO: 1.2 % (ref 0–1.8)
BASOPHILS # BLD: 0.08 K/UL (ref 0–0.12)
BILIRUB SERPL-MCNC: 0.3 MG/DL (ref 0.1–1.5)
BUN SERPL-MCNC: 68 MG/DL (ref 8–22)
CALCIUM SERPL-MCNC: 8.6 MG/DL (ref 8.5–10.5)
CHLORIDE SERPL-SCNC: 101 MMOL/L (ref 96–112)
CO2 SERPL-SCNC: 26 MMOL/L (ref 20–33)
CREAT SERPL-MCNC: 2.63 MG/DL (ref 0.5–1.4)
EOSINOPHIL # BLD AUTO: 0.23 K/UL (ref 0–0.51)
EOSINOPHIL NFR BLD: 3.4 % (ref 0–6.9)
ERYTHROCYTE [DISTWIDTH] IN BLOOD BY AUTOMATED COUNT: 49.2 FL (ref 35.9–50)
GLOBULIN SER CALC-MCNC: 3.9 G/DL (ref 1.9–3.5)
GLUCOSE BLD-MCNC: 152 MG/DL (ref 65–99)
GLUCOSE BLD-MCNC: 153 MG/DL (ref 65–99)
GLUCOSE BLD-MCNC: 175 MG/DL (ref 65–99)
GLUCOSE BLD-MCNC: 185 MG/DL (ref 65–99)
GLUCOSE SERPL-MCNC: 164 MG/DL (ref 65–99)
HCT VFR BLD AUTO: 44.8 % (ref 37–47)
HGB BLD-MCNC: 14.2 G/DL (ref 12–16)
IMM GRANULOCYTES # BLD AUTO: 0.04 K/UL (ref 0–0.11)
IMM GRANULOCYTES NFR BLD AUTO: 0.6 % (ref 0–0.9)
LYMPHOCYTES # BLD AUTO: 1.71 K/UL (ref 1–4.8)
LYMPHOCYTES NFR BLD: 24.9 % (ref 22–41)
MAGNESIUM SERPL-MCNC: 2.2 MG/DL (ref 1.5–2.5)
MCH RBC QN AUTO: 30 PG (ref 27–33)
MCHC RBC AUTO-ENTMCNC: 31.7 G/DL (ref 33.6–35)
MCV RBC AUTO: 94.5 FL (ref 81.4–97.8)
MONOCYTES # BLD AUTO: 0.77 K/UL (ref 0–0.85)
MONOCYTES NFR BLD AUTO: 11.2 % (ref 0–13.4)
NEUTROPHILS # BLD AUTO: 4.03 K/UL (ref 2–7.15)
NEUTROPHILS NFR BLD: 58.7 % (ref 44–72)
NRBC # BLD AUTO: 0 K/UL
NRBC BLD-RTO: 0 /100 WBC
PLATELET # BLD AUTO: 311 K/UL (ref 164–446)
PMV BLD AUTO: 10.8 FL (ref 9–12.9)
POTASSIUM SERPL-SCNC: 4.2 MMOL/L (ref 3.6–5.5)
PROT SERPL-MCNC: 7 G/DL (ref 6–8.2)
RBC # BLD AUTO: 4.74 M/UL (ref 4.2–5.4)
SODIUM SERPL-SCNC: 137 MMOL/L (ref 135–145)
WBC # BLD AUTO: 6.9 K/UL (ref 4.8–10.8)

## 2018-06-27 PROCEDURE — 700111 HCHG RX REV CODE 636 W/ 250 OVERRIDE (IP): Performed by: FAMILY MEDICINE

## 2018-06-27 PROCEDURE — 700111 HCHG RX REV CODE 636 W/ 250 OVERRIDE (IP): Performed by: HOSPITALIST

## 2018-06-27 PROCEDURE — 31720 CLEARANCE OF AIRWAYS: CPT

## 2018-06-27 PROCEDURE — 94669 MECHANICAL CHEST WALL OSCILL: CPT

## 2018-06-27 PROCEDURE — 94667 MNPJ CHEST WALL 1ST: CPT

## 2018-06-27 PROCEDURE — 82962 GLUCOSE BLOOD TEST: CPT | Mod: 91

## 2018-06-27 PROCEDURE — 80053 COMPREHEN METABOLIC PANEL: CPT

## 2018-06-27 PROCEDURE — 85025 COMPLETE CBC W/AUTO DIFF WBC: CPT

## 2018-06-27 PROCEDURE — 94668 MNPJ CHEST WALL SBSQ: CPT

## 2018-06-27 PROCEDURE — 36415 COLL VENOUS BLD VENIPUNCTURE: CPT

## 2018-06-27 PROCEDURE — 700102 HCHG RX REV CODE 250 W/ 637 OVERRIDE(OP): Performed by: HOSPITALIST

## 2018-06-27 PROCEDURE — 83735 ASSAY OF MAGNESIUM: CPT

## 2018-06-27 PROCEDURE — 71045 X-RAY EXAM CHEST 1 VIEW: CPT

## 2018-06-27 PROCEDURE — A9270 NON-COVERED ITEM OR SERVICE: HCPCS | Performed by: HOSPITALIST

## 2018-06-27 PROCEDURE — 99232 SBSQ HOSP IP/OBS MODERATE 35: CPT | Performed by: HOSPITALIST

## 2018-06-27 PROCEDURE — 770020 HCHG ROOM/CARE - TELE (206)

## 2018-06-27 RX ADMIN — INSULIN HUMAN 1 UNITS: 100 INJECTION, SOLUTION PARENTERAL at 20:42

## 2018-06-27 RX ADMIN — MECLIZINE HYDROCHLORIDE 25 MG: 25 TABLET ORAL at 05:19

## 2018-06-27 RX ADMIN — METOCLOPRAMIDE 10 MG: 5 INJECTION, SOLUTION INTRAMUSCULAR; INTRAVENOUS at 16:54

## 2018-06-27 RX ADMIN — METOCLOPRAMIDE 10 MG: 5 INJECTION, SOLUTION INTRAMUSCULAR; INTRAVENOUS at 12:00

## 2018-06-27 RX ADMIN — HEPARIN SODIUM 5000 UNITS: 5000 INJECTION, SOLUTION INTRAVENOUS; SUBCUTANEOUS at 20:40

## 2018-06-27 RX ADMIN — OXYCODONE HYDROCHLORIDE AND ACETAMINOPHEN 1 TABLET: 10; 325 TABLET ORAL at 16:52

## 2018-06-27 RX ADMIN — FAMOTIDINE 20 MG: 20 TABLET ORAL at 09:12

## 2018-06-27 RX ADMIN — MECLIZINE HYDROCHLORIDE 25 MG: 25 TABLET ORAL at 20:40

## 2018-06-27 RX ADMIN — OXYCODONE HYDROCHLORIDE AND ACETAMINOPHEN 1 TABLET: 10; 325 TABLET ORAL at 12:58

## 2018-06-27 RX ADMIN — METOCLOPRAMIDE 10 MG: 5 INJECTION, SOLUTION INTRAMUSCULAR; INTRAVENOUS at 06:00

## 2018-06-27 RX ADMIN — ONDANSETRON 4 MG: 2 INJECTION INTRAMUSCULAR; INTRAVENOUS at 01:27

## 2018-06-27 RX ADMIN — SENNOSIDES AND DOCUSATE SODIUM 2 TABLET: 8.6; 5 TABLET ORAL at 20:40

## 2018-06-27 RX ADMIN — ASPIRIN 325 MG: 325 TABLET ORAL at 09:12

## 2018-06-27 RX ADMIN — INSULIN HUMAN 1 UNITS: 100 INJECTION, SOLUTION PARENTERAL at 06:19

## 2018-06-27 RX ADMIN — LEVOTHYROXINE SODIUM 112 MCG: 112 TABLET ORAL at 09:11

## 2018-06-27 RX ADMIN — MECLIZINE HYDROCHLORIDE 25 MG: 25 TABLET ORAL at 11:34

## 2018-06-27 RX ADMIN — TEMAZEPAM 15 MG: 15 CAPSULE ORAL at 20:41

## 2018-06-27 RX ADMIN — HEPARIN SODIUM 5000 UNITS: 5000 INJECTION, SOLUTION INTRAVENOUS; SUBCUTANEOUS at 11:49

## 2018-06-27 RX ADMIN — PROCHLORPERAZINE EDISYLATE 10 MG: 5 INJECTION INTRAMUSCULAR; INTRAVENOUS at 14:50

## 2018-06-27 RX ADMIN — METOCLOPRAMIDE 10 MG: 5 INJECTION, SOLUTION INTRAMUSCULAR; INTRAVENOUS at 00:03

## 2018-06-27 RX ADMIN — TEMAZEPAM 15 MG: 15 CAPSULE ORAL at 00:12

## 2018-06-27 RX ADMIN — METOPROLOL TARTRATE 12.5 MG: 25 TABLET, FILM COATED ORAL at 20:40

## 2018-06-27 RX ADMIN — GUAIFENESIN 200 MG: 100 SOLUTION ORAL at 09:12

## 2018-06-27 RX ADMIN — GUAIFENESIN 200 MG: 100 SOLUTION ORAL at 00:03

## 2018-06-27 RX ADMIN — OXYCODONE HYDROCHLORIDE AND ACETAMINOPHEN 1 TABLET: 10; 325 TABLET ORAL at 21:36

## 2018-06-27 RX ADMIN — ATORVASTATIN CALCIUM 80 MG: 80 TABLET, FILM COATED ORAL at 20:40

## 2018-06-27 RX ADMIN — HEPARIN SODIUM 5000 UNITS: 5000 INJECTION, SOLUTION INTRAVENOUS; SUBCUTANEOUS at 06:00

## 2018-06-27 RX ADMIN — OXYCODONE HYDROCHLORIDE AND ACETAMINOPHEN 1 TABLET: 10; 325 TABLET ORAL at 05:27

## 2018-06-27 RX ADMIN — GUAIFENESIN 200 MG: 100 SOLUTION ORAL at 05:21

## 2018-06-27 RX ADMIN — OXYCODONE HYDROCHLORIDE AND ACETAMINOPHEN 1 TABLET: 10; 325 TABLET ORAL at 09:14

## 2018-06-27 RX ADMIN — METOCLOPRAMIDE 10 MG: 5 INJECTION, SOLUTION INTRAMUSCULAR; INTRAVENOUS at 23:33

## 2018-06-27 RX ADMIN — GUAIFENESIN 200 MG: 100 SOLUTION ORAL at 20:39

## 2018-06-27 RX ADMIN — OXYCODONE HYDROCHLORIDE AND ACETAMINOPHEN 1 TABLET: 10; 325 TABLET ORAL at 01:27

## 2018-06-27 RX ADMIN — GUAIFENESIN 200 MG: 100 SOLUTION ORAL at 12:58

## 2018-06-27 ASSESSMENT — ENCOUNTER SYMPTOMS
ORTHOPNEA: 0
MYALGIAS: 0
STRIDOR: 0
FLANK PAIN: 0
NECK PAIN: 0
PHOTOPHOBIA: 0
TREMORS: 0
FOCAL WEAKNESS: 0
BACK PAIN: 0
SENSORY CHANGE: 0
SORE THROAT: 0
HEADACHES: 0
LOSS OF CONSCIOUSNESS: 0
DIZZINESS: 0
PALPITATIONS: 0
CHILLS: 0
DIZZINESS: 1
VOMITING: 0
WEIGHT LOSS: 0
PND: 0
SHORTNESS OF BREATH: 0
ABDOMINAL PAIN: 0
DEPRESSION: 0
BLOOD IN STOOL: 0
BLURRED VISION: 1
NERVOUS/ANXIOUS: 0
TINGLING: 0
WEAKNESS: 1
EYE PAIN: 0
SPUTUM PRODUCTION: 0
SPEECH CHANGE: 0
CONSTIPATION: 0
MEMORY LOSS: 0
COUGH: 1
DOUBLE VISION: 0
HEMOPTYSIS: 0
NAUSEA: 1
HEARTBURN: 0
WHEEZING: 0
COUGH: 0
CLAUDICATION: 0
BRUISES/BLEEDS EASILY: 0
FEVER: 0
SEIZURES: 0
NAUSEA: 0

## 2018-06-27 ASSESSMENT — PAIN SCALES - GENERAL
PAINLEVEL_OUTOF10: 7
PAINLEVEL_OUTOF10: 3
PAINLEVEL_OUTOF10: 9
PAINLEVEL_OUTOF10: 6

## 2018-06-27 NOTE — PROGRESS NOTES
Patient alert and oriented X 4, strong cough with thick secretions noted.  MD called @0730, order for guaifenesin requested and received, administered twice this shift with + effect.  Patient calls appropriately and asks for PRN medications appropriately.  She did complain of dizziness and vomiting, which was managed with PRN meds.  She is resting in bed at the time of this note with call light in reach.

## 2018-06-27 NOTE — CARE PLAN
Problem: Safety  Goal: Will remain free from injury  Outcome: PROGRESSING AS EXPECTED  Side rails up x 2 call light within reach  Bed alarm on  hourly rounding in place.    Problem: Pain Management  Goal: Pain level will decrease to patient's comfort goal  Outcome: PROGRESSING SLOWER THAN EXPECTED

## 2018-06-27 NOTE — PROGRESS NOTES
Nephrology Progress Note, Adult, Complex               Author: Fadi Najjar Date & Time created: 6/27/2018  1:55 PM     Interval History:  53 y/o female with CAMPOS/CKD IV, DMN, HTN, admitted with left cerebellar stroke  Renal US (+) for PCKD -d/w Pt -admitted diagnosed with PCKD several years ago  Still with vertigo, N/V this morning  SOB improved  Pt overall better  Review of Systems:  Review of Systems   Constitutional: Negative for chills and fever.   Respiratory: Positive for cough. Negative for shortness of breath.    Cardiovascular: Negative for chest pain, palpitations, orthopnea and leg swelling.   Gastrointestinal: Positive for nausea. Negative for abdominal pain and vomiting.   Genitourinary: Negative for dysuria, flank pain, frequency, hematuria and urgency.   Musculoskeletal: Negative for back pain, joint pain and myalgias.   Neurological: Positive for dizziness. Negative for headaches.        Vertigo        Physical Exam:  Physical Exam   Constitutional: She is oriented to person, place, and time. She appears well-developed and well-nourished. No distress.   HENT:   Nose: Nose normal.   NGT   Eyes: Conjunctivae are normal. No scleral icterus.   Neck: No thyromegaly present.   Cardiovascular: Normal rate and regular rhythm.  Exam reveals no gallop and no friction rub.    Pulmonary/Chest: Effort normal. No respiratory distress. She has no wheezes. She has rhonchi. She has no rales.   Abdominal: Soft. Bowel sounds are normal. She exhibits no distension. There is no tenderness.   Musculoskeletal: She exhibits edema.   Lymphadenopathy:     She has no cervical adenopathy.   Neurological: She is alert and oriented to person, place, and time.   Skin: Skin is warm. She is not diaphoretic. No erythema.   Nursing note and vitals reviewed.      Labs:        Invalid input(s): [id number]  Recent Labs      06/25/18   0452   BNPBTYPENAT  64     Recent Labs      06/25/18   0331  06/26/18   0515  06/27/18   0530    SODIUM  136  137  137   POTASSIUM  4.5  4.3  4.2   CHLORIDE  102  103  101   CO2  27  25  26   BUN  57*  66*  68*   CREATININE  3.23*  3.16*  2.63*   MAGNESIUM   --    --   2.2   CALCIUM  8.6  8.9  8.6     Recent Labs      18   0331  18   0515  18   0530   ALTSGPT   --    --   <5   ASTSGOT   --    --   12   ALKPHOSPHAT   --    --   86   TBILIRUBIN   --    --   0.3   PREALBUMIN  8.0*   --    --    GLUCOSE  179*  157*  164*     Recent Labs      18   0530   RBC  4.74   HEMOGLOBIN  14.2   HEMATOCRIT  44.8   PLATELETCT  311     Recent Labs      18   0530   WBC  6.9   NEUTSPOLYS  58.70   LYMPHOCYTES  24.90   MONOCYTES  11.20   EOSINOPHILS  3.40   BASOPHILS  1.20   ASTSGOT  12   ALTSGPT  <5   ALKPHOSPHAT  86   TBILIRUBIN  0.3           Hemodynamics:  Temp (24hrs), Av.9 °C (98.5 °F), Min:36.4 °C (97.6 °F), Max:37.6 °C (99.6 °F)  Temperature: 36.6 °C (97.8 °F)  Pulse  Av.5  Min: 56  Max: 86   Blood Pressure: 104/62     Respiratory:    Respiration: 18, Pulse Oximetry: 97 %     Work Of Breathing / Effort: Mild  RUL Breath Sounds: Rhonchi, RML Breath Sounds: Diminished, RLL Breath Sounds: Rhonchi, LLL Breath Sounds: Diminished  Fluids:    Intake/Output Summary (Last 24 hours) at 18 1355  Last data filed at 18 0127   Gross per 24 hour   Intake               30 ml   Output              400 ml   Net             -370 ml        GI/Nutrition:  Orders Placed This Encounter   Procedures   • Diet NPO     Standing Status:   Standing     Number of Occurrences:   1     Order Specific Question:   Restrict to:     Answer:   Strict [1]     Medical Decision Making, by Problem:  Active Hospital Problems    Diagnosis   • *Stroke (HCC) [I63.9]   • CKD stage 4 due to type 2 diabetes mellitus [E11.22, N18.4]   • Dysphagia [R13.10]   • Diabetic neuropathy (HCC) [E11.40]   • Type II diabetes mellitus (HCC) [E11.9]   • History of DVT (deep vein thrombosis) [Z86.718]   • History of GI bleed [Z87.19]    • Essential hypertension [I10]   • Hyperlipemia [E78.5]   • Hypothyroid [E03.9]       Quality-Core Measures   Reviewed items::  Labs reviewed    Assessment and plan:    1.CAMPOS/CKD IV /PCKD:cr is better today  2.HTN: BP is high  3.Electrolytes:OK  4.Anemia: Hb WNL  5.Volume overload:better  6 resp failure    Recs:   no acute need for HD  Daily labs  Renal dose all meds  Avoid nephrotoxins  Prognosis guarded  D/W Dr Avery

## 2018-06-27 NOTE — PROGRESS NOTES
[facility] Hospitalist Progress Note    Date of Service: 6/27/2018    Chief Complaint  52 y.o. female admitted 6/19/2018 with acute to subacute left medial cerebellar CVA. Patient was initially seen at an outside facility for nausea, vomiting, vision changes and vertigo. 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 Prior hyper-coaguable state workup negative.    Interval Problem Update  Cerebellar infarct - Stable. Neuro signed off.   Dysphagia - continues to struggle w secretions - emesis. Abd Xray 6/24 NGT ok. Added NGT suction of upper airway  IDDM - SSI  CKD-IV - Neph consulted. Creatinine waxing/waning - Follow BP/glucose closely.  PCKD - neph following  Hx GIB - no active signs of bleeding - restarted ASA  HTN - slightly worse. Add clonidine. CCB/BB. ACEI when cleared w Neph   Lipids - Max statin  Pain - on oral pain meds    6/26  No acute issues, patient comfortable, mild nausea but overall doing well.   Continue NGT with suction  Creatinine improved today, but still volume overloaded.   Nephrology following, CTM.  Continue RT protocol, duo nebs, Pep therapy if warranted, and incentive spirometry.       6/27  Improving clinically, still requiring NG tube  Renal functions improving creatinine 2.63 from 3.16  Pending placement options.      Consultants/Specialty  Neurology  Nephrology    Disposition  TBD      Review of Systems   Constitutional: Positive for malaise/fatigue. Negative for chills, fever and weight loss.   HENT: Negative for congestion, hearing loss, sore throat and tinnitus.         Positive for dysphonia   Eyes: Positive for blurred vision. Negative for double vision, photophobia and pain.   Respiratory: Negative for cough, hemoptysis, sputum production, shortness of breath, wheezing and stridor.    Cardiovascular: Negative for chest pain, palpitations,  orthopnea, claudication and PND.   Gastrointestinal: Negative for abdominal pain, blood in stool, constipation, heartburn, melena, nausea and vomiting.   Genitourinary: Negative for dysuria, frequency and urgency.   Musculoskeletal: Negative for back pain, myalgias and neck pain.   Neurological: Positive for weakness. Negative for dizziness, tingling, tremors, sensory change, speech change, focal weakness, seizures, loss of consciousness and headaches.   Endo/Heme/Allergies: Does not bruise/bleed easily.   Psychiatric/Behavioral: Negative for depression, memory loss and suicidal ideas. The patient is not nervous/anxious.       Physical Exam  Laboratory/Imaging   Hemodynamics  Temp (24hrs), Av °C (98.6 °F), Min:36.4 °C (97.6 °F), Max:37.6 °C (99.6 °F)   Temperature: 37.5 °C (99.5 °F)  Pulse  Av.6  Min: 56  Max: 86    Blood Pressure: 149/73      Respiratory      Respiration: 16, Pulse Oximetry: 94 %     Work Of Breathing / Effort: Mild  RUL Breath Sounds: Rhonchi, RML Breath Sounds: Diminished, RLL Breath Sounds: Rhonchi, LLL Breath Sounds: Diminished    Fluids    Intake/Output Summary (Last 24 hours) at 18 0812  Last data filed at 18 0127   Gross per 24 hour   Intake               30 ml   Output              400 ml   Net             -370 ml       Nutrition  Orders Placed This Encounter   Procedures   • Diet NPO     Standing Status:   Standing     Number of Occurrences:   1     Order Specific Question:   Restrict to:     Answer:   Strict [1]     Physical Exam   Constitutional: She is oriented to person, place, and time. She appears well-developed and well-nourished. No distress.   HENT:   Head: Normocephalic and atraumatic.   Mouth/Throat: No oropharyngeal exudate.   Eyes: Conjunctivae and EOM are normal. Pupils are equal, round, and reactive to light. Right eye exhibits no discharge. No scleral icterus.   Neck: Neck supple. No JVD present. No thyromegaly present.   Cardiovascular: Normal rate,  regular rhythm, normal heart sounds and intact distal pulses.    No murmur heard.  Pulmonary/Chest: Effort normal. No stridor. No respiratory distress. She has decreased breath sounds in the right lower field and the left lower field. She has no wheezes. She has no rhonchi. She has no rales.   Abdominal: Soft. Bowel sounds are normal. She exhibits no distension. There is no tenderness. There is no rebound.   Musculoskeletal: Normal range of motion. She exhibits no edema.   Neurological: She is alert and oriented to person, place, and time. No cranial nerve deficit.   Skin: Skin is warm and dry. She is not diaphoretic. No erythema.   Psychiatric: She has a normal mood and affect. Her speech is normal and behavior is normal. Thought content normal. Cognition and memory are normal.       Recent Labs      06/27/18   0530   WBC  6.9   RBC  4.74   HEMOGLOBIN  14.2   HEMATOCRIT  44.8   MCV  94.5   MCH  30.0   MCHC  31.7*   RDW  49.2   PLATELETCT  311   MPV  10.8     Recent Labs      06/25/18   0331  06/26/18   0515  06/27/18   0530   SODIUM  136  137  137   POTASSIUM  4.5  4.3  4.2   CHLORIDE  102  103  101   CO2  27  25  26   GLUCOSE  179*  157*  164*   BUN  57*  66*  68*   CREATININE  3.23*  3.16*  2.63*   CALCIUM  8.6  8.9  8.6         Recent Labs      06/25/18   0452   BNPBTYPENAT  64              Assessment/Plan     * Stroke (HCC)- (present on admission)   Assessment & Plan    MRI of the brain outlying facility., eft medial cerebellar acute or subacute infarction with no hemorrhage.   Carotid Duplex: Antegrade left vertebral flow with high resistant may indicate distal occlusion;   Continue ASA/Statin.  Echocardiogram: preserved LVEF.  Neurology signed off.          CKD stage 4 due to type 2 diabetes mellitus- (present on admission)   Assessment & Plan    Acute on chronic renal failure  Hx of PCKD  Nephrology following.  Cre 2.63<3.16  Avoid nephrotoxins. Renal dose medications           Non-intractable vomiting with  nausea   Assessment & Plan    Resolved.  IV anti emetics prn      Dysphagia   Assessment & Plan    SLP following, failed swallow, now using Cor track.  Dietary following.          History of GI bleed   Assessment & Plan    Pepcid GI ppx        History of DVT (deep vein thrombosis)   Assessment & Plan    Hx of DVT?PE , s/p IVC filter placement.  Hx of GI bleeding in past, Anticoagulation contraindicated.  DVT ppx dose ,   monitor for bleeding.          Type II diabetes mellitus (HCC)   Assessment & Plan    A1c 8.3   Controlled.  Continue Insulin-sliding scale, accu-checks and hypoglycemia protocol.        Essential hypertension- (present on admission)   Assessment & Plan    Controlled  Continue with Metoprolol and Norvasc.        Polycystic kidney disease- (present on admission)   Assessment & Plan    As per above,   Nephrology following.        Hyperlipemia- (present on admission)   Assessment & Plan    Cotninue lipitor.      Hypothyroid- (present on admission)   Assessment & Plan    TSH WNL.  Continue with home dose of synthroid.      Diabetic neuropathy (HCC)- (present on admission)   Assessment & Plan    Stable, will consider gabapentin when stable.          Quality-Core Measures   Reviewed items::  EKG reviewed, Labs reviewed, Medications reviewed and Radiology images reviewed  Wallace catheter::  No Wallace  DVT prophylaxis pharmacological::  Heparin  DVT prophylaxis - mechanical:  SCDs  Ulcer Prophylaxis::  Yes  Assessed for rehabilitation services:  Patient was assess for and/or received rehabilitation services during this hospitalization      Patient plan of care discussed at multidisplinary team rounds and with patient and R.N at beside.

## 2018-06-28 LAB
ANION GAP SERPL CALC-SCNC: 10 MMOL/L (ref 0–11.9)
BASOPHILS # BLD AUTO: 0.8 % (ref 0–1.8)
BASOPHILS # BLD: 0.07 K/UL (ref 0–0.12)
BUN SERPL-MCNC: 78 MG/DL (ref 8–22)
CALCIUM SERPL-MCNC: 8.8 MG/DL (ref 8.5–10.5)
CHLORIDE SERPL-SCNC: 103 MMOL/L (ref 96–112)
CO2 SERPL-SCNC: 23 MMOL/L (ref 20–33)
CREAT SERPL-MCNC: 2.78 MG/DL (ref 0.5–1.4)
EOSINOPHIL # BLD AUTO: 0.18 K/UL (ref 0–0.51)
EOSINOPHIL NFR BLD: 2.2 % (ref 0–6.9)
ERYTHROCYTE [DISTWIDTH] IN BLOOD BY AUTOMATED COUNT: 46.8 FL (ref 35.9–50)
GLUCOSE BLD-MCNC: 186 MG/DL (ref 65–99)
GLUCOSE BLD-MCNC: 192 MG/DL (ref 65–99)
GLUCOSE BLD-MCNC: 192 MG/DL (ref 65–99)
GLUCOSE BLD-MCNC: 223 MG/DL (ref 65–99)
GLUCOSE SERPL-MCNC: 239 MG/DL (ref 65–99)
HCT VFR BLD AUTO: 41.1 % (ref 37–47)
HGB BLD-MCNC: 13.2 G/DL (ref 12–16)
IMM GRANULOCYTES # BLD AUTO: 0.05 K/UL (ref 0–0.11)
IMM GRANULOCYTES NFR BLD AUTO: 0.6 % (ref 0–0.9)
LYMPHOCYTES # BLD AUTO: 1.3 K/UL (ref 1–4.8)
LYMPHOCYTES NFR BLD: 15.7 % (ref 22–41)
MAGNESIUM SERPL-MCNC: 2.2 MG/DL (ref 1.5–2.5)
MCH RBC QN AUTO: 30 PG (ref 27–33)
MCHC RBC AUTO-ENTMCNC: 32.1 G/DL (ref 33.6–35)
MCV RBC AUTO: 93.4 FL (ref 81.4–97.8)
MONOCYTES # BLD AUTO: 0.68 K/UL (ref 0–0.85)
MONOCYTES NFR BLD AUTO: 8.2 % (ref 0–13.4)
NEUTROPHILS # BLD AUTO: 6 K/UL (ref 2–7.15)
NEUTROPHILS NFR BLD: 72.5 % (ref 44–72)
NRBC # BLD AUTO: 0 K/UL
NRBC BLD-RTO: 0 /100 WBC
PLATELET # BLD AUTO: ABNORMAL K/UL (ref 164–446)
POTASSIUM SERPL-SCNC: 5.1 MMOL/L (ref 3.6–5.5)
RBC # BLD AUTO: 4.4 M/UL (ref 4.2–5.4)
SODIUM SERPL-SCNC: 136 MMOL/L (ref 135–145)
WBC # BLD AUTO: 8.3 K/UL (ref 4.8–10.8)

## 2018-06-28 PROCEDURE — 700102 HCHG RX REV CODE 250 W/ 637 OVERRIDE(OP): Performed by: HOSPITALIST

## 2018-06-28 PROCEDURE — A9270 NON-COVERED ITEM OR SERVICE: HCPCS | Performed by: HOSPITALIST

## 2018-06-28 PROCEDURE — 99232 SBSQ HOSP IP/OBS MODERATE 35: CPT | Performed by: HOSPITALIST

## 2018-06-28 PROCEDURE — 94668 MNPJ CHEST WALL SBSQ: CPT

## 2018-06-28 PROCEDURE — 31720 CLEARANCE OF AIRWAYS: CPT

## 2018-06-28 PROCEDURE — 92526 ORAL FUNCTION THERAPY: CPT

## 2018-06-28 PROCEDURE — 36415 COLL VENOUS BLD VENIPUNCTURE: CPT

## 2018-06-28 PROCEDURE — 700111 HCHG RX REV CODE 636 W/ 250 OVERRIDE (IP): Performed by: FAMILY MEDICINE

## 2018-06-28 PROCEDURE — 770020 HCHG ROOM/CARE - TELE (206)

## 2018-06-28 PROCEDURE — 700111 HCHG RX REV CODE 636 W/ 250 OVERRIDE (IP): Performed by: HOSPITALIST

## 2018-06-28 PROCEDURE — 85025 COMPLETE CBC W/AUTO DIFF WBC: CPT

## 2018-06-28 PROCEDURE — 82962 GLUCOSE BLOOD TEST: CPT

## 2018-06-28 PROCEDURE — 83735 ASSAY OF MAGNESIUM: CPT

## 2018-06-28 PROCEDURE — 80048 BASIC METABOLIC PNL TOTAL CA: CPT

## 2018-06-28 PROCEDURE — 99232 SBSQ HOSP IP/OBS MODERATE 35: CPT | Performed by: INTERNAL MEDICINE

## 2018-06-28 PROCEDURE — 94669 MECHANICAL CHEST WALL OSCILL: CPT

## 2018-06-28 RX ADMIN — INSULIN HUMAN 2 UNITS: 100 INJECTION, SOLUTION PARENTERAL at 05:43

## 2018-06-28 RX ADMIN — LEVOTHYROXINE SODIUM 112 MCG: 112 TABLET ORAL at 07:56

## 2018-06-28 RX ADMIN — OXYCODONE HYDROCHLORIDE AND ACETAMINOPHEN 1 TABLET: 10; 325 TABLET ORAL at 07:56

## 2018-06-28 RX ADMIN — METOCLOPRAMIDE 10 MG: 5 INJECTION, SOLUTION INTRAMUSCULAR; INTRAVENOUS at 05:50

## 2018-06-28 RX ADMIN — HEPARIN SODIUM 5000 UNITS: 5000 INJECTION, SOLUTION INTRAVENOUS; SUBCUTANEOUS at 05:50

## 2018-06-28 RX ADMIN — GUAIFENESIN 200 MG: 100 SOLUTION ORAL at 13:36

## 2018-06-28 RX ADMIN — MECLIZINE HYDROCHLORIDE 25 MG: 25 TABLET ORAL at 20:31

## 2018-06-28 RX ADMIN — INSULIN HUMAN 1 UNITS: 100 INJECTION, SOLUTION PARENTERAL at 17:22

## 2018-06-28 RX ADMIN — METOPROLOL TARTRATE 12.5 MG: 25 TABLET, FILM COATED ORAL at 20:31

## 2018-06-28 RX ADMIN — ASPIRIN 325 MG: 325 TABLET ORAL at 07:57

## 2018-06-28 RX ADMIN — METOPROLOL TARTRATE 12.5 MG: 25 TABLET, FILM COATED ORAL at 07:57

## 2018-06-28 RX ADMIN — OXYCODONE HYDROCHLORIDE AND ACETAMINOPHEN 1 TABLET: 10; 325 TABLET ORAL at 22:14

## 2018-06-28 RX ADMIN — PROCHLORPERAZINE EDISYLATE 10 MG: 5 INJECTION INTRAMUSCULAR; INTRAVENOUS at 01:41

## 2018-06-28 RX ADMIN — ONDANSETRON 4 MG: 2 INJECTION INTRAMUSCULAR; INTRAVENOUS at 19:06

## 2018-06-28 RX ADMIN — SENNOSIDES AND DOCUSATE SODIUM 2 TABLET: 8.6; 5 TABLET ORAL at 07:56

## 2018-06-28 RX ADMIN — GUAIFENESIN 200 MG: 100 SOLUTION ORAL at 20:31

## 2018-06-28 RX ADMIN — METOCLOPRAMIDE 10 MG: 5 INJECTION, SOLUTION INTRAMUSCULAR; INTRAVENOUS at 23:36

## 2018-06-28 RX ADMIN — MAGNESIUM HYDROXIDE 30 ML: 400 SUSPENSION ORAL at 11:54

## 2018-06-28 RX ADMIN — FAMOTIDINE 20 MG: 20 TABLET ORAL at 07:56

## 2018-06-28 RX ADMIN — TEMAZEPAM 15 MG: 15 CAPSULE ORAL at 20:31

## 2018-06-28 RX ADMIN — OXYCODONE HYDROCHLORIDE AND ACETAMINOPHEN 1 TABLET: 10; 325 TABLET ORAL at 17:16

## 2018-06-28 RX ADMIN — GUAIFENESIN 200 MG: 100 SOLUTION ORAL at 05:50

## 2018-06-28 RX ADMIN — OXYCODONE HYDROCHLORIDE AND ACETAMINOPHEN 1 TABLET: 10; 325 TABLET ORAL at 02:53

## 2018-06-28 RX ADMIN — OXYCODONE HYDROCHLORIDE AND ACETAMINOPHEN 1 TABLET: 10; 325 TABLET ORAL at 11:54

## 2018-06-28 RX ADMIN — INSULIN HUMAN 1 UNITS: 100 INJECTION, SOLUTION PARENTERAL at 11:24

## 2018-06-28 RX ADMIN — METOCLOPRAMIDE 10 MG: 5 INJECTION, SOLUTION INTRAMUSCULAR; INTRAVENOUS at 17:16

## 2018-06-28 RX ADMIN — INSULIN HUMAN 1 UNITS: 100 INJECTION, SOLUTION PARENTERAL at 20:14

## 2018-06-28 RX ADMIN — ATORVASTATIN CALCIUM 80 MG: 80 TABLET, FILM COATED ORAL at 20:31

## 2018-06-28 RX ADMIN — MAGNESIUM HYDROXIDE 30 ML: 400 SUSPENSION ORAL at 20:31

## 2018-06-28 RX ADMIN — AMLODIPINE BESYLATE 10 MG: 5 TABLET ORAL at 07:57

## 2018-06-28 RX ADMIN — METOCLOPRAMIDE 10 MG: 5 INJECTION, SOLUTION INTRAMUSCULAR; INTRAVENOUS at 11:19

## 2018-06-28 RX ADMIN — HEPARIN SODIUM 5000 UNITS: 5000 INJECTION, SOLUTION INTRAVENOUS; SUBCUTANEOUS at 13:31

## 2018-06-28 RX ADMIN — PROCHLORPERAZINE EDISYLATE 10 MG: 5 INJECTION INTRAMUSCULAR; INTRAVENOUS at 13:32

## 2018-06-28 RX ADMIN — GUAIFENESIN 200 MG: 100 SOLUTION ORAL at 01:41

## 2018-06-28 RX ADMIN — HEPARIN SODIUM 5000 UNITS: 5000 INJECTION, SOLUTION INTRAVENOUS; SUBCUTANEOUS at 20:31

## 2018-06-28 ASSESSMENT — ENCOUNTER SYMPTOMS
BACK PAIN: 0
WHEEZING: 0
VOMITING: 1
BLURRED VISION: 1
ORTHOPNEA: 0
BRUISES/BLEEDS EASILY: 0
EYE DISCHARGE: 0
DEPRESSION: 0
BLOOD IN STOOL: 0
CONSTIPATION: 1
TREMORS: 0
FEVER: 0
WEAKNESS: 1
PALPITATIONS: 0
COUGH: 0
DIZZINESS: 1
EYE PAIN: 0
MYALGIAS: 0
CHILLS: 0
NECK PAIN: 0
FLANK PAIN: 0
SPUTUM PRODUCTION: 0
VOMITING: 0
STRIDOR: 0
DIZZINESS: 0
CONSTIPATION: 0
PND: 0
ABDOMINAL PAIN: 0
SHORTNESS OF BREATH: 0
NERVOUS/ANXIOUS: 0
DOUBLE VISION: 0
PHOTOPHOBIA: 0
SEIZURES: 0
HEADACHES: 0
WEIGHT LOSS: 0
CLAUDICATION: 0
TINGLING: 0
NAUSEA: 0
HEARTBURN: 0
MEMORY LOSS: 0
NAUSEA: 1
SENSORY CHANGE: 0
FOCAL WEAKNESS: 0
EYE REDNESS: 0
HEMOPTYSIS: 0
LOSS OF CONSCIOUSNESS: 0
COUGH: 1
SPEECH CHANGE: 0
SORE THROAT: 0

## 2018-06-28 ASSESSMENT — PAIN SCALES - GENERAL
PAINLEVEL_OUTOF10: 9
PAINLEVEL_OUTOF10: 7
PAINLEVEL_OUTOF10: 9
PAINLEVEL_OUTOF10: 6
PAINLEVEL_OUTOF10: 8
PAINLEVEL_OUTOF10: 9
PAINLEVEL_OUTOF10: 6
PAINLEVEL_OUTOF10: 7
PAINLEVEL_OUTOF10: 8

## 2018-06-28 NOTE — PROGRESS NOTES
"Pt a/o *4, ALFONSO, Gen Weakness. Refusing to get up to cammode, so using a bed pan. C/o PARKER Pain throughout shift, medicated per Mar + results noted. C/o N/V, medicated per Mar, + results noted. Pt suctioning continuously throughout shift and still coughing up Phlegm. No BM as of yet, gave Milk of mag and stool softeners, MD aware. Pt not tolerating tube feed at goal of 50ml/hr, Refusing that dose and adjusted to 30ml/hr. bsecause she said \"I can handle it at that rate\" MD aware. POC discussed w PT. Hourly rounds in place.   "

## 2018-06-28 NOTE — PROGRESS NOTES
RN called RT to help with Deep suctioning. Copious amounts of blood noted in sputum and left nostril. MD notified.

## 2018-06-28 NOTE — PROGRESS NOTES
Nephrology Progress Note, Adult, Complex               Author: Fadi Najjar Date & Time created: 6/28/2018  1:08 PM     Interval History:  53 y/o female with CAMPOS/CKD IV, DMN, HTN, admitted with left cerebellar stroke  Renal US (+) for PCKD -d/w Pt -admitted diagnosed with PCKD several years ago  Still with vertigo, N/V this morning  SOB improved  Unable to tolerate TF  Review of Systems:  Review of Systems   Constitutional: Negative for chills and fever.   Respiratory: Positive for cough. Negative for shortness of breath.    Cardiovascular: Negative for chest pain, palpitations, orthopnea and leg swelling.   Gastrointestinal: Positive for constipation, nausea and vomiting. Negative for abdominal pain.   Genitourinary: Negative for dysuria, flank pain, frequency, hematuria and urgency.   Musculoskeletal: Negative for back pain, joint pain and myalgias.   Neurological: Positive for dizziness. Negative for headaches.        Vertigo        Physical Exam:  Physical Exam   Constitutional: She is oriented to person, place, and time. She appears well-developed and well-nourished. No distress.   HENT:   Nose: Nose normal.   NGT   Eyes: Conjunctivae are normal. No scleral icterus.   Neck: No thyromegaly present.   Cardiovascular: Normal rate and regular rhythm.  Exam reveals no gallop and no friction rub.    Pulmonary/Chest: Effort normal. No respiratory distress. She has no wheezes. She has rhonchi. She has no rales.   Abdominal: Soft. Bowel sounds are normal. She exhibits no distension. There is no tenderness.   Musculoskeletal: She exhibits edema.   Lymphadenopathy:     She has no cervical adenopathy.   Neurological: She is alert and oriented to person, place, and time.   Skin: Skin is warm. She is not diaphoretic. No erythema.   Nursing note and vitals reviewed.      Labs:        Invalid input(s): [id number]      Recent Labs      06/26/18   0515  06/27/18   0530  06/28/18   0451   SODIUM  137  137  136   POTASSIUM  4.3   4.2  5.1   CHLORIDE  103  101  103   CO2  25  26  23   BUN  66*  68*  78*   CREATININE  3.16*  2.63*  2.78*   MAGNESIUM   --   2.2  2.2   CALCIUM  8.9  8.6  8.8     Recent Labs      18   0515  1830  18   0451   ALTSGPT   --   <5   --    ASTSGOT   --   12   --    ALKPHOSPHAT   --   86   --    TBILIRUBIN   --   0.3   --    GLUCOSE  157*  164*  239*     Recent Labs      18   0530  18   0451   RBC  4.74  4.40   HEMOGLOBIN  14.2  13.2   HEMATOCRIT  44.8  41.1   PLATELETCT  311  SEE COMMENT     Recent Labs      18   WBC  6.9  8.3   NEUTSPOLYS  58.70  72.50*   LYMPHOCYTES  24.90  15.70*   MONOCYTES  11.20  8.20   EOSINOPHILS  3.40  2.20   BASOPHILS  1.20  0.80   ASTSGOT  12   --    ALTSGPT  <5   --    ALKPHOSPHAT  86   --    TBILIRUBIN  0.3   --            Hemodynamics:  Temp (24hrs), Av.4 °C (97.6 °F), Min:36.2 °C (97.1 °F), Max:36.8 °C (98.3 °F)  Temperature: 36.4 °C (97.6 °F)  Pulse  Av.4  Min: 56  Max: 89   Blood Pressure: 144/62     Respiratory:    Respiration: 15, Pulse Oximetry: 95 %, O2 Daily Delivery Respiratory : Silicone Nasal Cannula     PEP/CPT Method: Flutter Valve, Work Of Breathing / Effort: Mild  RUL Breath Sounds: Rhonchi, RML Breath Sounds: Rhonchi, RLL Breath Sounds: Diminished, DIANNE Breath Sounds: Rhonchi, LLL Breath Sounds: Diminished  Fluids:    Intake/Output Summary (Last 24 hours) at 18 1308  Last data filed at 18 0806   Gross per 24 hour   Intake             2260 ml   Output              300 ml   Net             1960 ml        GI/Nutrition:  Orders Placed This Encounter   Procedures   • Diet NPO     Standing Status:   Standing     Number of Occurrences:   1     Order Specific Question:   Restrict to:     Answer:   Strict [1]     Medical Decision Making, by Problem:  Active Hospital Problems    Diagnosis   • *Stroke (HCC) [I63.9]   • CKD stage 4 due to type 2 diabetes mellitus [E11.22, N18.4]   • Dysphagia  [R13.10]   • Diabetic neuropathy (HCC) [E11.40]   • Type II diabetes mellitus (HCC) [E11.9]   • History of DVT (deep vein thrombosis) [Z86.718]   • History of GI bleed [Z87.19]   • Essential hypertension [I10]   • Hyperlipemia [E78.5]   • Hypothyroid [E03.9]       Quality-Core Measures   Reviewed items::  Labs reviewed    Assessment and plan:    1.CAMPOS/CKD IV /PCKD:cr is better today  2.HTN: BP is high  3.Electrolytes:OK  4.Anemia: Hb WNL  5.Volume overload:better  6 resp failure  7 constipation     Recs:   no acute need for HD  Daily labs  Renal dose all meds  Avoid nephrotoxins  Prognosis guarded  R/O ileus

## 2018-06-28 NOTE — DISCHARGE PLANNING
Anticipated Discharge Disposition: IRF    Action: LSW made tc to Andrés with [employer] ([phone]) this worker left a message requesting contracted Acute Rehab providers to send referrals to for pt    LSW received tc from Andrés with [employer]. Andrés stated that [facility]F maybe contracted with [employer] and to send the referral there. He requested updated PT/OT/SLP notes    CHOICE faxed to [facility]    LSW faxed updated PT/OT/SLP notes to Andrés at [phone]    Barriers to Discharge: IRF facilities    Plan: LSW to f/u with insurance for IRF facilities

## 2018-06-28 NOTE — PROGRESS NOTES
[facility] Hospitalist Progress Note    Date of Service: 6/28/2018    Chief Complaint  52 y.o. female admitted 6/19/2018 with acute to subacute left medial cerebellar CVA. Patient was initially seen at an outside facility for nausea, vomiting, vision changes and vertigo. 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 Prior hyper-coaguable state workup negative.    Interval Problem Update  Cerebellar infarct - Stable. Neuro signed off.   Dysphagia - continues to struggle w secretions - emesis. Abd Xray 6/24 NGT ok. Added NGT suction of upper airway  IDDM - SSI  CKD-IV - Neph consulted. Creatinine waxing/waning - Follow BP/glucose closely.  PCKD - neph following  Hx GIB - no active signs of bleeding - restarted ASA  HTN - slightly worse. Add clonidine. CCB/BB. ACEI when cleared w Neph   Lipids - Max statin  Pain - on oral pain meds    6/26  No acute issues, patient comfortable, mild nausea but overall doing well.   Continue NGT with suction  Creatinine improved today, but still volume overloaded.   Nephrology following, CTM.  Continue RT protocol, duo nebs, Pep therapy if warranted, and incentive spirometry.       6/27  Improving clinically, still requiring NG tube  Renal functions improving creatinine 2.63 from 3.16  Pending placement options.    6/28  Renal function stable although creatinine fluctuating daily  Nephrology following  Discharge planning per social work  Continue tube feeds.  SLP following.    Consultants/Specialty  Neurology  Nephrology    Disposition  TBD      Review of Systems   Constitutional: Positive for malaise/fatigue. Negative for chills, fever and weight loss.   HENT: Negative for congestion, ear discharge, ear pain, hearing loss, nosebleeds, sore throat and tinnitus.         Positive for dysphonia   Eyes: Positive for blurred vision. Negative for  double vision, photophobia, pain, discharge and redness.   Respiratory: Negative for cough, hemoptysis, sputum production, shortness of breath, wheezing and stridor.    Cardiovascular: Negative for chest pain, palpitations, orthopnea, claudication and PND.   Gastrointestinal: Negative for abdominal pain, blood in stool, constipation, heartburn, melena, nausea and vomiting.   Genitourinary: Negative for dysuria, frequency and urgency.   Musculoskeletal: Negative for back pain, myalgias and neck pain.   Neurological: Positive for weakness. Negative for dizziness, tingling, tremors, sensory change, speech change, focal weakness, seizures, loss of consciousness and headaches.   Endo/Heme/Allergies: Does not bruise/bleed easily.   Psychiatric/Behavioral: Negative for depression, memory loss and suicidal ideas. The patient is not nervous/anxious.       Physical Exam  Laboratory/Imaging   Hemodynamics  Temp (24hrs), Av.4 °C (97.6 °F), Min:36.2 °C (97.1 °F), Max:36.8 °C (98.3 °F)   Temperature: 36.4 °C (97.6 °F)  Pulse  Av.5  Min: 56  Max: 89    Blood Pressure: 160/78 (RN notified)      Respiratory      Respiration: 16, Pulse Oximetry: 97 %, O2 Daily Delivery Respiratory : Silicone Nasal Cannula     PEP/CPT Method: Flutter Valve, Work Of Breathing / Effort: Mild  RUL Breath Sounds: Rhonchi, RML Breath Sounds: Rhonchi, RLL Breath Sounds: Diminished, DIANNE Breath Sounds: Rhonchi, LLL Breath Sounds: Diminished    Fluids    Intake/Output Summary (Last 24 hours) at 18 0859  Last data filed at 18 0806   Gross per 24 hour   Intake             2660 ml   Output              700 ml   Net             1960 ml       Nutrition  Orders Placed This Encounter   Procedures   • Diet NPO     Standing Status:   Standing     Number of Occurrences:   1     Order Specific Question:   Restrict to:     Answer:   Strict [1]     Physical Exam   Constitutional: She is oriented to person, place, and time. She appears well-developed  and well-nourished. No distress.   HENT:   Head: Normocephalic and atraumatic.   Mouth/Throat: No oropharyngeal exudate.   Eyes: Conjunctivae and EOM are normal. Pupils are equal, round, and reactive to light. Right eye exhibits no discharge. No scleral icterus.   Neck: Neck supple. No JVD present. No thyromegaly present.   Cardiovascular: Normal rate, regular rhythm, normal heart sounds and intact distal pulses.    No murmur heard.  Pulmonary/Chest: Effort normal. No stridor. No respiratory distress. She has decreased breath sounds in the right lower field and the left lower field. She has no wheezes. She has no rhonchi. She has no rales.   Abdominal: Soft. Bowel sounds are normal. She exhibits no distension and no mass. There is no tenderness. There is no rebound and no guarding.   Musculoskeletal: Normal range of motion. She exhibits no edema or tenderness.   Neurological: She is alert and oriented to person, place, and time. No cranial nerve deficit.   Skin: Skin is warm and dry. She is not diaphoretic. No erythema.   Psychiatric: She has a normal mood and affect. Her speech is normal and behavior is normal. Thought content normal. Cognition and memory are normal.   Nursing note and vitals reviewed.      Recent Labs      06/27/18   0530  06/28/18   0451   WBC  6.9  8.3   RBC  4.74  4.40   HEMOGLOBIN  14.2  13.2   HEMATOCRIT  44.8  41.1   MCV  94.5  93.4   MCH  30.0  30.0   MCHC  31.7*  32.1*   RDW  49.2  46.8   PLATELETCT  311  SEE COMMENT   MPV  10.8   --      Recent Labs      06/26/18   0515  06/27/18   0530  06/28/18   0451   SODIUM  137  137  136   POTASSIUM  4.3  4.2  5.1   CHLORIDE  103  101  103   CO2  25  26  23   GLUCOSE  157*  164*  239*   BUN  66*  68*  78*   CREATININE  3.16*  2.63*  2.78*   CALCIUM  8.9  8.6  8.8                      Assessment/Plan     * Stroke (HCC)- (present on admission)   Assessment & Plan    MRI of the brain outlying facility., eft medial cerebellar acute or subacute  infarction with no hemorrhage.   Carotid Duplex: Antegrade left vertebral flow with high resistant may indicate distal occlusion  Continue ASA/Statin.  Echocardiogram: preserved LVEF.  Neurology signed off.          CKD stage 4 due to type 2 diabetes mellitus- (present on admission)   Assessment & Plan    Acute on chronic renal failure  Hx of PCKD  Nephrology following.  Cre 2.78 2.63<3.16  Avoid nephrotoxins.             Non-intractable vomiting with nausea   Assessment & Plan    Resolved.  IV anti emetics prn      Dysphagia   Assessment & Plan    SLP following, failed swallow, now using Cor track.  Dietary following.          History of GI bleed   Assessment & Plan    Pepcid GI ppx        History of DVT (deep vein thrombosis)   Assessment & Plan    Hx of DVT?PE , s/p IVC filter placement.  Hx of GI bleeding in past, Anticoagulation contraindicated.  DVT ppx dose ,   monitor for bleeding.          Type II diabetes mellitus (HCC)   Assessment & Plan    A1c 8.3   Controlled.  Continue Insulin-sliding scale, accu-checks and hypoglycemia protocol.        Essential hypertension- (present on admission)   Assessment & Plan    Controlled  Continue with Metoprolol and Norvasc.        Polycystic kidney disease- (present on admission)   Assessment & Plan    As per above,   Nephrology following.        Hyperlipemia- (present on admission)   Assessment & Plan    Cotninue lipitor.      Hypothyroid- (present on admission)   Assessment & Plan    TSH WNL.  Continue with home dose of synthroid.      Diabetic neuropathy (HCC)- (present on admission)   Assessment & Plan    Stable, will consider gabapentin when stable.          Quality-Core Measures   Reviewed items::  EKG reviewed, Labs reviewed, Medications reviewed and Radiology images reviewed  Wallace catheter::  No Wallace  DVT prophylaxis pharmacological::  Heparin  DVT prophylaxis - mechanical:  SCDs  Ulcer Prophylaxis::  Yes  Assessed for rehabilitation services:  Patient was  assess for and/or received rehabilitation services during this hospitalization      Patient plan of care discussed at multidisplinary team rounds and with patient and R.N at beside.

## 2018-06-28 NOTE — THERAPY
"Speech Language Therapy dysphagia treatment completed.     Functional Status: RN reports pt had required NT suctioning due to large amounts of upper airway secretions, with bleeding noted.  Upon entering the room, pt sleepy but agreeable to participate.  She had wet vocal quality, with frequent coughing w/o resolution.  Pt participated in dysphagia exercises targeting BOT retraction, laryngeal elevation and pharyngeal constriction.  She was able to perform them with \"fair to good\" accuracy 5/5 times each, with particular difficulty noted with the Mendelson and Breanne exercises.  Pt was given 2 single ice chips, which resulted in immediate s/sx of aspiration.  She remains unsafe for any PO intake at this time and remains at high risk for aspiration secondary to inability to effectively manage secretions.  SLP is following.     Recommendations:   1) Continue strict NPO.  2) Please encourage pt to practice dysphagia exercises.    Plan of Care: Will benefit from Speech Therapy 5 times per week    Post-Acute Therapy: Discharge to a transitional care facility for continued skilled therapy services.    See \"Rehab Therapy-Acute\" Patient Summary Report for complete documentation.     "

## 2018-06-28 NOTE — DISCHARGE PLANNING
Received Choice form at 1612  Agency/Facility Name: [facility]  Referral sent per Choice form at 1620  Choice obtained by  BHARAT Ann.

## 2018-06-28 NOTE — PROGRESS NOTES
2006: pt alert and orientated, reporting headache, declining pain medication at this time, states will wait until percocet is available. Pt states she is always nauseated, abd soft and no active vomiting at this time, pt has TF running at goal rate 50ml/hr via Remarkk HOB is elevated. Pt has productive cough, pt is suctioning herself, RT was in to evaluate pt. Pt has 2L oxygen on via NP with oxygen saturation at 95%. Pt is on tele. Pt declines mobilizing at this time due to vertigo.    2141: contacted RT to come see pt, stated that he will come on his next rounds. Pt still requiring lots of suction, pt able to suction herself.    0158: RT in to deep suction pt, pt reported relief after, although still coughing and congested.    0539: pt up to commode to void, tolerated well.

## 2018-06-28 NOTE — CARE PLAN
Problem: Safety  Goal: Will remain free from injury  Outcome: PROGRESSING AS EXPECTED  No new injury    Problem: Infection  Goal: Will remain free from infection  Outcome: PROGRESSING AS EXPECTED  No new s/s infection

## 2018-06-28 NOTE — CARE PLAN
"Problem: Safety  Goal: Will remain free from injury  Outcome: PROGRESSING AS EXPECTED  Pt has call bell within reach and calls appropriately. Pt's bed in lowest position, bed alarm on, rounding on pt regularly.    Problem: Pain Management  Goal: Pain level will decrease to patient's comfort goal  Outcome: PROGRESSING AS EXPECTED  Pt reports constant headache, pt appears comfortable, and states percocet is effective, will continue to monitor.    Problem: Respiratory:  Goal: Respiratory status will improve  Outcome: PROGRESSING AS EXPECTED  Pt reports at times she feels like she is \"drowning\", pt is suctioning herself, HOB elevated, oxygen saturation within normal limits. RT in to evaluate pt a couple times and did deep suction pt. Pt receiving cough syrup, stating it is effective.      "

## 2018-06-29 LAB
ALBUMIN SERPL BCP-MCNC: 3 G/DL (ref 3.2–4.9)
ALBUMIN/GLOB SERPL: 0.8 G/DL
ALP SERPL-CCNC: 79 U/L (ref 30–99)
ALT SERPL-CCNC: 5 U/L (ref 2–50)
ANION GAP SERPL CALC-SCNC: 7 MMOL/L (ref 0–11.9)
AST SERPL-CCNC: 14 U/L (ref 12–45)
BILIRUB SERPL-MCNC: 0.3 MG/DL (ref 0.1–1.5)
BUN SERPL-MCNC: 83 MG/DL (ref 8–22)
CALCIUM SERPL-MCNC: 8.6 MG/DL (ref 8.5–10.5)
CHLORIDE SERPL-SCNC: 103 MMOL/L (ref 96–112)
CO2 SERPL-SCNC: 28 MMOL/L (ref 20–33)
CREAT SERPL-MCNC: 2.41 MG/DL (ref 0.5–1.4)
GLOBULIN SER CALC-MCNC: 3.6 G/DL (ref 1.9–3.5)
GLUCOSE BLD-MCNC: 182 MG/DL (ref 65–99)
GLUCOSE BLD-MCNC: 190 MG/DL (ref 65–99)
GLUCOSE BLD-MCNC: 190 MG/DL (ref 65–99)
GLUCOSE BLD-MCNC: 205 MG/DL (ref 65–99)
GLUCOSE SERPL-MCNC: 227 MG/DL (ref 65–99)
POTASSIUM SERPL-SCNC: 4.7 MMOL/L (ref 3.6–5.5)
PROT SERPL-MCNC: 6.6 G/DL (ref 6–8.2)
SODIUM SERPL-SCNC: 138 MMOL/L (ref 135–145)

## 2018-06-29 PROCEDURE — 700111 HCHG RX REV CODE 636 W/ 250 OVERRIDE (IP): Performed by: HOSPITALIST

## 2018-06-29 PROCEDURE — 36415 COLL VENOUS BLD VENIPUNCTURE: CPT

## 2018-06-29 PROCEDURE — 700102 HCHG RX REV CODE 250 W/ 637 OVERRIDE(OP): Performed by: HOSPITALIST

## 2018-06-29 PROCEDURE — 82962 GLUCOSE BLOOD TEST: CPT

## 2018-06-29 PROCEDURE — 99232 SBSQ HOSP IP/OBS MODERATE 35: CPT | Performed by: HOSPITALIST

## 2018-06-29 PROCEDURE — 92526 ORAL FUNCTION THERAPY: CPT

## 2018-06-29 PROCEDURE — 770020 HCHG ROOM/CARE - TELE (206)

## 2018-06-29 PROCEDURE — A9270 NON-COVERED ITEM OR SERVICE: HCPCS | Performed by: HOSPITALIST

## 2018-06-29 PROCEDURE — 700111 HCHG RX REV CODE 636 W/ 250 OVERRIDE (IP): Performed by: FAMILY MEDICINE

## 2018-06-29 PROCEDURE — 97116 GAIT TRAINING THERAPY: CPT

## 2018-06-29 PROCEDURE — 80053 COMPREHEN METABOLIC PANEL: CPT

## 2018-06-29 PROCEDURE — 99232 SBSQ HOSP IP/OBS MODERATE 35: CPT | Performed by: INTERNAL MEDICINE

## 2018-06-29 RX ORDER — OXYCODONE AND ACETAMINOPHEN 10; 325 MG/1; MG/1
1 TABLET ORAL
Status: DISCONTINUED | OUTPATIENT
Start: 2018-06-29 | End: 2018-07-08

## 2018-06-29 RX ADMIN — OXYCODONE HYDROCHLORIDE AND ACETAMINOPHEN 1 TABLET: 10; 325 TABLET ORAL at 09:30

## 2018-06-29 RX ADMIN — HEPARIN SODIUM 5000 UNITS: 5000 INJECTION, SOLUTION INTRAVENOUS; SUBCUTANEOUS at 05:40

## 2018-06-29 RX ADMIN — GUAIFENESIN 200 MG: 100 SOLUTION ORAL at 05:40

## 2018-06-29 RX ADMIN — INSULIN HUMAN 1 UNITS: 100 INJECTION, SOLUTION PARENTERAL at 20:42

## 2018-06-29 RX ADMIN — OXYCODONE HYDROCHLORIDE AND ACETAMINOPHEN 1 TABLET: 10; 325 TABLET ORAL at 16:12

## 2018-06-29 RX ADMIN — HEPARIN SODIUM 5000 UNITS: 5000 INJECTION, SOLUTION INTRAVENOUS; SUBCUTANEOUS at 20:25

## 2018-06-29 RX ADMIN — METOCLOPRAMIDE 10 MG: 5 INJECTION, SOLUTION INTRAMUSCULAR; INTRAVENOUS at 17:01

## 2018-06-29 RX ADMIN — METOPROLOL TARTRATE 12.5 MG: 25 TABLET, FILM COATED ORAL at 20:25

## 2018-06-29 RX ADMIN — GUAIFENESIN 200 MG: 100 SOLUTION ORAL at 01:02

## 2018-06-29 RX ADMIN — OXYCODONE HYDROCHLORIDE AND ACETAMINOPHEN 1 TABLET: 10; 325 TABLET ORAL at 03:53

## 2018-06-29 RX ADMIN — METOCLOPRAMIDE 10 MG: 5 INJECTION, SOLUTION INTRAMUSCULAR; INTRAVENOUS at 23:07

## 2018-06-29 RX ADMIN — PROCHLORPERAZINE EDISYLATE 10 MG: 5 INJECTION INTRAMUSCULAR; INTRAVENOUS at 23:14

## 2018-06-29 RX ADMIN — METOPROLOL TARTRATE 12.5 MG: 25 TABLET, FILM COATED ORAL at 09:22

## 2018-06-29 RX ADMIN — HEPARIN SODIUM 5000 UNITS: 5000 INJECTION, SOLUTION INTRAVENOUS; SUBCUTANEOUS at 14:43

## 2018-06-29 RX ADMIN — PROCHLORPERAZINE EDISYLATE 10 MG: 5 INJECTION INTRAMUSCULAR; INTRAVENOUS at 16:16

## 2018-06-29 RX ADMIN — OXYCODONE HYDROCHLORIDE AND ACETAMINOPHEN 1 TABLET: 10; 325 TABLET ORAL at 20:24

## 2018-06-29 RX ADMIN — METOCLOPRAMIDE 10 MG: 5 INJECTION, SOLUTION INTRAMUSCULAR; INTRAVENOUS at 11:32

## 2018-06-29 RX ADMIN — LEVOTHYROXINE SODIUM 112 MCG: 112 TABLET ORAL at 09:22

## 2018-06-29 RX ADMIN — SENNOSIDES AND DOCUSATE SODIUM 2 TABLET: 8.6; 5 TABLET ORAL at 20:25

## 2018-06-29 RX ADMIN — FAMOTIDINE 20 MG: 20 TABLET ORAL at 09:22

## 2018-06-29 RX ADMIN — INSULIN HUMAN 1 UNITS: 100 INJECTION, SOLUTION PARENTERAL at 11:39

## 2018-06-29 RX ADMIN — AMLODIPINE BESYLATE 10 MG: 5 TABLET ORAL at 09:23

## 2018-06-29 RX ADMIN — OXYCODONE HYDROCHLORIDE AND ACETAMINOPHEN 1 TABLET: 10; 325 TABLET ORAL at 23:07

## 2018-06-29 RX ADMIN — METOCLOPRAMIDE 10 MG: 5 INJECTION, SOLUTION INTRAMUSCULAR; INTRAVENOUS at 05:40

## 2018-06-29 RX ADMIN — INSULIN HUMAN 2 UNITS: 100 INJECTION, SOLUTION PARENTERAL at 17:05

## 2018-06-29 RX ADMIN — PROCHLORPERAZINE EDISYLATE 10 MG: 5 INJECTION INTRAMUSCULAR; INTRAVENOUS at 01:02

## 2018-06-29 RX ADMIN — ATORVASTATIN CALCIUM 80 MG: 80 TABLET, FILM COATED ORAL at 20:25

## 2018-06-29 RX ADMIN — ASPIRIN 325 MG: 325 TABLET ORAL at 09:23

## 2018-06-29 RX ADMIN — INSULIN HUMAN 1 UNITS: 100 INJECTION, SOLUTION PARENTERAL at 05:49

## 2018-06-29 RX ADMIN — MECLIZINE HYDROCHLORIDE 25 MG: 25 TABLET ORAL at 11:32

## 2018-06-29 RX ADMIN — OXYCODONE HYDROCHLORIDE AND ACETAMINOPHEN 1 TABLET: 10; 325 TABLET ORAL at 12:41

## 2018-06-29 ASSESSMENT — ENCOUNTER SYMPTOMS
FOCAL WEAKNESS: 0
FEVER: 0
WHEEZING: 0
COUGH: 0
DEPRESSION: 0
MYALGIAS: 0
PHOTOPHOBIA: 0
STRIDOR: 0
SHORTNESS OF BREATH: 0
BRUISES/BLEEDS EASILY: 0
SORE THROAT: 0
DOUBLE VISION: 0
ORTHOPNEA: 0
EYE PAIN: 0
TREMORS: 0
VOMITING: 0
SPUTUM PRODUCTION: 0
HEADACHES: 0
SEIZURES: 0
PND: 0
CLAUDICATION: 0
NECK PAIN: 0
EYE REDNESS: 0
DIAPHORESIS: 0
CONSTIPATION: 0
NERVOUS/ANXIOUS: 0
BLURRED VISION: 1
MEMORY LOSS: 0
CHILLS: 0
HEMOPTYSIS: 0
MYALGIAS: 1
EYE DISCHARGE: 0
SPEECH CHANGE: 0
WEIGHT LOSS: 0
FALLS: 0
WEAKNESS: 1
BACK PAIN: 1
NAUSEA: 1
LOSS OF CONSCIOUSNESS: 0
BLOOD IN STOOL: 0
TINGLING: 0
NECK PAIN: 1
ABDOMINAL PAIN: 0
PALPITATIONS: 0
FLANK PAIN: 0
DIZZINESS: 0
BACK PAIN: 0
COUGH: 1
HEARTBURN: 0
SENSORY CHANGE: 0
NAUSEA: 0

## 2018-06-29 ASSESSMENT — PAIN SCALES - GENERAL
PAINLEVEL_OUTOF10: 9
PAINLEVEL_OUTOF10: 10
PAINLEVEL_OUTOF10: 9
PAINLEVEL_OUTOF10: 8
PAINLEVEL_OUTOF10: 8

## 2018-06-29 ASSESSMENT — COGNITIVE AND FUNCTIONAL STATUS - GENERAL
MOBILITY SCORE: 19
CLIMB 3 TO 5 STEPS WITH RAILING: TOTAL
SUGGESTED CMS G CODE MODIFIER MOBILITY: CK
WALKING IN HOSPITAL ROOM: A LITTLE
STANDING UP FROM CHAIR USING ARMS: A LITTLE

## 2018-06-29 ASSESSMENT — GAIT ASSESSMENTS
GAIT LEVEL OF ASSIST: CONTACT GUARD ASSIST
DISTANCE (FEET): 5
ASSISTIVE DEVICE: PARALLEL BARS
DEVIATION: BRADYKINETIC;DECREASED BASE OF SUPPORT

## 2018-06-29 NOTE — PROGRESS NOTES
1959: pt alert and orientated, reporting headache, declining medicine at this time stating will wait for percocet. Pt reports constant dizziness and nausea, states dizziness and vertigo is improving. Pt has TF at 30ml/hr via cortrak, pt was unable to tolerate goal rate of 50, pt refusing for it to be increased. Pt's chest auscultated for rhonchi, pt is able to suction herself, pt is encouraged to cough and use incentive spirometer. Pt on 2L oxygen via nasal cannula with oxygen saturation at 96%. Pt was able to get up to commode to void SBA. Pt reports feeling constipated, abd soft, bowel sounds present.    0700: pt continues to refuse for TF to be turned to goal rate. Running at 30ml/hr

## 2018-06-29 NOTE — THERAPY
"Physical Therapy Treatment completed.   Bed Mobility:  Supine to Sit: Supervised  Transfers: Sit to Stand: Stand by Assist  Gait: Level Of Assist: Contact Guard Assist with Will Continue to Assess for Equipment Needs       Plan of Care: Will benefit from Physical Therapy 4 times per week  Discharge Recommendations: Equipment: Will Continue to Assess for Equipment Needs.     See \"Rehab Therapy-Acute\" Patient Summary Report for complete documentation.     Pt is demonstrating a gradual improvement in functional mobility at this time. Pt was able to demonstrate //bar ambulation today w/CGA, however, was only able to tolerate short distance secondary to dizziness. Pt tends to become extreamly dizzy after about 5 ft of ambulation. Does well with cues for heel to toe, weight shifting and LE advcancement. Pt was able to transfer with CGA with FWW use. Pt is primarily limited due to dizzines and being fearful of falling. Pt will continue to benefit from continued skilled PT while in house, will continue to follow with previous recs in place.     "

## 2018-06-29 NOTE — THERAPY
"Speech Language Therapy dysphagia treatment completed.     Functional Status: Upon entry, patient sitting up in bed with significant gurgly vocal quality. She was cued to throat clear/cough multiple times and utilize Yankauer to clear moderate amount of secretions. Patient reported feeling discouraged and this SLP provided education and motivation to continue exercises. She was educated to frequently throat clear and produce effortful swallow throughout the day in attempts to clear secretions and strengthen swallow function. Vocal quality improved in doing so but did not achieve clear vocal quality. Dysphagia exercises at bedside and patient performed 10 reps of falsetto, 15 reps CTAR, head lift (sustained x3 and repetitive x3), and pharyngeal squeeze (20 reps effortful swallow) exercises with fair-good accuracy given min A. Five reps of Mendelsohn maneuver performed with poor accuracy. PO trials of 5 tiny ice chips resulted in immediate increase in wet vocal quality and cough x1.     Recommendations: At this time, recommend patient continue NPO/cortrak with aggressive speech therapy to address dysphagia. Please turn off tube feedings when patient is performing head lift exercises for patient must be lying completely flat.     Plan of Care: Will benefit from Speech Therapy 5 times per week    Post-Acute Therapy: Discharge to a transitional care facility for continued skilled therapy services.    See \"Rehab Therapy-Acute\" Patient Summary Report for complete documentation.     "

## 2018-06-29 NOTE — PROGRESS NOTES
[facility] Hospitalist Progress Note    Date of Service: 6/29/2018    Chief Complaint  52 y.o. female admitted 6/19/2018 with acute to subacute left medial cerebellar CVA. Patient was initially seen at an outside facility for nausea, vomiting, vision changes and vertigo. 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 Prior hyper-coaguable state workup negative.    Interval Problem Update  Cerebellar infarct - Stable. Neuro signed off.   Dysphagia - continues to struggle w secretions - emesis. Abd Xray 6/24 NGT ok. Added NGT suction of upper airway  IDDM - SSI  CKD-IV - Neph consulted. Creatinine waxing/waning - Follow BP/glucose closely.  PCKD - neph following  Hx GIB - no active signs of bleeding - restarted ASA  HTN - slightly worse. Add clonidine. CCB/BB. ACEI when cleared w Neph   Lipids - Max statin  Pain - on oral pain meds    6/26  No acute issues, patient comfortable, mild nausea but overall doing well.   Continue NGT with suction  Creatinine improved today, but still volume overloaded.   Nephrology following, CTM.  Continue RT protocol, duo nebs, Pep therapy if warranted, and incentive spirometry.       6/27  Improving clinically, still requiring NG tube  Renal functions improving creatinine 2.63 from 3.16  Pending placement options.    6/28  Renal function stable although creatinine fluctuating daily  Nephrology following  Discharge planning per social work  Continue tube feeds.  SLP following.    6/29  Patient stable, no acute issues, except for her back pain which she says current regimine has not been adequate for past several days. eager to go to rehab. Pending per   Continue speech therapy.  Continue tube feeds.  Will titrate her pain medications carefully.    Consultants/Specialty  Neurology  Nephrology    Disposition  TBD      Review of Systems    Constitutional: Positive for malaise/fatigue. Negative for chills, diaphoresis, fever and weight loss.   HENT: Negative for congestion, ear discharge, ear pain, hearing loss, nosebleeds, sore throat and tinnitus.         Positive for dysphonia   Eyes: Positive for blurred vision. Negative for double vision, photophobia, pain, discharge and redness.   Respiratory: Negative for cough, hemoptysis, sputum production, shortness of breath, wheezing and stridor.    Cardiovascular: Negative for chest pain, palpitations, orthopnea, claudication and PND.   Gastrointestinal: Negative for abdominal pain, blood in stool, constipation, heartburn, melena, nausea and vomiting.   Genitourinary: Negative for dysuria, frequency and urgency.   Musculoskeletal: Positive for back pain and myalgias. Negative for falls, joint pain and neck pain.   Skin: Negative for rash.   Neurological: Positive for weakness. Negative for dizziness, tingling, tremors, sensory change, speech change, focal weakness, seizures, loss of consciousness and headaches.   Endo/Heme/Allergies: Does not bruise/bleed easily.   Psychiatric/Behavioral: Negative for depression, memory loss and suicidal ideas. The patient is not nervous/anxious.       Physical Exam  Laboratory/Imaging   Hemodynamics  Temp (24hrs), Av.4 °C (97.5 °F), Min:36.1 °C (97 °F), Max:36.6 °C (97.9 °F)   Temperature: 36.6 °C (97.9 °F)  Pulse  Av.8  Min: 56  Max: 89    Blood Pressure: 150/79      Respiratory      Respiration: 15, Pulse Oximetry: 96 %, O2 Daily Delivery Respiratory : Silicone Nasal Cannula     PEP/CPT Method: Flutter Valve, Work Of Breathing / Effort: Mild  RUL Breath Sounds: Rhonchi, RML Breath Sounds: Rhonchi, RLL Breath Sounds: Diminished, DIANNE Breath Sounds: Rhonchi, LLL Breath Sounds: Diminished    Fluids    Intake/Output Summary (Last 24 hours) at 18 0838  Last data filed at 18 0596   Gross per 24 hour   Intake             1020 ml   Output                0  ml   Net             1020 ml       Nutrition  Orders Placed This Encounter   Procedures   • Diet NPO     Standing Status:   Standing     Number of Occurrences:   1     Order Specific Question:   Restrict to:     Answer:   Strict [1]     Physical Exam   Constitutional: She is oriented to person, place, and time. She appears well-developed and well-nourished. No distress.   HENT:   Head: Normocephalic and atraumatic.   Mouth/Throat: No oropharyngeal exudate.   Eyes: Conjunctivae and EOM are normal. Pupils are equal, round, and reactive to light. Right eye exhibits no discharge. No scleral icterus.   Neck: Neck supple. No JVD present. No thyromegaly present.   Cardiovascular: Normal rate, regular rhythm, normal heart sounds and intact distal pulses.    No murmur heard.  Pulmonary/Chest: Effort normal. No stridor. No respiratory distress. She has decreased breath sounds in the right lower field and the left lower field. She has no wheezes. She has no rhonchi. She has no rales.   Abdominal: Soft. Bowel sounds are normal. She exhibits no distension and no mass. There is no tenderness. There is no rebound and no guarding.   Musculoskeletal: Normal range of motion. She exhibits no edema or tenderness.   Neurological: She is alert and oriented to person, place, and time. She displays abnormal reflex. No cranial nerve deficit. She exhibits abnormal muscle tone.   Skin: Skin is warm and dry. She is not diaphoretic. No erythema.   Psychiatric: She has a normal mood and affect. Her speech is normal and behavior is normal. Judgment and thought content normal. Cognition and memory are normal.   Nursing note and vitals reviewed.      Recent Labs      06/27/18 0530 06/28/18   0451   WBC  6.9  8.3   RBC  4.74  4.40   HEMOGLOBIN  14.2  13.2   HEMATOCRIT  44.8  41.1   MCV  94.5  93.4   MCH  30.0  30.0   MCHC  31.7*  32.1*   RDW  49.2  46.8   PLATELETCT  311  SEE COMMENT   MPV  10.8   --      Recent Labs      06/27/18   0530   06/28/18   0451  06/29/18   0433   SODIUM  137  136  138   POTASSIUM  4.2  5.1  4.7   CHLORIDE  101  103  103   CO2  26  23  28   GLUCOSE  164*  239*  227*   BUN  68*  78*  83*   CREATININE  2.63*  2.78*  2.41*   CALCIUM  8.6  8.8  8.6                      Assessment/Plan     * Stroke (HCC)- (present on admission)   Assessment & Plan    MRI of the brain outlying facility., eft medial cerebellar acute or subacute infarction with no hemorrhage.   Carotid Duplex: Antegrade left vertebral flow with high resistant may indicate distal occlusion  Continue ASA/Statin.  Echocardiogram: preserved LVEF.  Neurology signed off.          CKD stage 4 due to type 2 diabetes mellitus- (present on admission)   Assessment & Plan    Acute on chronic renal failure  Hx of PCKD  Nephrology following.  Cre 2.41<2.78 2.63<3.16  Avoid nephrotoxins.             Non-intractable vomiting with nausea   Assessment & Plan    Resolved.  IV anti emetics prn      Dysphagia   Assessment & Plan    SLP following, failed swallow, now using Cor track.  Dietary following.          History of GI bleed   Assessment & Plan    Pepcid GI ppx        History of DVT (deep vein thrombosis)   Assessment & Plan    Hx of DVT?PE , s/p IVC filter placement.  Hx of GI bleeding in past, Anticoagulation contraindicated.  DVT ppx dose ,   monitor for bleeding.          Type II diabetes mellitus (HCC)   Assessment & Plan    A1c 8.3   Controlled.  Continue Insulin-sliding scale, accu-checks and hypoglycemia protocol.        Essential hypertension- (present on admission)   Assessment & Plan    Controlled  Continue with Metoprolol and Norvasc.        Polycystic kidney disease- (present on admission)   Assessment & Plan    As per above,   Nephrology following.        Hyperlipemia- (present on admission)   Assessment & Plan    Cotninue lipitor.      Hypothyroid- (present on admission)   Assessment & Plan    TSH WNL.  Continue with home dose of synthroid.      Diabetic neuropathy  ([facility])- (present on admission)   Assessment & Plan    Stable, will consider gabapentin when stable.          Quality-Core Measures   Reviewed items::  EKG reviewed, Labs reviewed, Medications reviewed and Radiology images reviewed  Wallace catheter::  No Wallace  DVT prophylaxis pharmacological::  Heparin  DVT prophylaxis - mechanical:  SCDs  Ulcer Prophylaxis::  Yes  Assessed for rehabilitation services:  Patient was assess for and/or received rehabilitation services during this hospitalization      Patient plan of care discussed at multidisplinary team rounds and with patient and R.N at [facility].

## 2018-06-29 NOTE — DIETARY
Nutrition services: Brief Update     Day 10 of admit. Bryan Garcia is a 52 y.o. female with admitting DX of cerebellar stroke. TF via small bowel Cortrak initiated on 6/20.     Pt's TF formula and goal rate was Diabetisource AC @ 65 ml/hr which was meeting pt's estimated nutritional needs. However, on 6/22 pt reported nausea and vomiting secondary to TF high rate (even though her TF is via small bowel Cortrak), she requested to turn it down to 35 ml/hr. TF formula and rate changed on 6/26 to Impact Peptide 1.5 @ 50 ml/hr since it is a low-volume, high-calorie/protein formula and pt would likely tolerate rate better. Nonetheless, pt requested rate to be turn down @ 30 ml/hr again on 6/28 since she believes this rate is the maximum TF rate she can tolerate. Pt is receiving reglan and pepcid once a day; zofran and compazine PRN.     Recommendations/Plan:   1. Advance diet as indicated safe per SLP   2. Monitor TF tolerance   3. Fluids per MD     RD following

## 2018-06-29 NOTE — DISCHARGE PLANNING
Anticipated Discharge Disposition: IRF    Action: BHARAT spoke with Felisha from [facility]. She will be conducting the pt's on-site eval.    [facility] is pending insurance authorization and approval from the Medical Director    Barriers to Discharge: Acceptance    Plan: Pt to dc to [facility]

## 2018-06-29 NOTE — CARE PLAN
Problem: Safety  Goal: Will remain free from injury  Outcome: PROGRESSING AS EXPECTED  Pt has bed in lowest position, call bell within reach, bed alarm on, rounding on pt regularly.    Problem: Bowel/Gastric:  Goal: Normal bowel function is maintained or improved  Outcome: PROGRESSING AS EXPECTED  Pt reports feeling constipated, administered milk of magnesia, pt abdomen soft and non-tender to palpation, bowel sounds present.    Problem: Pain Management  Goal: Pain level will decrease to patient's comfort goal  Outcome: PROGRESSING AS EXPECTED  Pt reports chronic headache, pt reports relief with percocet, pt aware to ring call bell if needing it.

## 2018-06-29 NOTE — DISCHARGE PLANNING
Agency/Facility Name: [facility]  Spoke To: Felisha(Admissions)  Outcome: Most recent therapy notes faxed to [facility] per Felisha's request.

## 2018-06-29 NOTE — DISCHARGE PLANNING
Agency/Facility Name: [facility]  Spoke To: Felisha  Outcome: Referral pending insurance authorization and approval from Medical Director.

## 2018-06-29 NOTE — PROGRESS NOTES
Nephrology Progress Note, Adult, Complex               Author: Fadi Najjar Date & Time created: 6/29/2018  2:23 PM     Interval History:  51 y/o female with CAMPOS/CKD IV, DMN, HTN, admitted with left cerebellar stroke  Renal US (+) for PCKD -d/w Pt -admitted diagnosed with PCKD several years ago  Still with vertigo, N/V this morning  SOB improved  Better today, C/O neck pain   Review of Systems:  Review of Systems   Constitutional: Negative for chills and fever.   Respiratory: Positive for cough. Negative for shortness of breath.    Cardiovascular: Negative for chest pain, palpitations, orthopnea and leg swelling.   Gastrointestinal: Positive for nausea. Negative for abdominal pain, constipation and vomiting.   Genitourinary: Negative for dysuria, flank pain, frequency, hematuria and urgency.   Musculoskeletal: Positive for neck pain. Negative for back pain, joint pain and myalgias.   Neurological: Negative for dizziness and headaches.        Vertigo        Physical Exam:  Physical Exam   Constitutional: She is oriented to person, place, and time. She appears well-developed and well-nourished. No distress.   HENT:   Nose: Nose normal.   NGT   Eyes: Conjunctivae are normal. No scleral icterus.   Neck: No thyromegaly present.   Cardiovascular: Normal rate and regular rhythm.  Exam reveals no gallop and no friction rub.    Pulmonary/Chest: Effort normal. No respiratory distress. She has no wheezes. She has rhonchi. She has no rales.   Abdominal: Soft. Bowel sounds are normal. She exhibits no distension. There is no tenderness.   Musculoskeletal: She exhibits edema.   Lymphadenopathy:     She has no cervical adenopathy.   Neurological: She is alert and oriented to person, place, and time.   Skin: Skin is warm. She is not diaphoretic. No erythema.   Nursing note and vitals reviewed.      Labs:        Invalid input(s): [id number]      Recent Labs      06/27/18   0530  06/28/18   0451  06/29/18   0433   SODIUM  137  136   138   POTASSIUM  4.2  5.1  4.7   CHLORIDE  101  103  103   CO2  26  23  28   BUN  68*  78*  83*   CREATININE  2.63*  2.78*  2.41*   MAGNESIUM  2.2  2.2   --    CALCIUM  8.6  8.8  8.6     Recent Labs      18   0433   ALTSGPT  <5   --   5   ASTSGOT  12   --   14   ALKPHOSPHAT  86   --   79   TBILIRUBIN  0.3   --   0.3   GLUCOSE  164*  239*  227*     Recent Labs      181   RBC  4.74  4.40   HEMOGLOBIN  14.2  13.2   HEMATOCRIT  44.8  41.1   PLATELETCT  311  SEE COMMENT     Recent Labs      18   0433   WBC  6.9  8.3   --    NEUTSPOLYS  58.70  72.50*   --    LYMPHOCYTES  24.90  15.70*   --    MONOCYTES  11.20  8.20   --    EOSINOPHILS  3.40  2.20   --    BASOPHILS  1.20  0.80   --    ASTSGOT  12   --   14   ALTSGPT  <5   --   5   ALKPHOSPHAT  86   --   79   TBILIRUBIN  0.3   --   0.3           Hemodynamics:  Temp (24hrs), Av.4 °C (97.5 °F), Min:36.1 °C (97 °F), Max:36.6 °C (97.9 °F)  Temperature: 36.6 °C (97.9 °F)  Pulse  Av.8  Min: 56  Max: 89   Blood Pressure: 150/79     Respiratory:    Respiration: 15, Pulse Oximetry: 96 %, O2 Daily Delivery Respiratory : Silicone Nasal Cannula     PEP/CPT Method: Flutter Valve, Work Of Breathing / Effort: Mild  RUL Breath Sounds: Rhonchi, RML Breath Sounds: Rhonchi, RLL Breath Sounds: Diminished, DIANNE Breath Sounds: Rhonchi, LLL Breath Sounds: Diminished  Fluids:    Intake/Output Summary (Last 24 hours) at 18 1423  Last data filed at 18 0900   Gross per 24 hour   Intake             1220 ml   Output                0 ml   Net             1220 ml        GI/Nutrition:  Orders Placed This Encounter   Procedures   • Diet NPO     Standing Status:   Standing     Number of Occurrences:   1     Order Specific Question:   Restrict to:     Answer:   Strict [1]     Medical Decision Making, by Problem:  Active Hospital Problems    Diagnosis   • *Stroke (HCC) [I63.9]   •  CKD stage 4 due to type 2 diabetes mellitus [E11.22, N18.4]   • Dysphagia [R13.10]   • Diabetic neuropathy (HCC) [E11.40]   • Type II diabetes mellitus (HCC) [E11.9]   • History of DVT (deep vein thrombosis) [Z86.718]   • History of GI bleed [Z87.19]   • Essential hypertension [I10]   • Hyperlipemia [E78.5]   • Hypothyroid [E03.9]       Quality-Core Measures   Reviewed items::  Labs reviewed    Assessment and plan:    1.CAMPOS/CKD IV /PCKD:cr continues to improve slowly  2.HTN: BP is high  3.Electrolytes:OK  4.Anemia: Hb WNL  5.Volume overload:better  6 resp failure  7 constipation     Recs:   no acute need for HD  Daily labs  Renal dose all meds  Avoid nephrotoxins  Prognosis guarded

## 2018-06-29 NOTE — CARE PLAN
Problem: Communication  Goal: The ability to communicate needs accurately and effectively will improve    Intervention: [place] patient and significant other/support system to call light to alert staff of needs  Able to make needs known, instructed to call for assistance      Problem: Pain Management  Goal: Pain level will decrease to patient's comfort goal    Intervention: Follow pain managment plan developed in collaboration with patient and Interdisciplinary Team  Complains of headache, medicated per MAR with good results

## 2018-06-29 NOTE — PROGRESS NOTES
Assumed care of pt. A/Ox4, able to make needs known. Tele monitor in place. 2 L O2 via NC,  on, suction at bedside. SBA to commode. NPO with cortrak, impact at 30 (goal of 50, pt refusing to advance rate). Complains of headache, medicated per MAR. POC rehab. Bed alarm on. Call light in reach, bed in low position, will continue hourly rounding.

## 2018-06-30 LAB
ANION GAP SERPL CALC-SCNC: 10 MMOL/L (ref 0–11.9)
BUN SERPL-MCNC: 85 MG/DL (ref 8–22)
CALCIUM SERPL-MCNC: 9.1 MG/DL (ref 8.5–10.5)
CHLORIDE SERPL-SCNC: 101 MMOL/L (ref 96–112)
CO2 SERPL-SCNC: 25 MMOL/L (ref 20–33)
CREAT SERPL-MCNC: 2.42 MG/DL (ref 0.5–1.4)
GLUCOSE BLD-MCNC: 199 MG/DL (ref 65–99)
GLUCOSE BLD-MCNC: 202 MG/DL (ref 65–99)
GLUCOSE BLD-MCNC: 203 MG/DL (ref 65–99)
GLUCOSE SERPL-MCNC: 220 MG/DL (ref 65–99)
POTASSIUM SERPL-SCNC: 5 MMOL/L (ref 3.6–5.5)
SODIUM SERPL-SCNC: 136 MMOL/L (ref 135–145)

## 2018-06-30 PROCEDURE — 80048 BASIC METABOLIC PNL TOTAL CA: CPT

## 2018-06-30 PROCEDURE — A9270 NON-COVERED ITEM OR SERVICE: HCPCS | Performed by: HOSPITALIST

## 2018-06-30 PROCEDURE — 99231 SBSQ HOSP IP/OBS SF/LOW 25: CPT | Performed by: HOSPITALIST

## 2018-06-30 PROCEDURE — 700111 HCHG RX REV CODE 636 W/ 250 OVERRIDE (IP): Performed by: HOSPITALIST

## 2018-06-30 PROCEDURE — 700102 HCHG RX REV CODE 250 W/ 637 OVERRIDE(OP): Performed by: HOSPITALIST

## 2018-06-30 PROCEDURE — 770020 HCHG ROOM/CARE - TELE (206)

## 2018-06-30 PROCEDURE — 82962 GLUCOSE BLOOD TEST: CPT | Mod: 91

## 2018-06-30 PROCEDURE — 700111 HCHG RX REV CODE 636 W/ 250 OVERRIDE (IP): Performed by: FAMILY MEDICINE

## 2018-06-30 PROCEDURE — 36415 COLL VENOUS BLD VENIPUNCTURE: CPT

## 2018-06-30 PROCEDURE — 99232 SBSQ HOSP IP/OBS MODERATE 35: CPT | Performed by: INTERNAL MEDICINE

## 2018-06-30 RX ADMIN — PROCHLORPERAZINE EDISYLATE 10 MG: 5 INJECTION INTRAMUSCULAR; INTRAVENOUS at 09:00

## 2018-06-30 RX ADMIN — LEVOTHYROXINE SODIUM 112 MCG: 112 TABLET ORAL at 08:58

## 2018-06-30 RX ADMIN — MECLIZINE HYDROCHLORIDE 25 MG: 25 TABLET ORAL at 22:49

## 2018-06-30 RX ADMIN — SENNOSIDES AND DOCUSATE SODIUM 2 TABLET: 8.6; 5 TABLET ORAL at 08:58

## 2018-06-30 RX ADMIN — OXYCODONE HYDROCHLORIDE AND ACETAMINOPHEN 1 TABLET: 10; 325 TABLET ORAL at 18:00

## 2018-06-30 RX ADMIN — SENNOSIDES AND DOCUSATE SODIUM 2 TABLET: 8.6; 5 TABLET ORAL at 21:55

## 2018-06-30 RX ADMIN — FAMOTIDINE 20 MG: 20 TABLET ORAL at 08:58

## 2018-06-30 RX ADMIN — AMLODIPINE BESYLATE 10 MG: 5 TABLET ORAL at 08:58

## 2018-06-30 RX ADMIN — ONDANSETRON 4 MG: 2 INJECTION INTRAMUSCULAR; INTRAVENOUS at 18:04

## 2018-06-30 RX ADMIN — INSULIN HUMAN 1 UNITS: 100 INJECTION, SOLUTION PARENTERAL at 20:40

## 2018-06-30 RX ADMIN — METOCLOPRAMIDE 10 MG: 5 INJECTION, SOLUTION INTRAMUSCULAR; INTRAVENOUS at 11:48

## 2018-06-30 RX ADMIN — INSULIN HUMAN 2 UNITS: 100 INJECTION, SOLUTION PARENTERAL at 11:54

## 2018-06-30 RX ADMIN — OXYCODONE HYDROCHLORIDE AND ACETAMINOPHEN 1 TABLET: 10; 325 TABLET ORAL at 15:00

## 2018-06-30 RX ADMIN — PROMETHAZINE HYDROCHLORIDE 25 MG: 25 TABLET ORAL at 02:30

## 2018-06-30 RX ADMIN — OXYCODONE HYDROCHLORIDE AND ACETAMINOPHEN 1 TABLET: 10; 325 TABLET ORAL at 02:21

## 2018-06-30 RX ADMIN — ATORVASTATIN CALCIUM 80 MG: 80 TABLET, FILM COATED ORAL at 21:55

## 2018-06-30 RX ADMIN — HEPARIN SODIUM 5000 UNITS: 5000 INJECTION, SOLUTION INTRAVENOUS; SUBCUTANEOUS at 21:54

## 2018-06-30 RX ADMIN — OXYCODONE HYDROCHLORIDE AND ACETAMINOPHEN 1 TABLET: 10; 325 TABLET ORAL at 11:48

## 2018-06-30 RX ADMIN — INSULIN HUMAN 1 UNITS: 100 INJECTION, SOLUTION PARENTERAL at 17:21

## 2018-06-30 RX ADMIN — MECLIZINE HYDROCHLORIDE 25 MG: 25 TABLET ORAL at 11:48

## 2018-06-30 RX ADMIN — TEMAZEPAM 15 MG: 15 CAPSULE ORAL at 21:55

## 2018-06-30 RX ADMIN — INSULIN HUMAN 2 UNITS: 100 INJECTION, SOLUTION PARENTERAL at 05:58

## 2018-06-30 RX ADMIN — ASPIRIN 325 MG: 325 TABLET ORAL at 08:58

## 2018-06-30 RX ADMIN — HEPARIN SODIUM 5000 UNITS: 5000 INJECTION, SOLUTION INTRAVENOUS; SUBCUTANEOUS at 13:22

## 2018-06-30 RX ADMIN — PROCHLORPERAZINE EDISYLATE 10 MG: 5 INJECTION INTRAMUSCULAR; INTRAVENOUS at 22:49

## 2018-06-30 RX ADMIN — BUTALBITAL, ACETAMINOPHEN, AND CAFFEINE 1 TABLET: 50; 325; 40 TABLET ORAL at 20:12

## 2018-06-30 RX ADMIN — GUAIFENESIN 200 MG: 100 SOLUTION ORAL at 18:00

## 2018-06-30 RX ADMIN — PROCHLORPERAZINE EDISYLATE 10 MG: 5 INJECTION INTRAMUSCULAR; INTRAVENOUS at 15:08

## 2018-06-30 RX ADMIN — METOPROLOL TARTRATE 12.5 MG: 25 TABLET, FILM COATED ORAL at 21:55

## 2018-06-30 RX ADMIN — OXYCODONE HYDROCHLORIDE AND ACETAMINOPHEN 1 TABLET: 10; 325 TABLET ORAL at 08:57

## 2018-06-30 RX ADMIN — METOPROLOL TARTRATE 12.5 MG: 25 TABLET, FILM COATED ORAL at 08:59

## 2018-06-30 RX ADMIN — METOCLOPRAMIDE 10 MG: 5 INJECTION, SOLUTION INTRAMUSCULAR; INTRAVENOUS at 17:13

## 2018-06-30 RX ADMIN — OXYCODONE HYDROCHLORIDE AND ACETAMINOPHEN 1 TABLET: 10; 325 TABLET ORAL at 22:46

## 2018-06-30 RX ADMIN — GUAIFENESIN 200 MG: 100 SOLUTION ORAL at 09:10

## 2018-06-30 RX ADMIN — HEPARIN SODIUM 5000 UNITS: 5000 INJECTION, SOLUTION INTRAVENOUS; SUBCUTANEOUS at 05:54

## 2018-06-30 RX ADMIN — OXYCODONE HYDROCHLORIDE AND ACETAMINOPHEN 1 TABLET: 10; 325 TABLET ORAL at 05:54

## 2018-06-30 RX ADMIN — METOCLOPRAMIDE 10 MG: 5 INJECTION, SOLUTION INTRAMUSCULAR; INTRAVENOUS at 05:54

## 2018-06-30 ASSESSMENT — ENCOUNTER SYMPTOMS
SPUTUM PRODUCTION: 1
SPUTUM PRODUCTION: 0
FLANK PAIN: 0
EYE DISCHARGE: 0
BLURRED VISION: 1
DEPRESSION: 0
VOMITING: 0
NAUSEA: 1
COUGH: 0
FALLS: 0
PHOTOPHOBIA: 0
HEARTBURN: 0
FEVER: 0
EYE REDNESS: 0
EYE PAIN: 0
TINGLING: 0
NERVOUS/ANXIOUS: 0
SEIZURES: 0
WHEEZING: 0
ABDOMINAL PAIN: 0
STRIDOR: 0
BACK PAIN: 1
SPEECH CHANGE: 0
PND: 0
SENSORY CHANGE: 0
HEMOPTYSIS: 0
LOSS OF CONSCIOUSNESS: 0
PALPITATIONS: 0
WEAKNESS: 1
MEMORY LOSS: 0
SORE THROAT: 0
CLAUDICATION: 0
ORTHOPNEA: 0
CONSTIPATION: 0
SHORTNESS OF BREATH: 0
MYALGIAS: 1
MYALGIAS: 0
DIAPHORESIS: 0
DOUBLE VISION: 0
CHILLS: 0
WEIGHT LOSS: 0
FOCAL WEAKNESS: 0
HEADACHES: 0
COUGH: 1
NECK PAIN: 0
TREMORS: 0
NAUSEA: 0
BRUISES/BLEEDS EASILY: 0
DIZZINESS: 0
BLOOD IN STOOL: 0

## 2018-06-30 ASSESSMENT — PATIENT HEALTH QUESTIONNAIRE - PHQ9
1. LITTLE INTEREST OR PLEASURE IN DOING THINGS: NOT AT ALL
SUM OF ALL RESPONSES TO PHQ9 QUESTIONS 1 AND 2: 0
2. FEELING DOWN, DEPRESSED, IRRITABLE, OR HOPELESS: NOT AT ALL

## 2018-06-30 ASSESSMENT — PAIN SCALES - GENERAL
PAINLEVEL_OUTOF10: 8
PAINLEVEL_OUTOF10: 9
PAINLEVEL_OUTOF10: 6
PAINLEVEL_OUTOF10: 9
PAINLEVEL_OUTOF10: 9
PAINLEVEL_OUTOF10: 5
PAINLEVEL_OUTOF10: 9
PAINLEVEL_OUTOF10: 4

## 2018-06-30 NOTE — PROGRESS NOTES
[facility] Hospitalist Progress Note    Date of Service: 6/30/2018    Chief Complaint  52 y.o. female admitted 6/19/2018 with acute to subacute left medial cerebellar CVA. Patient was initially seen at an outside facility for nausea, vomiting, vision changes and vertigo. 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 Prior hyper-coaguable state workup negative.    Interval Problem Update  Cerebellar infarct - Stable. Neuro signed off.   Dysphagia - continues to struggle w secretions - emesis. Abd Xray 6/24 NGT ok. Added NGT suction of upper airway  IDDM - SSI  CKD-IV - Neph consulted. Creatinine waxing/waning - Follow BP/glucose closely.  PCKD - neph following  Hx GIB - no active signs of bleeding - restarted ASA  HTN - slightly worse. Add clonidine. CCB/BB. ACEI when cleared w Neph   Lipids - Max statin  Pain - on oral pain meds    6/26  No acute issues, patient comfortable, mild nausea but overall doing well.   Continue NGT with suction  Creatinine improved today, but still volume overloaded.   Nephrology following, CTM.  Continue RT protocol, duo nebs, Pep therapy if warranted, and incentive spirometry.       6/27  Improving clinically, still requiring NG tube  Renal functions improving creatinine 2.63 from 3.16  Pending placement options.    6/28  Renal function stable although creatinine fluctuating daily  Nephrology following  Discharge planning per social work  Continue tube feeds.  SLP following.    6/29  Patient stable, no acute issues, except for her back pain which she says current regimine has not been adequate for past several days. eager to go to rehab. Pending per   Continue speech therapy.  Continue tube feeds.  Will titrate her pain medications carefully.    6/30  No acute issues, patient sitting resting comfortable.   No complaints, pain is better  controlled  Continue to titrate pain medications.  Pending placement.    Consultants/Specialty  Neurology  Nephrology    Disposition  TBD      Review of Systems   Constitutional: Positive for malaise/fatigue. Negative for chills, diaphoresis, fever and weight loss.   HENT: Negative for congestion, ear discharge, ear pain, hearing loss, nosebleeds, sore throat and tinnitus.         Positive for dysphonia   Eyes: Positive for blurred vision. Negative for double vision, photophobia, pain, discharge and redness.   Respiratory: Negative for cough, hemoptysis, sputum production, shortness of breath, wheezing and stridor.    Cardiovascular: Negative for chest pain, palpitations, orthopnea, claudication and PND.   Gastrointestinal: Negative for abdominal pain, blood in stool, constipation, heartburn, melena, nausea and vomiting.   Genitourinary: Negative for dysuria, frequency and urgency.   Musculoskeletal: Positive for back pain (improved) and myalgias. Negative for falls, joint pain and neck pain.   Skin: Negative for rash.   Neurological: Positive for weakness. Negative for dizziness, tingling, tremors, sensory change, speech change, focal weakness, seizures, loss of consciousness and headaches.   Endo/Heme/Allergies: Does not bruise/bleed easily.   Psychiatric/Behavioral: Negative for depression, memory loss and suicidal ideas. The patient is not nervous/anxious.       Physical Exam  Laboratory/Imaging   Hemodynamics  Temp (24hrs), Av.4 °C (97.5 °F), Min:36.1 °C (97 °F), Max:36.7 °C (98 °F)   Temperature: 36.2 °C (97.2 °F)  Pulse  Av.6  Min: 56  Max: 89    Blood Pressure: 140/67      Respiratory      Respiration: 18, Pulse Oximetry: 95 %     Work Of Breathing / Effort: Mild  RUL Breath Sounds: Rhonchi, RML Breath Sounds: Rhonchi, RLL Breath Sounds: Diminished, DIANNE Breath Sounds: Rhonchi, LLL Breath Sounds: Diminished    Fluids    Intake/Output Summary (Last 24 hours) at 18 0952  Last data filed at  06/30/18 0400   Gross per 24 hour   Intake              270 ml   Output                0 ml   Net              270 ml       Nutrition  Orders Placed This Encounter   Procedures   • Diet NPO     Standing Status:   Standing     Number of Occurrences:   1     Order Specific Question:   Restrict to:     Answer:   Strict [1]     Physical Exam   Constitutional: She is oriented to person, place, and time. She appears well-developed and well-nourished. No distress.   HENT:   Head: Normocephalic and atraumatic.   Mouth/Throat: No oropharyngeal exudate.   Eyes: Conjunctivae are normal. Pupils are equal, round, and reactive to light. Right eye exhibits no discharge. No scleral icterus.   Neck: Neck supple. No JVD present. No thyromegaly present.   Cardiovascular: Intact distal pulses.    No murmur heard.  Pulmonary/Chest: Effort normal and breath sounds normal. No stridor. No respiratory distress. She has no wheezes. She has no rales.   Abdominal: Soft. Bowel sounds are normal. She exhibits no distension. There is no tenderness. There is no rebound.   Musculoskeletal: Normal range of motion. She exhibits no edema or tenderness.   Neurological: She is alert and oriented to person, place, and time. No cranial nerve deficit.   Skin: Skin is warm. She is not diaphoretic. No erythema.   Psychiatric: She has a normal mood and affect. Her behavior is normal. Thought content normal.       Recent Labs      06/28/18   0451   WBC  8.3   RBC  4.40   HEMOGLOBIN  13.2   HEMATOCRIT  41.1   MCV  93.4   MCH  30.0   MCHC  32.1*   RDW  46.8   PLATELETCT  SEE COMMENT     Recent Labs      06/28/18   0451  06/29/18   0433  06/30/18   0447   SODIUM  136  138  136   POTASSIUM  5.1  4.7  5.0   CHLORIDE  103  103  101   CO2  23  28  25   GLUCOSE  239*  227*  220*   BUN  78*  83*  85*   CREATININE  2.78*  2.41*  2.42*   CALCIUM  8.8  8.6  9.1                      Assessment/Plan     * Stroke (HCC)- (present on admission)   Assessment & Plan    MRI of  the brain [facility] eft medial cerebellar acute or subacute infarction with no hemorrhage.   Carotid Duplex: Antegrade left vertebral flow with high resistant may indicate distal occlusion  Continue ASA/Statin.  Echocardiogram: preserved LVEF.  Neurology signed off.          CKD stage 4 due to type 2 diabetes mellitus- (present on admission)   Assessment & Plan    Acute on chronic renal failure  Hx of PCKD  Nephrology following. No new recommendations  Cre 2.42<2.41<2.78 2.63<3.16  Avoid nephrotoxins.             Non-intractable vomiting with nausea   Assessment & Plan    Resolved.  IV anti emetics prn      Dysphagia   Assessment & Plan    SLP following, failed swallow, now using Cor track.  Dietary following.          History of GI bleed   Assessment & Plan    Pepcid GI ppx        History of DVT (deep vein thrombosis)   Assessment & Plan    Hx of DVT?PE , s/p IVC filter placement.  Hx of GI bleeding in past, Anticoagulation contraindicated.  DVT ppx dose ,   monitor for bleeding.          Type II diabetes mellitus (HCC)   Assessment & Plan    A1c 8.3   Controlled.  Continue Insulin-sliding scale, accu-checks and hypoglycemia protocol.        Essential hypertension- (present on admission)   Assessment & Plan    Controlled  Continue with Metoprolol and Norvasc.        Polycystic kidney disease- (present on admission)   Assessment & Plan    As per above,   Nephrology following.        Hyperlipemia- (present on admission)   Assessment & Plan    Cotninue lipitor.        Hypothyroid- (present on admission)   Assessment & Plan    TSH WNL.  Continue with home dose of synthroid.        Diabetic neuropathy (HCC)- (present on admission)   Assessment & Plan    Stable, will consider gabapentin when stable.     Assessment and plan reviewed in depth changes from yesterday at this point patient is waiting for placement.      Quality-Core Measures   Reviewed items::  EKG reviewed, Labs reviewed, Medications reviewed and  Radiology images reviewed  Wallace catheter::  No Wallace  DVT prophylaxis pharmacological::  Heparin  DVT prophylaxis - mechanical:  SCDs  Ulcer Prophylaxis::  Yes  Assessed for rehabilitation services:  Patient was assess for and/or received rehabilitation services during this hospitalization      Patient plan of care discussed at multidisplinary team rounds and with patient and R.N at beside.

## 2018-06-30 NOTE — CARE PLAN
Problem: Communication  Goal: The ability to communicate needs accurately and effectively will improve    Intervention: [place] patient and significant other/support system to call light to alert staff of needs  Able to make needs known, instructed to call for assistance      Problem: Pain Management  Goal: Pain level will decrease to patient's comfort goal    Intervention: Follow pain managment plan developed in collaboration with patient and Interdisciplinary Team  Complains of posterior head pain, medicated per MAR

## 2018-06-30 NOTE — PROGRESS NOTES
Nephrology Progress Note, Adult, Complex               Author: Fadi Najjar Date & Time created: 6/30/2018  3:03 PM     Interval History:  51 y/o female with CAMPOS/CKD IV, DMN, HTN, admitted with left cerebellar stroke  Renal US (+) for PCKD -d/w Pt -admitted diagnosed with PCKD several years ago  Still with vertigo, N/V this morning  c/o cough   Review of Systems:  Review of Systems   Constitutional: Negative for chills and fever.   Respiratory: Positive for cough and sputum production. Negative for shortness of breath.    Cardiovascular: Negative for chest pain, palpitations, orthopnea and leg swelling.   Gastrointestinal: Positive for nausea. Negative for abdominal pain, constipation and vomiting.   Genitourinary: Negative for dysuria, flank pain, frequency, hematuria and urgency.   Musculoskeletal: Positive for back pain. Negative for joint pain, myalgias and neck pain.   Neurological: Negative for dizziness and headaches.        Vertigo        Physical Exam:  Physical Exam   Constitutional: She is oriented to person, place, and time. She appears well-developed and well-nourished. No distress.   HENT:   Nose: Nose normal.   NGT   Eyes: Conjunctivae are normal. No scleral icterus.   Neck: No thyromegaly present.   Cardiovascular: Normal rate and regular rhythm.  Exam reveals no gallop and no friction rub.    Pulmonary/Chest: Effort normal. No respiratory distress. She has no wheezes. She has rhonchi. She has no rales.   Abdominal: Soft. Bowel sounds are normal. She exhibits no distension. There is no tenderness.   Musculoskeletal: She exhibits edema.   Lymphadenopathy:     She has no cervical adenopathy.   Neurological: She is alert and oriented to person, place, and time.   Skin: Skin is warm. She is not diaphoretic. No erythema.   Nursing note and vitals reviewed.      Labs:        Invalid input(s): [id number]      Recent Labs      06/28/18   0451  06/29/18   0433  06/30/18   0447   SODIUM  136  138  136    POTASSIUM  5.1  4.7  5.0   CHLORIDE  103  103  101   CO2  23  28  25   BUN  78*  83*  85*   CREATININE  2.78*  2.41*  2.42*   MAGNESIUM  2.2   --    --    CALCIUM  8.8  8.6  9.1     Recent Labs      187   ALTSGPT   --   5   --    ASTSGOT   --   14   --    ALKPHOSPHAT   --   79   --    TBILIRUBIN   --   0.3   --    GLUCOSE  239*  227*  220*     Recent Labs      18   RBC  4.40   HEMOGLOBIN  13.2   HEMATOCRIT  41.1   PLATELETCT  SEE COMMENT     Recent Labs      18   WBC  8.3   --    NEUTSPOLYS  72.50*   --    LYMPHOCYTES  15.70*   --    MONOCYTES  8.20   --    EOSINOPHILS  2.20   --    BASOPHILS  0.80   --    ASTSGOT   --   14   ALTSGPT   --   5   ALKPHOSPHAT   --   79   TBILIRUBIN   --   0.3           Hemodynamics:  Temp (24hrs), Av.4 °C (97.5 °F), Min:36.1 °C (97 °F), Max:36.7 °C (98 °F)  Temperature: 36.4 °C (97.6 °F)  Pulse  Av.5  Min: 56  Max: 89   Blood Pressure: 125/80     Respiratory:    Respiration: 18, Pulse Oximetry: 92 %     Work Of Breathing / Effort: Mild  RUL Breath Sounds: Rhonchi, RML Breath Sounds: Rhonchi, RLL Breath Sounds: Diminished, DIANNE Breath Sounds: Rhonchi, LLL Breath Sounds: Diminished  Fluids:    Intake/Output Summary (Last 24 hours) at 18 1503  Last data filed at 18 0400   Gross per 24 hour   Intake              270 ml   Output                0 ml   Net              270 ml        GI/Nutrition:  Orders Placed This Encounter   Procedures   • Diet NPO     Standing Status:   Standing     Number of Occurrences:   1     Order Specific Question:   Restrict to:     Answer:   Strict [1]     Medical Decision Making, by Problem:  Active Hospital Problems    Diagnosis   • *Stroke (HCC) [I63.9]   • CKD stage 4 due to type 2 diabetes mellitus [E11.22, N18.4]   • Dysphagia [R13.10]   • Diabetic neuropathy (HCC) [E11.40]   • Type II diabetes mellitus (HCC) [E11.9]   • History of DVT (deep vein  thrombosis) [Z86.718]   • History of GI bleed [Z87.19]   • Essential hypertension [I10]   • Hyperlipemia [E78.5]   • Hypothyroid [E03.9]       Quality-Core Measures   Reviewed items::  Labs reviewed    Assessment and plan:    1.CAMPOS/CKD IV /PCKD:cr stable  2.HTN: BP is better  3.Electrolytes:OK  4.Anemia:OK  5.Volume overload:better  6 resp failure  7 constipation     Recs:   no acute need for HD  Daily labs  Renal dose all meds  Avoid nephrotoxins  Prognosis guarded

## 2018-06-30 NOTE — PROGRESS NOTES
Patient alert and orient. Tolerating medications through NG well. IV clear and patent. Patient on stand by assist to commode, no last BM yet, but voids normally. Calls for assistance when needed.

## 2018-07-01 ENCOUNTER — APPOINTMENT (OUTPATIENT)
Dept: RADIOLOGY | Facility: MEDICAL CENTER | Age: 53
DRG: 064 | End: 2018-07-01
Attending: HOSPITALIST
Payer: COMMERCIAL

## 2018-07-01 LAB
ANION GAP SERPL CALC-SCNC: 10 MMOL/L (ref 0–11.9)
BUN SERPL-MCNC: 90 MG/DL (ref 8–22)
CALCIUM SERPL-MCNC: 9.1 MG/DL (ref 8.5–10.5)
CHLORIDE SERPL-SCNC: 102 MMOL/L (ref 96–112)
CO2 SERPL-SCNC: 26 MMOL/L (ref 20–33)
CREAT SERPL-MCNC: 2.78 MG/DL (ref 0.5–1.4)
GLUCOSE BLD-MCNC: 160 MG/DL (ref 65–99)
GLUCOSE BLD-MCNC: 163 MG/DL (ref 65–99)
GLUCOSE BLD-MCNC: 175 MG/DL (ref 65–99)
GLUCOSE BLD-MCNC: 180 MG/DL (ref 65–99)
GLUCOSE SERPL-MCNC: 206 MG/DL (ref 65–99)
POTASSIUM SERPL-SCNC: 5.2 MMOL/L (ref 3.6–5.5)
SODIUM SERPL-SCNC: 138 MMOL/L (ref 135–145)

## 2018-07-01 PROCEDURE — 700111 HCHG RX REV CODE 636 W/ 250 OVERRIDE (IP): Performed by: HOSPITALIST

## 2018-07-01 PROCEDURE — 770020 HCHG ROOM/CARE - TELE (206)

## 2018-07-01 PROCEDURE — 82962 GLUCOSE BLOOD TEST: CPT | Mod: 91

## 2018-07-01 PROCEDURE — 700111 HCHG RX REV CODE 636 W/ 250 OVERRIDE (IP): Performed by: FAMILY MEDICINE

## 2018-07-01 PROCEDURE — 80048 BASIC METABOLIC PNL TOTAL CA: CPT

## 2018-07-01 PROCEDURE — 700102 HCHG RX REV CODE 250 W/ 637 OVERRIDE(OP): Performed by: HOSPITALIST

## 2018-07-01 PROCEDURE — A9270 NON-COVERED ITEM OR SERVICE: HCPCS | Performed by: HOSPITALIST

## 2018-07-01 PROCEDURE — 99232 SBSQ HOSP IP/OBS MODERATE 35: CPT | Performed by: HOSPITALIST

## 2018-07-01 PROCEDURE — 36415 COLL VENOUS BLD VENIPUNCTURE: CPT

## 2018-07-01 RX ORDER — LORAZEPAM 2 MG/ML
0.5 INJECTION INTRAMUSCULAR ONCE
Status: COMPLETED | OUTPATIENT
Start: 2018-07-01 | End: 2018-07-01

## 2018-07-01 RX ADMIN — OXYCODONE HYDROCHLORIDE AND ACETAMINOPHEN 1 TABLET: 10; 325 TABLET ORAL at 15:12

## 2018-07-01 RX ADMIN — SENNOSIDES AND DOCUSATE SODIUM 2 TABLET: 8.6; 5 TABLET ORAL at 09:32

## 2018-07-01 RX ADMIN — HEPARIN SODIUM 5000 UNITS: 5000 INJECTION, SOLUTION INTRAVENOUS; SUBCUTANEOUS at 05:14

## 2018-07-01 RX ADMIN — GUAIFENESIN 200 MG: 100 SOLUTION ORAL at 02:00

## 2018-07-01 RX ADMIN — TEMAZEPAM 15 MG: 15 CAPSULE ORAL at 20:32

## 2018-07-01 RX ADMIN — METOCLOPRAMIDE 10 MG: 5 INJECTION, SOLUTION INTRAMUSCULAR; INTRAVENOUS at 13:08

## 2018-07-01 RX ADMIN — MECLIZINE HYDROCHLORIDE 25 MG: 25 TABLET ORAL at 20:32

## 2018-07-01 RX ADMIN — LEVOTHYROXINE SODIUM 112 MCG: 112 TABLET ORAL at 09:32

## 2018-07-01 RX ADMIN — HEPARIN SODIUM 5000 UNITS: 5000 INJECTION, SOLUTION INTRAVENOUS; SUBCUTANEOUS at 20:33

## 2018-07-01 RX ADMIN — PROCHLORPERAZINE EDISYLATE 10 MG: 5 INJECTION INTRAMUSCULAR; INTRAVENOUS at 13:53

## 2018-07-01 RX ADMIN — GUAIFENESIN 200 MG: 100 SOLUTION ORAL at 13:08

## 2018-07-01 RX ADMIN — ASPIRIN 324 MG: 81 TABLET, CHEWABLE ORAL at 09:33

## 2018-07-01 RX ADMIN — INSULIN HUMAN 1 UNITS: 100 INJECTION, SOLUTION PARENTERAL at 17:18

## 2018-07-01 RX ADMIN — OXYCODONE HYDROCHLORIDE AND ACETAMINOPHEN 1 TABLET: 10; 325 TABLET ORAL at 01:58

## 2018-07-01 RX ADMIN — AMLODIPINE BESYLATE 10 MG: 5 TABLET ORAL at 09:33

## 2018-07-01 RX ADMIN — METOCLOPRAMIDE 10 MG: 5 INJECTION, SOLUTION INTRAMUSCULAR; INTRAVENOUS at 05:15

## 2018-07-01 RX ADMIN — OXYCODONE HYDROCHLORIDE AND ACETAMINOPHEN 1 TABLET: 10; 325 TABLET ORAL at 18:30

## 2018-07-01 RX ADMIN — ATORVASTATIN CALCIUM 80 MG: 80 TABLET, FILM COATED ORAL at 20:32

## 2018-07-01 RX ADMIN — INSULIN HUMAN 1 UNITS: 100 INJECTION, SOLUTION PARENTERAL at 05:30

## 2018-07-01 RX ADMIN — BUTALBITAL, ACETAMINOPHEN, AND CAFFEINE 1 TABLET: 50; 325; 40 TABLET ORAL at 20:33

## 2018-07-01 RX ADMIN — METOCLOPRAMIDE 10 MG: 5 INJECTION, SOLUTION INTRAMUSCULAR; INTRAVENOUS at 01:38

## 2018-07-01 RX ADMIN — METOPROLOL TARTRATE 12.5 MG: 25 TABLET, FILM COATED ORAL at 09:33

## 2018-07-01 RX ADMIN — OXYCODONE HYDROCHLORIDE AND ACETAMINOPHEN 1 TABLET: 10; 325 TABLET ORAL at 10:35

## 2018-07-01 RX ADMIN — INSULIN HUMAN 1 UNITS: 100 INJECTION, SOLUTION PARENTERAL at 13:09

## 2018-07-01 RX ADMIN — METOPROLOL TARTRATE 12.5 MG: 25 TABLET, FILM COATED ORAL at 20:33

## 2018-07-01 RX ADMIN — HEPARIN SODIUM 5000 UNITS: 5000 INJECTION, SOLUTION INTRAVENOUS; SUBCUTANEOUS at 13:08

## 2018-07-01 RX ADMIN — SENNOSIDES AND DOCUSATE SODIUM 2 TABLET: 8.6; 5 TABLET ORAL at 20:32

## 2018-07-01 RX ADMIN — LORAZEPAM 0.5 MG: 2 INJECTION INTRAMUSCULAR; INTRAVENOUS at 05:18

## 2018-07-01 RX ADMIN — GUAIFENESIN 200 MG: 100 SOLUTION ORAL at 20:32

## 2018-07-01 RX ADMIN — FAMOTIDINE 20 MG: 20 TABLET ORAL at 09:33

## 2018-07-01 RX ADMIN — ONDANSETRON 4 MG: 2 INJECTION INTRAMUSCULAR; INTRAVENOUS at 02:00

## 2018-07-01 RX ADMIN — INSULIN HUMAN 1 UNITS: 100 INJECTION, SOLUTION PARENTERAL at 20:31

## 2018-07-01 ASSESSMENT — ENCOUNTER SYMPTOMS
STRIDOR: 0
BRUISES/BLEEDS EASILY: 0
PHOTOPHOBIA: 0
COUGH: 1
NAUSEA: 1
BACK PAIN: 1
SEIZURES: 0
SPUTUM PRODUCTION: 0
SORE THROAT: 0
SHORTNESS OF BREATH: 0
SPUTUM PRODUCTION: 1
PND: 0
EYE REDNESS: 0
FLANK PAIN: 0
MYALGIAS: 0
HEARTBURN: 0
CHILLS: 0
LOSS OF CONSCIOUSNESS: 0
VOMITING: 0
HEMOPTYSIS: 0
BLOOD IN STOOL: 0
NAUSEA: 0
ORTHOPNEA: 0
TREMORS: 0
WEIGHT LOSS: 0
NECK PAIN: 0
EYE DISCHARGE: 0
MYALGIAS: 1
SENSORY CHANGE: 0
DIAPHORESIS: 0
WEAKNESS: 1
PALPITATIONS: 0
EYE PAIN: 0
WHEEZING: 0
FOCAL WEAKNESS: 0
FEVER: 0
COUGH: 0
HEADACHES: 0
CLAUDICATION: 0
SPEECH CHANGE: 0
DIZZINESS: 0
FALLS: 0
MEMORY LOSS: 0
DOUBLE VISION: 0
DEPRESSION: 0
CONSTIPATION: 0
ABDOMINAL PAIN: 0
BLURRED VISION: 1
TINGLING: 0

## 2018-07-01 ASSESSMENT — PAIN SCALES - GENERAL
PAINLEVEL_OUTOF10: 2
PAINLEVEL_OUTOF10: 8
PAINLEVEL_OUTOF10: 7
PAINLEVEL_OUTOF10: 8
PAINLEVEL_OUTOF10: 4
PAINLEVEL_OUTOF10: 6

## 2018-07-01 NOTE — CARE PLAN
Problem: Safety  Goal: Will remain free from injury    Intervention: Educate patient and significant other/support system about adaptive mobility strategies and safe transfers  Up with stand by assist. Call light within reach. Bed alarm on.      Problem: Respiratory:  Goal: Respiratory status will improve    Intervention: Assess and monitor pulmonary status  Encouraged to use IS. Deep breathing exercises.

## 2018-07-01 NOTE — PROGRESS NOTES
Pt alert and oriented times four. On 2 liters of oxygen, continuous pulse oximetry, sats normal. Encouraged to use IS. Tube feeds running at 30cc/hr. Will increase rate as tolerated. Goal is 50cc/hr. Bed alarm on. Call light within reach. Will monitor.

## 2018-07-01 NOTE — PROGRESS NOTES
Cortrak Placement    Tube Team verified patient name and medical record number prior to tube placement.  Cortrak tube (43 inches, 10 [place]) placed at 70 cm in left nare.  Per Cortrak picture, tube appears to be in the stomach.  Nursing Instructions: Awaiting KUB to confirm placement before use for medications or feeding. Once placement confirmed, flush tube with 30 ml of water, and then remove and save stylet, in patient medication drawer.

## 2018-07-01 NOTE — PROGRESS NOTES
Nephrology Progress Note, Adult, Complex               Author: Fadi Najjar Date & Time created: 7/1/2018  1:54 PM     Interval History:  51 y/o female with CAMPOS/CKD IV, DMN, HTN, admitted with left cerebellar stroke  Renal US (+) for PCKD -d/w Pt -admitted diagnosed with PCKD several years ago  Still with vertigo, N/V this morning  c/o cough.  Requesting G tube placement   Review of Systems:  Review of Systems   Constitutional: Negative for chills and fever.   Respiratory: Positive for cough and sputum production. Negative for shortness of breath.    Cardiovascular: Negative for chest pain, palpitations, orthopnea and leg swelling.   Gastrointestinal: Positive for nausea. Negative for abdominal pain, constipation and vomiting.   Genitourinary: Negative for dysuria, flank pain, frequency, hematuria and urgency.   Musculoskeletal: Positive for back pain. Negative for joint pain, myalgias and neck pain.   Neurological: Negative for dizziness and headaches.        Vertigo        Physical Exam:  Physical Exam   Constitutional: She is oriented to person, place, and time. She appears well-developed and well-nourished. No distress.   HENT:   Nose: Nose normal.   NGT   Eyes: Conjunctivae are normal. No scleral icterus.   Neck: No thyromegaly present.   Cardiovascular: Normal rate and regular rhythm.  Exam reveals no gallop and no friction rub.    Pulmonary/Chest: Effort normal. No respiratory distress. She has no wheezes. She has rhonchi. She has no rales.   Abdominal: Soft. Bowel sounds are normal. She exhibits no distension. There is no tenderness.   Musculoskeletal: She exhibits edema.   Lymphadenopathy:     She has no cervical adenopathy.   Neurological: She is alert and oriented to person, place, and time.   Skin: Skin is warm. She is not diaphoretic. No erythema.   Nursing note and vitals reviewed.      Labs:        Invalid input(s): [id number]      Recent Labs      06/29/18   0433  06/30/18   0447  07/01/18   0511    SODIUM  138  136  138   POTASSIUM  4.7  5.0  5.2   CHLORIDE  103  101  102   CO2  28  25  26   BUN  83*  85*  90*   CREATININE  2.41*  2.42*  2.78*   CALCIUM  8.6  9.1  9.1     Recent Labs      18   0433  18   0447  18   0511   ALTSGPT  5   --    --    ASTSGOT  14   --    --    ALKPHOSPHAT  79   --    --    TBILIRUBIN  0.3   --    --    GLUCOSE  227*  220*  206*     No results for input(s): RBC, HEMOGLOBIN, HEMATOCRIT, PLATELETCT, PROTHROMBTM, APTT, INR, IRON, FERRITIN, TOTIRONBC in the last 72 hours.  Recent Labs      18   ASTSGOT  14   ALTSGPT  5   ALKPHOSPHAT  79   TBILIRUBIN  0.3           Hemodynamics:  Temp (24hrs), Av.7 °C (98 °F), Min:36.4 °C (97.6 °F), Max:36.8 °C (98.2 °F)  Temperature: 36.8 °C (98.2 °F)  Pulse  Av.8  Min: 56  Max: 89   Blood Pressure: 133/71     Respiratory:    Respiration: 16, Pulse Oximetry: 96 %     Work Of Breathing / Effort: Mild  RUL Breath Sounds: Crackles, RML Breath Sounds: Crackles, RLL Breath Sounds: Crackles, DIANNE Breath Sounds: Crackles, LLL Breath Sounds: Diminished  Fluids:    Intake/Output Summary (Last 24 hours) at 18 1354  Last data filed at 18 0218   Gross per 24 hour   Intake              475 ml   Output                0 ml   Net              475 ml     Weight: 92.1 kg (203 lb 0.7 oz)  GI/Nutrition:  Orders Placed This Encounter   Procedures   • Diet NPO     Standing Status:   Standing     Number of Occurrences:   1     Order Specific Question:   Restrict to:     Answer:   Strict [1]     Medical Decision Making, by Problem:  Active Hospital Problems    Diagnosis   • *Stroke (HCC) [I63.9]   • CKD stage 4 due to type 2 diabetes mellitus [E11.22, N18.4]   • Dysphagia [R13.10]   • Diabetic neuropathy (HCC) [E11.40]   • Type II diabetes mellitus (HCC) [E11.9]   • History of DVT (deep vein thrombosis) [Z86.718]   • History of GI bleed [Z87.19]   • Essential hypertension [I10]   • Hyperlipemia [E78.5]   • Hypothyroid  [E03.9]       Quality-Core Measures   Reviewed items::  Labs reviewed    Assessment and plan:    1.CAMPOS/CKD IV /PCKD:cr stable  2.HTN: BP is better  3.Electrolytes:OK  4.Anemia:OK  5.Volume overload:better  6 resp failure  Recs:   no acute need for HD  Daily labs  Renal dose all meds  Avoid nephrotoxins  Prognosis guarded

## 2018-07-01 NOTE — PROGRESS NOTES
[facility] Hospitalist Progress Note    Date of Service: 7/1/2018    Chief Complaint  52 y.o. female admitted 6/19/2018 with acute to subacute left medial cerebellar CVA. Patient was initially seen at an outside facility for nausea, vomiting, vision changes and vertigo. 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 Prior hyper-coaguable state workup negative.    Interval Problem Update  Cerebellar infarct - Stable. Neuro signed off.   Dysphagia - continues to struggle w secretions - emesis. Abd Xray 6/24 NGT ok. Added NGT suction of upper airway  IDDM - SSI  CKD-IV - Neph consulted. Creatinine waxing/waning - Follow BP/glucose closely.  PCKD - neph following  Hx GIB - no active signs of bleeding - restarted ASA  HTN - slightly worse. Add clonidine. CCB/BB. ACEI when cleared w Neph   Lipids - Max statin  Pain - on oral pain meds    6/26  No acute issues, patient comfortable, mild nausea but overall doing well.   Continue NGT with suction  Creatinine improved today, but still volume overloaded.   Nephrology following, CTM.  Continue RT protocol, duo nebs, Pep therapy if warranted, and incentive spirometry.       6/27  Improving clinically, still requiring NG tube  Renal functions improving creatinine 2.63 from 3.16  Pending placement options.    6/28  Renal function stable although creatinine fluctuating daily  Nephrology following  Discharge planning per social work  Continue tube feeds.  SLP following.    6/29  Patient stable, no acute issues, except for her back pain which she says current regimine has not been adequate for past several days. eager to go to rehab. Pending per   Continue speech therapy.  Continue tube feeds.  Will titrate her pain medications carefully.    6/30  No acute issues, patient sitting resting comfortable.   No complaints, pain is better  controlled  Continue to titrate pain medications.  Pending placement.      Patient eager to get a peg tube, however I want to give her every chance, I have reached out to speech to see if she has had any improvement for which we can wean her off of the cor track however if she still continues to feel will then proceed with a PEG tube placement.      Consultants/Specialty  Neurology  Nephrology    Disposition  TBD      Review of Systems   Constitutional: Positive for malaise/fatigue. Negative for chills, diaphoresis, fever and weight loss.   HENT: Negative for congestion, ear discharge, ear pain, hearing loss, nosebleeds, sore throat and tinnitus.         Positive for dysphonia   Eyes: Positive for blurred vision. Negative for double vision, photophobia, pain, discharge and redness.   Respiratory: Negative for cough, hemoptysis, sputum production, shortness of breath, wheezing and stridor.    Cardiovascular: Negative for chest pain, palpitations, orthopnea, claudication and PND.   Gastrointestinal: Negative for abdominal pain, blood in stool, constipation, heartburn, melena, nausea and vomiting.   Genitourinary: Negative for dysuria, frequency and urgency.   Musculoskeletal: Positive for back pain (stable) and myalgias (improved). Negative for falls, joint pain and neck pain.   Skin: Negative for rash.   Neurological: Positive for weakness. Negative for dizziness, tingling, tremors, sensory change, speech change, focal weakness, seizures, loss of consciousness and headaches.   Endo/Heme/Allergies: Does not bruise/bleed easily.   Psychiatric/Behavioral: Negative for depression, memory loss and suicidal ideas.      Physical Exam  Laboratory/Imaging   Hemodynamics  Temp (24hrs), Av.7 °C (98 °F), Min:36.4 °C (97.6 °F), Max:36.8 °C (98.2 °F)   Temperature: 36.8 °C (98.2 °F)  Pulse  Av.8  Min: 56  Max: 89    Blood Pressure: 133/71      Respiratory      Respiration: 16, Pulse Oximetry: 96 %     Work Of Breathing  / Effort: Mild  RUL Breath Sounds: Crackles, RML Breath Sounds: Crackles, RLL Breath Sounds: Crackles, DIANNE Breath Sounds: Crackles, LLL Breath Sounds: Diminished    Fluids    Intake/Output Summary (Last 24 hours) at 07/01/18 1308  Last data filed at 07/01/18 0218   Gross per 24 hour   Intake              475 ml   Output                0 ml   Net              475 ml       Nutrition  Orders Placed This Encounter   Procedures   • Diet NPO     Standing Status:   Standing     Number of Occurrences:   1     Order Specific Question:   Restrict to:     Answer:   Strict [1]     Physical Exam   Constitutional: She is oriented to person, place, and time. She appears well-developed and well-nourished. No distress.   HENT:   Head: Normocephalic and atraumatic.   Mouth/Throat: No oropharyngeal exudate.   Cor track in nostril   Eyes: Conjunctivae are normal. Pupils are equal, round, and reactive to light. Right eye exhibits no discharge. No scleral icterus.   Neck: Neck supple. No JVD present. No thyromegaly present.   Cardiovascular: Intact distal pulses.    No murmur heard.  Pulmonary/Chest: Effort normal and breath sounds normal. No stridor. No respiratory distress. She has no wheezes. She has no rales.   Abdominal: Soft. Bowel sounds are normal. She exhibits no distension. There is no tenderness. There is no rebound.   Musculoskeletal: Normal range of motion. She exhibits no edema or tenderness.   Neurological: She is alert and oriented to person, place, and time. No cranial nerve deficit.   Skin: Skin is warm. She is not diaphoretic. No erythema.   Psychiatric: She has a normal mood and affect. Her behavior is normal. Thought content normal.   Anxious             Recent Labs      06/29/18   0433  06/30/18   0447  07/01/18   0511   SODIUM  138  136  138   POTASSIUM  4.7  5.0  5.2   CHLORIDE  103  101  102   CO2  28  25  26   GLUCOSE  227*  220*  206*   BUN  83*  85*  90*   CREATININE  2.41*  2.42*  2.78*   CALCIUM  8.6  9.1   9.1                      Assessment/Plan     * Stroke (HCC)- (present on admission)   Assessment & Plan    MRI of the brain outlying facility., eft medial cerebellar acute or subacute infarction with no hemorrhage.   Carotid Duplex: Antegrade left vertebral flow with high resistant may indicate distal occlusion  Continue ASA/Statin.  Echocardiogram: preserved LVEF.  Neurology signed off.          CKD stage 4 due to type 2 diabetes mellitus- (present on admission)   Assessment & Plan    Acute on chronic renal failure  Hx of PCKD  Nephrology following. No new recommendations  Cre 2.78<2.42<2.41<2.78 2.63<3.16  Avoid nephrotoxins.             Non-intractable vomiting with nausea   Assessment & Plan    Resolved.  IV anti emetics prn      Dysphagia   Assessment & Plan    SLP following, failed swallow, now using Cor track.  Dietary following.          History of GI bleed   Assessment & Plan    Pepcid for GI ppx        History of DVT (deep vein thrombosis)   Assessment & Plan    Hx of DVT?PE , s/p IVC filter placement.  Hx of GI bleeding in past, Anticoagulation contraindicated.  DVT ppx dose , no signs of gross bleeding over the past several days.          Type II diabetes mellitus (HCC)   Assessment & Plan    A1c 8.3   Controlled.  Continue Insulin-sliding scale, accu-checks and hypoglycemia protocol.        Essential hypertension- (present on admission)   Assessment & Plan    Controlled  Continue with Metoprolol and Norvasc.        Polycystic kidney disease- (present on admission)   Assessment & Plan    As per above,   Nephrology following.        Hyperlipemia- (present on admission)   Assessment & Plan    Cotninue lipitor.        Hypothyroid- (present on admission)   Assessment & Plan    TSH WNL.  Continue with home dose of synthroid.        Diabetic neuropathy (HCC)- (present on admission)   Assessment & Plan    Stable, will consider gabapentin when stable.     Assessment and plan reviewed in depth at this point the  only issue is placement as well as PEG tube versus removal of cor track and dysphagia diet.  .      Quality-Core Measures   Reviewed items::  EKG reviewed, Labs reviewed, Medications reviewed and Radiology images reviewed  Wallace catheter::  No Wallace  DVT prophylaxis pharmacological::  Heparin  DVT prophylaxis - mechanical:  SCDs  Ulcer Prophylaxis::  Yes  Assessed for rehabilitation services:  Patient was assess for and/or received rehabilitation services during this hospitalization      Patient plan of care discussed at multidisplinary team rounds and with patient and R.N at [facility].

## 2018-07-02 LAB
ALBUMIN SERPL BCP-MCNC: 2.9 G/DL (ref 3.2–4.9)
ALBUMIN/GLOB SERPL: 0.7 G/DL
ALP SERPL-CCNC: 70 U/L (ref 30–99)
ALT SERPL-CCNC: 5 U/L (ref 2–50)
ANION GAP SERPL CALC-SCNC: 9 MMOL/L (ref 0–11.9)
AST SERPL-CCNC: 14 U/L (ref 12–45)
BASOPHILS # BLD AUTO: 1.3 % (ref 0–1.8)
BASOPHILS # BLD: 0.11 K/UL (ref 0–0.12)
BILIRUB SERPL-MCNC: 0.3 MG/DL (ref 0.1–1.5)
BUN SERPL-MCNC: 93 MG/DL (ref 8–22)
CALCIUM SERPL-MCNC: 9.5 MG/DL (ref 8.5–10.5)
CHLORIDE SERPL-SCNC: 105 MMOL/L (ref 96–112)
CO2 SERPL-SCNC: 24 MMOL/L (ref 20–33)
CREAT SERPL-MCNC: 2.42 MG/DL (ref 0.5–1.4)
CRP SERPL HS-MCNC: 4.71 MG/DL (ref 0–0.75)
EOSINOPHIL # BLD AUTO: 0.17 K/UL (ref 0–0.51)
EOSINOPHIL NFR BLD: 2 % (ref 0–6.9)
ERYTHROCYTE [DISTWIDTH] IN BLOOD BY AUTOMATED COUNT: 49.1 FL (ref 35.9–50)
GLOBULIN SER CALC-MCNC: 4 G/DL (ref 1.9–3.5)
GLUCOSE BLD-MCNC: 182 MG/DL (ref 65–99)
GLUCOSE BLD-MCNC: 186 MG/DL (ref 65–99)
GLUCOSE BLD-MCNC: 197 MG/DL (ref 65–99)
GLUCOSE BLD-MCNC: 224 MG/DL (ref 65–99)
GLUCOSE SERPL-MCNC: 207 MG/DL (ref 65–99)
HCT VFR BLD AUTO: 44.6 % (ref 37–47)
HGB BLD-MCNC: 13.6 G/DL (ref 12–16)
IMM GRANULOCYTES # BLD AUTO: 0.03 K/UL (ref 0–0.11)
IMM GRANULOCYTES NFR BLD AUTO: 0.4 % (ref 0–0.9)
LYMPHOCYTES # BLD AUTO: 1.6 K/UL (ref 1–4.8)
LYMPHOCYTES NFR BLD: 18.8 % (ref 22–41)
MAGNESIUM SERPL-MCNC: 2.6 MG/DL (ref 1.5–2.5)
MCH RBC QN AUTO: 29.7 PG (ref 27–33)
MCHC RBC AUTO-ENTMCNC: 30.5 G/DL (ref 33.6–35)
MCV RBC AUTO: 97.4 FL (ref 81.4–97.8)
MONOCYTES # BLD AUTO: 0.68 K/UL (ref 0–0.85)
MONOCYTES NFR BLD AUTO: 8 % (ref 0–13.4)
NEUTROPHILS # BLD AUTO: 5.92 K/UL (ref 2–7.15)
NEUTROPHILS NFR BLD: 69.5 % (ref 44–72)
NRBC # BLD AUTO: 0 K/UL
NRBC BLD-RTO: 0 /100 WBC
PLATELET # BLD AUTO: 275 K/UL (ref 164–446)
PMV BLD AUTO: 10.5 FL (ref 9–12.9)
POTASSIUM SERPL-SCNC: 4.9 MMOL/L (ref 3.6–5.5)
PREALB SERPL-MCNC: 15 MG/DL (ref 18–38)
PROT SERPL-MCNC: 6.9 G/DL (ref 6–8.2)
RBC # BLD AUTO: 4.58 M/UL (ref 4.2–5.4)
SODIUM SERPL-SCNC: 138 MMOL/L (ref 135–145)
WBC # BLD AUTO: 8.5 K/UL (ref 4.8–10.8)

## 2018-07-02 PROCEDURE — A9270 NON-COVERED ITEM OR SERVICE: HCPCS | Performed by: HOSPITALIST

## 2018-07-02 PROCEDURE — 84134 ASSAY OF PREALBUMIN: CPT

## 2018-07-02 PROCEDURE — 700102 HCHG RX REV CODE 250 W/ 637 OVERRIDE(OP): Performed by: NURSE PRACTITIONER

## 2018-07-02 PROCEDURE — 700111 HCHG RX REV CODE 636 W/ 250 OVERRIDE (IP): Performed by: FAMILY MEDICINE

## 2018-07-02 PROCEDURE — 82962 GLUCOSE BLOOD TEST: CPT | Mod: 91

## 2018-07-02 PROCEDURE — 83735 ASSAY OF MAGNESIUM: CPT

## 2018-07-02 PROCEDURE — 770020 HCHG ROOM/CARE - TELE (206)

## 2018-07-02 PROCEDURE — 700102 HCHG RX REV CODE 250 W/ 637 OVERRIDE(OP): Performed by: HOSPITALIST

## 2018-07-02 PROCEDURE — 99233 SBSQ HOSP IP/OBS HIGH 50: CPT | Performed by: INTERNAL MEDICINE

## 2018-07-02 PROCEDURE — 99232 SBSQ HOSP IP/OBS MODERATE 35: CPT | Performed by: HOSPITALIST

## 2018-07-02 PROCEDURE — 97535 SELF CARE MNGMENT TRAINING: CPT

## 2018-07-02 PROCEDURE — 92526 ORAL FUNCTION THERAPY: CPT

## 2018-07-02 PROCEDURE — 700111 HCHG RX REV CODE 636 W/ 250 OVERRIDE (IP): Performed by: HOSPITALIST

## 2018-07-02 PROCEDURE — 36415 COLL VENOUS BLD VENIPUNCTURE: CPT

## 2018-07-02 PROCEDURE — 80053 COMPREHEN METABOLIC PANEL: CPT

## 2018-07-02 PROCEDURE — A9270 NON-COVERED ITEM OR SERVICE: HCPCS | Performed by: NURSE PRACTITIONER

## 2018-07-02 PROCEDURE — 85025 COMPLETE CBC W/AUTO DIFF WBC: CPT

## 2018-07-02 PROCEDURE — 86140 C-REACTIVE PROTEIN: CPT

## 2018-07-02 RX ADMIN — OXYCODONE HYDROCHLORIDE AND ACETAMINOPHEN 1 TABLET: 10; 325 TABLET ORAL at 08:16

## 2018-07-02 RX ADMIN — GUAIFENESIN 200 MG: 100 SOLUTION ORAL at 22:12

## 2018-07-02 RX ADMIN — FAMOTIDINE 20 MG: 20 TABLET ORAL at 08:17

## 2018-07-02 RX ADMIN — LEVOTHYROXINE SODIUM 112 MCG: 112 TABLET ORAL at 08:16

## 2018-07-02 RX ADMIN — ATORVASTATIN CALCIUM 80 MG: 80 TABLET, FILM COATED ORAL at 22:12

## 2018-07-02 RX ADMIN — GUAIFENESIN 200 MG: 100 SOLUTION ORAL at 17:32

## 2018-07-02 RX ADMIN — INSULIN HUMAN 1 UNITS: 100 INJECTION, SOLUTION PARENTERAL at 21:54

## 2018-07-02 RX ADMIN — OXYCODONE HYDROCHLORIDE AND ACETAMINOPHEN 1 TABLET: 10; 325 TABLET ORAL at 13:01

## 2018-07-02 RX ADMIN — GUAIFENESIN 200 MG: 100 SOLUTION ORAL at 04:27

## 2018-07-02 RX ADMIN — OXYCODONE HYDROCHLORIDE AND ACETAMINOPHEN 1 TABLET: 10; 325 TABLET ORAL at 00:36

## 2018-07-02 RX ADMIN — INSULIN HUMAN 2 UNITS: 100 INJECTION, SOLUTION PARENTERAL at 17:46

## 2018-07-02 RX ADMIN — PROCHLORPERAZINE EDISYLATE 10 MG: 5 INJECTION INTRAMUSCULAR; INTRAVENOUS at 04:30

## 2018-07-02 RX ADMIN — PROCHLORPERAZINE EDISYLATE 10 MG: 5 INJECTION INTRAMUSCULAR; INTRAVENOUS at 22:02

## 2018-07-02 RX ADMIN — OXYCODONE HYDROCHLORIDE AND ACETAMINOPHEN 1 TABLET: 10; 325 TABLET ORAL at 22:02

## 2018-07-02 RX ADMIN — SENNOSIDES AND DOCUSATE SODIUM 2 TABLET: 8.6; 5 TABLET ORAL at 22:12

## 2018-07-02 RX ADMIN — GUAIFENESIN 200 MG: 100 SOLUTION ORAL at 08:16

## 2018-07-02 RX ADMIN — HEPARIN SODIUM 5000 UNITS: 5000 INJECTION, SOLUTION INTRAVENOUS; SUBCUTANEOUS at 06:12

## 2018-07-02 RX ADMIN — HEPARIN SODIUM 5000 UNITS: 5000 INJECTION, SOLUTION INTRAVENOUS; SUBCUTANEOUS at 22:12

## 2018-07-02 RX ADMIN — INSULIN HUMAN 1 UNITS: 100 INJECTION, SOLUTION PARENTERAL at 13:09

## 2018-07-02 RX ADMIN — METOCLOPRAMIDE 10 MG: 5 INJECTION, SOLUTION INTRAMUSCULAR; INTRAVENOUS at 23:46

## 2018-07-02 RX ADMIN — GUAIFENESIN 200 MG: 100 SOLUTION ORAL at 00:39

## 2018-07-02 RX ADMIN — CLONIDINE 1 PATCH: 0.1 PATCH TRANSDERMAL at 13:09

## 2018-07-02 RX ADMIN — OXYCODONE HYDROCHLORIDE AND ACETAMINOPHEN 1 TABLET: 10; 325 TABLET ORAL at 17:32

## 2018-07-02 RX ADMIN — ASPIRIN 325 MG: 325 TABLET ORAL at 08:16

## 2018-07-02 RX ADMIN — HEPARIN SODIUM 5000 UNITS: 5000 INJECTION, SOLUTION INTRAVENOUS; SUBCUTANEOUS at 13:01

## 2018-07-02 RX ADMIN — METOPROLOL TARTRATE 12.5 MG: 25 TABLET, FILM COATED ORAL at 08:16

## 2018-07-02 RX ADMIN — INSULIN HUMAN 1 UNITS: 100 INJECTION, SOLUTION PARENTERAL at 06:14

## 2018-07-02 RX ADMIN — PROCHLORPERAZINE EDISYLATE 10 MG: 5 INJECTION INTRAMUSCULAR; INTRAVENOUS at 13:01

## 2018-07-02 RX ADMIN — SENNOSIDES AND DOCUSATE SODIUM 2 TABLET: 8.6; 5 TABLET ORAL at 08:16

## 2018-07-02 RX ADMIN — MECLIZINE HYDROCHLORIDE 25 MG: 25 TABLET ORAL at 17:31

## 2018-07-02 RX ADMIN — AMLODIPINE BESYLATE 10 MG: 5 TABLET ORAL at 08:16

## 2018-07-02 RX ADMIN — OXYCODONE HYDROCHLORIDE AND ACETAMINOPHEN 1 TABLET: 10; 325 TABLET ORAL at 04:26

## 2018-07-02 RX ADMIN — PROCHLORPERAZINE EDISYLATE 10 MG: 5 INJECTION INTRAMUSCULAR; INTRAVENOUS at 08:17

## 2018-07-02 RX ADMIN — METOPROLOL TARTRATE 12.5 MG: 25 TABLET, FILM COATED ORAL at 22:12

## 2018-07-02 RX ADMIN — METOCLOPRAMIDE 10 MG: 5 INJECTION, SOLUTION INTRAMUSCULAR; INTRAVENOUS at 00:25

## 2018-07-02 RX ADMIN — PROCHLORPERAZINE EDISYLATE 10 MG: 5 INJECTION INTRAMUSCULAR; INTRAVENOUS at 17:32

## 2018-07-02 RX ADMIN — MECLIZINE HYDROCHLORIDE 25 MG: 25 TABLET ORAL at 04:26

## 2018-07-02 RX ADMIN — MECLIZINE HYDROCHLORIDE 25 MG: 25 TABLET ORAL at 08:17

## 2018-07-02 ASSESSMENT — ENCOUNTER SYMPTOMS
HEADACHES: 0
WHEEZING: 0
ABDOMINAL PAIN: 0
SORE THROAT: 0
COUGH: 1
STRIDOR: 0
TINGLING: 0
MYALGIAS: 1
BLOOD IN STOOL: 0
SHORTNESS OF BREATH: 0
VOMITING: 0
EYE DISCHARGE: 0
PHOTOPHOBIA: 0
PALPITATIONS: 0
HEMOPTYSIS: 0
CHILLS: 0
COUGH: 0
FALLS: 0
DIAPHORESIS: 0
NAUSEA: 0
BRUISES/BLEEDS EASILY: 0
SPUTUM PRODUCTION: 1
EYE PAIN: 0
MEMORY LOSS: 0
NECK PAIN: 0
NERVOUS/ANXIOUS: 1
MYALGIAS: 0
FLANK PAIN: 0
DOUBLE VISION: 0
LOSS OF CONSCIOUSNESS: 0
EYE REDNESS: 0
WEAKNESS: 1
FEVER: 0
SENSORY CHANGE: 0
CLAUDICATION: 0
ORTHOPNEA: 0
TREMORS: 0
DEPRESSION: 0
DIZZINESS: 0
BLURRED VISION: 1
CONSTIPATION: 0
BACK PAIN: 1
WEIGHT LOSS: 0
FOCAL WEAKNESS: 0
SPUTUM PRODUCTION: 0
SPEECH CHANGE: 0
PND: 0
SEIZURES: 0
HEARTBURN: 0

## 2018-07-02 ASSESSMENT — COGNITIVE AND FUNCTIONAL STATUS - GENERAL
SUGGESTED CMS G CODE MODIFIER DAILY ACTIVITY: CJ
DAILY ACTIVITIY SCORE: 21
DRESSING REGULAR LOWER BODY CLOTHING: A LOT
HELP NEEDED FOR BATHING: A LITTLE

## 2018-07-02 ASSESSMENT — PAIN SCALES - GENERAL
PAINLEVEL_OUTOF10: 8
PAINLEVEL_OUTOF10: 3
PAINLEVEL_OUTOF10: 2
PAINLEVEL_OUTOF10: 6
PAINLEVEL_OUTOF10: 8

## 2018-07-02 NOTE — PROGRESS NOTES
[facility] Hospitalist Progress Note    Date of Service: 7/2/2018    Chief Complaint  52 y.o. female admitted 6/19/2018 with acute to subacute left medial cerebellar CVA. Patient was initially seen at an outside facility for nausea, vomiting, vision changes and vertigo. 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 Prior hyper-coaguable state workup negative.    Interval Problem Update  Cerebellar infarct - Stable. Neuro signed off.   Dysphagia - continues to struggle w secretions - emesis. Abd Xray 6/24 NGT ok. Added NGT suction of upper airway  IDDM - SSI  CKD-IV - Neph consulted. Creatinine waxing/waning - Follow BP/glucose closely.  PCKD - neph following  Hx GIB - no active signs of bleeding - restarted ASA  HTN - slightly worse. Add clonidine. CCB/BB. ACEI when cleared w Neph   Lipids - Max statin  Pain - on oral pain meds    6/26  No acute issues, patient comfortable, mild nausea but overall doing well.   Continue NGT with suction  Creatinine improved today, but still volume overloaded.   Nephrology following, CTM.  Continue RT protocol, duo nebs, Pep therapy if warranted, and incentive spirometry.       6/27  Improving clinically, still requiring NG tube  Renal functions improving creatinine 2.63 from 3.16  Pending placement options.    6/28  Renal function stable although creatinine fluctuating daily  Nephrology following  Discharge planning per social work  Continue tube feeds.  SLP following.    6/29  Patient stable, no acute issues, except for her back pain which she says current regimine has not been adequate for past several days. eager to go to rehab. Pending per   Continue speech therapy.  Continue tube feeds.  Will titrate her pain medications carefully.    6/30  No acute issues, patient sitting resting comfortable.   No complaints, pain is better  controlled  Continue to titrate pain medications.  Pending placement.      Patient eager to get a peg tube, however I want to give her every chance, I have reached out to speech to see if she has had any improvement for which we can wean her off of the cor track however if she still continues to feel will then proceed with a PEG tube placement.      Patient very anxious this morning wants her PEG tube immediately I reassured her that we have consulted gastroenterology who will discuss this with her and most likely will undergo a PEG tube placement.    Consultants/Specialty  Neurology  Nephrology    Disposition  TBD      Review of Systems   Constitutional: Positive for malaise/fatigue. Negative for chills, diaphoresis, fever and weight loss.   HENT: Negative for congestion, ear discharge, ear pain, hearing loss, nosebleeds, sore throat and tinnitus.         Positive for dysphonia   Eyes: Positive for blurred vision. Negative for double vision, photophobia, pain, discharge and redness.   Respiratory: Negative for cough, hemoptysis, sputum production, shortness of breath, wheezing and stridor.    Cardiovascular: Negative for chest pain, palpitations, orthopnea, claudication and PND.   Gastrointestinal: Negative for abdominal pain, blood in stool, constipation, heartburn, melena, nausea and vomiting.   Genitourinary: Negative for dysuria, frequency and urgency.   Musculoskeletal: Positive for back pain and myalgias. Negative for falls, joint pain and neck pain.   Skin: Negative for rash.   Neurological: Positive for weakness. Negative for dizziness, tingling, tremors, sensory change, speech change, focal weakness, seizures, loss of consciousness and headaches.   Endo/Heme/Allergies: Does not bruise/bleed easily.   Psychiatric/Behavioral: Negative for depression, memory loss and suicidal ideas. The patient is nervous/anxious.       Physical Exam  Laboratory/Imaging   Hemodynamics  Temp (24hrs), Av.4 °C (97.6  °F), Min:36.1 °C (97 °F), Max:36.7 °C (98 °F)   Temperature: 36.6 °C (97.8 °F)  Pulse  Av.7  Min: 56  Max: 89    Blood Pressure: 134/69      Respiratory      Respiration: 18, Pulse Oximetry: 93 %        RUL Breath Sounds: Coarse Crackles, RML Breath Sounds: Coarse Crackles, RLL Breath Sounds: Coarse Crackles, DIANNE Breath Sounds: Coarse Crackles, LLL Breath Sounds: Coarse Crackles    Fluids    Intake/Output Summary (Last 24 hours) at 18 1400  Last data filed at 18 0444   Gross per 24 hour   Intake             1124 ml   Output                0 ml   Net             1124 ml       Nutrition  Orders Placed This Encounter   Procedures   • Diet NPO     Standing Status:   Standing     Number of Occurrences:   1     Order Specific Question:   Restrict to:     Answer:   Strict [1]     Physical Exam   Constitutional: She is oriented to person, place, and time. She appears well-developed and well-nourished. No distress.   HENT:   Head: Normocephalic and atraumatic.   Mouth/Throat: No oropharyngeal exudate.   Cor track in nostril   Eyes: Conjunctivae are normal. Pupils are equal, round, and reactive to light. Right eye exhibits no discharge. No scleral icterus.   Neck: Neck supple. No JVD present. No thyromegaly present.   Cardiovascular: Normal rate and intact distal pulses.    No murmur heard.  Pulmonary/Chest: Effort normal and breath sounds normal. No stridor. No respiratory distress. She has no wheezes. She has no rales.   Abdominal: Soft. Bowel sounds are normal. She exhibits no distension. There is no tenderness. There is no rebound.   Musculoskeletal: Normal range of motion. She exhibits no edema or tenderness.   Neurological: She is alert and oriented to person, place, and time. No cranial nerve deficit.   Skin: Skin is warm. She is not diaphoretic. No erythema.   Psychiatric: She has a normal mood and affect. Her behavior is normal. Judgment and thought content normal.   Anxious         Recent Labs       07/02/18   0322   WBC  8.5   RBC  4.58   HEMOGLOBIN  13.6   HEMATOCRIT  44.6   MCV  97.4   MCH  29.7   MCHC  30.5*   RDW  49.1   PLATELETCT  275   MPV  10.5     Recent Labs      06/30/18   0447  07/01/18   0511  07/02/18   0322   SODIUM  136  138  138   POTASSIUM  5.0  5.2  4.9   CHLORIDE  101  102  105   CO2  25  26  24   GLUCOSE  220*  206*  207*   BUN  85*  90*  93*   CREATININE  2.42*  2.78*  2.42*   CALCIUM  9.1  9.1  9.5                      Assessment/Plan     Type II diabetes mellitus (HCC)  A1c 8.3   Controlled.  Continue Insulin-sliding scale, accu-checks and hypoglycemia protocol.        History of DVT (deep vein thrombosis)  Hx of DVT?PE , s/p IVC filter placement.  Hx of GI bleeding in past, Anticoagulation contraindicated.  DVT ppx dose , no signs of gross bleeding over the past several days.      CKD stage 4 due to type 2 diabetes mellitus  Acute on chronic renal failure  Hx of PCKD  Nephrology following. No new recommendations  Cre 2.42<2.78<2.42<2.41<2.78 2.63<3.16  Avoid nephrotoxins.             Stroke ([facility])  MRI of the brain outlying facility., eft medial cerebellar acute or subacute infarction with no hemorrhage.   Carotid Duplex: Antegrade left vertebral flow with high resistant may indicate distal occlusion  Continue ASA/Statin.  Echocardiogram: preserved LVEF.  Neurology signed off.          Essential hypertension  Controlled  Continue with Metoprolol and Norvasc.      History of GI bleed  Pepcid for GI ppx      Hyperlipemia  Cotninue lipitor.    Hypothyroid  TSH WNL.  Continue with home dose of synthroid.    Dysphagia  SLP following, failed swallow, patient has a core tract  Pending gastroenterology consultation for possible PEG tube placement.      Diabetic neuropathy (HCC)  Stable, start low dose gabapentin    Polycystic kidney disease  As per above,   Nephrology following.      Non-intractable vomiting with nausea  Resolved.  IV anti emetics prn      Quality-Core Measures   Reviewed  items::  EKG reviewed, Labs reviewed, Medications reviewed and Radiology images reviewed  Wallace catheter::  No Wallace  DVT prophylaxis pharmacological::  Heparin  DVT prophylaxis - mechanical:  SCDs  Ulcer Prophylaxis::  Yes  Assessed for rehabilitation services:  Patient was assess for and/or received rehabilitation services during this hospitalization      Patient plan of care discussed at multidisplinary team rounds and with patient and R.N at [facility].

## 2018-07-02 NOTE — PROGRESS NOTES
Nephrology Progress Note, Adult, Complex               Author: Darlyn Patti Date & Time created: 7/2/2018  1:52 PM     Interval History:  53 y/o female with CAMPOS/CKD IV, DMN, HTN, admitted with left cerebellar stroke  Renal US (+) for PCKD -d/w Pt -admitted diagnosed with PCKD several years ago  C/o cough, fatigue  Difficulties to swallow  Good UOP  Creat better    Review of Systems:  Review of Systems   Constitutional: Positive for malaise/fatigue. Negative for chills and fever.   Respiratory: Positive for cough and sputum production. Negative for shortness of breath.    Cardiovascular: Negative for chest pain, palpitations, orthopnea and leg swelling.   Gastrointestinal: Negative for abdominal pain, constipation, nausea and vomiting.   Genitourinary: Negative for dysuria, flank pain, frequency, hematuria and urgency.   Musculoskeletal: Positive for back pain. Negative for joint pain, myalgias and neck pain.   Neurological: Negative for dizziness, sensory change, focal weakness and headaches.        Vertigo        Physical Exam:  Physical Exam   Constitutional: She is oriented to person, place, and time. She appears well-developed and well-nourished. No distress.   HENT:   Nose: Nose normal.   NGT   Eyes: Conjunctivae are normal. No scleral icterus.   Neck: No thyromegaly present.   Cardiovascular: Normal rate and regular rhythm.  Exam reveals no gallop and no friction rub.    Pulmonary/Chest: Effort normal. No respiratory distress. She has no wheezes. She has no rhonchi. She has no rales.   Abdominal: Soft. Bowel sounds are normal. She exhibits no distension. There is no tenderness.   Musculoskeletal: She exhibits no edema.   Lymphadenopathy:     She has no cervical adenopathy.   Neurological: She is alert and oriented to person, place, and time.   Skin: Skin is warm. She is not diaphoretic. No erythema.   Nursing note and vitals reviewed.      Labs:        Invalid input(s): [id number]      Recent Labs       18   0511  18   SODIUM  136  138  138   POTASSIUM  5.0  5.2  4.9   CHLORIDE  101  102  105   CO2  25  26  24   BUN  85*  90*  93*   CREATININE  2.42*  2.78*  2.42*   MAGNESIUM   --    --   2.6*   CALCIUM  9.1  9.1  9.5     Recent Labs      18   0511  18   032   ALTSGPT   --    --   5   ASTSGOT   --    --   14   ALKPHOSPHAT   --    --   70   TBILIRUBIN   --    --   0.3   PREALBUMIN   --    --   15.0*   GLUCOSE  220*  206*  207*     Recent Labs      18   RBC  4.58   HEMOGLOBIN  13.6   HEMATOCRIT  44.6   PLATELETCT  275     Recent Labs      18   032   WBC  8.5   NEUTSPOLYS  69.50   LYMPHOCYTES  18.80*   MONOCYTES  8.00   EOSINOPHILS  2.00   BASOPHILS  1.30   ASTSGOT  14   ALTSGPT  5   ALKPHOSPHAT  70   TBILIRUBIN  0.3           Hemodynamics:  Temp (24hrs), Av.4 °C (97.6 °F), Min:36.1 °C (97 °F), Max:36.7 °C (98 °F)  Temperature: 36.6 °C (97.8 °F)  Pulse  Av.7  Min: 56  Max: 89   Blood Pressure: 134/69     Respiratory:    Respiration: 18, Pulse Oximetry: 93 %        RUL Breath Sounds: Coarse Crackles, RML Breath Sounds: Coarse Crackles, RLL Breath Sounds: Coarse Crackles, DIANNE Breath Sounds: Coarse Crackles, LLL Breath Sounds: Coarse Crackles  Fluids:    Intake/Output Summary (Last 24 hours) at 18 1352  Last data filed at 18 0444   Gross per 24 hour   Intake             1124 ml   Output                0 ml   Net             1124 ml        GI/Nutrition:  Orders Placed This Encounter   Procedures   • Diet NPO     Standing Status:   Standing     Number of Occurrences:   1     Order Specific Question:   Restrict to:     Answer:   Strict [1]     Medical Decision Making, by Problem:  Active Hospital Problems    Diagnosis   • *Stroke (HCC) [I63.9]   • CKD stage 4 due to type 2 diabetes mellitus [E11.22, N18.4]   • Dysphagia [R13.10]   • Diabetic neuropathy (HCC) [E11.40]   • Type II diabetes mellitus (HCC) [E11.9]   •  History of DVT (deep vein thrombosis) [Z86.718]   • History of GI bleed [Z87.19]   • Essential hypertension [I10]   • Hyperlipemia [E78.5]   • Hypothyroid [E03.9]       Quality-Core Measures   Reviewed items::  Labs reviewed    Assessment and plan:    1.CAMPOS/CKD IV /PCKD:cr improving  2.HTN: BP  Well controlled  3.Electrolytes:well controlled  4.Anemia:Hb stable - WNL  5.Volume overload:better  6 CVA  Recs:   no acute need for HD  Daily labs  Renal dose all meds  Avoid nephrotoxins  Prognosis guarded

## 2018-07-02 NOTE — PROGRESS NOTES
Pt has been very upset and uncomfortable most of the day.  She voiced being very upset about another cortrak being placed after hers came out last night.  She is now stating that she wants to have a PEG tube placed instead of the NG tube.  Dr. Avery was made aware and stated that after getting a swallow eval tomorrow they will discuss further need for a PEG tube. She agreed with that plan. Family members have been in and out of her room most of the day.  She has been resting comfortably after receiving percocet twice today.

## 2018-07-02 NOTE — DISCHARGE PLANNING
Agency/Facility Name: [facility]  Spoke To: Felisha  Outcome: Updated notes faxed [facility] per Felisha's request.  Insurance auth received. Marissa([facility]) notified.

## 2018-07-02 NOTE — DISCHARGE PLANNING
Spoke To: Marissa([facility])  Plan or Request: Transport to [facility] today @ 1600 canceled and rescheduled for tomorrow 7/3 @ 1200 per Marissa's([facility]) request. Felisha(Rehab) updated via phone.

## 2018-07-02 NOTE — DISCHARGE PLANNING
Received Transport Form @ 1200 from Marissa([facility])  Spoke to Brittney @ [facility] transport    Transport is scheduled for 7/2 @ 1600 going to [facility]. Marissa([facility]) and Phoebe([facility] notified of transport time.

## 2018-07-02 NOTE — DISCHARGE PLANNING
Anticipated Discharge Disposition: IRF    Action: LSW spoke with Dr. Avery. Pt is medically clear to dc to [facility]    LSW faxed transport request form to [facility]    LSW notified by Stacy LU that pt will be transported to [facility] at 1600    Pt discussed during rounds. Pt is awaiting a Peg tube. MD signed COBRA.  LSW moved transport to IRF for 7/3 at 12pm      Barriers to Discharge: Medical Clearance    Plan: Pt to dc to [facility] once medically clear

## 2018-07-02 NOTE — PROGRESS NOTES
Pt tolerating tube feeds at 30cc/hr. Will increase to goal per protocol. States nausea has eased a bit. She wants to have peg tube. On 2 liters of oxygen, oxygen saturation normal. Makes needs known. Will monitor closely.

## 2018-07-02 NOTE — CARE PLAN
Problem: Bowel/Gastric:  Goal: Normal bowel function is maintained or improved  Outcome: PROGRESSING SLOWER THAN EXPECTED  No BM since the 26th per pt. Stool softners administered.    Problem: Pain Management  Goal: Pain level will decrease to patient's comfort goal  Outcome: PROGRESSING AS EXPECTED  States pain meds help some.

## 2018-07-03 LAB
ALBUMIN SERPL BCP-MCNC: 3.2 G/DL (ref 3.2–4.9)
ALBUMIN/GLOB SERPL: 0.9 G/DL
ALP SERPL-CCNC: 71 U/L (ref 30–99)
ALT SERPL-CCNC: 7 U/L (ref 2–50)
ANION GAP SERPL CALC-SCNC: 7 MMOL/L (ref 0–11.9)
AST SERPL-CCNC: 13 U/L (ref 12–45)
BASOPHILS # BLD AUTO: 0.9 % (ref 0–1.8)
BASOPHILS # BLD: 0.07 K/UL (ref 0–0.12)
BILIRUB SERPL-MCNC: 0.3 MG/DL (ref 0.1–1.5)
BUN SERPL-MCNC: 90 MG/DL (ref 8–22)
CALCIUM SERPL-MCNC: 9.1 MG/DL (ref 8.5–10.5)
CHLORIDE SERPL-SCNC: 103 MMOL/L (ref 96–112)
CO2 SERPL-SCNC: 26 MMOL/L (ref 20–33)
CREAT SERPL-MCNC: 2.79 MG/DL (ref 0.5–1.4)
EOSINOPHIL # BLD AUTO: 0.21 K/UL (ref 0–0.51)
EOSINOPHIL NFR BLD: 2.7 % (ref 0–6.9)
ERYTHROCYTE [DISTWIDTH] IN BLOOD BY AUTOMATED COUNT: 46.4 FL (ref 35.9–50)
GLOBULIN SER CALC-MCNC: 3.5 G/DL (ref 1.9–3.5)
GLUCOSE BLD-MCNC: 105 MG/DL (ref 65–99)
GLUCOSE BLD-MCNC: 136 MG/DL (ref 65–99)
GLUCOSE BLD-MCNC: 137 MG/DL (ref 65–99)
GLUCOSE BLD-MCNC: 157 MG/DL (ref 65–99)
GLUCOSE SERPL-MCNC: 167 MG/DL (ref 65–99)
HCT VFR BLD AUTO: 39.8 % (ref 37–47)
HGB BLD-MCNC: 12.6 G/DL (ref 12–16)
IMM GRANULOCYTES # BLD AUTO: 0.03 K/UL (ref 0–0.11)
IMM GRANULOCYTES NFR BLD AUTO: 0.4 % (ref 0–0.9)
INR PPP: 1.03 (ref 0.87–1.13)
LYMPHOCYTES # BLD AUTO: 1.53 K/UL (ref 1–4.8)
LYMPHOCYTES NFR BLD: 19.3 % (ref 22–41)
MAGNESIUM SERPL-MCNC: 2.5 MG/DL (ref 1.5–2.5)
MCH RBC QN AUTO: 30 PG (ref 27–33)
MCHC RBC AUTO-ENTMCNC: 31.7 G/DL (ref 33.6–35)
MCV RBC AUTO: 94.8 FL (ref 81.4–97.8)
MONOCYTES # BLD AUTO: 0.67 K/UL (ref 0–0.85)
MONOCYTES NFR BLD AUTO: 8.5 % (ref 0–13.4)
NEUTROPHILS # BLD AUTO: 5.41 K/UL (ref 2–7.15)
NEUTROPHILS NFR BLD: 68.2 % (ref 44–72)
NRBC # BLD AUTO: 0 K/UL
NRBC BLD-RTO: 0 /100 WBC
PLATELET # BLD AUTO: 276 K/UL (ref 164–446)
PMV BLD AUTO: 10.7 FL (ref 9–12.9)
POTASSIUM SERPL-SCNC: 4.9 MMOL/L (ref 3.6–5.5)
PROT SERPL-MCNC: 6.7 G/DL (ref 6–8.2)
PROTHROMBIN TIME: 13.2 SEC (ref 12–14.6)
RBC # BLD AUTO: 4.2 M/UL (ref 4.2–5.4)
SODIUM SERPL-SCNC: 136 MMOL/L (ref 135–145)
WBC # BLD AUTO: 7.9 K/UL (ref 4.8–10.8)

## 2018-07-03 PROCEDURE — 0DH63UZ INSERTION OF FEEDING DEVICE INTO STOMACH, PERCUTANEOUS APPROACH: ICD-10-PCS | Performed by: INTERNAL MEDICINE

## 2018-07-03 PROCEDURE — 160208 HCHG ENDO MINUTES - EA ADDL 1 MIN LEVEL 4: Performed by: INTERNAL MEDICINE

## 2018-07-03 PROCEDURE — 700102 HCHG RX REV CODE 250 W/ 637 OVERRIDE(OP): Performed by: HOSPITALIST

## 2018-07-03 PROCEDURE — 99231 SBSQ HOSP IP/OBS SF/LOW 25: CPT | Performed by: INTERNAL MEDICINE

## 2018-07-03 PROCEDURE — 500066 HCHG BITE BLOCK, ECT: Performed by: INTERNAL MEDICINE

## 2018-07-03 PROCEDURE — 36415 COLL VENOUS BLD VENIPUNCTURE: CPT

## 2018-07-03 PROCEDURE — 0DJ08ZZ INSPECTION OF UPPER INTESTINAL TRACT, VIA NATURAL OR ARTIFICIAL OPENING ENDOSCOPIC: ICD-10-PCS | Performed by: INTERNAL MEDICINE

## 2018-07-03 PROCEDURE — 110372 HCHG SHELL REV 278: Performed by: INTERNAL MEDICINE

## 2018-07-03 PROCEDURE — 85025 COMPLETE CBC W/AUTO DIFF WBC: CPT

## 2018-07-03 PROCEDURE — 700111 HCHG RX REV CODE 636 W/ 250 OVERRIDE (IP): Performed by: HOSPITALIST

## 2018-07-03 PROCEDURE — 3E0G76Z INTRODUCTION OF NUTRITIONAL SUBSTANCE INTO UPPER GI, VIA NATURAL OR ARTIFICIAL OPENING: ICD-10-PCS | Performed by: INTERNAL MEDICINE

## 2018-07-03 PROCEDURE — 99232 SBSQ HOSP IP/OBS MODERATE 35: CPT | Performed by: HOSPITALIST

## 2018-07-03 PROCEDURE — A9270 NON-COVERED ITEM OR SERVICE: HCPCS | Performed by: HOSPITALIST

## 2018-07-03 PROCEDURE — 700111 HCHG RX REV CODE 636 W/ 250 OVERRIDE (IP)

## 2018-07-03 PROCEDURE — 82962 GLUCOSE BLOOD TEST: CPT | Mod: 91

## 2018-07-03 PROCEDURE — 83735 ASSAY OF MAGNESIUM: CPT

## 2018-07-03 PROCEDURE — 99153 MOD SED SAME PHYS/QHP EA: CPT | Performed by: INTERNAL MEDICINE

## 2018-07-03 PROCEDURE — 160203 HCHG ENDO MINUTES - 1ST 30 MINS LEVEL 4: Performed by: INTERNAL MEDICINE

## 2018-07-03 PROCEDURE — 700111 HCHG RX REV CODE 636 W/ 250 OVERRIDE (IP): Performed by: FAMILY MEDICINE

## 2018-07-03 PROCEDURE — 85610 PROTHROMBIN TIME: CPT

## 2018-07-03 PROCEDURE — 99152 MOD SED SAME PHYS/QHP 5/>YRS: CPT | Performed by: INTERNAL MEDICINE

## 2018-07-03 PROCEDURE — 770006 HCHG ROOM/CARE - MED/SURG/GYN SEMI*

## 2018-07-03 PROCEDURE — 160048 HCHG OR STATISTICAL LEVEL 1-5: Performed by: INTERNAL MEDICINE

## 2018-07-03 PROCEDURE — 80053 COMPREHEN METABOLIC PANEL: CPT

## 2018-07-03 DEVICE — KIT 20 FRENCH PULL SAFETY PEG: Type: IMPLANTABLE DEVICE | Status: FUNCTIONAL

## 2018-07-03 RX ORDER — SODIUM CHLORIDE 9 MG/ML
500 INJECTION, SOLUTION INTRAVENOUS
Status: ACTIVE | OUTPATIENT
Start: 2018-07-03 | End: 2018-07-03

## 2018-07-03 RX ORDER — GLYCOPYRROLATE 1 MG/1
0.5 TABLET ORAL 2 TIMES DAILY
Status: DISCONTINUED | OUTPATIENT
Start: 2018-07-03 | End: 2018-07-06

## 2018-07-03 RX ORDER — MIDAZOLAM HYDROCHLORIDE 1 MG/ML
.5-2 INJECTION INTRAMUSCULAR; INTRAVENOUS PRN
Status: ACTIVE | OUTPATIENT
Start: 2018-07-03 | End: 2018-07-03

## 2018-07-03 RX ORDER — MIDAZOLAM HYDROCHLORIDE 1 MG/ML
INJECTION INTRAMUSCULAR; INTRAVENOUS
Status: DISCONTINUED | OUTPATIENT
Start: 2018-07-03 | End: 2018-07-03 | Stop reason: HOSPADM

## 2018-07-03 RX ADMIN — OXYCODONE HYDROCHLORIDE AND ACETAMINOPHEN 1 TABLET: 10; 325 TABLET ORAL at 06:50

## 2018-07-03 RX ADMIN — METOPROLOL TARTRATE 12.5 MG: 25 TABLET, FILM COATED ORAL at 21:41

## 2018-07-03 RX ADMIN — GUAIFENESIN 200 MG: 100 SOLUTION ORAL at 22:50

## 2018-07-03 RX ADMIN — POLYETHYLENE GLYCOL 3350 1 PACKET: 17 POWDER, FOR SOLUTION ORAL at 07:44

## 2018-07-03 RX ADMIN — SENNOSIDES AND DOCUSATE SODIUM 2 TABLET: 8.6; 5 TABLET ORAL at 07:35

## 2018-07-03 RX ADMIN — SENNOSIDES AND DOCUSATE SODIUM 2 TABLET: 8.6; 5 TABLET ORAL at 21:41

## 2018-07-03 RX ADMIN — OXYCODONE HYDROCHLORIDE AND ACETAMINOPHEN 1 TABLET: 10; 325 TABLET ORAL at 13:07

## 2018-07-03 RX ADMIN — OXYCODONE HYDROCHLORIDE AND ACETAMINOPHEN 1 TABLET: 10; 325 TABLET ORAL at 02:09

## 2018-07-03 RX ADMIN — AMLODIPINE BESYLATE 10 MG: 5 TABLET ORAL at 07:37

## 2018-07-03 RX ADMIN — INSULIN HUMAN 1 UNITS: 100 INJECTION, SOLUTION PARENTERAL at 06:48

## 2018-07-03 RX ADMIN — PROCHLORPERAZINE EDISYLATE 10 MG: 5 INJECTION INTRAMUSCULAR; INTRAVENOUS at 07:32

## 2018-07-03 RX ADMIN — METOCLOPRAMIDE 10 MG: 5 INJECTION, SOLUTION INTRAMUSCULAR; INTRAVENOUS at 11:03

## 2018-07-03 RX ADMIN — PROCHLORPERAZINE EDISYLATE 10 MG: 5 INJECTION INTRAMUSCULAR; INTRAVENOUS at 02:09

## 2018-07-03 RX ADMIN — BUTALBITAL, ACETAMINOPHEN, AND CAFFEINE 1 TABLET: 50; 325; 40 TABLET ORAL at 10:55

## 2018-07-03 RX ADMIN — GLYCOPYRROLATE 0.5 MG: 1 TABLET ORAL at 21:41

## 2018-07-03 RX ADMIN — METOPROLOL TARTRATE 12.5 MG: 25 TABLET, FILM COATED ORAL at 07:36

## 2018-07-03 RX ADMIN — FAMOTIDINE 20 MG: 20 TABLET ORAL at 07:36

## 2018-07-03 RX ADMIN — ATORVASTATIN CALCIUM 80 MG: 80 TABLET, FILM COATED ORAL at 21:41

## 2018-07-03 RX ADMIN — LEVOTHYROXINE SODIUM 112 MCG: 112 TABLET ORAL at 07:36

## 2018-07-03 RX ADMIN — BUTALBITAL, ACETAMINOPHEN, AND CAFFEINE 1 TABLET: 50; 325; 40 TABLET ORAL at 04:26

## 2018-07-03 RX ADMIN — PROCHLORPERAZINE EDISYLATE 10 MG: 5 INJECTION INTRAMUSCULAR; INTRAVENOUS at 18:31

## 2018-07-03 RX ADMIN — HEPARIN SODIUM 5000 UNITS: 5000 INJECTION, SOLUTION INTRAVENOUS; SUBCUTANEOUS at 06:50

## 2018-07-03 RX ADMIN — BUTALBITAL, ACETAMINOPHEN, AND CAFFEINE 1 TABLET: 50; 325; 40 TABLET ORAL at 18:28

## 2018-07-03 RX ADMIN — OXYCODONE HYDROCHLORIDE AND ACETAMINOPHEN 1 TABLET: 10; 325 TABLET ORAL at 22:02

## 2018-07-03 ASSESSMENT — ENCOUNTER SYMPTOMS
PALPITATIONS: 0
DIARRHEA: 0
COUGH: 0
MYALGIAS: 0
CHILLS: 0
EYE DISCHARGE: 0
SENSORY CHANGE: 0
FLANK PAIN: 0
ABDOMINAL PAIN: 0
HALLUCINATIONS: 0
EYE REDNESS: 0
COUGH: 1
CONSTIPATION: 0
SHORTNESS OF BREATH: 0
NECK PAIN: 0
SPUTUM PRODUCTION: 1
STRIDOR: 0
FOCAL WEAKNESS: 0
MYALGIAS: 1
DIZZINESS: 0
FEVER: 0
WEAKNESS: 1
VOMITING: 0
WHEEZING: 0
BACK PAIN: 1
NAUSEA: 0
TREMORS: 0
ORTHOPNEA: 0
SPEECH CHANGE: 1
DIAPHORESIS: 0
HEADACHES: 0

## 2018-07-03 ASSESSMENT — LIFESTYLE VARIABLES: SUBSTANCE_ABUSE: 0

## 2018-07-03 ASSESSMENT — PAIN SCALES - GENERAL
PAINLEVEL_OUTOF10: 5
PAINLEVEL_OUTOF10: 7

## 2018-07-03 NOTE — PROGRESS NOTES
Gastroenterology Progress Note     Author: Juan Miguel Echolsjose   Date & Time Created: 2018 11:01 PM    Chief Complaint:  dysphagia    Interval History:  Please see dictated consultation    Review of Systems:  ROS    Physical Exam:  Physical Exam    Labs:        Invalid input(s): [id number]      Recent Labs      18   0511  18   0322   SODIUM  136  138  138   POTASSIUM  5.0  5.2  4.9   CHLORIDE  101  102  105   CO2  25  26  24   BUN  85*  90*  93*   CREATININE  2.42*  2.78*  2.42*   MAGNESIUM   --    --   2.6*   CALCIUM  9.1  9.1  9.5     Recent Labs      18   0511  18   0322   ALTSGPT   --    --   5   ASTSGOT   --    --   14   ALKPHOSPHAT   --    --   70   TBILIRUBIN   --    --   0.3   PREALBUMIN   --    --   15.0*   GLUCOSE  220*  206*  207*     Recent Labs      18   0322   RBC  4.58   HEMOGLOBIN  13.6   HEMATOCRIT  44.6   PLATELETCT  275     Recent Labs      18   0322   WBC  8.5   NEUTSPOLYS  69.50   LYMPHOCYTES  18.80*   MONOCYTES  8.00   EOSINOPHILS  2.00   BASOPHILS  1.30   ASTSGOT  14   ALTSGPT  5   ALKPHOSPHAT  70   TBILIRUBIN  0.3     Hemodynamics:  Temp (24hrs), Av.4 °C (97.6 °F), Min:36.1 °C (97 °F), Max:36.6 °C (97.9 °F)  Temperature: 36.5 °C (97.7 °F)  Pulse  Av.7  Min: 56  Max: 89   Blood Pressure: 148/64     Respiratory:    Respiration: 18, Pulse Oximetry: 97 %           Fluids:    Intake/Output Summary (Last 24 hours) at 18 2301  Last data filed at 18 1600   Gross per 24 hour   Intake             1134 ml   Output                0 ml   Net             1134 ml        GI/Nutrition:  Orders Placed This Encounter   Procedures   • Diet NPO     Standing Status:   Standing     Number of Occurrences:   1     Order Specific Question:   Restrict to:     Answer:   Strict [1]     Medical Decision Making, by Problem:  Active Hospital Problems    Diagnosis   • *Stroke (HCC) [I63.9]   • CKD stage 4 due to type 2 diabetes  mellitus [E11.22, N18.4]   • Non-intractable vomiting with nausea [R11.2]   • Dysphagia [R13.10]   • Diabetic neuropathy (HCC) [E11.40]   • Type II diabetes mellitus (HCC) [E11.9]   • History of DVT (deep vein thrombosis) [Z86.718]   • History of GI bleed [Z87.19]   • Essential hypertension [I10]   • Polycystic kidney disease [Q61.3]   • Hyperlipemia [E78.5]   • Hypothyroid [E03.9]       Plan:  Npo  1 gm of anceph  Consent for peg  Check inr prior to procedure    Quality-Core Measures

## 2018-07-03 NOTE — CARE PLAN
Problem: Nutritional:  Goal: Nutrition support tolerated and meeting greater than 85% of estimated needs  Outcome: NOT MET  Pt is currently strict NPO w/o nutrition support secondary to possible PEG tube placement later today per RN.

## 2018-07-03 NOTE — CARE PLAN
Problem: Safety  Goal: Will remain free from falls    Intervention: Implement fall precautions  Bed alarm armed. Fall armband applied.      Problem: Bowel/Gastric:  Goal: Will not experience complications related to bowel motility    Intervention: Implement interventions to promote bowel evacuation if inadequate bowel movements in past 48 hours  Miralax given due to last BM 6/26.

## 2018-07-03 NOTE — CONSULTS
DATE OF SERVICE:  07/02/2018    GASTROENTEROLOGY CONSULTATION     INDICATION FOR CONSULTATION:  Dysphagia.    HISTORY OF PRESENT ILLNESS:  This is a 52-year-old female who was admitted at   the end of June for a recent onset of acute stroke.  She presented to an   outlying facility with complaints of blurred vision, nausea and vomiting.  CT   scan of the brain at that time was noted to be negative for acute intracranial   abnormalities.  Further evaluation with an MRI showed medial left cerebellar   subacute infarction with no evidence of hemorrhage.  She was transferred to   [facility] for higher level of care.  By the time of transfer, she was outside the   appropriate window for TPA and subsequently this was not administered.  She   has a past medical history for type 2 diabetes, hypertension, history of DVT   and PE, which resulted in an IVC filter placement.  She had a hypercoagulable   workup, which was apparently noted to be negative.  She has been worked up for   GI bleed in the past with an upper and lower endoscopy performed by Dr. Yi in 2013.  She presently is suffering from bouts of dysphagia given   the recent CVA that she has experienced.  A Cortrak was placed for   nutritional support and speech pathology has evaluated her.  She was noted to   have issues with aspiration and she will require a course of rehabilitation   and hopefully improvement in regards to her swallowing; however, given that   her nutritional requirements are what they are, we were consulted for further   evaluation and possible PEG tube placement.  She presently is able to move all   extremities.  She is unable to protect her airway and felt that aggressive   speech therapy would be an appropriate measure.    REVIEW OF SYSTEMS:  She denies any headaches, visual changes, shortness of   breath.  She denies any hematemesis, coffee-ground emesis.  Denies any   heartburn, indigestion.  Denies any abdominal complaints,  genitourinary   abnormalities, lower extremity swelling, rashes, numbness, tingling.    ALLERGIES:  TORADOL, LEVAQUIN, LISINOPRIL AND TAPE.    PAST MEDICAL HISTORY:  Pertinent for type 2 diabetes, hypertension,   neuropathy, arthritis, cataract, history of DVT, PE, pneumonia, IVC filter,   renal disorder and possible thyroid abnormalities.    PAST SURGICAL HISTORY:  Surgical procedures involve a cholecystectomy, total   abdominal hysterectomy, the upper and lower endoscopy as indicated above and 3   knee arthroscopies.    SOCIAL HISTORY:  She denies any alcohol, IV drugs, blood transfusions, tattoos   or cocaine abuse presently.    FAMILY HISTORY:  Pertinent for mother with lung cancer, father with heart   disease and prostate cancer.    PHYSICAL EXAMINATION:  GENERAL:  She is a very pleasant female, looks appropriate for her stated age   who is resting in bed.  She is somewhat uncomfortable given the Cortrak with   extension of her left naris and she expresses that on multiple occasions.  VITAL SIGNS:  Heart rate of anywhere from 55-70, temperature of 36.7, pulse of   70 and blood pressure 134/69.  HEENT:  Atraumatic, normocephalic.  Pupils equal, round, reactive to light.    Extraocular muscles are intact.  LUNGS:  Clear to auscultation.  HEART:  Regular rate and rhythm.  S1, S2.  No rubs, murmurs or gallops.  ABDOMEN:  Soft, doughy, nontender.  No appreciable hepatosplenomegaly.    Positive bowel sounds.  EXTREMITIES:  There is no clubbing, cyanosis or edema.  NEUROLOGIC:  There are no gross focal deficits appreciated.    LABORATORY DATA:  Shows a white count of 8.5, hemoglobin 13.6, hematocrit 44.6   and a platelet count of 275.  Sodium 138, potassium 4.9, chloride 105, bicarb   of 24, glucose of 207, creatinine of 2.42, BUN of 93, calcium of 9.5, AST and   ALT of 14 and 5, alkaline phosphatase of 70, total bilirubin 0.3, magnesium   of 2.6.  INR has not been ascertained during this hospitalization.    IMAGING:   CT scan of the head, refer to the HPI.    IMPRESSION:  Dysphagia.    PLAN:  At the present time would be to schedule her for a PEG tube placement.    I have discussed with the patient the risks and benefits of the procedure,   expressed to her that the PEG tube could stay in for as long as ____ for her   ability to improve her swallowing, however, has to stay in for a minimum of 6   weeks.  Prior to PEG tube placement, we will require a PT and INR.  She should   be made n.p.o.  A 1 gram of Ancef should be administered 30 minutes prior to   the procedure with the plan to perform a PEG tube.  It should be consented as   well.  The risks and benefits were explained to her.    In closing, I thank the referring physicians for the opportunity of   introducing the patient.       ____________________________________     DANIEL CANTU MD    GV / NTS    DD:  07/02/2018 23:01:11  DT:  07/02/2018 23:51:32    D#:  [id number]  Job#:  [id number]

## 2018-07-03 NOTE — PROGRESS NOTES
Nephrology Progress Note, Adult, Complex               Author: Darlyn Patti Date & Time created: 7/3/2018  2:26 PM     Interval History:  51 y/o female with CAMPOS/CKD IV, DMN, HTN, admitted with left cerebellar stroke  Renal US (+) for PCKD -d/w Pt -admitted diagnosed with PCKD several years ago  C/o cough, fatigue  Difficulties to swallow  Good UOP  Creat at 2.4-2.7    Review of Systems:  Review of Systems   Constitutional: Positive for malaise/fatigue. Negative for chills and fever.   Respiratory: Positive for cough and sputum production. Negative for shortness of breath.    Cardiovascular: Negative for chest pain, palpitations, orthopnea and leg swelling.   Gastrointestinal: Negative for abdominal pain, constipation, nausea and vomiting.   Genitourinary: Negative for dysuria, flank pain, frequency, hematuria and urgency.   Musculoskeletal: Positive for back pain. Negative for joint pain, myalgias and neck pain.   Neurological: Negative for dizziness, sensory change, focal weakness and headaches.        Vertigo        Physical Exam:  Physical Exam   Constitutional: She is oriented to person, place, and time. She appears well-developed and well-nourished. No distress.   HENT:   Nose: Nose normal.   NGT   Eyes: Conjunctivae are normal. No scleral icterus.   Neck: No thyromegaly present.   Cardiovascular: Normal rate and regular rhythm.  Exam reveals no gallop and no friction rub.    Pulmonary/Chest: Effort normal. No respiratory distress. She has no wheezes. She has no rhonchi. She has no rales.   Abdominal: Soft. Bowel sounds are normal. She exhibits no distension. There is no tenderness.   Musculoskeletal: She exhibits no edema.   Lymphadenopathy:     She has no cervical adenopathy.   Neurological: She is alert and oriented to person, place, and time.   Skin: Skin is warm. She is not diaphoretic. No erythema.   Nursing note and vitals reviewed.      Labs:        Invalid input(s): [id number]      Recent Labs       18   0511  18   0322  18   0520   SODIUM  138  138  136   POTASSIUM  5.2  4.9  4.9   CHLORIDE  102  105  103   CO2  26  24  26   BUN  90*  93*  90*   CREATININE  2.78*  2.42*  2.79*   MAGNESIUM   --   2.6*  2.5   CALCIUM  9.1  9.5  9.1     Recent Labs      18   0511  18   0322  18   0520   ALTSGPT   --   5  7   ASTSGOT   --   14  13   ALKPHOSPHAT   --   70  71   TBILIRUBIN   --   0.3  0.3   PREALBUMIN   --   15.0*   --    GLUCOSE  206*  207*  167*     Recent Labs      18   0520   RBC  4.58  4.20   HEMOGLOBIN  13.6  12.6   HEMATOCRIT  44.6  39.8   PLATELETCT  275  276   PROTHROMBTM   --   13.2   INR   --   1.03     Recent Labs      182  18   0520   WBC  8.5  7.9   NEUTSPOLYS  69.50  68.20   LYMPHOCYTES  18.80*  19.30*   MONOCYTES  8.00  8.50   EOSINOPHILS  2.00  2.70   BASOPHILS  1.30  0.90   ASTSGOT  14  13   ALTSGPT  5  7   ALKPHOSPHAT  70  71   TBILIRUBIN  0.3  0.3           Hemodynamics:  Temp (24hrs), Av.4 °C (97.6 °F), Min:36.1 °C (97 °F), Max:36.7 °C (98.1 °F)  Temperature: 36.2 °C (97.2 °F)  Pulse  Av.5  Min: 56  Max: 89Heart Rate (Monitored): 86  Blood Pressure: 127/72, NIBP: 114/63     Respiratory:    Respiration: (!) 9, Pulse Oximetry: 95 %     Work Of Breathing / Effort: Mild  RUL Breath Sounds: Coarse Crackles, RML Breath Sounds: Coarse Crackles, RLL Breath Sounds: Coarse Crackles, DIANNE Breath Sounds: Coarse Crackles, LLL Breath Sounds: Coarse Crackles  Fluids:    Intake/Output Summary (Last 24 hours) at 18 1426  Last data filed at 18   Gross per 24 hour   Intake              320 ml   Output                0 ml   Net              320 ml        GI/Nutrition:  Orders Placed This Encounter   Procedures   • Diet NPO     Standing Status:   Standing     Number of Occurrences:   1     Order Specific Question:   Restrict to:     Answer:   Strict [1]     Medical Decision Making, by Problem:  Active Hospital  Problems    Diagnosis   • *Stroke (HCC) [I63.9]   • CKD stage 4 due to type 2 diabetes mellitus [E11.22, N18.4]   • Dysphagia [R13.10]   • Diabetic neuropathy (HCC) [E11.40]   • Type II diabetes mellitus (HCC) [E11.9]   • History of DVT (deep vein thrombosis) [Z86.718]   • History of GI bleed [Z87.19]   • Essential hypertension [I10]   • Hyperlipemia [E78.5]   • Hypothyroid [E03.9]       Quality-Core Measures   Reviewed items::  Labs reviewed    Assessment and plan:    1.CAMPOS/CKD IV /PCKD -to monitor  2.HTN: BP  Well controlled  3.Electrolytes:well controlled  4.Anemia:Hb stable - WNL  5.Volume overload:better  6 CVA  Recs:   no acute need for HD  Daily labs  Renal dose all meds  Avoid nephrotoxins  Prognosis guarded  Needs f/u in Nephrology Clinic

## 2018-07-03 NOTE — DIETARY
Nutrition support weekly update:      Day 14 of admit.  Bryan Garcia is a 52 y.o. female with admitting DX of cerebellar stroke. Tube feeding initiated on 6/20. Current TF via small bowel Cortrak Impact Peptide @ 50 ml/hr attempted to provide 1800 kcal, 113 g of protein and 924 mL of free fluids. However, pt refused rate of 50 ml/hr as she stated she could only tolerate TF @35 ml/hr, which is providing ~70% of estimated nutritional needs.      Per GI note, pt have conscent for PEG placement on 7/2. Discussed d/c plan with RN and would recommend transition to bolus feeds to simulate nutrition home regimen prior to d/c.     Assessment:  Weight: 92.1 kg via bed scale on 6/30 - pt noted with a 4.6 kg weight gain since admit (87.5 kg via stand up scale) on 6/19 - estimated nutritional needs will remain the same.     Evaluation:   1. Per SLP on 7/2 - pt is very slow to make gains with dysphagia and recommended a more permanent source of non-oral nutrition.   2. PEG to be placed.   3. Glucose levels of 167, BUN 90, creatinine 2.79 on 7/3.   4. Pt on regular insulin and reglan per MAR. Zofran, phenergan and compazine PRN.   5. Per pt report, unable to tolerate TF rate >35 ml/hr secondary to nausea/vomiting, however pt's TF was via small bowel Cortrak as of 6/26, therefore nausea/vomiting should not be present due to TF volume/rate - see previous note for details.   6. Pt noted with medical hx of T2DM, neuropathy in DM, HTN, and arthritis.   7. Pt to benefit from a controlled CHO specialized formula to meet estimated caloric/protein needs.   8. Pt noted with a POA left medial malleolus stage III pressure injury; WT note reviewed. Formula will meet 100% RDI of vitamins and minerals, however it will not meet vitamin C for stage 3 pressure ulcer - would recommend an additional 2x 500 mg of vitamin C per day to promote wound healing as pt is only getting 360 mg via TF.   9. Pt to be d/c @ 3:00 PM today per LSW note.      Recommendations/Plan:  1. Home regimen bolus feeds recommendations: 6x Diabetisource  mL cans per day which will be providing 1800 kcals, 90 g of protein and 1230 mL of free fluids. Would recommend administering 2 cans every 5 hours (0800, 1300, 1800 hrs) to simulate regular meals schedule at home.   2. Fluids per MD - would recommend 4x 240 mL water flushes per day to meet pt's estimated fluid needs.   3. Monitor TF tolerance.   4. Vitamin C recommendations: 2x 500 mg BID to meet nutrition supplementation for stage III pressure ulcers.   5. Monitor weight.     RD following.

## 2018-07-03 NOTE — DISCHARGE PLANNING
Agency/Facility Name: [facility]  Spoke To: Felisha  Outcome: Peg placement note and most recent progress notes faxed to [facility]. Per Felisha, they are unable to accept patient tomorrow as they would like to see how patient tolerates Peg tube. Transport canceled. Catalina([facility]) notified.

## 2018-07-03 NOTE — DISCHARGE PLANNING
Agency/Facility Name: [facility]  Spoke To: Felisha  Outcome: Felisha requesting later transport time. Transport time changed to 1500. Felisha(Rehab) and Marissa(LSW) notified of new transport time.

## 2018-07-03 NOTE — PROGRESS NOTES
[facility] Hospitalist Progress Note    Date of Service: 7/3/2018    Chief Complaint  52 y.o. 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  admitted 2018 with N/V, visual changes- Dx subacute cerebellar cva.     Interval Problem Update    Post PEG placement. Pain controlled . dw multi disciplinary team- anticipate will need post acute rehab .     Consultants/Specialty    Dr. Armstrong nephrology  Neurology - Dr. Sykes       Disposition    Anticipate to snf for rehab.         Review of Systems   Constitutional: Positive for malaise/fatigue. Negative for chills, diaphoresis and fever.   HENT: Negative for congestion.    Eyes: Negative for discharge and redness.   Respiratory: Negative for cough, shortness of breath, wheezing and stridor.    Cardiovascular: Negative for chest pain, palpitations and leg swelling.   Gastrointestinal: Negative for abdominal pain, diarrhea and vomiting.   Genitourinary: Negative for flank pain and hematuria.   Musculoskeletal: Positive for back pain and myalgias. Negative for joint pain and neck pain.   Neurological: Positive for speech change and weakness. Negative for tremors, sensory change and focal weakness.   Psychiatric/Behavioral: Negative for hallucinations and substance abuse.      Physical Exam  Laboratory/Imaging   Hemodynamics  Temp (24hrs), Av.5 °C (97.7 °F), Min:36.1 °C (97 °F), Max:36.9 °C (98.4 °F)   Temperature: 36.4 °C (97.6 °F)  Pulse  Av.6  Min: 56  Max: 89 Heart Rate (Monitored): 75  Blood Pressure: 105/72, NIBP: 110/80      Respiratory      Respiration: 20, Pulse Oximetry: 96 %     Work Of Breathing / Effort: Mild  RUL Breath Sounds: Coarse Crackles, RML Breath Sounds: Coarse Crackles, RLL Breath Sounds: Coarse Crackles, DIANNE Breath Sounds: Coarse Crackles, LLL Breath Sounds: Coarse Crackles    Fluids    Intake/Output Summary (Last 24 hours) at 18  1646  Last data filed at 07/03/18 0752   Gross per 24 hour   Intake              400 ml   Output                0 ml   Net              400 ml       Nutrition  Orders Placed This Encounter   Procedures   • Diet NPO     Standing Status:   Standing     Number of Occurrences:   1     Order Specific Question:   Restrict to:     Answer:   Strict [1]     Physical Exam   Constitutional: No distress.   HENT:   Head: Normocephalic and atraumatic.   Right Ear: External ear normal.   Left Ear: External ear normal.   Nose: Nose normal.   Gurgling, intermittently clears throat.    Eyes: EOM are normal. Right eye exhibits no discharge. Left eye exhibits no discharge. No scleral icterus.   Neck: No JVD present.   Cardiovascular: Normal rate and regular rhythm.    No murmur heard.  Pulmonary/Chest: No stridor. She has no wheezes. She has no rales.   Abdominal: Soft. Bowel sounds are normal. She exhibits no distension. There is tenderness. There is no rebound and no guarding.   PEG tube site dressed.    Musculoskeletal: She exhibits no edema or tenderness.   Neurological: She is alert. No cranial nerve deficit. Coordination abnormal.   Mild left hemiparesis - 4/5 strength.    Skin: Skin is warm and dry. She is not diaphoretic. No pallor.   Vitals reviewed.      Recent Labs      07/02/18   0322  07/03/18   0520   WBC  8.5  7.9   RBC  4.58  4.20   HEMOGLOBIN  13.6  12.6   HEMATOCRIT  44.6  39.8   MCV  97.4  94.8   MCH  29.7  30.0   MCHC  30.5*  31.7*   RDW  49.1  46.4   PLATELETCT  275  276   MPV  10.5  10.7     Recent Labs      07/01/18   0511  07/02/18   0322  07/03/18   0520   SODIUM  138  138  136   POTASSIUM  5.2  4.9  4.9   CHLORIDE  102  105  103   CO2  26  24  26   GLUCOSE  206*  207*  167*   BUN  90*  93*  90*   CREATININE  2.78*  2.42*  2.79*   CALCIUM  9.1  9.5  9.1     Recent Labs      07/03/18   0520   INR  1.03                  Assessment/Plan     * Stroke (HCC)- (present on admission)   Assessment & Plan    MRI of the  brain outlying facility w Left medial cerebellar acute or subacute infarction with no hemorrhage.   Carotid Duplex: Antegrade left vertebral flow with high resistant may indicate distal occlusion  Continue ASA/Statin.  Pt/OT  Post Peg placement- plan to resume tfeeds per GI.  Echocardiogram: preserved LVEF.  Neurology signed off.              CKD stage 4 due to type 2 diabetes mellitus- (present on admission)   Assessment & Plan    Acute on chronic renal failure- suspect near baseline.  Avoid nephrotoxins.  Fu Bmp, renal fxn.                 Non-intractable vomiting with nausea   Assessment & Plan    Resolved.  IV anti emetics prn        Dysphagia   Assessment & Plan    SLP following, failed swallow, patient has a core tract  Peg tube placement.  Reports of gurgling, increased secretions past 3 weeks-CXR in signs of pneumonia or infiltrate-  trial of low dose robinol as tolerated.             History of GI bleed   Assessment & Plan    Pepcid for GI ppx          History of DVT (deep vein thrombosis)   Assessment & Plan    Hx of DVT?PE , s/p IVC filter placement.  Hx of GI bleeding in past, Anticoagulation contraindicated.  No signs of active bleed- continue w DVT prevention w heparin.             Type II diabetes mellitus (HCC)   Assessment & Plan    A1c 8.3 labile blood sugars, improved   cw Insulin-sliding scale, accu-checks and hypoglycemia protocol.            Essential hypertension- (present on admission)   Assessment & Plan    Controlled  Continue with Metoprolol and Norvasc.          Polycystic kidney disease- (present on admission)   Assessment & Plan    As per above,   Nephrology following.          Hyperlipemia- (present on admission)   Assessment & Plan    Cotninue lipitor.        Hypothyroid- (present on admission)   Assessment & Plan    TSH WNL.  cw synthroid.         Diabetic neuropathy (HCC)- (present on admission)   Assessment & Plan    cw  gabapentin          Quality-Core Measures   Reviewed items::   Medications reviewed, Labs reviewed and Radiology images reviewed  Wallace catheter::  No Wallace  DVT prophylaxis pharmacological::  Heparin

## 2018-07-03 NOTE — PROGRESS NOTES
Spoke to MD Rodger. PEG will be placed at 1400pm today and patient will not be able to discharge until he can reassess PEG tomorrow. Notified Cooper, Charge RN, MD Vijay and patient. Patient demonstrates good understanding of new discharge plan.

## 2018-07-03 NOTE — DISCHARGE PLANNING
Agency/Facility Name: [facility]  Outcome: Transport time changed to tomorrow 7/4 @ 1500 via [facility] transport per Marissa's(LSW) request. Felisha(Rehab) and Marissa(LSW) updated on transport time.

## 2018-07-03 NOTE — PROGRESS NOTES
Assumed care of pt at 1900. Pt AAOx4, 2 L O2 via NC. Pt c/o nausea and pain, medicated per MAR. SBA to BSC. Hourly rounding in place.

## 2018-07-03 NOTE — OR SURGEON
Immediate Post OP Note    PreOp Diagnosis: dysphagia    PostOp Diagnosis:    1/ esophagus normal, GEJ at 38 cm   2/ normal appearing stomach, antrum, pylorus   3/ normal appearing duodenal bulb second and third portions   4/ normal appearing cardia   5/ PEG placed in the epigastric region at the 3.5 cm eliezer    Procedure(s):  GASTROSCOPY-ENDO - Wound Class: Clean Contaminated  PEG PLACEMENT - Wound Class: Clean Contaminated    Surgeon(s):  Juan Miguel Soares M.D.    Anesthesiologist/Type of Anesthesia:  No anesthesia staff entered./Sedation  Fentanyl 125 mcg and 8 mg of Versed IV  1 gm of Anceph given prior to the procedure    In time 1401-out time 1439  Surgical Staff:  Endoscopy Technician: Tasia Hernandez R.N.  Sedation/Monitoring Nurse: Román Aguilera R.N.    Specimens removed if any:  * No specimens in log *    Estimated Blood Loss: 6 cc    Narrative:   Prior to the procedure the patient was informed of the risk and benefits of the procedure all question were answered.  The patient was sedated and once adequate sedation was achieved the bite block was placed. They were placed in the left lateral decubitus positron.   Using the standard Olympus gastroscope it was introduced through the oral pharynx and passed into the esophagus.  Intubation of the esophagus was achieved easily and the entire esophagus appeared normal.  The GEJ 36 cm,it appeared normal.  Intubation of the gastric cavity was performed and insufflation was achieved allowing for a panoramic view of the entire gastric mucosa.  No abnormalities were appreciated. Advancement into the antrum allowed for visualization of the antrum and pylorus both of these structures appeared normal.  Intubation of the pylorus allowed for visualization of the duodenal bulb along with the second and third portion, all these structures appeared normal.  No mucosal abnormalities were appreciated.  Extubation of the pylorus allowed for retroflexion to be performed  in the antrum to visualize the cardia, fundus and body, which also appeared normal.  Once this was achieved gastroscope was then brought to the GE junction and then with the use of transillumination and palpation a site was appreciated on the abdominal wall in the epigastric region 4 finger breaths below the rib line. At which time the area was cleaned and draped in the sterile fashion. We were using a pull PEG tube kit.  The area was draped. The area identified was then injected with approximately 3-5 mL of 1% Xylocaine. The injection needle was appreciated endoscopically. The needle was then subsequently removed and a small incision approximately 1 cm size was performed using the scalpel that is in the kit. After which time the trocar and plastic sheath were passed through the incision site and visualized endoscopically. Using the snare that is in the kit, the sheath and trochar were snared. Trochar was subsequently removed allowing for the introduction of the guidewire. The guidewire was snared and the entire endoscope was then withdrawn along with the guidewire. The PEG tube was then deployed onto the guidewire and pulled through in standard fashion. The PEG tube was appreciated at approximately 3.5 cm eliezer, the bumper and the necessary attachments were deployed onto the PEG tube. The gastroscope was then reintroduced through the oropharynx entire esophagus was evaluated and no appreciable mucosal abnormalities are noted. GE junction was appreciated and appeared intact and normal. The PEG tube was appreciated endoscopically and rotates nicely. Gastro was withdrawn excess air was removed patient tolerated the procedure well.    Complications:none    Recommendations:    Abdominal binder,   elevate the head to 45°,   did not start tube feeds for at least 24 hours,  it is okay to use the PEG tube for medication at this time.   Call if there are any further issues or concerns [phone].   Hold am dose of heparin  restart in 24 hrs.        7/3/2018 2:41 PM Juan Miguel Soares M.D.

## 2018-07-03 NOTE — PROGRESS NOTES
Consent for PEG signed. Awaiting call back to my page to MD Meredith. I need to ask him when this procedure is to take place, so we can arrange for patient's discharge.

## 2018-07-03 NOTE — PROGRESS NOTES
Abdominal binder has been delivered to pt's main nursing station desk for staff to apply and fit to pt when ready.

## 2018-07-03 NOTE — DISCHARGE PLANNING
Anticipated Discharge Disposition: IRF    Action: Pt discussed during rounds. Pt will be medically clear once peg tube and loop recorder have been placed today.    Transport to [facility] has been scheduled for 1500 tomorrow (7/4)    Barriers to Discharge: Medical Clearance    Plan: Pt to dc to [facility]

## 2018-07-03 NOTE — CARE PLAN
Problem: Safety  Goal: Will remain free from injury  Outcome: PROGRESSING AS EXPECTED  Bed locked and in lowest position. Call light and personal belongings in reach. Bed alarm in place. Hourly rounding.     Problem: Pain Management  Goal: Pain level will decrease to patient's comfort goal  Outcome: PROGRESSING SLOWER THAN EXPECTED  Pt medicated with PRN pain medications. Ice and heat packs provided.

## 2018-07-04 ENCOUNTER — APPOINTMENT (OUTPATIENT)
Dept: RADIOLOGY | Facility: MEDICAL CENTER | Age: 53
DRG: 064 | End: 2018-07-04
Attending: INTERNAL MEDICINE
Payer: COMMERCIAL

## 2018-07-04 LAB
ANION GAP SERPL CALC-SCNC: 9 MMOL/L (ref 0–11.9)
BUN SERPL-MCNC: 78 MG/DL (ref 8–22)
CALCIUM SERPL-MCNC: 9.2 MG/DL (ref 8.5–10.5)
CHLORIDE SERPL-SCNC: 105 MMOL/L (ref 96–112)
CO2 SERPL-SCNC: 24 MMOL/L (ref 20–33)
CREAT SERPL-MCNC: 2.42 MG/DL (ref 0.5–1.4)
GLUCOSE BLD-MCNC: 107 MG/DL (ref 65–99)
GLUCOSE BLD-MCNC: 116 MG/DL (ref 65–99)
GLUCOSE BLD-MCNC: 132 MG/DL (ref 65–99)
GLUCOSE BLD-MCNC: 156 MG/DL (ref 65–99)
GLUCOSE SERPL-MCNC: 106 MG/DL (ref 65–99)
POTASSIUM SERPL-SCNC: 5.4 MMOL/L (ref 3.6–5.5)
SODIUM SERPL-SCNC: 138 MMOL/L (ref 135–145)

## 2018-07-04 PROCEDURE — 700111 HCHG RX REV CODE 636 W/ 250 OVERRIDE (IP): Performed by: FAMILY MEDICINE

## 2018-07-04 PROCEDURE — 99232 SBSQ HOSP IP/OBS MODERATE 35: CPT | Performed by: HOSPITALIST

## 2018-07-04 PROCEDURE — 36415 COLL VENOUS BLD VENIPUNCTURE: CPT

## 2018-07-04 PROCEDURE — 97116 GAIT TRAINING THERAPY: CPT

## 2018-07-04 PROCEDURE — A9270 NON-COVERED ITEM OR SERVICE: HCPCS | Performed by: HOSPITALIST

## 2018-07-04 PROCEDURE — 700102 HCHG RX REV CODE 250 W/ 637 OVERRIDE(OP): Performed by: HOSPITALIST

## 2018-07-04 PROCEDURE — 770006 HCHG ROOM/CARE - MED/SURG/GYN SEMI*

## 2018-07-04 PROCEDURE — 80048 BASIC METABOLIC PNL TOTAL CA: CPT

## 2018-07-04 PROCEDURE — 700111 HCHG RX REV CODE 636 W/ 250 OVERRIDE (IP): Performed by: HOSPITALIST

## 2018-07-04 PROCEDURE — 99233 SBSQ HOSP IP/OBS HIGH 50: CPT | Performed by: INTERNAL MEDICINE

## 2018-07-04 PROCEDURE — 73600 X-RAY EXAM OF ANKLE: CPT | Mod: LT

## 2018-07-04 PROCEDURE — 82962 GLUCOSE BLOOD TEST: CPT | Mod: 91

## 2018-07-04 RX ORDER — ASCORBIC ACID 500 MG
1000 TABLET ORAL 2 TIMES DAILY
Status: DISCONTINUED | OUTPATIENT
Start: 2018-07-04 | End: 2018-07-06

## 2018-07-04 RX ORDER — M-VIT,TX,IRON,MINS/CALC/FOLIC 27MG-0.4MG
1 TABLET ORAL DAILY
Status: DISCONTINUED | OUTPATIENT
Start: 2018-07-04 | End: 2018-07-06

## 2018-07-04 RX ORDER — TRAMADOL HYDROCHLORIDE 50 MG/1
100 TABLET ORAL 2 TIMES DAILY
Status: DISCONTINUED | OUTPATIENT
Start: 2018-07-04 | End: 2018-07-06

## 2018-07-04 RX ORDER — TRAMADOL HYDROCHLORIDE 50 MG/1
100 TABLET ORAL 2 TIMES DAILY
Status: ON HOLD | COMMUNITY
End: 2018-07-09

## 2018-07-04 RX ADMIN — HEPARIN SODIUM 5000 UNITS: 5000 INJECTION, SOLUTION INTRAVENOUS; SUBCUTANEOUS at 12:23

## 2018-07-04 RX ADMIN — METOCLOPRAMIDE 10 MG: 5 INJECTION, SOLUTION INTRAMUSCULAR; INTRAVENOUS at 10:24

## 2018-07-04 RX ADMIN — SENNOSIDES AND DOCUSATE SODIUM 2 TABLET: 8.6; 5 TABLET ORAL at 21:41

## 2018-07-04 RX ADMIN — METOCLOPRAMIDE 10 MG: 5 INJECTION, SOLUTION INTRAMUSCULAR; INTRAVENOUS at 05:58

## 2018-07-04 RX ADMIN — OXYCODONE HYDROCHLORIDE AND ACETAMINOPHEN 1 TABLET: 10; 325 TABLET ORAL at 05:58

## 2018-07-04 RX ADMIN — METOPROLOL TARTRATE 12.5 MG: 25 TABLET, FILM COATED ORAL at 09:16

## 2018-07-04 RX ADMIN — HEPARIN SODIUM 5000 UNITS: 5000 INJECTION, SOLUTION INTRAVENOUS; SUBCUTANEOUS at 21:40

## 2018-07-04 RX ADMIN — PROCHLORPERAZINE EDISYLATE 10 MG: 5 INJECTION INTRAMUSCULAR; INTRAVENOUS at 01:20

## 2018-07-04 RX ADMIN — OXYCODONE HYDROCHLORIDE AND ACETAMINOPHEN 1 TABLET: 10; 325 TABLET ORAL at 02:09

## 2018-07-04 RX ADMIN — TEMAZEPAM 15 MG: 15 CAPSULE ORAL at 21:41

## 2018-07-04 RX ADMIN — OXYCODONE HYDROCHLORIDE AND ACETAMINOPHEN 1000 MG: 500 TABLET ORAL at 10:23

## 2018-07-04 RX ADMIN — METOPROLOL TARTRATE 12.5 MG: 25 TABLET, FILM COATED ORAL at 21:40

## 2018-07-04 RX ADMIN — OXYCODONE HYDROCHLORIDE AND ACETAMINOPHEN 1 TABLET: 10; 325 TABLET ORAL at 12:23

## 2018-07-04 RX ADMIN — OXYCODONE HYDROCHLORIDE AND ACETAMINOPHEN 1 TABLET: 10; 325 TABLET ORAL at 21:41

## 2018-07-04 RX ADMIN — BUTALBITAL, ACETAMINOPHEN, AND CAFFEINE 1 TABLET: 50; 325; 40 TABLET ORAL at 09:17

## 2018-07-04 RX ADMIN — PROCHLORPERAZINE EDISYLATE 10 MG: 5 INJECTION INTRAMUSCULAR; INTRAVENOUS at 05:58

## 2018-07-04 RX ADMIN — OXYCODONE HYDROCHLORIDE AND ACETAMINOPHEN 1000 MG: 500 TABLET ORAL at 21:40

## 2018-07-04 RX ADMIN — METOCLOPRAMIDE 10 MG: 5 INJECTION, SOLUTION INTRAMUSCULAR; INTRAVENOUS at 23:49

## 2018-07-04 RX ADMIN — LEVOTHYROXINE SODIUM 112 MCG: 112 TABLET ORAL at 09:22

## 2018-07-04 RX ADMIN — TRAMADOL HYDROCHLORIDE 100 MG: 50 TABLET, COATED ORAL at 21:41

## 2018-07-04 RX ADMIN — INSULIN HUMAN 1 UNITS: 100 INJECTION, SOLUTION PARENTERAL at 21:23

## 2018-07-04 RX ADMIN — ATORVASTATIN CALCIUM 80 MG: 80 TABLET, FILM COATED ORAL at 21:41

## 2018-07-04 RX ADMIN — MECLIZINE HYDROCHLORIDE 25 MG: 25 TABLET ORAL at 11:09

## 2018-07-04 RX ADMIN — BUTALBITAL, ACETAMINOPHEN, AND CAFFEINE 1 TABLET: 50; 325; 40 TABLET ORAL at 16:38

## 2018-07-04 RX ADMIN — GLYCOPYRROLATE 0.5 MG: 1 TABLET ORAL at 09:21

## 2018-07-04 RX ADMIN — TRAMADOL HYDROCHLORIDE 100 MG: 50 TABLET, COATED ORAL at 10:23

## 2018-07-04 RX ADMIN — SENNOSIDES AND DOCUSATE SODIUM 2 TABLET: 8.6; 5 TABLET ORAL at 09:25

## 2018-07-04 RX ADMIN — GLYCOPYRROLATE 0.5 MG: 1 TABLET ORAL at 22:23

## 2018-07-04 RX ADMIN — METOCLOPRAMIDE 10 MG: 5 INJECTION, SOLUTION INTRAMUSCULAR; INTRAVENOUS at 16:38

## 2018-07-04 RX ADMIN — PROCHLORPERAZINE EDISYLATE 10 MG: 5 INJECTION INTRAMUSCULAR; INTRAVENOUS at 21:40

## 2018-07-04 RX ADMIN — METOCLOPRAMIDE 10 MG: 5 INJECTION, SOLUTION INTRAMUSCULAR; INTRAVENOUS at 00:55

## 2018-07-04 RX ADMIN — MULTIPLE VITAMINS W/ MINERALS TAB 1 TABLET: TAB at 10:23

## 2018-07-04 RX ADMIN — FAMOTIDINE 20 MG: 20 TABLET ORAL at 09:16

## 2018-07-04 RX ADMIN — ONDANSETRON 4 MG: 2 INJECTION INTRAMUSCULAR; INTRAVENOUS at 16:38

## 2018-07-04 RX ADMIN — AMLODIPINE BESYLATE 10 MG: 5 TABLET ORAL at 09:16

## 2018-07-04 RX ADMIN — MAGNESIUM HYDROXIDE 30 ML: 400 SUSPENSION ORAL at 09:22

## 2018-07-04 ASSESSMENT — COGNITIVE AND FUNCTIONAL STATUS - GENERAL
MOBILITY SCORE: 19
WALKING IN HOSPITAL ROOM: A LITTLE
MOVING FROM LYING ON BACK TO SITTING ON SIDE OF FLAT BED: A LITTLE
STANDING UP FROM CHAIR USING ARMS: A LITTLE
CLIMB 3 TO 5 STEPS WITH RAILING: A LOT
SUGGESTED CMS G CODE MODIFIER MOBILITY: CK

## 2018-07-04 ASSESSMENT — PAIN SCALES - GENERAL
PAINLEVEL_OUTOF10: 8
PAINLEVEL_OUTOF10: 7
PAINLEVEL_OUTOF10: 9
PAINLEVEL_OUTOF10: 9

## 2018-07-04 ASSESSMENT — ENCOUNTER SYMPTOMS
CHILLS: 0
PALPITATIONS: 0
SENSORY CHANGE: 0
STRIDOR: 0
EYES NEGATIVE: 1
SPUTUM PRODUCTION: 1
RESPIRATORY NEGATIVE: 1
VOMITING: 0
GASTROINTESTINAL NEGATIVE: 1
HEADACHES: 0
COUGH: 1
BACK PAIN: 1
CONSTIPATION: 0
TREMORS: 0
DIAPHORESIS: 0
CONSTITUTIONAL NEGATIVE: 1
FLANK PAIN: 0
MYALGIAS: 0
FOCAL WEAKNESS: 0
SHORTNESS OF BREATH: 0
HALLUCINATIONS: 0
MYALGIAS: 1
NAUSEA: 0
FEVER: 0
BACK PAIN: 0
CARDIOVASCULAR NEGATIVE: 1
SPEECH CHANGE: 1
ABDOMINAL PAIN: 0
ORTHOPNEA: 0
DIZZINESS: 0
COUGH: 0
NECK PAIN: 0
WEAKNESS: 1

## 2018-07-04 ASSESSMENT — GAIT ASSESSMENTS
ASSISTIVE DEVICE: FRONT WHEEL WALKER
GAIT LEVEL OF ASSIST: CONTACT GUARD ASSIST
DISTANCE (FEET): 15
DEVIATION: DECREASED BASE OF SUPPORT

## 2018-07-04 ASSESSMENT — LIFESTYLE VARIABLES: SUBSTANCE_ABUSE: 0

## 2018-07-04 NOTE — PROGRESS NOTES
[facility] Hospitalist Progress Note    Date of Service: 2018    Chief Complaint  52 y.o. 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  admitted 2018 with N/V, visual changes- Dx subacute cerebellar cva.     Interval Problem Update    Improved strength. Up with walker.  GI to re assess for starting G tube feeds.   Decreased throat , oral secretions.     Consultants/Specialty    Dr. Armstrong nephrology  Neurology - Dr. Sykes       Disposition    Anticipate to snf for rehab.         Review of Systems   Constitutional: Negative for diaphoresis, fever and malaise/fatigue.   HENT: Positive for congestion (improved ).    Respiratory: Negative for cough, shortness of breath and stridor.    Cardiovascular: Negative for chest pain and leg swelling.   Gastrointestinal: Negative for abdominal pain and vomiting.   Genitourinary: Negative for flank pain and hematuria.   Musculoskeletal: Positive for back pain and myalgias. Negative for joint pain and neck pain.   Neurological: Positive for speech change and weakness. Negative for tremors, sensory change and focal weakness.   Psychiatric/Behavioral: Negative for hallucinations and substance abuse.      Physical Exam  Laboratory/Imaging   Hemodynamics  Temp (24hrs), Av.6 °C (97.9 °F), Min:36.2 °C (97.2 °F), Max:36.9 °C (98.4 °F)   Temperature: 36.6 °C (97.9 °F)  Pulse  Av.8  Min: 56  Max: 89 Heart Rate (Monitored): 75  Blood Pressure: 140/68, NIBP: 110/80      Respiratory      Respiration: 18, Pulse Oximetry: 96 %     Work Of Breathing / Effort: Mild  RUL Breath Sounds: Coarse Crackles, RML Breath Sounds: Coarse Crackles, RLL Breath Sounds: Coarse Crackles, DIANNE Breath Sounds: Coarse Crackles, LLL Breath Sounds: Coarse Crackles    Fluids    Intake/Output Summary (Last 24 hours) at 18 1236  Last data filed at 18 0900   Gross per 24 hour   Intake               640 ml   Output                0 ml   Net              640 ml       Nutrition  Orders Placed This Encounter   Procedures   • Diet NPO     Standing Status:   Standing     Number of Occurrences:   1     Order Specific Question:   Restrict to:     Answer:   Strict [1]     Physical Exam   Constitutional: No distress.   HENT:   Head: Normocephalic and atraumatic.   Right Ear: External ear normal.   Left Ear: External ear normal.   Nose: Nose normal.   Eyes: EOM are normal. Right eye exhibits no discharge. Left eye exhibits no discharge. No scleral icterus.   Neck: No JVD present.   Cardiovascular: Normal rate and regular rhythm.    No murmur heard.  Pulmonary/Chest: No stridor. She has no wheezes. She has no rales.   Abdominal: Soft. Bowel sounds are normal. She exhibits no distension. There is no tenderness.   PEG tube site dressed.    Musculoskeletal: She exhibits no edema or tenderness.   Neurological: She is alert. No cranial nerve deficit. Coordination abnormal.   Mild left hemiparesis - 4/5 strength.    Skin: Skin is warm and dry. She is not diaphoretic. No pallor.   Psychiatric: She has a normal mood and affect. Her behavior is normal.   Vitals reviewed.      Recent Labs      07/02/18   0322  07/03/18   0520   WBC  8.5  7.9   RBC  4.58  4.20   HEMOGLOBIN  13.6  12.6   HEMATOCRIT  44.6  39.8   MCV  97.4  94.8   MCH  29.7  30.0   MCHC  30.5*  31.7*   RDW  49.1  46.4   PLATELETCT  275  276   MPV  10.5  10.7     Recent Labs      07/02/18   0322  07/03/18   0520  07/04/18   0424   SODIUM  138  136  138   POTASSIUM  4.9  4.9  5.4   CHLORIDE  105  103  105   CO2  24  26  24   GLUCOSE  207*  167*  106*   BUN  93*  90*  78*   CREATININE  2.42*  2.79*  2.42*   CALCIUM  9.5  9.1  9.2     Recent Labs      07/03/18   0520   INR  1.03                  Assessment/Plan     * Stroke (HCC)- (present on admission)   Assessment & Plan    MRI of the brain outlying facility w Left medial cerebellar acute or subacute infarction with no  hemorrhage- improved strength.   Carotid Duplex: Antegrade left vertebral flow with high resistant may indicate distal occlusion  Continue ASA/Statin.  Pt/OT  Post Peg placement- GI to re assess - to start tfeeds                CKD stage 4 due to type 2 diabetes mellitus- (present on admission)   Assessment & Plan    Acute on chronic renal failure- suspect near baseline.  Avoid nephrotoxins.  Discussed with nephrology dr. Armstrong- outpt fu.                Non-intractable vomiting with nausea   Assessment & Plan    Resolved.  IV anti emetics prn        Dysphagia   Assessment & Plan    SLP following, failed swallow, patient has a core tract. Improved oral secretions  Start Peg tfeeds per GI  Continue Rubinol.               History of GI bleed   Assessment & Plan    Pepcid for GI ppx          History of DVT (deep vein thrombosis)   Assessment & Plan    Hx of DVT?PE , s/p IVC filter placement.  Hx of GI bleeding in past, Anticoagulation contraindicated.  No signs of active bleed  Follow clinically           Type II diabetes mellitus (HCC)   Assessment & Plan    A1c 8.3 labile blood sugars-- blood sugars < 150   cw Insulin-sliding scale, accu-checks and hypoglycemia protocol.            Essential hypertension- (present on admission)   Assessment & Plan    Controlled  Continue with Metoprolol and Norvasc.          Polycystic kidney disease- (present on admission)   Assessment & Plan    As per above,   Nephrology following.          Hyperlipemia- (present on admission)   Assessment & Plan    Cotninue lipitor.        Hypothyroid- (present on admission)   Assessment & Plan    TSH WNL.  cw synthroid.         Diabetic neuropathy (HCC)- (present on admission)   Assessment & Plan    cw  gabapentin          Quality-Core Measures   Reviewed items::  Medications reviewed, Labs reviewed and Radiology images reviewed  Wallace catheter::  No Wallace  DVT prophylaxis pharmacological::  Heparin

## 2018-07-04 NOTE — CARE PLAN
Problem: Pain Management  Goal: Pain level will decrease to patient's comfort goal  Outcome: PROGRESSING SLOWER THAN EXPECTED  Pt medicated with PRN pain medications during this shift.     Problem: Respiratory:  Goal: Respiratory status will improve  Outcome: PROGRESSING SLOWER THAN EXPECTED  Pt coughing frequently, suctioning self multiple times per hour.

## 2018-07-04 NOTE — PROGRESS NOTES
Nephrology Progress Note, Adult, Complex               Author: Dalryn Patti Date & Time created: 7/4/2018  1:09 PM     Interval History:  51 y/o female with CAMPOS/CKD IV, DMN, HTN, admitted with left cerebellar stroke  Renal US (+) for PCKD -d/w Pt -admitted diagnosed with PCKD several years ago  C/o cough, fatigue  Difficulties to swallow  s/p PEG tube placement  Good UOP  Creat at 2.4-2.7 -likely baseline    Review of Systems:  Review of Systems   Constitutional: Positive for malaise/fatigue. Negative for chills and fever.   Respiratory: Positive for cough and sputum production. Negative for shortness of breath.    Cardiovascular: Negative for chest pain, palpitations, orthopnea and leg swelling.   Gastrointestinal: Negative for abdominal pain, constipation, nausea and vomiting.   Genitourinary: Negative for dysuria, flank pain, frequency, hematuria and urgency.   Musculoskeletal: Negative for back pain, joint pain, myalgias and neck pain.   Neurological: Negative for dizziness, sensory change, focal weakness and headaches.        Vertigo        Physical Exam:  Physical Exam   Constitutional: She is oriented to person, place, and time. She appears well-developed and well-nourished. No distress.   HENT:   Head: Normocephalic and atraumatic.   Nose: Nose normal.   Eyes: Conjunctivae are normal. No scleral icterus.   Neck: No thyromegaly present.   Cardiovascular: Normal rate and regular rhythm.  Exam reveals no gallop and no friction rub.    Pulmonary/Chest: Effort normal. No respiratory distress. She has no wheezes. She has no rhonchi. She has no rales.   Abdominal: Soft. Bowel sounds are normal. She exhibits no distension. There is no tenderness.   Musculoskeletal: She exhibits no edema.   Lymphadenopathy:     She has no cervical adenopathy.   Neurological: She is alert and oriented to person, place, and time.   Skin: Skin is warm. She is not diaphoretic. No erythema.   Nursing note and vitals reviewed.      Labs:         Invalid input(s): [id number]      Recent Labs      18   0520  18   0424   SODIUM  138  136  138   POTASSIUM  4.9  4.9  5.4   CHLORIDE  105  103  105   CO2  24  26  24   BUN  93*  90*  78*   CREATININE  2.42*  2.79*  2.42*   MAGNESIUM  2.6*  2.5   --    CALCIUM  9.5  9.1  9.2     Recent Labs      18   0520  18   0424   ALTSGPT  5  7   --    ASTSGOT  14  13   --    ALKPHOSPHAT  70  71   --    TBILIRUBIN  0.3  0.3   --    PREALBUMIN  15.0*   --    --    GLUCOSE  207*  167*  106*     Recent Labs      18   05   RBC  4.58  4.20   HEMOGLOBIN  13.6  12.6   HEMATOCRIT  44.6  39.8   PLATELETCT  275  276   PROTHROMBTM   --   13.2   INR   --   1.03     Recent Labs      18   0520   WBC  8.5  7.9   NEUTSPOLYS  69.50  68.20   LYMPHOCYTES  18.80*  19.30*   MONOCYTES  8.00  8.50   EOSINOPHILS  2.00  2.70   BASOPHILS  1.30  0.90   ASTSGOT  14  13   ALTSGPT  5  7   ALKPHOSPHAT  70  71   TBILIRUBIN  0.3  0.3           Hemodynamics:  Temp (24hrs), Av.6 °C (97.9 °F), Min:36.2 °C (97.2 °F), Max:36.9 °C (98.4 °F)  Temperature: 36.6 °C (97.9 °F)  Pulse  Av.8  Min: 56  Max: 89Heart Rate (Monitored): 75  Blood Pressure: 140/68, NIBP: 110/80     Respiratory:    Respiration: 18, Pulse Oximetry: 96 %     Work Of Breathing / Effort: Mild  RUL Breath Sounds: Coarse Crackles, RML Breath Sounds: Coarse Crackles, RLL Breath Sounds: Coarse Crackles, DIANNE Breath Sounds: Coarse Crackles, LLL Breath Sounds: Coarse Crackles  Fluids:    Intake/Output Summary (Last 24 hours) at 18 1309  Last data filed at 07/04/18 0900   Gross per 24 hour   Intake              640 ml   Output                0 ml   Net              640 ml        GI/Nutrition:  Orders Placed This Encounter   Procedures   • Diet NPO     Standing Status:   Standing     Number of Occurrences:   1     Order Specific Question:   Restrict to:     Answer:   Strict [1]      Medical Decision Making, by Problem:  Active Hospital Problems    Diagnosis   • *Stroke (HCC) [I63.9]   • CKD stage 4 due to type 2 diabetes mellitus [E11.22, N18.4]   • Dysphagia [R13.10]   • Diabetic neuropathy (HCC) [E11.40]   • Type II diabetes mellitus (HCC) [E11.9]   • History of DVT (deep vein thrombosis) [Z86.718]   • History of GI bleed [Z87.19]   • Essential hypertension [I10]   • Hyperlipemia [E78.5]   • Hypothyroid [E03.9]       Quality-Core Measures   Reviewed items::  Labs reviewed    Assessment and plan:    1.CKD IV /PCKD -to monitor  2.HTN: BP  Well controlled  3.Electrolytes:well controlled  4.Anemia:Hb stable - WNL  5.Volume overload:better  6 CVA  Recs:   Continue current treatment  Avoid nephrotoxins  As creat stable at baseline will sign off  Please call with quesitons  f/u in Nephrology Clinic

## 2018-07-04 NOTE — PROGRESS NOTES
Patient tolerates sitting up in chair for about 20 minutes before having to go back to bed due to dizziness. Meclizine given. See ADL's Flowsheet and MAR.

## 2018-07-04 NOTE — PROGRESS NOTES
Gastroenterology Progress Note     Author: Juan Miguel Rodger   Date & Time Created: 2018 1:03 PM    Chief Complaint:  dysphagia    Interval History:  Doing well no complaints     Review of Systems:  Review of Systems   Constitutional: Negative.    HENT: Negative.    Eyes: Negative.    Respiratory: Negative.    Cardiovascular: Negative.    Gastrointestinal: Negative.        Physical Exam:  Physical Exam   Constitutional: She appears well-developed.   HENT:   Head: Normocephalic.   Eyes: Pupils are equal, round, and reactive to light.   Cardiovascular: Normal rate and regular rhythm.    Pulmonary/Chest: Effort normal and breath sounds normal.   Abdominal: Soft. Bowel sounds are normal.   Peg tube site is intact looks well   Neurological: She is alert.       Labs:        Invalid input(s): [id number]      Recent Labs      18   0424   SODIUM  138  136  138   POTASSIUM  4.9  4.9  5.4   CHLORIDE  105  103  105   CO2  24  26  24   BUN  93*  90*  78*   CREATININE  2.42*  2.79*  2.42*   MAGNESIUM  2.6*  2.5   --    CALCIUM  9.5  9.1  9.2     Recent Labs      18   0520  18   0424   ALTSGPT  5  7   --    ASTSGOT  14  13   --    ALKPHOSPHAT  70  71   --    TBILIRUBIN  0.3  0.3   --    PREALBUMIN  15.0*   --    --    GLUCOSE  207*  167*  106*     Recent Labs      18   0520   RBC  4.58  4.20   HEMOGLOBIN  13.6  12.6   HEMATOCRIT  44.6  39.8   PLATELETCT  275  276   PROTHROMBTM   --   13.2   INR   --   1.03     Recent Labs      18   0520   WBC  8.5  7.9   NEUTSPOLYS  69.50  68.20   LYMPHOCYTES  18.80*  19.30*   MONOCYTES  8.00  8.50   EOSINOPHILS  2.00  2.70   BASOPHILS  1.30  0.90   ASTSGOT  14  13   ALTSGPT  5  7   ALKPHOSPHAT  70  71   TBILIRUBIN  0.3  0.3     Hemodynamics:  Temp (24hrs), Av.6 °C (97.9 °F), Min:36.2 °C (97.2 °F), Max:36.9 °C (98.4 °F)  Temperature: 36.6 °C (97.9 °F)  Pulse  Av.8   Min: 56  Max: 89Heart Rate (Monitored): 75  Blood Pressure: 140/68, NIBP: 110/80     Respiratory:    Respiration: 18, Pulse Oximetry: 96 %     Work Of Breathing / Effort: Mild  RUL Breath Sounds: Coarse Crackles, RML Breath Sounds: Coarse Crackles, RLL Breath Sounds: Coarse Crackles, DIANNE Breath Sounds: Coarse Crackles, LLL Breath Sounds: Coarse Crackles  Fluids:    Intake/Output Summary (Last 24 hours) at 07/04/18 1303  Last data filed at 07/04/18 0900   Gross per 24 hour   Intake              640 ml   Output                0 ml   Net              640 ml        GI/Nutrition:  Orders Placed This Encounter   Procedures   • Diet NPO     Standing Status:   Standing     Number of Occurrences:   1     Order Specific Question:   Restrict to:     Answer:   Strict [1]     Medical Decision Making, by Problem:  Active Hospital Problems    Diagnosis   • *Stroke (HCC) [I63.9]   • CKD stage 4 due to type 2 diabetes mellitus [E11.22, N18.4]   • Non-intractable vomiting with nausea [R11.2]   • Dysphagia [R13.10]   • Diabetic neuropathy (HCC) [E11.40]   • Type II diabetes mellitus (HCC) [E11.9]   • History of DVT (deep vein thrombosis) [Z86.718]   • History of GI bleed [Z87.19]   • Essential hypertension [I10]   • Polycystic kidney disease [Q61.3]   • Hyperlipemia [E78.5]   • Hypothyroid [E03.9]       Plan:  Dysphagia s/p PEG placed yesterday patient tolerated procedure start TF later tonight.    Quality-Core Measures

## 2018-07-04 NOTE — CARE PLAN
Problem: Nutritional:  Goal: Nutrition support tolerated and meeting greater than 85% of estimated needs  Outcome: MET Date Met: 07/04/18  Bolus feeds in place as of 7/3.

## 2018-07-04 NOTE — THERAPY
"Physical Therapy Treatment completed.   Bed Mobility:  Supine to Sit:  (found up in and returned to chair)  Transfers: Sit to Stand: Contact Guard Assist  Gait: Level Of Assist: Contact Guard Assist with Front-Wheel Walker       Plan of Care: Will benefit from Physical Therapy 4 times per week  Discharge Recommendations: Equipment: Will Continue to Assess for Equipment Needs. Post-acute therapy Discharge to a transitional care facility for continued skilled therapy services.    Pt appears to have progressed her mobility but is greatly limited by increasing dizziness with increased duration in standing. Tends to resolve with seated rest. Pt performed gait with FWW with less ataxic pattern once a 4lb weight was added to her L ankle. While here, pt will benefit from ongoing acute PT services. She will require placement for further functional training and safety.      See \"Rehab Therapy-Acute\" Patient Summary Report for complete documentation.       "

## 2018-07-04 NOTE — PROGRESS NOTES
Assumed care of pt at 1900. AAOx4, 2 L O2 via NC. PEG tube site assessed, dressing CDI. Pt c/o pain and nausea, medicated per MAR. SBA to BSC. Hourly rounding in place.

## 2018-07-04 NOTE — CARE PLAN
Problem: Venous Thromboembolism (VTW)/Deep Vein Thrombosis (DVT) Prevention:  Goal: Patient will participate in Venous Thrombosis (VTE)/Deep Vein Thrombosis (DVT)Prevention Measures    Intervention: Encourage ambulation/mobilization at level directed by Physical Therapy in collaboration with Interdisciplinary Team  Got patient up to chair in halls. See ADLs Flowsheet.      Problem: Bowel/Gastric:  Goal: Will not experience complications related to bowel motility    Intervention: Implement interventions to promote bowel evacuation if inadequate bowel movements in past 48 hours  Last BM 6/26 Senna and Milk of mag given. Miralax already given.

## 2018-07-05 LAB
ANION GAP SERPL CALC-SCNC: 8 MMOL/L (ref 0–11.9)
BUN SERPL-MCNC: 73 MG/DL (ref 8–22)
CALCIUM SERPL-MCNC: 9.3 MG/DL (ref 8.5–10.5)
CHLORIDE SERPL-SCNC: 102 MMOL/L (ref 96–112)
CO2 SERPL-SCNC: 26 MMOL/L (ref 20–33)
CREAT SERPL-MCNC: 2.29 MG/DL (ref 0.5–1.4)
GLUCOSE BLD-MCNC: 101 MG/DL (ref 65–99)
GLUCOSE BLD-MCNC: 138 MG/DL (ref 65–99)
GLUCOSE BLD-MCNC: 208 MG/DL (ref 65–99)
GLUCOSE BLD-MCNC: 212 MG/DL (ref 65–99)
GLUCOSE SERPL-MCNC: 108 MG/DL (ref 65–99)
POTASSIUM SERPL-SCNC: 4.8 MMOL/L (ref 3.6–5.5)
SODIUM SERPL-SCNC: 136 MMOL/L (ref 135–145)

## 2018-07-05 PROCEDURE — 700102 HCHG RX REV CODE 250 W/ 637 OVERRIDE(OP): Performed by: HOSPITALIST

## 2018-07-05 PROCEDURE — 700111 HCHG RX REV CODE 636 W/ 250 OVERRIDE (IP): Performed by: FAMILY MEDICINE

## 2018-07-05 PROCEDURE — 99232 SBSQ HOSP IP/OBS MODERATE 35: CPT | Performed by: HOSPITALIST

## 2018-07-05 PROCEDURE — A9270 NON-COVERED ITEM OR SERVICE: HCPCS | Performed by: HOSPITALIST

## 2018-07-05 PROCEDURE — 36415 COLL VENOUS BLD VENIPUNCTURE: CPT

## 2018-07-05 PROCEDURE — 770006 HCHG ROOM/CARE - MED/SURG/GYN SEMI*

## 2018-07-05 PROCEDURE — 80048 BASIC METABOLIC PNL TOTAL CA: CPT

## 2018-07-05 PROCEDURE — 92526 ORAL FUNCTION THERAPY: CPT

## 2018-07-05 PROCEDURE — 82962 GLUCOSE BLOOD TEST: CPT | Mod: 91

## 2018-07-05 PROCEDURE — 700111 HCHG RX REV CODE 636 W/ 250 OVERRIDE (IP): Performed by: HOSPITALIST

## 2018-07-05 RX ORDER — LACTULOSE 20 G/30ML
30 SOLUTION ORAL 3 TIMES DAILY
Status: DISCONTINUED | OUTPATIENT
Start: 2018-07-05 | End: 2018-07-09 | Stop reason: HOSPADM

## 2018-07-05 RX ADMIN — BUTALBITAL, ACETAMINOPHEN, AND CAFFEINE 1 TABLET: 50; 325; 40 TABLET ORAL at 13:59

## 2018-07-05 RX ADMIN — POLYETHYLENE GLYCOL 3350 1 PACKET: 17 POWDER, FOR SOLUTION ORAL at 10:49

## 2018-07-05 RX ADMIN — INSULIN HUMAN 2 UNITS: 100 INJECTION, SOLUTION PARENTERAL at 20:33

## 2018-07-05 RX ADMIN — METOCLOPRAMIDE 10 MG: 5 INJECTION, SOLUTION INTRAMUSCULAR; INTRAVENOUS at 11:38

## 2018-07-05 RX ADMIN — MULTIPLE VITAMINS W/ MINERALS TAB 1 TABLET: TAB at 12:27

## 2018-07-05 RX ADMIN — SENNOSIDES AND DOCUSATE SODIUM 2 TABLET: 8.6; 5 TABLET ORAL at 20:12

## 2018-07-05 RX ADMIN — ASPIRIN 324 MG: 81 TABLET, CHEWABLE ORAL at 12:29

## 2018-07-05 RX ADMIN — OXYCODONE HYDROCHLORIDE AND ACETAMINOPHEN 1 TABLET: 10; 325 TABLET ORAL at 03:38

## 2018-07-05 RX ADMIN — ATORVASTATIN CALCIUM 80 MG: 80 TABLET, FILM COATED ORAL at 20:12

## 2018-07-05 RX ADMIN — LACTULOSE 30 ML: 20 SOLUTION ORAL at 14:00

## 2018-07-05 RX ADMIN — HEPARIN SODIUM 5000 UNITS: 5000 INJECTION, SOLUTION INTRAVENOUS; SUBCUTANEOUS at 13:59

## 2018-07-05 RX ADMIN — HEPARIN SODIUM 5000 UNITS: 5000 INJECTION, SOLUTION INTRAVENOUS; SUBCUTANEOUS at 06:18

## 2018-07-05 RX ADMIN — BISACODYL 10 MG: 10 SUPPOSITORY RECTAL at 09:28

## 2018-07-05 RX ADMIN — OXYCODONE HYDROCHLORIDE AND ACETAMINOPHEN 1000 MG: 500 TABLET ORAL at 12:28

## 2018-07-05 RX ADMIN — LEVOTHYROXINE SODIUM 112 MCG: 112 TABLET ORAL at 12:27

## 2018-07-05 RX ADMIN — ONDANSETRON 4 MG: 2 INJECTION INTRAMUSCULAR; INTRAVENOUS at 09:18

## 2018-07-05 RX ADMIN — MECLIZINE HYDROCHLORIDE 25 MG: 25 TABLET ORAL at 18:45

## 2018-07-05 RX ADMIN — INSULIN HUMAN 2 UNITS: 100 INJECTION, SOLUTION PARENTERAL at 16:54

## 2018-07-05 RX ADMIN — GLYCOPYRROLATE 0.5 MG: 1 TABLET ORAL at 12:28

## 2018-07-05 RX ADMIN — TEMAZEPAM 15 MG: 15 CAPSULE ORAL at 20:13

## 2018-07-05 RX ADMIN — AMLODIPINE BESYLATE 10 MG: 5 TABLET ORAL at 12:28

## 2018-07-05 RX ADMIN — METOCLOPRAMIDE 10 MG: 5 INJECTION, SOLUTION INTRAMUSCULAR; INTRAVENOUS at 23:47

## 2018-07-05 RX ADMIN — OXYCODONE HYDROCHLORIDE AND ACETAMINOPHEN 1 TABLET: 10; 325 TABLET ORAL at 23:47

## 2018-07-05 RX ADMIN — PROCHLORPERAZINE EDISYLATE 10 MG: 5 INJECTION INTRAMUSCULAR; INTRAVENOUS at 03:38

## 2018-07-05 RX ADMIN — OXYCODONE HYDROCHLORIDE AND ACETAMINOPHEN 1 TABLET: 10; 325 TABLET ORAL at 20:11

## 2018-07-05 RX ADMIN — METOCLOPRAMIDE 10 MG: 5 INJECTION, SOLUTION INTRAMUSCULAR; INTRAVENOUS at 17:15

## 2018-07-05 RX ADMIN — FAMOTIDINE 20 MG: 20 TABLET ORAL at 12:29

## 2018-07-05 RX ADMIN — METOPROLOL TARTRATE 12.5 MG: 25 TABLET, FILM COATED ORAL at 12:27

## 2018-07-05 RX ADMIN — HEPARIN SODIUM 5000 UNITS: 5000 INJECTION, SOLUTION INTRAVENOUS; SUBCUTANEOUS at 20:10

## 2018-07-05 RX ADMIN — GLYCOPYRROLATE 0.5 MG: 1 TABLET ORAL at 20:11

## 2018-07-05 RX ADMIN — TRAMADOL HYDROCHLORIDE 100 MG: 50 TABLET, COATED ORAL at 20:12

## 2018-07-05 RX ADMIN — ONDANSETRON 4 MG: 2 INJECTION INTRAMUSCULAR; INTRAVENOUS at 15:43

## 2018-07-05 RX ADMIN — SENNOSIDES AND DOCUSATE SODIUM 2 TABLET: 8.6; 5 TABLET ORAL at 12:27

## 2018-07-05 RX ADMIN — TRAMADOL HYDROCHLORIDE 100 MG: 50 TABLET, COATED ORAL at 09:26

## 2018-07-05 RX ADMIN — OXYCODONE HYDROCHLORIDE AND ACETAMINOPHEN 1000 MG: 500 TABLET ORAL at 20:11

## 2018-07-05 RX ADMIN — PROCHLORPERAZINE EDISYLATE 10 MG: 5 INJECTION INTRAMUSCULAR; INTRAVENOUS at 20:11

## 2018-07-05 RX ADMIN — OXYCODONE HYDROCHLORIDE AND ACETAMINOPHEN 1 TABLET: 10; 325 TABLET ORAL at 15:43

## 2018-07-05 RX ADMIN — METOPROLOL TARTRATE 12.5 MG: 25 TABLET, FILM COATED ORAL at 20:12

## 2018-07-05 RX ADMIN — METOCLOPRAMIDE 10 MG: 5 INJECTION, SOLUTION INTRAMUSCULAR; INTRAVENOUS at 06:19

## 2018-07-05 ASSESSMENT — ENCOUNTER SYMPTOMS
BACK PAIN: 1
VOMITING: 0
FOCAL WEAKNESS: 0
WEAKNESS: 1
SPEECH CHANGE: 1
SHORTNESS OF BREATH: 0
CONSTIPATION: 1
MYALGIAS: 1
ABDOMINAL PAIN: 0
DIAPHORESIS: 0
FEVER: 0
NAUSEA: 1
NECK PAIN: 0
TREMORS: 0
STRIDOR: 0
FLANK PAIN: 0
COUGH: 0
SENSORY CHANGE: 0

## 2018-07-05 ASSESSMENT — PAIN SCALES - GENERAL
PAINLEVEL_OUTOF10: 7
PAINLEVEL_OUTOF10: 8
PAINLEVEL_OUTOF10: 8
PAINLEVEL_OUTOF10: 7
PAINLEVEL_OUTOF10: 8
PAINLEVEL_OUTOF10: 7

## 2018-07-05 NOTE — CARE PLAN
Problem: Communication  Goal: The ability to communicate needs accurately and effectively will improve  Outcome: PROGRESSING AS EXPECTED  Pt able to communicate her needs, has call bell within reach, rounding on pt regularly.    Problem: Bowel/Gastric:  Goal: Normal bowel function is maintained or improved  Outcome: PROGRESSING AS EXPECTED  Pt has not had a BM in few days, pt requested to just have stool softeners, pt declined suppository tonight, states she would like it during the day tomorrow.    Problem: Respiratory:  Goal: Respiratory status will improve  Outcome: PROGRESSING AS EXPECTED  Pt has productive cough and pt is able to suction herself. Pt denies SOB, oxygen saturation within normal limits on RA.

## 2018-07-05 NOTE — DISCHARGE PLANNING
Agency/Facility Name: [facility]  Spoke To: Phoebe(Admissions)  Outcome: Phoebe spoke to RN. Per Phoebe, patient has not had BM and TF were held due to nausea. Phoebe requesting MD progress note from today. [facility] to fax notes when available. Marissa(LSW) updated.

## 2018-07-05 NOTE — DISCHARGE PLANNING
Agency/Facility Name: [facility]  Spoke To: Phoebe(Admissions)  Outcome: Updated notes faxed to [facility]. Phoebe notified.

## 2018-07-05 NOTE — PROGRESS NOTES
Pt AAOx4, Lalito. Up to commode with assist, unsteady. C/O headache and nausea, meds given per MAR. Suppository, miralax, lactulose given to facilitate BM, 2 very small smear type stools and small amount of flatus noted.  Q 2 hr turn while in bed. Bed alarm,  in place. Pt self suctions secretions occasionally.

## 2018-07-05 NOTE — PROGRESS NOTES
1943: received page back from dr menendez, received order for portable xray of lft ankle, pt reported it feels sprained after twisting to get to commode.    2117: pt alert, reports feeling dizziness and nausea, states this rarely goes away, will give antiemetic. Pt reporting headache, and pain to lft ankle (xray came back negative). Pt able to move lft ankle, denies numbness or tingling, does not appear more swollen. Pt has productive cough, able to suction herself, states cough has improved. Pt's oxygen saturation 95% on RA. Pt has not had BM in multiple days, pt refusing enema, refusing suppository states she will take it tomorrow during the day and just wants stool softener tonight.

## 2018-07-05 NOTE — PROGRESS NOTES
"Bilat underbreasts raw from abdominal binder. Removed binder and padded breasts with Mepilexes. Patient also reports \"twisting,\" her ankle when I helped her pivot to bedside commode. Elevated LLE, iced it, and paged MD Stanton to notify.  "

## 2018-07-05 NOTE — ASSESSMENT & PLAN NOTE
Xray abd findings of constipation - has been on prolonged narcotics.   IV Reglan  sched lactulose, dulcolax suppositories   Bowel protocols.   Fu xray w persistent constipation pattern- Relistor-- repeat dose tomorrow if ineffective.

## 2018-07-05 NOTE — THERAPY
"Speech Language Therapy dysphagia treatment completed.   Functional Status:  Patient seen on this date for dysphagia treatment. Patient A&O X4. She reports that she is completing exercises 5x a day and is motivated to eat by mouth again. Patient exhibited wet vocal quality throughout session, but was able to clear throat/cough with minimum cues. Wet cough noted intermittently. With max verbal encouragement, patient was able to complete oral motor/laryngeal exercises with \"good\" accuracy and moderate clinician support. Patient began to fatigue after 10 reps of 6 laryngeal/pharyngeal exercises and 02 sats were noted to drop to 87 with effortful swallow exercises. Due to patient's wet vocal quality and wet cough, PO trials were not attempted at this time. Patient should remain NPO with nutrition via PEG. SLP will continue to follow for aggressive therapy.    Recommendations: NPO with PEG  Plan of Care: Will benefit from Speech Therapy 5 times per week  Post-Acute Therapy: Discharge to a transitional care facility for continued skilled therapy services.    See \"Rehab Therapy-Acute\" Patient Summary Report for complete documentation.     "

## 2018-07-05 NOTE — WOUND TEAM
Wound consult placed for evaluation of skin under breasts that are irritated due to abdominal binder that was placed after Gtube placement.  Binder was removed yesterday due to irritation and reported it doesn't have to be applied.  Removed mepilex dressing applied by staff.  Left breast is pink with intact skin and no dressing required.  Right medial breast skin is red and covered with adhesive foam.  No other care indicated.  Discussed with staff RN.

## 2018-07-05 NOTE — PROGRESS NOTES
Pt c/o headache pain and nausea this am. No charted BM and pt states no BM since 6/26. Zofran and sched tramadol given. Dulcolax supp given. Other meds and TF held until nausea/BM resolved.

## 2018-07-05 NOTE — PROGRESS NOTES
[facility] Hospitalist Progress Note    Date of Service: 2018    Chief Complaint  52 y.o. 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  admitted 2018 with N/V, visual changes- Dx subacute cerebellar cva.     Interval Problem Update    Nauseated. Constipated- LBM reported approx 1 week ago.  Remains unsteady-- Ambulating w walker.     Consultants/Specialty    Dr. Armstrong nephrology  Neurology - Dr. Sykes       Disposition    Anticipate to snf for rehab.         Review of Systems   Constitutional: Negative for diaphoresis, fever and malaise/fatigue.   HENT: Positive for congestion (improved ).    Respiratory: Negative for cough, shortness of breath and stridor.    Cardiovascular: Negative for chest pain and leg swelling.   Gastrointestinal: Positive for constipation and nausea. Negative for abdominal pain and vomiting.   Genitourinary: Negative for flank pain and hematuria.   Musculoskeletal: Positive for back pain and myalgias. Negative for joint pain and neck pain.   Neurological: Positive for speech change and weakness. Negative for tremors, sensory change and focal weakness.      Physical Exam  Laboratory/Imaging   Hemodynamics  Temp (24hrs), Av.2 °C (98.9 °F), Min:36.6 °C (97.8 °F), Max:37.6 °C (99.6 °F)   Temperature: 36.6 °C (97.8 °F)  Pulse  Av.2  Min: 56  Max: 89    Blood Pressure: 146/68      Respiratory      Respiration: 17, Pulse Oximetry: 94 %     Work Of Breathing / Effort: (P) Mild  RUL Breath Sounds: (P) Diminished, RML Breath Sounds: (P) Diminished, RLL Breath Sounds: (P) Diminished, DIANNE Breath Sounds: (P) Diminished;Crackles, LLL Breath Sounds: (P) Diminished    Fluids    Intake/Output Summary (Last 24 hours) at 18 1209  Last data filed at 18 0349   Gross per 24 hour   Intake             1430 ml   Output                0 ml   Net             1430 ml       Nutrition  Orders  Placed This Encounter   Procedures   • Diet NPO     Standing Status:   Standing     Number of Occurrences:   1     Order Specific Question:   Restrict to:     Answer:   Strict [1]     Physical Exam   Constitutional:   Alert, approp oriented NAD   HENT:   Head: Normocephalic and atraumatic.   Right Ear: External ear normal.   Left Ear: External ear normal.   Nose: Nose normal.   Eyes: EOM are normal. Right eye exhibits no discharge. Left eye exhibits no discharge. No scleral icterus.   Neck: No JVD present.   Cardiovascular: Normal rate and regular rhythm.    No murmur heard.  Pulmonary/Chest: No stridor. She has no wheezes. She has no rales.   Abdominal: Soft. Bowel sounds are normal. She exhibits no distension. There is no tenderness.   PEG tube site dressed.    Musculoskeletal: She exhibits no edema or tenderness.   Neurological: She is alert. No cranial nerve deficit. Coordination abnormal.   Mild left hemiparesis - 4/5 strength.    Skin: Skin is warm and dry. She is not diaphoretic. No pallor.   Psychiatric: She has a normal mood and affect. Her behavior is normal.   Vitals reviewed.      Recent Labs      07/03/18   0520   WBC  7.9   RBC  4.20   HEMOGLOBIN  12.6   HEMATOCRIT  39.8   MCV  94.8   MCH  30.0   MCHC  31.7*   RDW  46.4   PLATELETCT  276   MPV  10.7     Recent Labs      07/03/18   0520  07/04/18   0424  07/05/18   0347   SODIUM  136  138  136   POTASSIUM  4.9  5.4  4.8   CHLORIDE  103  105  102   CO2  26  24  26   GLUCOSE  167*  106*  108*   BUN  90*  78*  73*   CREATININE  2.79*  2.42*  2.29*   CALCIUM  9.1  9.2  9.3     Recent Labs      07/03/18   0520   INR  1.03                  Assessment/Plan     * Stroke (HCC)- (present on admission)   Assessment & Plan    MRI of the brain outlying facility w Left medial cerebellar acute or subacute infarction with no hemorrhage- improved strength.   Carotid Duplex: Antegrade left vertebral flow with high resistant may indicate distal occlusion  Continue  ASA/Statin.  Pt/OT  Continue tfeeds                CKD stage 4 due to type 2 diabetes mellitus- (present on admission)   Assessment & Plan    Acute on chronic renal failure- suspect near baseline.  Avoid nephrotoxins.  Outpatient nephrology fu .                Non-intractable vomiting with nausea   Assessment & Plan    Recurrent nausea  Continue sched reglan  Treat constipation w laxities.   IV anti emetics prn        Dysphagia   Assessment & Plan    SLP following, failed swallow, patient has a core tract. Improved oral secretions  Peg tube feeds   Continue Rubinol.               History of GI bleed   Assessment & Plan    Pepcid for GI ppx          History of DVT (deep vein thrombosis)   Assessment & Plan    Hx of DVT?PE , s/p IVC filter placement.  Hx of GI bleeding in past, Anticoagulation contraindicated.  No signs of active bleed  Follow clinically           Type II diabetes mellitus (HCC)   Assessment & Plan    A1c 8.3 labile blood sugar  cw Insulin-sliding scale as needed  Serial accu-checks   hypoglycemia protocol.            Essential hypertension- (present on admission)   Assessment & Plan    Controlled  Continue with Metoprolol and Norvasc.          Slow transit constipation- (present on admission)   Assessment & Plan    IV Reglan  Start sched lactulose  Bowel protocols.   As needed bowel protocols.         Polycystic kidney disease- (present on admission)   Assessment & Plan    As per above,   Nephrology following.          Hyperlipemia- (present on admission)   Assessment & Plan    Continue lipitor.        Hypothyroid- (present on admission)   Assessment & Plan    TSH WNL.  cw synthroid.         Diabetic neuropathy (HCC)- (present on admission)   Assessment & Plan    cw  gabapentin          Quality-Core Measures   Reviewed items::  Medications reviewed, Labs reviewed and Radiology images reviewed  Wallace catheter::  No Wallace  DVT prophylaxis pharmacological::  Heparin

## 2018-07-06 ENCOUNTER — APPOINTMENT (OUTPATIENT)
Dept: RADIOLOGY | Facility: MEDICAL CENTER | Age: 53
DRG: 064 | End: 2018-07-06
Attending: HOSPITALIST
Payer: COMMERCIAL

## 2018-07-06 LAB
ANION GAP SERPL CALC-SCNC: 9 MMOL/L (ref 0–11.9)
BUN SERPL-MCNC: 70 MG/DL (ref 8–22)
CALCIUM SERPL-MCNC: 8.7 MG/DL (ref 8.5–10.5)
CHLORIDE SERPL-SCNC: 102 MMOL/L (ref 96–112)
CO2 SERPL-SCNC: 25 MMOL/L (ref 20–33)
CREAT SERPL-MCNC: 2.27 MG/DL (ref 0.5–1.4)
GLUCOSE BLD-MCNC: 110 MG/DL (ref 65–99)
GLUCOSE BLD-MCNC: 123 MG/DL (ref 65–99)
GLUCOSE BLD-MCNC: 145 MG/DL (ref 65–99)
GLUCOSE BLD-MCNC: 175 MG/DL (ref 65–99)
GLUCOSE SERPL-MCNC: 142 MG/DL (ref 65–99)
POTASSIUM SERPL-SCNC: 4.6 MMOL/L (ref 3.6–5.5)
SODIUM SERPL-SCNC: 136 MMOL/L (ref 135–145)

## 2018-07-06 PROCEDURE — 700102 HCHG RX REV CODE 250 W/ 637 OVERRIDE(OP): Performed by: HOSPITALIST

## 2018-07-06 PROCEDURE — 36415 COLL VENOUS BLD VENIPUNCTURE: CPT

## 2018-07-06 PROCEDURE — 700111 HCHG RX REV CODE 636 W/ 250 OVERRIDE (IP): Performed by: FAMILY MEDICINE

## 2018-07-06 PROCEDURE — 770006 HCHG ROOM/CARE - MED/SURG/GYN SEMI*

## 2018-07-06 PROCEDURE — 80048 BASIC METABOLIC PNL TOTAL CA: CPT

## 2018-07-06 PROCEDURE — 82962 GLUCOSE BLOOD TEST: CPT | Mod: 91

## 2018-07-06 PROCEDURE — 700111 HCHG RX REV CODE 636 W/ 250 OVERRIDE (IP): Performed by: HOSPITALIST

## 2018-07-06 PROCEDURE — 97116 GAIT TRAINING THERAPY: CPT

## 2018-07-06 PROCEDURE — 92526 ORAL FUNCTION THERAPY: CPT

## 2018-07-06 PROCEDURE — A9270 NON-COVERED ITEM OR SERVICE: HCPCS | Performed by: HOSPITALIST

## 2018-07-06 PROCEDURE — 99232 SBSQ HOSP IP/OBS MODERATE 35: CPT | Performed by: HOSPITALIST

## 2018-07-06 PROCEDURE — 700105 HCHG RX REV CODE 258: Performed by: HOSPITALIST

## 2018-07-06 PROCEDURE — 74018 RADEX ABDOMEN 1 VIEW: CPT

## 2018-07-06 RX ORDER — TRAMADOL HYDROCHLORIDE 50 MG/1
100 TABLET ORAL EVERY 12 HOURS PRN
Status: DISCONTINUED | OUTPATIENT
Start: 2018-07-06 | End: 2018-07-08

## 2018-07-06 RX ORDER — M-VIT,TX,IRON,MINS/CALC/FOLIC 27MG-0.4MG
1 TABLET ORAL DAILY
Status: DISCONTINUED | OUTPATIENT
Start: 2018-07-07 | End: 2018-07-09 | Stop reason: HOSPADM

## 2018-07-06 RX ORDER — METOCLOPRAMIDE HYDROCHLORIDE 5 MG/ML
5 INJECTION INTRAMUSCULAR; INTRAVENOUS EVERY 6 HOURS
Status: DISCONTINUED | OUTPATIENT
Start: 2018-07-06 | End: 2018-07-08

## 2018-07-06 RX ORDER — SODIUM CHLORIDE 9 MG/ML
INJECTION, SOLUTION INTRAVENOUS CONTINUOUS
Status: DISCONTINUED | OUTPATIENT
Start: 2018-07-06 | End: 2018-07-09 | Stop reason: HOSPADM

## 2018-07-06 RX ORDER — GLYCOPYRROLATE 1 MG/1
0.5 TABLET ORAL 2 TIMES DAILY
Status: DISCONTINUED | OUTPATIENT
Start: 2018-07-06 | End: 2018-07-09 | Stop reason: HOSPADM

## 2018-07-06 RX ORDER — ASCORBIC ACID 500 MG
1000 TABLET ORAL 2 TIMES DAILY
Status: DISCONTINUED | OUTPATIENT
Start: 2018-07-06 | End: 2018-07-09 | Stop reason: HOSPADM

## 2018-07-06 RX ADMIN — OXYCODONE HYDROCHLORIDE AND ACETAMINOPHEN 1000 MG: 500 TABLET ORAL at 20:03

## 2018-07-06 RX ADMIN — ASPIRIN 325 MG: 325 TABLET ORAL at 09:33

## 2018-07-06 RX ADMIN — MULTIPLE VITAMINS W/ MINERALS TAB 1 TABLET: TAB at 09:36

## 2018-07-06 RX ADMIN — LACTULOSE 30 ML: 20 SOLUTION ORAL at 14:22

## 2018-07-06 RX ADMIN — SENNOSIDES AND DOCUSATE SODIUM 2 TABLET: 8.6; 5 TABLET ORAL at 09:33

## 2018-07-06 RX ADMIN — HEPARIN SODIUM 5000 UNITS: 5000 INJECTION, SOLUTION INTRAVENOUS; SUBCUTANEOUS at 20:02

## 2018-07-06 RX ADMIN — ONDANSETRON 4 MG: 2 INJECTION INTRAMUSCULAR; INTRAVENOUS at 04:14

## 2018-07-06 RX ADMIN — METOPROLOL TARTRATE 12.5 MG: 25 TABLET, FILM COATED ORAL at 09:33

## 2018-07-06 RX ADMIN — OXYCODONE HYDROCHLORIDE AND ACETAMINOPHEN 1 TABLET: 10; 325 TABLET ORAL at 17:32

## 2018-07-06 RX ADMIN — PROCHLORPERAZINE EDISYLATE 10 MG: 5 INJECTION INTRAMUSCULAR; INTRAVENOUS at 20:12

## 2018-07-06 RX ADMIN — TRAMADOL HYDROCHLORIDE 100 MG: 50 TABLET, COATED ORAL at 20:12

## 2018-07-06 RX ADMIN — LEVOTHYROXINE SODIUM 112 MCG: 112 TABLET ORAL at 09:34

## 2018-07-06 RX ADMIN — TEMAZEPAM 15 MG: 15 CAPSULE ORAL at 20:13

## 2018-07-06 RX ADMIN — GLYCOPYRROLATE 0.5 MG: 1 TABLET ORAL at 09:38

## 2018-07-06 RX ADMIN — METOPROLOL TARTRATE 12.5 MG: 25 TABLET, FILM COATED ORAL at 20:03

## 2018-07-06 RX ADMIN — OXYCODONE HYDROCHLORIDE AND ACETAMINOPHEN 1000 MG: 500 TABLET ORAL at 09:35

## 2018-07-06 RX ADMIN — INSULIN HUMAN 1 UNITS: 100 INJECTION, SOLUTION PARENTERAL at 17:06

## 2018-07-06 RX ADMIN — HEPARIN SODIUM 5000 UNITS: 5000 INJECTION, SOLUTION INTRAVENOUS; SUBCUTANEOUS at 14:22

## 2018-07-06 RX ADMIN — METOCLOPRAMIDE 10 MG: 5 INJECTION, SOLUTION INTRAMUSCULAR; INTRAVENOUS at 11:23

## 2018-07-06 RX ADMIN — OXYCODONE HYDROCHLORIDE AND ACETAMINOPHEN 1000 MG: 500 TABLET ORAL at 09:32

## 2018-07-06 RX ADMIN — GLYCOPYRROLATE 0.5 MG: 1 TABLET ORAL at 20:16

## 2018-07-06 RX ADMIN — LACTULOSE 30 ML: 20 SOLUTION ORAL at 20:04

## 2018-07-06 RX ADMIN — METOCLOPRAMIDE 10 MG: 5 INJECTION, SOLUTION INTRAMUSCULAR; INTRAVENOUS at 05:26

## 2018-07-06 RX ADMIN — SENNOSIDES AND DOCUSATE SODIUM 2 TABLET: 8.6; 5 TABLET ORAL at 20:03

## 2018-07-06 RX ADMIN — SODIUM CHLORIDE: 9 INJECTION, SOLUTION INTRAVENOUS at 16:00

## 2018-07-06 RX ADMIN — TRAMADOL HYDROCHLORIDE 100 MG: 50 TABLET, COATED ORAL at 09:31

## 2018-07-06 RX ADMIN — ONDANSETRON 4 MG: 2 INJECTION INTRAMUSCULAR; INTRAVENOUS at 09:34

## 2018-07-06 RX ADMIN — MECLIZINE HYDROCHLORIDE 25 MG: 25 TABLET ORAL at 16:44

## 2018-07-06 RX ADMIN — METOCLOPRAMIDE 5 MG: 5 INJECTION, SOLUTION INTRAMUSCULAR; INTRAVENOUS at 16:44

## 2018-07-06 RX ADMIN — LACTULOSE 30 ML: 20 SOLUTION ORAL at 09:34

## 2018-07-06 RX ADMIN — HEPARIN SODIUM 5000 UNITS: 5000 INJECTION, SOLUTION INTRAVENOUS; SUBCUTANEOUS at 05:26

## 2018-07-06 RX ADMIN — BUTALBITAL, ACETAMINOPHEN, AND CAFFEINE 1 TABLET: 50; 325; 40 TABLET ORAL at 14:57

## 2018-07-06 RX ADMIN — ONDANSETRON 4 MG: 2 INJECTION INTRAMUSCULAR; INTRAVENOUS at 14:22

## 2018-07-06 RX ADMIN — BUTALBITAL, ACETAMINOPHEN, AND CAFFEINE 1 TABLET: 50; 325; 40 TABLET ORAL at 04:14

## 2018-07-06 RX ADMIN — ONDANSETRON 4 MG: 2 INJECTION INTRAMUSCULAR; INTRAVENOUS at 23:52

## 2018-07-06 RX ADMIN — FAMOTIDINE 20 MG: 20 TABLET ORAL at 09:33

## 2018-07-06 RX ADMIN — AMLODIPINE BESYLATE 10 MG: 5 TABLET ORAL at 09:34

## 2018-07-06 RX ADMIN — PROCHLORPERAZINE EDISYLATE 10 MG: 5 INJECTION INTRAMUSCULAR; INTRAVENOUS at 01:55

## 2018-07-06 RX ADMIN — MECLIZINE HYDROCHLORIDE 25 MG: 25 TABLET ORAL at 05:27

## 2018-07-06 RX ADMIN — ATORVASTATIN CALCIUM 80 MG: 80 TABLET, FILM COATED ORAL at 20:03

## 2018-07-06 ASSESSMENT — PAIN SCALES - GENERAL
PAINLEVEL_OUTOF10: 7
PAINLEVEL_OUTOF10: 6
PAINLEVEL_OUTOF10: 8
PAINLEVEL_OUTOF10: 5
PAINLEVEL_OUTOF10: 4
PAINLEVEL_OUTOF10: 8
PAINLEVEL_OUTOF10: 8
PAINLEVEL_OUTOF10: 4

## 2018-07-06 ASSESSMENT — COGNITIVE AND FUNCTIONAL STATUS - GENERAL
WALKING IN HOSPITAL ROOM: A LITTLE
STANDING UP FROM CHAIR USING ARMS: A LITTLE
MOBILITY SCORE: 19
MOVING FROM LYING ON BACK TO SITTING ON SIDE OF FLAT BED: A LITTLE
CLIMB 3 TO 5 STEPS WITH RAILING: A LOT
SUGGESTED CMS G CODE MODIFIER MOBILITY: CK

## 2018-07-06 ASSESSMENT — ENCOUNTER SYMPTOMS
FEVER: 0
STRIDOR: 0
SPEECH CHANGE: 1
SENSORY CHANGE: 0
COUGH: 0
VOMITING: 1
SHORTNESS OF BREATH: 0
FOCAL WEAKNESS: 0
BACK PAIN: 1
DIAPHORESIS: 0
MYALGIAS: 1
WEAKNESS: 1
ABDOMINAL PAIN: 0
NECK PAIN: 0
NAUSEA: 1
TREMORS: 0
FLANK PAIN: 0
CONSTIPATION: 1

## 2018-07-06 ASSESSMENT — GAIT ASSESSMENTS
DISTANCE (FEET): 20
DEVIATION: DECREASED BASE OF SUPPORT;DECREASED HEEL STRIKE;DECREASED TOE OFF
ASSISTIVE DEVICE: FRONT WHEEL WALKER
GAIT LEVEL OF ASSIST: MINIMAL ASSIST

## 2018-07-06 NOTE — THERAPY
"Physical Therapy Treatment completed.   Bed Mobility:  Supine to Sit: Supervised (flat bed, with rail)  Transfers: Sit to Stand: Contact Guard Assist  Gait: Level Of Assist: Minimal Assist (to mod A due to L lateral lean) with Front-Wheel Walker       Plan of Care: Will benefit from Physical Therapy 4 times per week  Discharge Recommendations: Equipment: Will Continue to Assess for Equipment Needs.     Pt increased her overall quantity of gait bouts, but tends to fatigue and become increasingly dizzy around x20' of gait requiring seated rest. Today, pt demonstrated a strong L lateral lean during standing and gait activity requiring PT to provide min to mod A for correction and stabilization. This was not present when seen on 7/4. Pt also reporting HA to posterior base of skull. To improve pt's L LE coordination during swing and stance, PT placed 4lb weight which enabled pt to have more consistent placement at initial contact and less deviating path. While here, pt will benefit from further acute skilled PT services to improve her functional mobility. She will require placement upon DC.      See \"Rehab Therapy-Acute\" Patient Summary Report for complete documentation.       "

## 2018-07-06 NOTE — PROGRESS NOTES
Assumed care of patient at 0700.  Report received at bedside.  Patient is A&O x 4, not tolerating bolus feeds.  Patient experiencing extreme nausea following medication administration.  Dr. Linares notified.  Instructed by MD to hold bolus feeds until patient is able to tolerate.

## 2018-07-06 NOTE — DISCHARGE PLANNING
Agency/Facility Name: [facility]  Spoke To: Felisha  Outcome: Updated notes provided are currently being reviewed by medical director. Felisha will call this [facility] with any updates on status of referral.

## 2018-07-06 NOTE — PROGRESS NOTES
1948: pt reporting constant headache to posterior head and constant nausea that increases after the bolus feeds. Pt is alert, follows directions well, pt has wound to lft ankle and states is very sore, xray was negative. Pt has productive cough although she is able to suction herself, pt's oxygen saturation is 94% RA.    2030: pt got up to commode SBA, pt had large BM, pt refused lactulose tonight, stating just wants stool softeners.

## 2018-07-06 NOTE — DISCHARGE PLANNING
Agency/Facility Name: [facility]  Spoke To: Felisha  Outcome: Patient has been accepted by medical director. Bed available today. Marissa(FELICITA) notified.

## 2018-07-06 NOTE — CARE PLAN
Problem: Bowel/Gastric:  Goal: Normal bowel function is maintained or improved  Outcome: PROGRESSING AS EXPECTED      Problem: Knowledge Deficit  Goal: Knowledge of the prescribed therapeutic regimen will improve  Outcome: PROGRESSING AS EXPECTED

## 2018-07-06 NOTE — CARE PLAN
Problem: Safety  Goal: Will remain free from injury  Outcome: PROGRESSING AS EXPECTED  Bed alarm in place    Problem: Pain Management  Goal: Pain level will decrease to patient's comfort goal  Outcome: PROGRESSING SLOWER THAN EXPECTED  Pain meds given per MAR

## 2018-07-06 NOTE — PROGRESS NOTES
[facility]ist Progress Note    Date of Service: 2018    Chief Complaint  52 y.o. 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  admitted 2018 with N/V, visual changes- Dx subacute cerebellar cva.     Interval Problem Update    Had large Bm yesterday. N/V this morning with meds and 150 cc flush into Gtube.     Consultants/Specialty    Dr. Armstrong nephrology  Neurology - Dr. Sykes       Disposition    Anticipate to snf for rehab.         Review of Systems   Constitutional: Positive for malaise/fatigue. Negative for diaphoresis and fever.   HENT: Positive for congestion (improved ).    Respiratory: Negative for cough, shortness of breath and stridor.    Cardiovascular: Negative for chest pain and leg swelling.   Gastrointestinal: Positive for constipation (resolved ), nausea and vomiting. Negative for abdominal pain.   Genitourinary: Negative for flank pain and hematuria.   Musculoskeletal: Positive for back pain and myalgias. Negative for joint pain and neck pain.   Neurological: Positive for speech change and weakness. Negative for tremors, sensory change and focal weakness.      Physical Exam  Laboratory/Imaging   Hemodynamics  Temp (24hrs), Av.6 °C (97.9 °F), Min:36.4 °C (97.6 °F), Max:36.9 °C (98.4 °F)   Temperature: 36.4 °C (97.6 °F)  Pulse  Av.4  Min: 56  Max: 89    Blood Pressure: 131/57      Respiratory      Respiration: 18, Pulse Oximetry: 95 %     Work Of Breathing / Effort: Mild  RUL Breath Sounds: Diminished;Clear, RML Breath Sounds: Diminished;Clear, RLL Breath Sounds: Diminished;Clear, DIANNE Breath Sounds: Diminished;Clear, LLL Breath Sounds: Clear;Diminished    Fluids    Intake/Output Summary (Last 24 hours) at 18 1241  Last data filed at 18 0800   Gross per 24 hour   Intake             1960 ml   Output              100 ml   Net             1860 ml       Nutrition  Orders  Placed This Encounter   Procedures   • Diet NPO     Standing Status:   Standing     Number of Occurrences:   1     Order Specific Question:   Restrict to:     Answer:   Strict [1]     Physical Exam   Constitutional: She appears distressed (nausea).   Alert, approp oriented    HENT:   Head: Normocephalic and atraumatic.   Eyes: EOM are normal. Right eye exhibits no discharge. Left eye exhibits no discharge. No scleral icterus.   Neck: No JVD present.   Cardiovascular: Normal rate and regular rhythm.    No murmur heard.  Pulmonary/Chest: No stridor. No respiratory distress. She has no wheezes. She has no rales.   Abdominal: Soft. Bowel sounds are normal. She exhibits no distension. There is no tenderness.   PEG tube site dressed.    Musculoskeletal: She exhibits no edema or tenderness.   Neurological: She is alert. No cranial nerve deficit. Coordination abnormal.   Mild left hemiparesis - 4/5 strength.    Skin: Skin is warm and dry. She is not diaphoretic. No pallor.   Vitals reviewed.          Recent Labs      07/04/18   0424  07/05/18   0347  07/06/18   0346   SODIUM  138  136  136   POTASSIUM  5.4  4.8  4.6   CHLORIDE  105  102  102   CO2  24  26  25   GLUCOSE  106*  108*  142*   BUN  78*  73*  70*   CREATININE  2.42*  2.29*  2.27*   CALCIUM  9.2  9.3  8.7                      Assessment/Plan     * Stroke (HCC)- (present on admission)   Assessment & Plan    MRI of the brain outlying facility w Left medial cerebellar acute or subacute infarction with no hemorrhage- improved strength w dysarthria, dysphagia.   Carotid Duplex: Antegrade left vertebral flow with high resistant may indicate distal occlusion  Continue ASA/Statin.  Pt/OT  Hold Gtube feeds due to N/V- check abd xray for signs of ileus.                 CKD stage 4 due to type 2 diabetes mellitus- (present on admission)   Assessment & Plan    Acute on chronic renal failure- stable.  Avoid nephrotoxins.  Outpatient nephrology fu .                 Non-intractable vomiting with nausea   Assessment & Plan    Recurrent nausea/Vomiting.   Continue sched reglan  Xray abd  Hold G tube feeds.  Treat constipation w laxities.   IV anti emetics prn        Dysphagia   Assessment & Plan    SLP following, failed swallow, patient has a core tract. Improved oral secretions  w recurrent N/V  Hold Tfs  IV reglan  Check xray abd for signs of ileus.  Continue Rubinol.               History of GI bleed   Assessment & Plan    Pepcid for GI ppx          History of DVT (deep vein thrombosis)   Assessment & Plan    Hx of DVT ? PE , s/p IVC filter placement.  Hx of GI bleeding in past, Anticoagulation contraindicated.  No signs of active bleed  Follow clinically           Type II diabetes mellitus (HCC)   Assessment & Plan    A1c 8.3 labile blood sugar  cw Insulin-sliding scale as needed  Serial accu-checks   hypoglycemia protocol.            Essential hypertension- (present on admission)   Assessment & Plan    Controlled  Continue with Metoprolol and Norvasc.          Slow transit constipation- (present on admission)   Assessment & Plan    IV Reglan  Prn  lactulose  Bowel protocols.           Polycystic kidney disease- (present on admission)   Assessment & Plan    As per above,   Nephrology following.          Hyperlipemia- (present on admission)   Assessment & Plan    Continue lipitor.        Hypothyroid- (present on admission)   Assessment & Plan    TSH WNL.  cw synthroid.         Diabetic neuropathy (HCC)- (present on admission)   Assessment & Plan    cw  gabapentin          Quality-Core Measures   Reviewed items::  Medications reviewed, Labs reviewed and Radiology images reviewed  Wallace catheter::  No Wallace  DVT prophylaxis pharmacological::  Heparin

## 2018-07-06 NOTE — DISCHARGE PLANNING
Anticipated Discharge Disposition: IRF    Action: It has been noted that pt had BM last night.    ALY, Stacy to f/u with [facility] to approve pt's transfer today.    Pt discussed during rounds. Pt is not medically clear. [facility] does not accept pt's over the weekend.  Transport will be set up for Monday    Barriers to Discharge: None    Plan: Pt to dc to [facility]

## 2018-07-06 NOTE — THERAPY
"Occupational Therapy Treatment Attempted    OT treat held 2/2 patient report N/V recently and remains with nausea. Will continue to follow.     See \"Rehab Therapy-Acute\" Patient Summary Report for complete documentation.    "

## 2018-07-06 NOTE — THERAPY
"Speech Language Therapy dysphagia treatment completed.   Functional Status:  Patient seen on this date for dysphagia therapy. Patient was awake and agreeable to therapy. Pt reports feeling very cold today so additional blanket was provided. Voice was noted to be clear and strong prior to PO trials. Dysphagia exercises were conducted and patient was able to complete 10 reps of chin tuck against resistance, falsetto, effortful swallow, stretch exercise, tongue base retraction, and pretending to gargle with fair-good accuracy and moderate clinician support. During effortful swallow task, laryngeal elevation was noted to be weak and incomplete. Two single ice chips were presented to patient and immediate coughing was noted and implementation of Yankauer was used to clear PO. Patient continues to be at a high risk for aspiration so it is recommended she remain NPO with nutrition via PEG.    Recommendations: NPO/PEG   Plan of Care: Will benefit from Speech Therapy 5 times per week  Post-Acute Therapy: Discharge to a transitional care facility for continued skilled therapy services.    See \"Rehab Therapy-Acute\" Patient Summary Report for complete documentation.     "

## 2018-07-06 NOTE — DISCHARGE PLANNING
Spoke To: Felisha(Admissions)  Agency/Facility Name: [facility]  Plan or Request: Felisha requesting two days worth of notes. [facility]

## 2018-07-06 NOTE — CARE PLAN
Problem: Communication  Goal: The ability to communicate needs accurately and effectively will improve  Outcome: PROGRESSING AS EXPECTED  Pt able to communicate her needs, pt has call bell within reach, pt rings call bell appropriately.    Problem: Safety  Goal: Will remain free from injury  Outcome: PROGRESSING AS EXPECTED  Pt has bed in lowest position, bed alarm on, rounding on pt regularly. Pt has call bell within reach.    Problem: Bowel/Gastric:  Goal: Normal bowel function is maintained or improved  Outcome: PROGRESSING AS EXPECTED  Pt was able to have multiple BM's today, pt reports feeling relieved.

## 2018-07-06 NOTE — DISCHARGE PLANNING
Received Transport Form @ 1600 from Marissa([facility])  Spoke to Lotus @ [facility] transport    Transport is scheduled for 7/9  @ 1330 going to [facility] via [facility] transport. Yohana at [facility] notified of transport time.

## 2018-07-07 LAB
ANION GAP SERPL CALC-SCNC: 12 MMOL/L (ref 0–11.9)
BUN SERPL-MCNC: 62 MG/DL (ref 8–22)
CALCIUM SERPL-MCNC: 8.8 MG/DL (ref 8.5–10.5)
CHLORIDE SERPL-SCNC: 103 MMOL/L (ref 96–112)
CO2 SERPL-SCNC: 24 MMOL/L (ref 20–33)
CREAT SERPL-MCNC: 2.59 MG/DL (ref 0.5–1.4)
GLUCOSE BLD-MCNC: 114 MG/DL (ref 65–99)
GLUCOSE BLD-MCNC: 89 MG/DL (ref 65–99)
GLUCOSE BLD-MCNC: 89 MG/DL (ref 65–99)
GLUCOSE BLD-MCNC: 99 MG/DL (ref 65–99)
GLUCOSE SERPL-MCNC: 112 MG/DL (ref 65–99)
POTASSIUM SERPL-SCNC: 4.9 MMOL/L (ref 3.6–5.5)
SODIUM SERPL-SCNC: 139 MMOL/L (ref 135–145)

## 2018-07-07 PROCEDURE — 36415 COLL VENOUS BLD VENIPUNCTURE: CPT

## 2018-07-07 PROCEDURE — 80048 BASIC METABOLIC PNL TOTAL CA: CPT

## 2018-07-07 PROCEDURE — A9270 NON-COVERED ITEM OR SERVICE: HCPCS | Performed by: HOSPITALIST

## 2018-07-07 PROCEDURE — 700102 HCHG RX REV CODE 250 W/ 637 OVERRIDE(OP): Performed by: HOSPITALIST

## 2018-07-07 PROCEDURE — 82962 GLUCOSE BLOOD TEST: CPT | Mod: 91

## 2018-07-07 PROCEDURE — 700111 HCHG RX REV CODE 636 W/ 250 OVERRIDE (IP): Performed by: FAMILY MEDICINE

## 2018-07-07 PROCEDURE — 700105 HCHG RX REV CODE 258: Performed by: HOSPITALIST

## 2018-07-07 PROCEDURE — 770006 HCHG ROOM/CARE - MED/SURG/GYN SEMI*

## 2018-07-07 PROCEDURE — 700111 HCHG RX REV CODE 636 W/ 250 OVERRIDE (IP): Performed by: HOSPITALIST

## 2018-07-07 PROCEDURE — 99232 SBSQ HOSP IP/OBS MODERATE 35: CPT | Performed by: HOSPITALIST

## 2018-07-07 RX ADMIN — GLYCOPYRROLATE 0.5 MG: 1 TABLET ORAL at 22:09

## 2018-07-07 RX ADMIN — OXYCODONE HYDROCHLORIDE AND ACETAMINOPHEN 1 TABLET: 10; 325 TABLET ORAL at 21:58

## 2018-07-07 RX ADMIN — SENNOSIDES AND DOCUSATE SODIUM 2 TABLET: 8.6; 5 TABLET ORAL at 09:14

## 2018-07-07 RX ADMIN — OXYCODONE HYDROCHLORIDE AND ACETAMINOPHEN 1 TABLET: 10; 325 TABLET ORAL at 01:38

## 2018-07-07 RX ADMIN — HEPARIN SODIUM 5000 UNITS: 5000 INJECTION, SOLUTION INTRAVENOUS; SUBCUTANEOUS at 05:17

## 2018-07-07 RX ADMIN — METOCLOPRAMIDE 5 MG: 5 INJECTION, SOLUTION INTRAMUSCULAR; INTRAVENOUS at 11:48

## 2018-07-07 RX ADMIN — AMLODIPINE BESYLATE 10 MG: 5 TABLET ORAL at 08:00

## 2018-07-07 RX ADMIN — TEMAZEPAM 15 MG: 15 CAPSULE ORAL at 21:26

## 2018-07-07 RX ADMIN — ASPIRIN 325 MG: 325 TABLET ORAL at 09:14

## 2018-07-07 RX ADMIN — FAMOTIDINE 20 MG: 20 TABLET ORAL at 09:14

## 2018-07-07 RX ADMIN — HEPARIN SODIUM 5000 UNITS: 5000 INJECTION, SOLUTION INTRAVENOUS; SUBCUTANEOUS at 15:02

## 2018-07-07 RX ADMIN — BUTALBITAL, ACETAMINOPHEN, AND CAFFEINE 1 TABLET: 50; 325; 40 TABLET ORAL at 15:02

## 2018-07-07 RX ADMIN — ATORVASTATIN CALCIUM 80 MG: 80 TABLET, FILM COATED ORAL at 22:07

## 2018-07-07 RX ADMIN — MECLIZINE HYDROCHLORIDE 25 MG: 25 TABLET ORAL at 09:14

## 2018-07-07 RX ADMIN — BUTALBITAL, ACETAMINOPHEN, AND CAFFEINE 1 TABLET: 50; 325; 40 TABLET ORAL at 08:00

## 2018-07-07 RX ADMIN — MULTIPLE VITAMINS W/ MINERALS TAB 1 TABLET: TAB at 09:14

## 2018-07-07 RX ADMIN — SODIUM CHLORIDE: 9 INJECTION, SOLUTION INTRAVENOUS at 23:00

## 2018-07-07 RX ADMIN — LACTULOSE 30 ML: 20 SOLUTION ORAL at 21:26

## 2018-07-07 RX ADMIN — LACTULOSE 30 ML: 20 SOLUTION ORAL at 15:02

## 2018-07-07 RX ADMIN — METOPROLOL TARTRATE 12.5 MG: 25 TABLET, FILM COATED ORAL at 21:00

## 2018-07-07 RX ADMIN — GLYCOPYRROLATE 0.5 MG: 1 TABLET ORAL at 08:00

## 2018-07-07 RX ADMIN — METOCLOPRAMIDE 5 MG: 5 INJECTION, SOLUTION INTRAMUSCULAR; INTRAVENOUS at 05:17

## 2018-07-07 RX ADMIN — SODIUM CHLORIDE: 9 INJECTION, SOLUTION INTRAVENOUS at 09:15

## 2018-07-07 RX ADMIN — TRAMADOL HYDROCHLORIDE 100 MG: 50 TABLET, COATED ORAL at 11:54

## 2018-07-07 RX ADMIN — METOCLOPRAMIDE 5 MG: 5 INJECTION, SOLUTION INTRAMUSCULAR; INTRAVENOUS at 01:30

## 2018-07-07 RX ADMIN — OXYCODONE HYDROCHLORIDE AND ACETAMINOPHEN 1 TABLET: 10; 325 TABLET ORAL at 05:25

## 2018-07-07 RX ADMIN — METHYLNALTREXONE BROMIDE 6 MG: 12 INJECTION, SOLUTION SUBCUTANEOUS at 16:42

## 2018-07-07 RX ADMIN — OXYCODONE HYDROCHLORIDE AND ACETAMINOPHEN 1000 MG: 500 TABLET ORAL at 09:14

## 2018-07-07 RX ADMIN — PROMETHAZINE HYDROCHLORIDE 25 MG: 25 TABLET ORAL at 14:54

## 2018-07-07 RX ADMIN — SENNOSIDES AND DOCUSATE SODIUM 2 TABLET: 8.6; 5 TABLET ORAL at 21:00

## 2018-07-07 RX ADMIN — OXYCODONE HYDROCHLORIDE AND ACETAMINOPHEN 1 TABLET: 10; 325 TABLET ORAL at 18:43

## 2018-07-07 RX ADMIN — ONDANSETRON 4 MG: 2 INJECTION INTRAMUSCULAR; INTRAVENOUS at 07:51

## 2018-07-07 RX ADMIN — METOCLOPRAMIDE 5 MG: 5 INJECTION, SOLUTION INTRAMUSCULAR; INTRAVENOUS at 17:50

## 2018-07-07 RX ADMIN — LEVOTHYROXINE SODIUM 112 MCG: 112 TABLET ORAL at 08:00

## 2018-07-07 RX ADMIN — HEPARIN SODIUM 5000 UNITS: 5000 INJECTION, SOLUTION INTRAVENOUS; SUBCUTANEOUS at 22:00

## 2018-07-07 RX ADMIN — METOPROLOL TARTRATE 12.5 MG: 25 TABLET, FILM COATED ORAL at 08:00

## 2018-07-07 ASSESSMENT — PAIN SCALES - GENERAL
PAINLEVEL_OUTOF10: 9
PAINLEVEL_OUTOF10: 8
PAINLEVEL_OUTOF10: 0

## 2018-07-07 ASSESSMENT — ENCOUNTER SYMPTOMS
SPEECH CHANGE: 1
SENSORY CHANGE: 0
WEAKNESS: 1
SHORTNESS OF BREATH: 0
ABDOMINAL PAIN: 0
VOMITING: 0
NAUSEA: 1
NECK PAIN: 0
FLANK PAIN: 0
BACK PAIN: 1
COUGH: 0
TREMORS: 0
MYALGIAS: 1
FOCAL WEAKNESS: 0
STRIDOR: 0
CONSTIPATION: 1
FEVER: 0
DIAPHORESIS: 0

## 2018-07-07 NOTE — PROGRESS NOTES
Patient is having unrelieved pain and dizziness.  Medicated per MAR.  Per Dr. Linares's orders, patient feedings are held unless patient is able to tolerate.  At this time, patient is able to tolerate about 50 mls of any liquid at any given time.

## 2018-07-07 NOTE — PROGRESS NOTES
[facility] Hospitalist Progress Note    Date of Service: 2018    Chief Complaint  52 y.o. 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  admitted 2018 with N/V, visual changes- Dx subacute cerebellar cva.     Interval Problem Update    Vomiting resolved. Restarted on small volume tube feeds. No new Bms    Xray abd with increased gas, distention - possible constipation vs ileus.     Consultants/Specialty    Dr. Armstrong nephrology  Neurology - Dr. Sykes       Disposition    Anticipate to snf for rehab.         Review of Systems   Constitutional: Negative for diaphoresis and fever.   HENT: Positive for congestion (improved ).    Respiratory: Negative for cough, shortness of breath and stridor.    Cardiovascular: Negative for chest pain and leg swelling.   Gastrointestinal: Positive for constipation and nausea. Negative for abdominal pain and vomiting.   Genitourinary: Negative for flank pain and hematuria.   Musculoskeletal: Positive for back pain, joint pain (hx left ankle fusion [facility] in February. ) and myalgias. Negative for neck pain.   Neurological: Positive for speech change and weakness. Negative for tremors, sensory change and focal weakness.      Physical Exam  Laboratory/Imaging   Hemodynamics  Temp (24hrs), Av °C (98.6 °F), Min:36.6 °C (97.8 °F), Max:37.6 °C (99.6 °F)   Temperature: 36.6 °C (97.8 °F)  Pulse  Av.7  Min: 56  Max: 97    Blood Pressure: 135/60      Respiratory      Respiration: 14, Pulse Oximetry: 96 %     Work Of Breathing / Effort: Mild  RUL Breath Sounds: Diminished;Clear, RML Breath Sounds: Diminished;Clear, RLL Breath Sounds: Diminished;Clear, DIANNE Breath Sounds: Diminished;Clear, LLL Breath Sounds: Diminished;Clear    Fluids    Intake/Output Summary (Last 24 hours) at 18 1345  Last data filed at 18 0600   Gross per 24 hour   Intake             1325 ml   Output                 0 ml   Net             1325 ml       Nutrition  Orders Placed This Encounter   Procedures   • Diet NPO     Standing Status:   Standing     Number of Occurrences:   1     Order Specific Question:   Restrict to:     Answer:   Strict [1]     Physical Exam   Constitutional: No distress.   Alert, approp oriented    HENT:   Head: Normocephalic and atraumatic.   Eyes: EOM are normal. Right eye exhibits no discharge. Left eye exhibits no discharge. No scleral icterus.   Neck: No JVD present.   Cardiovascular: Normal rate and regular rhythm.    No murmur heard.  Pulmonary/Chest: No stridor. No respiratory distress. She has no wheezes. She has no rales.   Abdominal: Soft. Bowel sounds are normal. She exhibits distension (mild ). There is no tenderness.   PEG tube site dressed.    Musculoskeletal: She exhibits no edema or tenderness.   Left ankle mild swelling, surgical incision healed. No localized redness or pain to touch.    Neurological: She is alert. No cranial nerve deficit. Coordination abnormal.   Mild left hemiparesis - 4/5 strength.    Skin: Skin is warm and dry. She is not diaphoretic. No pallor.   Vitals reviewed.          Recent Labs      07/05/18   0347  07/06/18   0346  07/07/18   0209   SODIUM  136  136  139   POTASSIUM  4.8  4.6  4.9   CHLORIDE  102  102  103   CO2  26  25  24   GLUCOSE  108*  142*  112*   BUN  73*  70*  62*   CREATININE  2.29*  2.27*  2.59*   CALCIUM  9.3  8.7  8.8                      Assessment/Plan     * Stroke (HCC)- (present on admission)   Assessment & Plan    MRI of the brain outlying facility w Left medial cerebellar acute or subacute infarction with no hemorrhage- improved strength w dysarthria, dysphagia, left weakness.   Carotid Duplex: Antegrade left vertebral flow with high resistant may indicate distal occlusion  Continue ASA/Statin.  Pt/OT  Resume low volume G tube feeds.                    CKD stage 4 due to type 2 diabetes mellitus- (present on admission)   Assessment  & Plan    Acute on chronic renal failure- stable.  Avoid nephrotoxins.  Outpatient nephrology fu .                Slow transit constipation- (present on admission)   Assessment & Plan    Xray abd findings of constipation , laxatives   IV Reglan  sched lactulose  Bowel protocols.   Fu xray w persistent constipation pattern- has been on frequent opiates for chronic pain w hx of ankle fusion 2/18 -  trial of Relistor.           Non-intractable vomiting with nausea   Assessment & Plan    Improved. Xray abd cw constipation  Vs ileus.   Continue sched reglan  Trial of relistor.   laxities.   IV anti emetics prn        Dysphagia   Assessment & Plan    SLP following, failed swallow, patient has Peg .  Nausea better- Re start low dose tfeeds,   IV reglan  Continue laxatives.   Rubinol to decrease secretions.               History of GI bleed   Assessment & Plan    Pepcid for GI ppx          History of DVT (deep vein thrombosis)   Assessment & Plan    Hx of DVT ? PE , s/p IVC filter placement.  Hx of GI bleeding in past, Anticoagulation contraindicated.  No signs of active bleed.   Follow clinically           Type II diabetes mellitus (HCC)   Assessment & Plan    A1c 8.3 labile blood sugar  cw Insulin-sliding scale as needed  Serial accu-checks   hypoglycemia protocol.            Essential hypertension- (present on admission)   Assessment & Plan    Controlled  Continue with Metoprolol and Norvasc.          Polycystic kidney disease- (present on admission)   Assessment & Plan    As per above,   Nephrology following.          Hyperlipemia- (present on admission)   Assessment & Plan    Continue lipitor.        Hypothyroid- (present on admission)   Assessment & Plan    TSH WNL.  cw synthroid.         Diabetic neuropathy (HCC)- (present on admission)   Assessment & Plan    cw  gabapentin          Quality-Core Measures   Reviewed items::  Medications reviewed, Labs reviewed and Radiology images reviewed  Wallace catheter::  No  Wallace  DVT prophylaxis pharmacological::  Heparin

## 2018-07-07 NOTE — PROGRESS NOTES
Assumed care of patient at 0700.  Report received at bedside.  Patient is A&O x 4, 1-assist pivot to bedside commode.  Still reports constant pain to the back of the head.  Still intolerant to moderate amounts of liquid through her PEG tube.  Medication administration and bolus feedings adjusted per patient tolerance.  Hourly rounding in place.

## 2018-07-08 ENCOUNTER — APPOINTMENT (OUTPATIENT)
Dept: RADIOLOGY | Facility: MEDICAL CENTER | Age: 53
DRG: 064 | End: 2018-07-08
Attending: HOSPITALIST
Payer: COMMERCIAL

## 2018-07-08 LAB
GLUCOSE BLD-MCNC: 103 MG/DL (ref 65–99)
GLUCOSE BLD-MCNC: 105 MG/DL (ref 65–99)
GLUCOSE BLD-MCNC: 98 MG/DL (ref 65–99)
GLUCOSE BLD-MCNC: 98 MG/DL (ref 65–99)

## 2018-07-08 PROCEDURE — 700102 HCHG RX REV CODE 250 W/ 637 OVERRIDE(OP): Performed by: HOSPITALIST

## 2018-07-08 PROCEDURE — A9270 NON-COVERED ITEM OR SERVICE: HCPCS | Performed by: HOSPITALIST

## 2018-07-08 PROCEDURE — 770006 HCHG ROOM/CARE - MED/SURG/GYN SEMI*

## 2018-07-08 PROCEDURE — 700111 HCHG RX REV CODE 636 W/ 250 OVERRIDE (IP): Performed by: FAMILY MEDICINE

## 2018-07-08 PROCEDURE — 700105 HCHG RX REV CODE 258: Performed by: HOSPITALIST

## 2018-07-08 PROCEDURE — 700111 HCHG RX REV CODE 636 W/ 250 OVERRIDE (IP): Performed by: HOSPITALIST

## 2018-07-08 PROCEDURE — 82962 GLUCOSE BLOOD TEST: CPT | Mod: 91

## 2018-07-08 PROCEDURE — 99232 SBSQ HOSP IP/OBS MODERATE 35: CPT | Performed by: HOSPITALIST

## 2018-07-08 PROCEDURE — 73610 X-RAY EXAM OF ANKLE: CPT | Mod: LT

## 2018-07-08 RX ORDER — POLYVINYL ALCOHOL 14 MG/ML
2 SOLUTION/ DROPS OPHTHALMIC
Status: DISCONTINUED | OUTPATIENT
Start: 2018-07-08 | End: 2018-07-09 | Stop reason: HOSPADM

## 2018-07-08 RX ORDER — TRAMADOL HYDROCHLORIDE 50 MG/1
50 TABLET ORAL EVERY 12 HOURS PRN
Status: DISCONTINUED | OUTPATIENT
Start: 2018-07-08 | End: 2018-07-09 | Stop reason: HOSPADM

## 2018-07-08 RX ORDER — METOCLOPRAMIDE 10 MG/1
5 TABLET ORAL
Status: DISCONTINUED | OUTPATIENT
Start: 2018-07-08 | End: 2018-07-09 | Stop reason: HOSPADM

## 2018-07-08 RX ORDER — OXYCODONE AND ACETAMINOPHEN 10; 325 MG/1; MG/1
.5-1 TABLET ORAL
Status: DISCONTINUED | OUTPATIENT
Start: 2018-07-08 | End: 2018-07-09 | Stop reason: HOSPADM

## 2018-07-08 RX ORDER — POLYETHYLENE GLYCOL 3350 17 G/17G
1 POWDER, FOR SOLUTION ORAL
Status: DISCONTINUED | OUTPATIENT
Start: 2018-07-08 | End: 2018-07-09 | Stop reason: HOSPADM

## 2018-07-08 RX ORDER — AMOXICILLIN 250 MG
2 CAPSULE ORAL 2 TIMES DAILY
Status: DISCONTINUED | OUTPATIENT
Start: 2018-07-08 | End: 2018-07-09 | Stop reason: HOSPADM

## 2018-07-08 RX ORDER — MECLIZINE HYDROCHLORIDE 25 MG/1
25 TABLET ORAL EVERY 8 HOURS PRN
Status: DISCONTINUED | OUTPATIENT
Start: 2018-07-08 | End: 2018-07-09 | Stop reason: HOSPADM

## 2018-07-08 RX ORDER — BISACODYL 10 MG
10 SUPPOSITORY, RECTAL RECTAL 2 TIMES DAILY
Status: DISCONTINUED | OUTPATIENT
Start: 2018-07-08 | End: 2018-07-09 | Stop reason: HOSPADM

## 2018-07-08 RX ADMIN — HEPARIN SODIUM 5000 UNITS: 5000 INJECTION, SOLUTION INTRAVENOUS; SUBCUTANEOUS at 06:12

## 2018-07-08 RX ADMIN — OXYCODONE HYDROCHLORIDE AND ACETAMINOPHEN 1000 MG: 500 TABLET ORAL at 09:31

## 2018-07-08 RX ADMIN — SENNOSIDES AND DOCUSATE SODIUM 2 TABLET: 8.6; 5 TABLET ORAL at 09:32

## 2018-07-08 RX ADMIN — TEMAZEPAM 15 MG: 15 CAPSULE ORAL at 20:57

## 2018-07-08 RX ADMIN — LACTULOSE 30 ML: 20 SOLUTION ORAL at 09:31

## 2018-07-08 RX ADMIN — LACTULOSE 30 ML: 20 SOLUTION ORAL at 20:46

## 2018-07-08 RX ADMIN — STANDARDIZED SENNA CONCENTRATE AND DOCUSATE SODIUM 2 TABLET: 8.6; 5 TABLET, FILM COATED ORAL at 20:41

## 2018-07-08 RX ADMIN — HEPARIN SODIUM 5000 UNITS: 5000 INJECTION, SOLUTION INTRAVENOUS; SUBCUTANEOUS at 21:13

## 2018-07-08 RX ADMIN — FAMOTIDINE 20 MG: 20 TABLET ORAL at 09:32

## 2018-07-08 RX ADMIN — OXYCODONE HYDROCHLORIDE AND ACETAMINOPHEN 1 TABLET: 10; 325 TABLET ORAL at 20:45

## 2018-07-08 RX ADMIN — HEPARIN SODIUM 5000 UNITS: 5000 INJECTION, SOLUTION INTRAVENOUS; SUBCUTANEOUS at 15:01

## 2018-07-08 RX ADMIN — MAGNESIUM HYDROXIDE 30 ML: 400 SUSPENSION ORAL at 12:18

## 2018-07-08 RX ADMIN — LEVOTHYROXINE SODIUM 112 MCG: 112 TABLET ORAL at 07:53

## 2018-07-08 RX ADMIN — OXYCODONE HYDROCHLORIDE AND ACETAMINOPHEN 1000 MG: 500 TABLET ORAL at 20:41

## 2018-07-08 RX ADMIN — METOCLOPRAMIDE 5 MG: 5 INJECTION, SOLUTION INTRAMUSCULAR; INTRAVENOUS at 06:11

## 2018-07-08 RX ADMIN — METOPROLOL TARTRATE 12.5 MG: 25 TABLET, FILM COATED ORAL at 07:52

## 2018-07-08 RX ADMIN — TRAMADOL HYDROCHLORIDE 50 MG: 50 TABLET, COATED ORAL at 15:01

## 2018-07-08 RX ADMIN — BISACODYL 10 MG: 10 SUPPOSITORY RECTAL at 20:45

## 2018-07-08 RX ADMIN — SODIUM CHLORIDE: 9 INJECTION, SOLUTION INTRAVENOUS at 12:26

## 2018-07-08 RX ADMIN — ONDANSETRON 4 MG: 2 INJECTION INTRAMUSCULAR; INTRAVENOUS at 15:06

## 2018-07-08 RX ADMIN — METOCLOPRAMIDE 5 MG: 5 INJECTION, SOLUTION INTRAMUSCULAR; INTRAVENOUS at 11:03

## 2018-07-08 RX ADMIN — OXYCODONE HYDROCHLORIDE AND ACETAMINOPHEN 1 TABLET: 10; 325 TABLET ORAL at 06:18

## 2018-07-08 RX ADMIN — OXYCODONE HYDROCHLORIDE AND ACETAMINOPHEN 1 TABLET: 10; 325 TABLET ORAL at 01:49

## 2018-07-08 RX ADMIN — PROMETHAZINE HYDROCHLORIDE 25 MG: 25 TABLET ORAL at 07:53

## 2018-07-08 RX ADMIN — METOCLOPRAMIDE 5 MG: 5 INJECTION, SOLUTION INTRAMUSCULAR; INTRAVENOUS at 00:45

## 2018-07-08 RX ADMIN — ONDANSETRON 4 MG: 2 INJECTION INTRAMUSCULAR; INTRAVENOUS at 03:37

## 2018-07-08 RX ADMIN — BUTALBITAL, ACETAMINOPHEN, AND CAFFEINE 1 TABLET: 50; 325; 40 TABLET ORAL at 09:31

## 2018-07-08 RX ADMIN — TRAMADOL HYDROCHLORIDE 100 MG: 50 TABLET, COATED ORAL at 03:37

## 2018-07-08 RX ADMIN — METOPROLOL TARTRATE 12.5 MG: 25 TABLET, FILM COATED ORAL at 20:42

## 2018-07-08 RX ADMIN — BUTALBITAL, ACETAMINOPHEN, AND CAFFEINE 1 TABLET: 50; 325; 40 TABLET ORAL at 17:19

## 2018-07-08 RX ADMIN — LACTULOSE 30 ML: 20 SOLUTION ORAL at 15:01

## 2018-07-08 RX ADMIN — PROCHLORPERAZINE EDISYLATE 10 MG: 5 INJECTION INTRAMUSCULAR; INTRAVENOUS at 18:20

## 2018-07-08 RX ADMIN — GLYCOPYRROLATE 0.5 MG: 1 TABLET ORAL at 20:47

## 2018-07-08 RX ADMIN — ATORVASTATIN CALCIUM 80 MG: 80 TABLET, FILM COATED ORAL at 20:41

## 2018-07-08 RX ADMIN — GLYCOPYRROLATE 0.5 MG: 1 TABLET ORAL at 07:52

## 2018-07-08 RX ADMIN — ASPIRIN 325 MG: 325 TABLET ORAL at 07:53

## 2018-07-08 RX ADMIN — METOCLOPRAMIDE HYDROCHLORIDE 5 MG: 10 TABLET ORAL at 20:45

## 2018-07-08 RX ADMIN — METOCLOPRAMIDE HYDROCHLORIDE 5 MG: 10 TABLET ORAL at 17:17

## 2018-07-08 RX ADMIN — AMLODIPINE BESYLATE 10 MG: 5 TABLET ORAL at 07:52

## 2018-07-08 RX ADMIN — ONDANSETRON 4 MG: 2 INJECTION INTRAMUSCULAR; INTRAVENOUS at 10:57

## 2018-07-08 RX ADMIN — PROMETHAZINE HYDROCHLORIDE 25 MG: 25 TABLET ORAL at 17:17

## 2018-07-08 RX ADMIN — MULTIPLE VITAMINS W/ MINERALS TAB 1 TABLET: TAB at 09:32

## 2018-07-08 ASSESSMENT — PAIN SCALES - GENERAL
PAINLEVEL_OUTOF10: 5
PAINLEVEL_OUTOF10: 6
PAINLEVEL_OUTOF10: 8
PAINLEVEL_OUTOF10: 6
PAINLEVEL_OUTOF10: 8
PAINLEVEL_OUTOF10: 5
PAINLEVEL_OUTOF10: 6
PAINLEVEL_OUTOF10: 5
PAINLEVEL_OUTOF10: 8
PAINLEVEL_OUTOF10: 6
PAINLEVEL_OUTOF10: 9
PAINLEVEL_OUTOF10: 6

## 2018-07-08 ASSESSMENT — ENCOUNTER SYMPTOMS
MYALGIAS: 1
CONSTIPATION: 1
COUGH: 0
TREMORS: 0
NAUSEA: 1
SPEECH CHANGE: 1
DIAPHORESIS: 0
WEAKNESS: 1
SENSORY CHANGE: 0
FEVER: 0
FLANK PAIN: 0
ABDOMINAL PAIN: 0
SHORTNESS OF BREATH: 0
VOMITING: 0
STRIDOR: 0
FOCAL WEAKNESS: 0

## 2018-07-08 NOTE — PROGRESS NOTES
"Patient fell on 7/8/18.  RN was present.  Patient was attempting to transfer self to commode and states that her \"legs just gave out\".  Dr. Linares notified.  Orders received for xray of ankle as patient was reporting pain.  Pharmacy notified.  Patient is stable.  "

## 2018-07-08 NOTE — PROGRESS NOTES
[facility] Hospitalist Progress Note    Date of Service: 2018    Chief Complaint  52 y.o. 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  admitted 2018 with N/V, visual changes- Dx subacute cerebellar cva.     Interval Problem Update    Fell this morning during help OOB. Reports of increased left ankle pain, weakness- xray no acute fx.  Nausea improved- No Bm overnight. Tolerating tfeeds.     Consultants/Specialty    Dr. Armstrong nephrology  Neurology - Dr. Sykes       Disposition    Anticipate to snf for rehab.         Review of Systems   Constitutional: Negative for diaphoresis and fever.   HENT: Positive for congestion (improved - mild ).    Respiratory: Negative for cough, shortness of breath and stridor.    Cardiovascular: Negative for chest pain and leg swelling.   Gastrointestinal: Positive for constipation and nausea (improved ). Negative for abdominal pain and vomiting.   Genitourinary: Negative for flank pain and hematuria.   Musculoskeletal: Positive for joint pain (hx left ankle fusion [facility] in February- worsened. ) and myalgias.   Neurological: Positive for speech change (improving ) and weakness. Negative for tremors, sensory change and focal weakness.      Physical Exam  Laboratory/Imaging   Hemodynamics  Temp (24hrs), Av.6 °C (97.9 °F), Min:36.4 °C (97.6 °F), Max:36.8 °C (98.2 °F)   Temperature: 36.4 °C (97.6 °F)  Pulse  Av.8  Min: 56  Max: 97    Blood Pressure: 138/67      Respiratory      Respiration: 16, Pulse Oximetry: 94 %     Work Of Breathing / Effort: Mild  RUL Breath Sounds: Clear, RML Breath Sounds: Clear, RLL Breath Sounds: Clear, DIANNE Breath Sounds: Clear, LLL Breath Sounds: Clear    Fluids    Intake/Output Summary (Last 24 hours) at 18 1205  Last data filed at 18 0800   Gross per 24 hour   Intake              380 ml   Output                0 ml   Net              380  ml       Nutrition  Orders Placed This Encounter   Procedures   • Diet NPO     Standing Status:   Standing     Number of Occurrences:   1     Order Specific Question:   Restrict to:     Answer:   Strict [1]     Physical Exam   Constitutional: No distress.   Alert, approp oriented    HENT:   Head: Normocephalic and atraumatic.   Eyes: EOM are normal. Right eye exhibits no discharge. Left eye exhibits no discharge. No scleral icterus.   Neck: No JVD present.   Cardiovascular: Normal rate and regular rhythm.    No murmur heard.  Pulmonary/Chest: No stridor. No respiratory distress. She has no wheezes. She has no rales.   Abdominal: Soft. Bowel sounds are normal. She exhibits distension (mild ). There is no tenderness.   PEG tube site dressed.    Musculoskeletal: She exhibits no edema or tenderness.   Left ankle mild swelling, surgical incision healed.  Tender to touch - no localized redness or swelling.    Neurological: She is alert. No cranial nerve deficit. Coordination abnormal.   Mild left hemiparesis - 4/5 strength.    Skin: She is not diaphoretic.   Vitals reviewed.          Recent Labs      07/06/18   0346  07/07/18   0209   SODIUM  136  139   POTASSIUM  4.6  4.9   CHLORIDE  102  103   CO2  25  24   GLUCOSE  142*  112*   BUN  70*  62*   CREATININE  2.27*  2.59*   CALCIUM  8.7  8.8                      Assessment/Plan     * Stroke (HCC)- (present on admission)   Assessment & Plan    MRI of the brain outlying facility w Left medial cerebellar acute or subacute infarction with no hemorrhage- improved strength w dysarthria, dysphagia, left weakness.   Carotid Duplex: Antegrade left vertebral flow with high resistant may indicate distal occlusion  Continue ASA/Statin.  Pt/OT  cw G tube feeds.                   Slow transit constipation- (present on admission)   Assessment & Plan    Xray abd findings of constipation - has been on prolonged narcotics.   IV Reglan  sched lactulose, dulcolax suppositories   Bowel  protocols.   Fu xray w persistent constipation pattern- Relistor-- repeat dose tomorrow if ineffective.           Non-intractable vomiting with nausea   Assessment & Plan    Improved.   Xray abd cw constipation  Vs ileus.   Continue sched reglan  laxities.   IV anti emetics prn        Dysphagia   Assessment & Plan    SLP following, failed swallow, patient has PEG .  Nausea better- cw tfeeds  IV reglan  Rubinol to decrease secretions.   Treat constipation.               CKD stage 4 due to type 2 diabetes mellitus- (present on admission)   Assessment & Plan    Acute on chronic renal failure- stable.  Avoid nephrotoxins.  Outpatient nephrology fu .                History of GI bleed   Assessment & Plan    Pepcid for GI ppx          History of DVT (deep vein thrombosis)   Assessment & Plan    Hx of DVT ? PE , s/p IVC filter placement.  Hx of GI bleeding in past, Anticoagulation contraindicated.  No signs of active bleed.   Follow clinically           Type II diabetes mellitus (HCC)   Assessment & Plan    A1c 8.3 - good BS control  cw Insulin-sliding scale as needed  Serial accu-checks   hypoglycemia protocol.            Essential hypertension- (present on admission)   Assessment & Plan    Controlled  Continue with Metoprolol and Norvasc.          Polycystic kidney disease- (present on admission)   Assessment & Plan    As per above,   Nephrology following.          Hyperlipemia- (present on admission)   Assessment & Plan    Continue lipitor.        Hypothyroid- (present on admission)   Assessment & Plan    TSH WNL.  cw synthroid.         Diabetic neuropathy (HCC)- (present on admission)   Assessment & Plan    cw  gabapentin          Quality-Core Measures   Reviewed items::  Medications reviewed, Labs reviewed and Radiology images reviewed  Wallace catheter::  No Wallace  DVT prophylaxis pharmacological::  Heparin      Left ankle pain- chronic - post fall.  Xray no acute fx.  Noted OA and posterior fusion changes, continue  supportive.

## 2018-07-08 NOTE — CARE PLAN
Problem: Safety  Goal: Will remain free from falls  Outcome: PROGRESSING SLOWER THAN EXPECTED  Patient fell this AM    Problem: Bowel/Gastric:  Goal: Normal bowel function is maintained or improved  Outcome: PROGRESSING SLOWER THAN EXPECTED

## 2018-07-08 NOTE — CARE PLAN
Problem: Safety  Goal: Will remain free from injury  Outcome: PROGRESSING AS EXPECTED  Side rails up x 2 call light within reach bed alarm activated and hourly rounding in place.  Intervention: Provide assistance with mobility  Pt gets up to BSC with  standby assist. Tolerates well.

## 2018-07-09 VITALS
TEMPERATURE: 97.2 F | BODY MASS INDEX: 29.76 KG/M2 | OXYGEN SATURATION: 98 % | SYSTOLIC BLOOD PRESSURE: 147 MMHG | HEART RATE: 78 BPM | RESPIRATION RATE: 20 BRPM | WEIGHT: 207.89 LBS | DIASTOLIC BLOOD PRESSURE: 72 MMHG | HEIGHT: 70 IN

## 2018-07-09 LAB
CRP SERPL HS-MCNC: 8.28 MG/DL (ref 0–0.75)
GLUCOSE BLD-MCNC: 102 MG/DL (ref 65–99)
GLUCOSE BLD-MCNC: 106 MG/DL (ref 65–99)
GLUCOSE BLD-MCNC: 88 MG/DL (ref 65–99)
PREALB SERPL-MCNC: 11 MG/DL (ref 18–38)

## 2018-07-09 PROCEDURE — 84134 ASSAY OF PREALBUMIN: CPT

## 2018-07-09 PROCEDURE — 700102 HCHG RX REV CODE 250 W/ 637 OVERRIDE(OP): Performed by: NURSE PRACTITIONER

## 2018-07-09 PROCEDURE — 82962 GLUCOSE BLOOD TEST: CPT | Mod: 91

## 2018-07-09 PROCEDURE — 36415 COLL VENOUS BLD VENIPUNCTURE: CPT

## 2018-07-09 PROCEDURE — A9270 NON-COVERED ITEM OR SERVICE: HCPCS | Performed by: HOSPITALIST

## 2018-07-09 PROCEDURE — 99239 HOSP IP/OBS DSCHRG MGMT >30: CPT | Performed by: HOSPITALIST

## 2018-07-09 PROCEDURE — 97535 SELF CARE MNGMENT TRAINING: CPT

## 2018-07-09 PROCEDURE — 700102 HCHG RX REV CODE 250 W/ 637 OVERRIDE(OP): Performed by: HOSPITALIST

## 2018-07-09 PROCEDURE — A9270 NON-COVERED ITEM OR SERVICE: HCPCS | Performed by: NURSE PRACTITIONER

## 2018-07-09 PROCEDURE — 700111 HCHG RX REV CODE 636 W/ 250 OVERRIDE (IP): Performed by: FAMILY MEDICINE

## 2018-07-09 PROCEDURE — 97116 GAIT TRAINING THERAPY: CPT

## 2018-07-09 PROCEDURE — 86140 C-REACTIVE PROTEIN: CPT

## 2018-07-09 RX ORDER — DIAZEPAM 5 MG/1
5 TABLET ORAL EVERY 8 HOURS
Qty: 15 TAB | Refills: 0 | Status: SHIPPED | OUTPATIENT
Start: 2018-07-09 | End: 2018-07-14

## 2018-07-09 RX ORDER — MECLIZINE HYDROCHLORIDE 25 MG/1
25 TABLET ORAL EVERY 8 HOURS PRN
Qty: 30 TAB | Refills: 0 | Status: ON HOLD | OUTPATIENT
Start: 2018-07-09 | End: 2020-10-12

## 2018-07-09 RX ORDER — BISACODYL 10 MG
10 SUPPOSITORY, RECTAL RECTAL 2 TIMES DAILY PRN
Status: ON HOLD
Start: 2018-07-09 | End: 2019-08-23

## 2018-07-09 RX ORDER — POLYVINYL ALCOHOL 14 MG/ML
2 SOLUTION/ DROPS OPHTHALMIC
Qty: 1 BOTTLE | Refills: 3 | Status: SHIPPED | OUTPATIENT
Start: 2018-07-09

## 2018-07-09 RX ORDER — M-VIT,TX,IRON,MINS/CALC/FOLIC 27MG-0.4MG
1 TABLET ORAL DAILY
Qty: 30 TAB | Refills: 11 | Status: ON HOLD | OUTPATIENT
Start: 2018-07-10 | End: 2019-08-23

## 2018-07-09 RX ORDER — TRAMADOL HYDROCHLORIDE 50 MG/1
50-100 TABLET ORAL EVERY 6 HOURS PRN
Qty: 20 TAB | Refills: 0 | Status: SHIPPED | OUTPATIENT
Start: 2018-07-09 | End: 2018-07-14

## 2018-07-09 RX ORDER — ATORVASTATIN CALCIUM 80 MG/1
80 TABLET, FILM COATED ORAL EVERY EVENING
Qty: 30 TAB
Start: 2018-07-09

## 2018-07-09 RX ORDER — ONDANSETRON 4 MG/1
4 TABLET, ORALLY DISINTEGRATING ORAL EVERY 4 HOURS PRN
Qty: 10 TAB | Refills: 0 | Status: SHIPPED | OUTPATIENT
Start: 2018-07-09

## 2018-07-09 RX ORDER — GLYCOPYRROLATE 1 MG/1
0.5 TABLET ORAL 2 TIMES DAILY
Qty: 90 TAB | Status: ON HOLD
Start: 2018-07-09 | End: 2020-10-12

## 2018-07-09 RX ORDER — HEPARIN SODIUM 5000 [USP'U]/ML
5000 INJECTION, SOLUTION INTRAVENOUS; SUBCUTANEOUS EVERY 8 HOURS
Refills: 0 | Status: ON HOLD
Start: 2018-07-09 | End: 2019-08-23

## 2018-07-09 RX ORDER — AMOXICILLIN 250 MG
2 CAPSULE ORAL 2 TIMES DAILY
Start: 2018-07-09

## 2018-07-09 RX ORDER — FAMOTIDINE 20 MG/1
20 TABLET, FILM COATED ORAL DAILY
Qty: 60 TAB
Start: 2018-07-10

## 2018-07-09 RX ORDER — DIAZEPAM 5 MG/1
5 TABLET ORAL EVERY 8 HOURS
Status: DISCONTINUED | OUTPATIENT
Start: 2018-07-09 | End: 2018-07-09 | Stop reason: HOSPADM

## 2018-07-09 RX ORDER — METOCLOPRAMIDE 5 MG/1
5 TABLET ORAL
Qty: 120 TAB | Status: ON HOLD
Start: 2018-07-09 | End: 2020-10-12

## 2018-07-09 RX ORDER — ASPIRIN 325 MG
325 TABLET ORAL DAILY
Qty: 100 TAB | Status: ON HOLD
Start: 2018-07-10 | End: 2019-08-23

## 2018-07-09 RX ORDER — DEXTROSE MONOHYDRATE 25 G/50ML
25 INJECTION, SOLUTION INTRAVENOUS PRN
Refills: 0 | Status: ON HOLD
Start: 2018-07-09 | End: 2019-08-23

## 2018-07-09 RX ORDER — HYDROXYZINE HYDROCHLORIDE 25 MG/1
25 TABLET, FILM COATED ORAL 3 TIMES DAILY PRN
Qty: 30 TAB | Refills: 0 | Status: ON HOLD | OUTPATIENT
Start: 2018-07-09 | End: 2020-10-12

## 2018-07-09 RX ADMIN — MULTIPLE VITAMINS W/ MINERALS TAB 1 TABLET: TAB at 09:58

## 2018-07-09 RX ADMIN — METOCLOPRAMIDE HYDROCHLORIDE 5 MG: 10 TABLET ORAL at 06:57

## 2018-07-09 RX ADMIN — DIAZEPAM 5 MG: 5 TABLET ORAL at 09:58

## 2018-07-09 RX ADMIN — BUTALBITAL, ACETAMINOPHEN, AND CAFFEINE 1 TABLET: 50; 325; 40 TABLET ORAL at 05:41

## 2018-07-09 RX ADMIN — CLONIDINE 1 PATCH: 0.1 PATCH TRANSDERMAL at 14:59

## 2018-07-09 RX ADMIN — GLYCOPYRROLATE 0.5 MG: 1 TABLET ORAL at 10:02

## 2018-07-09 RX ADMIN — METOCLOPRAMIDE HYDROCHLORIDE 5 MG: 10 TABLET ORAL at 16:17

## 2018-07-09 RX ADMIN — OXYCODONE HYDROCHLORIDE AND ACETAMINOPHEN 1000 MG: 500 TABLET ORAL at 09:59

## 2018-07-09 RX ADMIN — HEPARIN SODIUM 5000 UNITS: 5000 INJECTION, SOLUTION INTRAVENOUS; SUBCUTANEOUS at 05:41

## 2018-07-09 RX ADMIN — HEPARIN SODIUM 5000 UNITS: 5000 INJECTION, SOLUTION INTRAVENOUS; SUBCUTANEOUS at 14:35

## 2018-07-09 RX ADMIN — METOPROLOL TARTRATE 12.5 MG: 25 TABLET, FILM COATED ORAL at 09:59

## 2018-07-09 RX ADMIN — TRAMADOL HYDROCHLORIDE 50 MG: 50 TABLET, COATED ORAL at 02:44

## 2018-07-09 RX ADMIN — AMLODIPINE BESYLATE 10 MG: 5 TABLET ORAL at 09:58

## 2018-07-09 RX ADMIN — METOCLOPRAMIDE HYDROCHLORIDE 5 MG: 10 TABLET ORAL at 12:40

## 2018-07-09 RX ADMIN — OXYCODONE HYDROCHLORIDE AND ACETAMINOPHEN 1 TABLET: 10; 325 TABLET ORAL at 12:15

## 2018-07-09 RX ADMIN — OXYCODONE HYDROCHLORIDE AND ACETAMINOPHEN 1 TABLET: 10; 325 TABLET ORAL at 06:59

## 2018-07-09 RX ADMIN — LEVOTHYROXINE SODIUM 112 MCG: 112 TABLET ORAL at 10:02

## 2018-07-09 RX ADMIN — FAMOTIDINE 20 MG: 20 TABLET ORAL at 09:58

## 2018-07-09 RX ADMIN — ASPIRIN 324 MG: 81 TABLET, CHEWABLE ORAL at 09:58

## 2018-07-09 RX ADMIN — PROCHLORPERAZINE EDISYLATE 10 MG: 5 INJECTION INTRAMUSCULAR; INTRAVENOUS at 02:00

## 2018-07-09 RX ADMIN — DIAZEPAM 5 MG: 5 TABLET ORAL at 16:12

## 2018-07-09 RX ADMIN — ONDANSETRON 4 MG: 2 INJECTION INTRAMUSCULAR; INTRAVENOUS at 05:41

## 2018-07-09 RX ADMIN — ONDANSETRON 4 MG: 2 INJECTION INTRAMUSCULAR; INTRAVENOUS at 00:09

## 2018-07-09 ASSESSMENT — COGNITIVE AND FUNCTIONAL STATUS - GENERAL
STANDING UP FROM CHAIR USING ARMS: A LITTLE
HELP NEEDED FOR BATHING: A LITTLE
TOILETING: A LITTLE
MOBILITY SCORE: 19
CLIMB 3 TO 5 STEPS WITH RAILING: A LOT
DAILY ACTIVITIY SCORE: 20
SUGGESTED CMS G CODE MODIFIER MOBILITY: CK
SUGGESTED CMS G CODE MODIFIER DAILY ACTIVITY: CJ
MOVING FROM LYING ON BACK TO SITTING ON SIDE OF FLAT BED: A LITTLE
WALKING IN HOSPITAL ROOM: A LITTLE
DRESSING REGULAR LOWER BODY CLOTHING: A LOT

## 2018-07-09 ASSESSMENT — PAIN SCALES - GENERAL
PAINLEVEL_OUTOF10: 6
PAINLEVEL_OUTOF10: 6
PAINLEVEL_OUTOF10: 8
PAINLEVEL_OUTOF10: 6
PAINLEVEL_OUTOF10: 6

## 2018-07-09 ASSESSMENT — GAIT ASSESSMENTS
ASSISTIVE DEVICE: FRONT WHEEL WALKER
DISTANCE (FEET): 30
GAIT LEVEL OF ASSIST: CONTACT GUARD ASSIST

## 2018-07-09 NOTE — DISCHARGE INSTRUCTIONS
Discharge Instructions    Discharged to other by medical transportation with escort. Discharged via wheelchair, hospital escort: Yes.  Special equipment needed: Not Applicable    Be sure to schedule a follow-up appointment with your primary care doctor or any specialists as instructed.     Discharge Plan:   Diet Plan: Discussed  Activity Level: Discussed  Confirmed Follow up Appointment: Patient to Call and Schedule Appointment  Confirmed Symptoms Management: Discussed  Medication Reconciliation Updated: Yes  Pneumococcal Vaccine Administered/Refused: Not given - Patient refused pneumococcal vaccine  Influenza Vaccine Indication: Patient Refuses    I understand that a diet low in cholesterol, fat, and sodium is recommended for good health. Unless I have been given specific instructions below for another diet, I accept this instruction as my diet prescription.   Other diet: NPO/PEG tube    Special Instructions:     Stroke/CVA/TIA/Hemorrhagic Ischemia Discharge Instructions  You have had a stroke. Your risk factors have been identified as follows:  High blood pressure  Diabetes  Overweight  It is important that you reduce your risk factors to avoid another stroke in the future. Here are some general guidelines to follow:  · Eat healthy - avoid food high in fat.  · Get regular exercise.  · Maintain a healthy weight.  · Avoid smoking.  · Avoid alcohol and illegal drug use.  · Take your medications as directed.  For more information regarding risk factors, refer to pages 17-19 in your Stroke Patient Education Guide. Stroke Education Guide was given to patient.    Warning signs of a stroke include (which can also be found on page 3 of your Stroke Patient Education Guide):  · Sudden numbness of weakness of the face, arm or leg (especially on one side of the body).  · Sudden confusion, trouble speaking or understanding.  · Sudden trouble seeing in one or both eyes.  · Sudden trouble walking, dizziness, loss of balance or  coordination.  · Sudden severe headache with no known cause.  It is very important to get treatment quickly when a stroke occurs. If you experience any of the above warning signs, call [phone] immediately.     Some patients who have had a stroke will be going home on a blood thinner medication called Warfarin (Coumadin).  This medication requires very close monitoring and follow up.  This follow up can be provided by either your Primary Care Physician or by [facility].  The Outpatient Anticoagulation Service is located at the [place] [facility] at [facility] ([facility]  If you do not know when your follow up appointment is scheduled, call [phone] to verify your appointment time.      · Is patient discharged on Warfarin / Coumadin?   No       Ischemic Stroke  An ischemic stroke (cerebrovascular accident, or CVA) is the sudden death of brain tissue that occurs when an area of the brain does not get enough oxygen. It is a medical emergency that must be treated right away. An ischemic stroke can cause permanent loss of brain function. This can cause problems with how different parts of your body function.  What are the causes?  This condition is caused by a decrease of oxygen supply to an area of the brain, which may be the result of:  · A small blood clot (embolus) or a buildup of plaque in the blood vessels (atherosclerosis) that blocks blood flow in the brain.  · An abnormal heart rhythm (atrial fibrillation).  · A blocked or damaged artery in the head or neck.  What increases the risk?  Certain factors may make you more likely to develop this condition. Some of these factors are things that you can change, such as:  · Obesity.  · Smoking cigarettes.  · Taking oral birth control, especially if you also use tobacco.  · Physical inactivity.  · Excessive alcohol use.  · Use of illegal drugs, especially cocaine and methamphetamine.  Other  risk factors include:  · High blood pressure (hypertension).  · High cholesterol.  · Diabetes mellitus.  · Heart disease.  · Being , , , or .  · Being over age 60.  · Family history of stroke.  · Previous history of blood clots, stroke, or transient ischemic attack (TIA).  · Sickle cell disease.  · Being a woman with a history of preeclampsia.  · Migraine headache.  · Sleep apnea.  · Irregular heartbeats, such as atrial fibrillation.  · Chronic inflammatory diseases, such as rheumatoid arthritis or lupus.  · Blood clotting disorders (hypercoagulable state).  What are the signs or symptoms?  Symptoms of this condition usually develop suddenly, or you may notice them after waking up from sleep. Symptoms may include sudden:  · Weakness or numbness in your face, arm, or leg, especially on one side of your body.  · Trouble walking or difficulty moving your arms or legs.  · Loss of balance or coordination.  · Confusion.  · Slurred speech (dysarthria).  · Trouble speaking, understanding speech, or both (aphasia).  · Vision changes--such as double vision, blurred vision, or loss of vision--in one or both eyes.  · Dizziness.  · Nausea and vomiting.  · Severe headache with no known cause. The headache is often described as the worst headache ever experienced.  If possible, make note of the exact time that you last felt like your normal self and what time your symptoms started. Tell your health care provider.  If symptoms come and go, this could be a sign of a warning stroke, or TIA. Get help right away, even if you feel better.  How is this diagnosed?  This condition may be diagnosed based on:  · Your symptoms, your medical history, and a physical exam.  · CT scan of the brain.  · MRI.  · CT angiogram. This test uses a computer to take X-rays of your arteries. A dye may be injected into your blood to show the inside of your blood vessels more clearly.  · MRI angiogram. This  is a type of MRI that is used to evaluate the blood vessels.  · Cerebral angiogram. This test uses X-rays and a dye to show the blood vessels in the brain and neck.  You may need to see a health care provider who specializes in stroke care. A stroke specialist can be seen in person or through communication using telephone or television technology (telemedicine).  Other tests may also be done to find the cause of the stroke, such as:  · Electrocardiogram (ECG).  · Continuous heart monitoring.  · Echocardiogram.  · Carotid ultrasound.  · A scan of the brain circulation.  · Blood tests.  · Sleep study to check for sleep apnea.  How is this treated?  Treatment for this condition will depend on the duration, severity, and cause of your symptoms and on the area of the brain affected. It is very important to get treatment at the first sign of stroke symptoms. Some treatments work better if they are done within 3-6 hours of the onset of stroke symptoms. These initial treatments may include:  · Aspirin.  · Medicines to control blood pressure.  · Medicine given by injection to dissolve the blood clot (thrombolytic).  · Treatments given directly to the affected artery to remove or dissolve the blood clot.  Other treatment options may include:  · Oxygen.  · IV fluids.  · Medicines to thin the blood (anticoagulants or antiplatelets).  · Procedures to increase blood flow.  Medicines and changes to your diet may be used to help treat and manage risk factors for stroke, such as diabetes, high cholesterol, and high blood pressure.  After a stroke, you may work with physical, speech, mental health, or occupational therapists to help you recover.  Follow these instructions at home:  Medicines  · Take over-the-counter and prescription medicines only as told by your health care provider.  · If you were told to take a medicine to thin your blood, such as aspirin or an anticoagulant, take it exactly as told by your health care  provider.  ¨ Taking too much blood-thinning medicine can cause bleeding.  ¨ If you do not take enough blood-thinning medicine, you will not have the protection that you need against another stroke and other problems.  · Understand the side effects of taking anticoagulant medicine. When taking this type of medicine, make sure you:  ¨ Hold pressure over any cuts for longer than usual.  ¨ Tell your dentist and other health care providers that you are taking anticoagulants before you have any procedures that may cause bleeding.  ¨ Avoid activities that may cause trauma or injury.  Eating and drinking  · Follow instructions from your health care provider about diet.  · Eat healthy foods.  · If your ability to swallow was affected by the stroke, you may need to take steps to avoid choking, such as:  ¨ Taking small bites when eating.  ¨ Eating foods that are soft or pureed.  Safety  · Follow instructions from your health care team about physical activity.  · Use a walker or cane as told by your health care provider.  · Take steps to create a safe home environment in order to reduce the risk of falls. This may include:  ¨ Having your home looked at by specialists.  ¨ Installing grab bars in the bedroom and bathroom.  ¨ Using safety equipment, such as raised toilets and a seat in the shower.  General instructions  · Do not use any tobacco products, such as cigarettes, chewing tobacco, and e-cigarettes. If you need help quitting, ask your health care provider.  · Limit alcohol intake to no more than 1 drink a day for nonpregnant women and 2 drinks a day for men. One drink equals 12 oz of beer, 5 oz of wine, or 1½ oz of hard liquor.  · If you need help to stop using drugs or alcohol, ask your health care provider about a referral to a program or specialist.  · Maintain an active and healthy lifestyle. Get regular exercise as told by your health care provider.  · Keep all follow-up visits as told by your health care provider,  including visits with all specialists on your health care team. This is important.  How is this prevented?  Your risk of another stroke can be decreased by managing high blood pressure, high cholesterol, diabetes, heart disease, sleep apnea, and obesity. It can also be decreased by quitting smoking, limiting alcohol, and staying physically active.  Your health care provider will continue to work with you on measures to prevent short-term and long-term complications of stroke.  Get help right away if:  You have:  · Sudden weakness or numbness in your face, arm, or leg, especially on one side of your body.  · Sudden confusion.  · Sudden trouble speaking, understanding, or both (aphasia).  · Sudden trouble seeing with one or both eyes.  · Sudden trouble walking or difficulty moving your arms or legs.  · Sudden dizziness.  · Sudden loss of balance or coordination.  · Sudden, severe headache with no known cause.  · A partial or total loss of consciousness.  · A seizure.  Any of these symptoms may represent a serious problem that is an emergency. Do not wait to see if the symptoms will go away. Get medical help right away. Call your local emergency services (911 in [place] Do not drive yourself to the hospital.   This information is not intended to replace advice given to you by your health care provider. Make sure you discuss any questions you have with your health care provider.  Document Released: 12/18/2006 Document Revised: 05/30/2017 Document Reviewed: 03/15/2017  Elsevier Interactive Patient Education © 2017 [employer]  Rehabilitation After a Stroke, Adult  Introduction  A stroke can cause many types of problems. The treatment of stroke involves three stages:  · Prevention.  · Treatment immediately following a stroke.  · Rehabilitation after a stroke.  How is my rehabilitation plan developed?  A detailed exam by your health care provider helps outline what problems were caused by the stroke. Your health care  provider may consult specialists. The specialists may include doctors, occupational and physical therapists, and speech therapists. It is then possible to make a plan that best fits your needs.  Your evaluation might include the following:  · Evaluation of your ability to do daily activities that require using muscles, coordination, vision, reasoning, memory, and problem solving. Interviews with you and your health care provider will help determine what you could do and could not do before the stroke.  · Evaluation of your ability to do personal self-care tasks, such as dressing, grooming, and eating.  · Tests to see if there are sensory and motor changes due to the stroke, especially in the hands and legs.  What are the types of rehabilitation?  Your health care provider may have you start rehabilitation right away depending on the type and severity of your stroke. Rehabilitation after a stroke is focused on getting function back and preventing another stroke. Rehabilitation might include:  · Physical therapy. This can include help with walking, sitting, lying down, and balance. It may also be designed to help prevent shortening of the muscles (contractures) and swelling (edema).  · Occupational therapy. This therapy helps you to relearn skills needed for leading a normal life. These could include eating, using the restroom, dressing, and taking care of yourself. It helps to make you more independent.  · Vision therapy. This can help you to retrain, strengthen, and improve your vision after a stroke.  · Speech therapy. This can help to improve your speech and communication skills. It also teaches you and your family members to cope with problems of being unable to communicate.  · Cognitive therapy. This therapy can help with problems caused by lack of memory, attention, or concentration.  · Psychological or psychiatric therapy. This can help you cope with problems of frustration and emotional problems that may  develop after a stroke.  This information is not intended to replace advice given to you by your health care provider. Make sure you discuss any questions you have with your health care provider.  Document Released: 01/06/2009 Document Revised: 05/25/2017 Document Reviewed: 05/22/2014  [employer] Interactive Patient Education © 2017 [employer]  Depression / Suicide Risk    As you are discharged from this [facility] facility, it is important to learn how to keep safe from harming yourself.    Recognize the warning signs:  · Abrupt changes in personality, positive or negative- including increase in energy   · Giving away possessions  · Change in eating patterns- significant weight changes-  positive or negative  · Change in sleeping patterns- unable to sleep or sleeping all the time   · Unwillingness or inability to communicate  · Depression  · Unusual sadness, discouragement and loneliness  · Talk of wanting to die  · Neglect of personal appearance   · Rebelliousness- reckless behavior  · Withdrawal from people/activities they love  · Confusion- inability to concentrate     If you or a loved one observes any of these behaviors or has concerns about self-harm, here's what you can do:  · Talk about it- your feelings and reasons for harming yourself  · Remove any means that you might use to hurt yourself (examples: pills, rope, extension cords, firearm)  · Get professional help from the community (Mental Health, Substance Abuse, psychological counseling)  · Do not be alone:Call your Safe Contact- someone whom you trust who will be there for you.  · Call your local CRISIS HOTLINE 343-7929 or 897-004-9497  · Call your local Children's Mobile Crisis Response Team [place] (364) 025-6818 or www.[facility]  · Call the toll free National Suicide Prevention Hotlines   · National Suicide Prevention Lifeline 532-399-BVMI (4456)  · National Hope Line Network 800-SUICIDE (322-7993)

## 2018-07-09 NOTE — DISCHARGE PLANNING
Spoke To: Felisha  Agency/Facility Name: [facility]  Plan or Request: Felisha requesting ankle imaging. [facility]

## 2018-07-09 NOTE — DISCHARGE PLANNING
Spoke To: Felisha  Agency/Facility Name: [facility]  Plan or Request: Per Felisha, insurance now requesting updated therapy notes. Marissa(W) notified. Transport to [facility] @ 1330 canceled.

## 2018-07-09 NOTE — DISCHARGE PLANNING
Agency/Facility Name: [facility]  Spoke To: Felisha(Admissions)  Outcome: Updated therapy notes faxed to [facility]. Felisha notified.

## 2018-07-09 NOTE — CARE PLAN
Problem: Safety  Goal: Will remain free from injury  Outcome: PROGRESSING AS EXPECTED  Side rails up x 2 call light within reach bed alarm activated hourly round in place.    Problem: Bowel/Gastric:  Goal: Normal bowel function is maintained or improved  Outcome: PROGRESSING AS EXPECTED  Patient given several meds for constipation  4 liquid medium liquid  brown stools.    Problem: Pain Management  Goal: Pain level will decrease to patient's comfort goal    Intervention: Follow pain managment plan developed in collaboration with patient and Interdisciplinary Team  Medicated for pain per MAR

## 2018-07-09 NOTE — DISCHARGE SUMMARY
Hospital Medicine Discharge Note     Admit Date:  6/19/2018       Discharge Date:   7/9/2018    Attending Physician:  Marcell Linares M.D.      Diagnoses (includes active and resolved):     Principal Problem:    Stroke (HCC) POA: Yes.  Acute Medial left cerebellar infarction.   Active Problems:    Slow transit constipation POA: Yes    CKD stage 4 due to type 2 diabetes mellitus POA: Yes    Dysphagia POA: Unknown    Non-intractable vomiting with nausea POA: Unknown    Diabetic neuropathy (HCC) POA: Yes    Hypothyroid POA: Yes    Hyperlipemia POA: Yes    Polycystic kidney disease POA: Yes    Essential hypertension POA: Yes    Type II diabetes mellitus (HCC) (Chronic) POA: No    History of DVT (deep vein thrombosis) (Chronic) POA: No    History of GI bleed POA: Unknown    History of left ankle fusion  with chronic pain      Hospital Summary (Brief Narrative):         52 y.o. female with past medical history of type II diabetes mellitus, hypertension, history of DVT and PE status post IVC filter placement not tolerant to anticoagulation due to history of GI bleed who was transferred from another facility for acute stroke. Patient was at the other facility for evaluation of nausea and vomiting then developed vision changes.  She had presented with nausea vomiting with vision changes.  Upon arrival she was outside of TPA window and NIH score 0 - 1.  MRI at outlying facility revealed left medial cerebellar acute infarction or subacute infarction no hemorrhage.  Consulted by neurology recommended aspirin, statin therapy.  She had dysphasia recurrent nausea vomiting and frequent congestion.  PEG tube was placed per GI doctor.  She has chronic kidney disease was followed by Dr. Armstrong, nephrology,  creatinine stable 2.2-2.5 range prior to discharge recommended outpatient follow-up with nephrology.  With therapy she did have improvement in her dysarthria dysphasia and left weakness.  Had antegrade left vertebral flow with high  resistance may indicate distal occlusion, nonoperative treatment, recommend outpatient follow-up.  Episodes of severe constipation and nausea vomiting did improve with Reglan, scheduled lactulose and Dulcolax suppositories.  She was given Relistor for possible opiate induced constipation with good response.  Her secretions did improve with Rubinol.  Has history of DVT post-IVC filter , GI bleed in the past and was felt anticoagulation contraindicated.  On reevaluation she has had good bowel movements, tolerating PEG tube feeds.  Her hemiparesis and dysarthria have improved.  Patient clinically stable we transfer to rehab for ongoing therapies, medical management.      Consultants:        Dr. Sykes, neurology  Dr. Linn, GI  Dr. Armstrong, nephrology     Imaging/ Testing:      DX-ANKLE 3+ VIEWS LEFT   Final Result      1.  Negative for acute fracture      2.  Fusion across the tibiotalar joint      3.  Prior postoperative changes      4.  Midfoot osteoarthritis      [id number] VIEW   Final Result      1.  Increased colonic gas and stool suggesting constipation and potential ileus.   2.  No evidence for small bowel obstruction.   3.  Postoperative changes as described.      DX-ANKLE 2- VIEWS LEFT   Final Result      Old postsurgical and posttraumatic change of the distal tibia and fibula. There is plate and screw fixation bridging the tibiotalar joint. There are secondary arthropathy.      DX-ABDOMEN FOR TUBE PLACEMENT   Final Result         1.  Nonspecific bowel gas pattern.   2.  Dobbhoff tube tip overlying the expected location of the pylorus or first duodenal segment.      DX-CHEST-LIMITED (1 VIEW)   Final Result         No acute cardiopulmonary abnormalities are identified.      DX-ABDOMEN FOR TUBE PLACEMENT   Final Result         1.  Nonspecific bowel gas pattern.   2.  Dobbhoff tube tip overlying the expected location of the pylorus or first duodenal segment.      DX-ABDOMEN FOR TUBE PLACEMENT   Final  Result      Feeding tube tip at the distal stomach.      Carotid Duplex ([facility] Only) - Do not order if CTA - Neck done in ER   Final Result      ECHOCARDIOGRAM-COMP W/ CONT   Final Result      DX-CHEST-LIMITED (1 VIEW)   Final Result      1.  Hypoinflation with mild bibasilar atelectasis.   2.  Supportive tubing as described above.      DX-ABDOMEN FOR TUBE PLACEMENT   Final Result         Feeding tube with tip projecting over the expected area of the distal stomach.      DX-CHEST-PORTABLE (1 VIEW)   Final Result         1. Low lung volume with hypoventilatory change and bibasilar atelectasis.            Procedures:          Juan iMguel Soares M.D. Physician Signed Gastroenterology  OR Surgeon Date of Service: 7/3/2018  2:41 PM           PreOp Diagnosis: dysphagia     PostOp Diagnosis:               1/ esophagus normal, GEJ at 38 cm              2/ normal appearing stomach, antrum, pylorus              3/ normal appearing duodenal bulb second and third portions              4/ normal appearing cardia              5/ PEG placed in the epigastric region at the 3.5 cm eliezer     Procedure(s):  GASTROSCOPY-ENDO - Wound Class: Clean Contaminated  PEG PLACEMENT - Wound Class: Clean Contaminated     Surgeon(s):  Juan Miguel Soares M.D.            Discharge Medications:             Medication List      START taking these medications      Instructions   artificial tears 1.4 % Soln   Place 2 Drops in both eyes every 2 hours as needed.  Dose:  2 Drop     ascorbic acid 1000 MG tablet  Commonly known as:  VITAMIN C   Take 1 Tab by mouth every day.  Dose:  1000 mg     aspirin 325 MG Tabs  Start taking on:  7/10/2018  Commonly known as:  ASA  Replaces:  aspirin EC 81 MG Tbec   1 Tab by Per NG Tube route every day.  Dose:  325 mg     atorvastatin 80 MG tablet  Commonly known as:  LIPITOR   1 Tab by Per NG Tube route every evening.  Dose:  80 mg     bisacodyl 10 MG Supp  Commonly known as:  DULCOLAX   Insert 1  Suppository in rectum 2 times a day as needed. If oral laxatives ineffective.  Dose:  10 mg     cloNIDine 0.1 MG/24HR Ptwk  Commonly known as:  CATAPRES   Apply 1 Patch to skin as directed every 7 days.  Dose:  1 Patch     dextrose 50% 50 % Soln  Commonly known as:  D50W   25 mL by Intravenous route as needed (If FSBG is less than or equal to 70 mg/dL and If patient is NPO).  Dose:  25 mL     diazePAM 5 MG Tabs  Commonly known as:  VALIUM   1 Tab by Per NG Tube route every 8 hours for 5 days.  Dose:  5 mg     famotidine 20 MG Tabs  Start taking on:  7/10/2018  Commonly known as:  PEPCID   1 Tab by Per NG Tube route every day.  Dose:  20 mg     glucose 4 g 4 g Chew   Take 4 Tabs by mouth as needed for Low Blood Sugar (If FSBG is less than or equal to 70 mg/dL and patient able to eat or drink).  Dose:  16 g     glycopyrrolate 1 MG Tabs  Commonly known as:  ROBINUL   Take 0.5 Tabs by mouth 2 Times a Day.  Dose:  0.5 mg     guaiFENesin 100 MG/5ML Soln  Commonly known as:  ROBITUSSIN   10 mL by Per G Tube route every 6 hours as needed for Cough.  Dose:  10 mL     heparin 5000 UNIT/ML Soln   Inject 1 mL as instructed every 8 hours.  Dose:  5000 Units     insulin regular 100 Unit/mL Soln  Commonly known as:  HUMULIN R   Inject 1-6 Units as instructed 4 Times a Day,Before Meals and at Bedtime.  Dose:  1-6 Units     meclizine 25 MG Tabs  Commonly known as:  ANTIVERT   1 Tab by Per NG Tube route every 8 hours as needed for Dizziness or Vertigo.  Dose:  25 mg     metoclopramide 5 MG tablet  Commonly known as:  REGLAN   1 Tab by Per NG Tube route 4 Times a Day,Before Meals and at Bedtime.  Dose:  5 mg     metoprolol 25 MG Tabs  Commonly known as:  LOPRESSOR   0.5 Tabs by Per NG Tube route 2 Times a Day.  Dose:  12.5 mg     ondansetron 4 MG Tbdp  Commonly known as:  ZOFRAN ODT   Take 1 Tab by mouth every four hours as needed (give PO if no IV route available).  Dose:  4 mg     senna-docusate 8.6-50 MG Tabs  Commonly known as:   PERICOLACE or SENOKOT S   Take 2 Tabs by mouth 2 times a day.  Dose:  2 Tab     therapeutic multivitamin-minerals Tabs  Start taking on:  7/10/2018   1 Tab by Per NG Tube route every day.  Dose:  1 Tab        CHANGE how you take these medications      Instructions   ergocalciferol 00349 UNIT capsule  What changed:  Another medication with the same name was removed. Continue taking this medication, and follow the directions you see here.  Commonly known as:  DRISDOL   Take 1 Cap by mouth every 7 days.  Dose:  96683 Units     hydrOXYzine HCl 25 MG Tabs  What changed:  how to take this  Commonly known as:  ATARAX   1 Tab by Per NG Tube route 3 times a day as needed for Itching.  Dose:  25 mg     tramadol 50 MG Tabs  What changed:  · how much to take  · when to take this  · reasons to take this  Commonly known as:  ULTRAM   Take 1-2 Tabs by mouth every 6 hours as needed for Moderate Pain or Severe Pain for up to 5 days.  Dose:   mg        CONTINUE taking these medications      Instructions   amLODIPine 10 MG Tabs  Commonly known as:  NORVASC   Take 1 Tab by mouth every day.  Dose:  10 mg     DULoxetine 60 MG Cpep delayed-release capsule  Commonly known as:  CYMBALTA   Take 1 Cap by mouth every day.  Dose:  60 mg     fluticasone 50 MCG/ACT nasal spray  Commonly known as:  FLONASE   Spray 2 Sprays in nose every day.  Dose:  2 Spray     levothyroxine 112 MCG Tabs  Commonly known as:  SYNTHROID   Take 1 Tab by mouth every day.  Dose:  112 mcg     lidocaine 5 % Ptch  Commonly known as:  LIDODERM   Apply 2 Patches to skin as directed every 24 hours.  Dose:  2 Patch     * Misc. Devices Misc   Unilateral diagnostic mammogram right breast with ultrasound if indicated; fax [phone]     * Misc. Devices Misc   Freestyle lite test strips; patient has DM type 2 and tests 3 times per day     polyethylene glycol 3350 Powd  Commonly known as:  MIRALAX   Take 17 g by mouth 2 times a day.  Dose:  17 g     temazepam 15 MG  Caps  Commonly known as:  RESTORIL   Take 15 mg by mouth at bedtime as needed for Sleep.  Dose:  15 mg        * This list has 2 medication(s) that are the same as other medications prescribed for you. Read the directions carefully, and ask your doctor or other care provider to review them with you.            STOP taking these medications    aspirin EC 81 MG Tbec  Commonly known as:  ECOTRIN  Replaced by:  aspirin 325 MG Tabs     diclofenac EC 75 MG Tbec  Commonly known as:  VOLTAREN     furosemide 40 MG Tabs  Commonly known as:  LASIX     glucose blood strip  Commonly known as:  FREESTYLE LITE     Insulin Glargine 300 UNIT/ML Sopn  Commonly known as:  TOUJEO SOLOSTAR     metoprolol SR 25 MG Tb24  Commonly known as:  TOPROL XL     metroNIDAZOLE 500 MG Tabs  Commonly known as:  FLAGYL     NOVOLOG (insulin aspart) 100 UNIT/ML Sopn injection  Commonly known as:  NOVOLOG FLEXPEN     pravastatin 40 MG tablet  Commonly known as:  PRAVACHOL     triamcinolone acetonide 0.1 % Crea  Commonly known as:  KENALOG               Diet:       DIET ORDERS (Through next 24h)    Start     Ordered    07/03/18 1108  DIET TUBE FEEDING  CONTINUOUS DIET     Question Answer Comment   Which Rate/Volume? 2 cans at each meal time    Formula: DIABETISOURCE AC    Specify Type: BOLUS        07/03/18 1108    06/20/18 0120  Diet NPO  ALL MEALS     Question:  Restrict to:  Answer:  Strict    06/20/18 0120            Activity:   As tolerated and directed by skilled nursing.      Code status:   Full code    Primary Care Provider:    TROY MelendrezPYoelRSURENDRA    Follow up appointment details :      .  [facility] ([facility])  [address] [facility] [address]  [phone]        MICHELLE Melendrez.P.R.N.  [address]  [facility] [id number] [phone]    In 2 weeks      Darlyn Armstrong M.D.  [address]  [address]  [facility] [id number] [phone]    In 4 weeks      [facility]    In 4 weeks      Juan Miguel  LISETH Soares  [address] [place] [address]  [phone]    In 4 weeks      No future appointments.        Time spent on discharge day patient visit: 40 minutes    #################################################

## 2018-07-09 NOTE — PROGRESS NOTES
Pt given multiple bowel meds. Pt had 4 medium liquid brown stools with a few small hard stools mixed in with it. Pt C/O  nausea and pain. Medicated per Mar.

## 2018-07-09 NOTE — THERAPY
"Physical Therapy Treatment completed.   Bed Mobility:  Supine to Sit: Stand by Assist  Transfers: Sit to Stand: Contact Guard Assist  Gait: Level Of Assist: Contact Guard Assist with Front-Wheel Walker       Plan of Care: Will benefit from Physical Therapy 4 times per week  Discharge Recommendations: Equipment: Will Continue to Assess for Equipment Needs. Post-acute therapy Discharge to a transitional care facility for continued skilled therapy services.    Pt appeared to have improved midline standing posture today as compared to last session where she demo'd excessive L latera lean. She consistently maintained upright, neutral standing posture with FWW without cueing. She increased her single bout gait distance to 30' with FWW at CGA and demonstrated equal step length with 4lb ankle weight to L ankle. She required seated rests between gait bouts per her preference. While here, she will benefit from ongoing acute PT intervention to facilitate functional progression. She will require placement upon DC to optimize her funcitonal gains.      See \"Rehab Therapy-Acute\" Patient Summary Report for complete documentation.       "

## 2018-07-09 NOTE — DISCHARGE PLANNING
Agency/Facility Name: [facility]  Spoke To: Felisah  Outcome: Received auth. Bed available today.    Received Transport Form @ 1410 from Marissa([facility])  Spoke to Sae @ [facility] transport    Transport is scheduled for 7/9 @ 1700 going to [facility]. Marissa([facility] and [facility] notified of transport time.

## 2018-07-09 NOTE — PROGRESS NOTES
Assume bedside report and care at 0700. Patient alert and oriented x 4. Patient call light within reach, bed locked and lower position, bed alarm on. Pain in back of head,nausea with no vomiting, no numbness and tingling. Medications given per MAR. Ambulates with walker 1 assist. SCD's on. Vital signs stable, no signs and symptom of infection noted on assessment. Plan of care discussed. All questions answered at this time.

## 2018-07-09 NOTE — THERAPY
Attempted to see patient today for dysphagia treat. Per pt and RN patient has headache and was just provided medication and pt reports drowsiness. Per RN, pt will be transferring to [facility] later this afternoon. SLP will continue to follow.

## 2018-07-09 NOTE — THERAPY
"Occupational Therapy Treatment completed with focus on ADLs and ADL transfers.  Functional Status:  Min/mod A with ADLs and txfs, several LOB during tx.  Plan of Care: Will benefit from Occupational Therapy 4 times per week  Discharge Recommendations:  Equipment Will Continue to Assess for Equipment Needs. Post-acute therapy Discharge to a transitional care facility for continued therapy services.    See \"Rehab Therapy-Acute\" Patient Summary Report for complete documentation.   "

## 2018-07-09 NOTE — DISCHARGE PLANNING
Spoke To: Felisha  Agency/Facility Name: [facility]  Plan or Request: Felisha requesting updated progress notes, labs, vitals, etc. Requested information faxed to [facility].

## 2018-07-09 NOTE — DISCHARGE PLANNING
Anticipated Discharge Disposition: IRF    Action: LSW spoke with Dr. Linares. Pt is agreeable and ready to dc. Pt is medically clear to dc to [facility] - IR[facility].    [facility] is requested updated PT/OT notes. [facility] canceled transport for 1330. Bedside [facility] has requested  PT/OT to re-evaluate pt.     LSW notified that updated PT/OT notes are in. This worker notified [facility]    LSW notified by [facility] that pt will be transported to [facility] at 1700.    MD and Bedside R[facility] notified     COBRA placed in pt's chart    Barriers to Discharge:  None    [facility]

## 2018-07-09 NOTE — CARE PLAN
Problem: Safety  Goal: Will remain free from injury  Outcome: PROGRESSING AS EXPECTED  Bed alarm in use patient educated to call for assistance.     Problem: Bowel/Gastric:  Goal: Normal bowel function is maintained or improved  Outcome: PROGRESSING AS EXPECTED  Patient has had 8 bowel movements in last 24 hours. Senna and lactulose on MAR patient refused due to so many bowel movements.

## 2018-07-10 NOTE — PROGRESS NOTES
All discharge instructions reviewed with patient all questions answered. Gave report to NIRMAL Celis and Jason Ojeda all questions answered.

## 2018-07-12 ENCOUNTER — HOSPITAL ENCOUNTER (OUTPATIENT)
Dept: RADIOLOGY | Facility: MEDICAL CENTER | Age: 53
End: 2018-07-12

## 2018-12-12 ENCOUNTER — HOSPITAL ENCOUNTER (OUTPATIENT)
Facility: MEDICAL CENTER | Age: 53
End: 2018-12-12
Attending: INTERNAL MEDICINE
Payer: COMMERCIAL

## 2018-12-12 LAB
ALBUMIN SERPL BCP-MCNC: 3.7 G/DL (ref 3.2–4.9)
BUN SERPL-MCNC: 100 MG/DL (ref 8–22)
CALCIUM SERPL-MCNC: 8.9 MG/DL (ref 8.5–10.5)
CHLORIDE SERPL-SCNC: 83 MMOL/L (ref 96–112)
CO2 SERPL-SCNC: 29 MMOL/L (ref 20–33)
CREAT SERPL-MCNC: 3.19 MG/DL (ref 0.5–1.4)
CREAT UR-MCNC: 45.1 MG/DL
GLUCOSE SERPL-MCNC: 218 MG/DL (ref 65–99)
PHOSPHATE SERPL-MCNC: 5 MG/DL (ref 2.5–4.5)
POTASSIUM SERPL-SCNC: 3.5 MMOL/L (ref 3.6–5.5)
PROT UR-MCNC: 301.1 MG/DL (ref 0–15)
PROT/CREAT UR: 6676 MG/G (ref 10–107)
SODIUM SERPL-SCNC: 124 MMOL/L (ref 135–145)

## 2018-12-12 PROCEDURE — 84156 ASSAY OF PROTEIN URINE: CPT

## 2018-12-12 PROCEDURE — 80069 RENAL FUNCTION PANEL: CPT

## 2018-12-12 PROCEDURE — 82570 ASSAY OF URINE CREATININE: CPT

## 2019-08-14 ENCOUNTER — APPOINTMENT (OUTPATIENT)
Dept: RADIOLOGY | Facility: MEDICAL CENTER | Age: 54
DRG: 870 | End: 2019-08-14
Attending: INTERNAL MEDICINE
Payer: MEDICAID

## 2019-08-14 ENCOUNTER — HOSPITAL ENCOUNTER (OUTPATIENT)
Facility: MEDICAL CENTER | Age: 54
DRG: 870 | End: 2019-08-14
Admitting: HOSPITALIST
Payer: MEDICAID

## 2019-08-14 ENCOUNTER — HOSPITAL ENCOUNTER (INPATIENT)
Facility: MEDICAL CENTER | Age: 54
LOS: 9 days | DRG: 870 | End: 2019-08-23
Attending: HOSPITALIST | Admitting: HOSPITALIST
Payer: MEDICAID

## 2019-08-14 DIAGNOSIS — G47.00 INSOMNIA, UNSPECIFIED TYPE: ICD-10-CM

## 2019-08-14 DIAGNOSIS — N17.8 ACUTE RENAL FAILURE WITH OTHER SPECIFIED PATHOLOGICAL KIDNEY LESION SUPERIMPOSED ON STAGE 4 CHRONIC KIDNEY DISEASE (HCC): ICD-10-CM

## 2019-08-14 DIAGNOSIS — N18.4 ACUTE RENAL FAILURE WITH OTHER SPECIFIED PATHOLOGICAL KIDNEY LESION SUPERIMPOSED ON STAGE 4 CHRONIC KIDNEY DISEASE (HCC): ICD-10-CM

## 2019-08-14 DIAGNOSIS — J96.90 RESPIRATORY FAILURE, UNSPECIFIED CHRONICITY, UNSPECIFIED WHETHER WITH HYPOXIA OR HYPERCAPNIA (HCC): ICD-10-CM

## 2019-08-14 DIAGNOSIS — A41.9 SEPSIS, DUE TO UNSPECIFIED ORGANISM: ICD-10-CM

## 2019-08-14 PROBLEM — N17.9 ACUTE ON CHRONIC KIDNEY FAILURE (HCC): Status: ACTIVE | Noted: 2019-08-14

## 2019-08-14 PROBLEM — N18.9 ACUTE ON CHRONIC KIDNEY FAILURE (HCC): Status: ACTIVE | Noted: 2019-08-14

## 2019-08-14 PROBLEM — J18.9 MULTIFOCAL PNEUMONIA: Status: ACTIVE | Noted: 2019-08-14

## 2019-08-14 PROBLEM — I10 HTN (HYPERTENSION): Status: ACTIVE | Noted: 2019-08-14

## 2019-08-14 PROBLEM — E78.5 HLD (HYPERLIPIDEMIA): Status: ACTIVE | Noted: 2019-08-14

## 2019-08-14 LAB
ACTION RANGE TRIGGERED IACRT: YES
ACTION RANGE TRIGGERED IACRT: YES
ANION GAP SERPL CALC-SCNC: 18 MMOL/L (ref 0–11.9)
BASE EXCESS BLDA CALC-SCNC: -14 MMOL/L (ref -4–3)
BASE EXCESS BLDA CALC-SCNC: -14 MMOL/L (ref -4–3)
BASOPHILS # BLD AUTO: 0.3 % (ref 0–1.8)
BASOPHILS # BLD: 0.07 K/UL (ref 0–0.12)
BODY TEMPERATURE: ABNORMAL DEGREES
BODY TEMPERATURE: ABNORMAL DEGREES
BUN SERPL-MCNC: 72 MG/DL (ref 8–22)
BURR CELLS BLD QL SMEAR: NORMAL
CALCIUM SERPL-MCNC: 8.7 MG/DL (ref 8.5–10.5)
CHLORIDE SERPL-SCNC: 106 MMOL/L (ref 96–112)
CO2 BLDA-SCNC: 14 MMOL/L (ref 20–33)
CO2 BLDA-SCNC: 16 MMOL/L (ref 20–33)
CO2 SERPL-SCNC: 8 MMOL/L (ref 20–33)
COMMENT 1642: NORMAL
CREAT SERPL-MCNC: 5.6 MG/DL (ref 0.5–1.4)
EOSINOPHIL # BLD AUTO: 0 K/UL (ref 0–0.51)
EOSINOPHIL NFR BLD: 0 % (ref 0–6.9)
ERYTHROCYTE [DISTWIDTH] IN BLOOD BY AUTOMATED COUNT: 48.5 FL (ref 35.9–50)
FUNGUS SPEC FUNGUS STN: NORMAL
GLUCOSE BLD-MCNC: 202 MG/DL (ref 65–99)
GLUCOSE BLD-MCNC: 265 MG/DL (ref 65–99)
GLUCOSE BLD-MCNC: 282 MG/DL (ref 65–99)
GLUCOSE BLD-MCNC: 311 MG/DL (ref 65–99)
GLUCOSE BLD-MCNC: 343 MG/DL (ref 65–99)
GLUCOSE SERPL-MCNC: 294 MG/DL (ref 65–99)
GRAM STN SPEC: NORMAL
GRAM STN SPEC: NORMAL
HAV IGM SERPL QL IA: NEGATIVE
HBV CORE IGM SER QL: NEGATIVE
HBV SURFACE AB SERPL IA-ACNC: 4.91 MIU/ML (ref 0–10)
HBV SURFACE AG SER QL: NEGATIVE
HCO3 BLDA-SCNC: 13.2 MMOL/L (ref 17–25)
HCO3 BLDA-SCNC: 14.8 MMOL/L (ref 17–25)
HCT VFR BLD AUTO: 29.2 % (ref 37–47)
HCV AB SER QL: NEGATIVE
HGB BLD-MCNC: 8.7 G/DL (ref 12–16)
HOROWITZ INDEX BLDA+IHG-RTO: 187 MM[HG]
HOROWITZ INDEX BLDA+IHG-RTO: 189 MM[HG]
IMM GRANULOCYTES # BLD AUTO: 0.18 K/UL (ref 0–0.11)
IMM GRANULOCYTES NFR BLD AUTO: 0.9 % (ref 0–0.9)
INST. QUALIFIED PATIENT IIQPT: YES
INST. QUALIFIED PATIENT IIQPT: YES
LACTATE BLD-SCNC: 1.7 MMOL/L (ref 0.5–2)
LACTATE BLD-SCNC: 1.7 MMOL/L (ref 0.5–2)
LACTATE BLD-SCNC: 2.2 MMOL/L (ref 0.5–2)
LYMPHOCYTES # BLD AUTO: 0.65 K/UL (ref 1–4.8)
LYMPHOCYTES NFR BLD: 3.1 % (ref 22–41)
MAGNESIUM SERPL-MCNC: 1.8 MG/DL (ref 1.5–2.5)
MCH RBC QN AUTO: 28.2 PG (ref 27–33)
MCHC RBC AUTO-ENTMCNC: 29.8 G/DL (ref 33.6–35)
MCV RBC AUTO: 94.5 FL (ref 81.4–97.8)
MONOCYTES # BLD AUTO: 0.6 K/UL (ref 0–0.85)
MONOCYTES NFR BLD AUTO: 2.9 % (ref 0–13.4)
MORPHOLOGY BLD-IMP: NORMAL
NEUTROPHILS # BLD AUTO: 19.37 K/UL (ref 2–7.15)
NEUTROPHILS NFR BLD: 92.8 % (ref 44–72)
NRBC # BLD AUTO: 0 K/UL
NRBC BLD-RTO: 0 /100 WBC
O2/TOTAL GAS SETTING VFR VENT: 100 %
O2/TOTAL GAS SETTING VFR VENT: 70 %
PCO2 BLDA: 36.4 MMHG (ref 26–37)
PCO2 BLDA: 45.5 MMHG (ref 26–37)
PCO2 TEMP ADJ BLDA: 33.3 MMHG (ref 26–37)
PCO2 TEMP ADJ BLDA: 44.5 MMHG (ref 26–37)
PH BLDA: 7.12 [PH] (ref 7.4–7.5)
PH BLDA: 7.17 [PH] (ref 7.4–7.5)
PH TEMP ADJ BLDA: 7.13 [PH] (ref 7.4–7.5)
PH TEMP ADJ BLDA: 7.2 [PH] (ref 7.4–7.5)
PHOSPHATE SERPL-MCNC: 5.7 MG/DL (ref 2.5–4.5)
PLATELET # BLD AUTO: 276 K/UL (ref 164–446)
PLATELET BLD QL SMEAR: NORMAL
PMV BLD AUTO: 10.2 FL (ref 9–12.9)
PO2 BLDA: 131 MMHG (ref 64–87)
PO2 BLDA: 189 MMHG (ref 64–87)
PO2 TEMP ADJ BLDA: 119 MMHG (ref 64–87)
PO2 TEMP ADJ BLDA: 186 MMHG (ref 64–87)
POIKILOCYTOSIS BLD QL SMEAR: NORMAL
POTASSIUM SERPL-SCNC: 5.1 MMOL/L (ref 3.6–5.5)
POTASSIUM SERPL-SCNC: 5.2 MMOL/L (ref 3.6–5.5)
PROCALCITONIN SERPL-MCNC: 32.07 NG/ML
RBC # BLD AUTO: 3.09 M/UL (ref 4.2–5.4)
RBC BLD AUTO: PRESENT
RHODAMINE-AURAMINE STN SPEC: NORMAL
SAO2 % BLDA: 98 % (ref 93–99)
SAO2 % BLDA: 99 % (ref 93–99)
SIGNIFICANT IND 70042: NORMAL
SITE SITE: NORMAL
SODIUM SERPL-SCNC: 132 MMOL/L (ref 135–145)
SOURCE SOURCE: NORMAL
SPECIMEN DRAWN FROM PATIENT: ABNORMAL
SPECIMEN DRAWN FROM PATIENT: ABNORMAL
TRIGL SERPL-MCNC: 143 MG/DL (ref 0–149)
WBC # BLD AUTO: 20.9 K/UL (ref 4.8–10.8)

## 2019-08-14 PROCEDURE — A4314 CATH W/DRAINAGE 2-WAY LATEX: HCPCS | Performed by: INTERNAL MEDICINE

## 2019-08-14 PROCEDURE — 99291 CRITICAL CARE FIRST HOUR: CPT | Mod: 25 | Performed by: INTERNAL MEDICINE

## 2019-08-14 PROCEDURE — 71045 X-RAY EXAM CHEST 1 VIEW: CPT

## 2019-08-14 PROCEDURE — 88305 TISSUE EXAM BY PATHOLOGIST: CPT

## 2019-08-14 PROCEDURE — 88112 CYTOPATH CELL ENHANCE TECH: CPT

## 2019-08-14 PROCEDURE — B546ZZA ULTRASONOGRAPHY OF RIGHT SUBCLAVIAN VEIN, GUIDANCE: ICD-10-PCS | Performed by: INTERNAL MEDICINE

## 2019-08-14 PROCEDURE — 02HV33Z INSERTION OF INFUSION DEVICE INTO SUPERIOR VENA CAVA, PERCUTANEOUS APPROACH: ICD-10-PCS | Performed by: INTERNAL MEDICINE

## 2019-08-14 PROCEDURE — 99223 1ST HOSP IP/OBS HIGH 75: CPT | Mod: 25 | Performed by: HOSPITALIST

## 2019-08-14 PROCEDURE — 85025 COMPLETE CBC W/AUTO DIFF WBC: CPT

## 2019-08-14 PROCEDURE — 770022 HCHG ROOM/CARE - ICU (200)

## 2019-08-14 PROCEDURE — C1752 CATH,HEMODIALYSIS,SHORT-TERM: HCPCS | Performed by: INTERNAL MEDICINE

## 2019-08-14 PROCEDURE — 0BC68ZZ EXTIRPATION OF MATTER FROM RIGHT LOWER LOBE BRONCHUS, VIA NATURAL OR ARTIFICIAL OPENING ENDOSCOPIC: ICD-10-PCS | Performed by: INTERNAL MEDICINE

## 2019-08-14 PROCEDURE — 05WY33Z REVISION OF INFUSION DEVICE IN UPPER VEIN, PERCUTANEOUS APPROACH: ICD-10-PCS | Performed by: INTERNAL MEDICINE

## 2019-08-14 PROCEDURE — 302978 HCHG BRONCHOSCOPY-DIAGNOSTIC

## 2019-08-14 PROCEDURE — 36556 INSERT NON-TUNNEL CV CATH: CPT

## 2019-08-14 PROCEDURE — 5A1955Z RESPIRATORY VENTILATION, GREATER THAN 96 CONSECUTIVE HOURS: ICD-10-PCS | Performed by: INTERNAL MEDICINE

## 2019-08-14 PROCEDURE — 84132 ASSAY OF SERUM POTASSIUM: CPT

## 2019-08-14 PROCEDURE — 99358 PROLONG SERVICE W/O CONTACT: CPT | Performed by: HOSPITALIST

## 2019-08-14 PROCEDURE — 80074 ACUTE HEPATITIS PANEL: CPT

## 2019-08-14 PROCEDURE — 700105 HCHG RX REV CODE 258: Performed by: INTERNAL MEDICINE

## 2019-08-14 PROCEDURE — 700105 HCHG RX REV CODE 258: Performed by: HOSPITALIST

## 2019-08-14 PROCEDURE — B548ZZA ULTRASONOGRAPHY OF SUPERIOR VENA CAVA, GUIDANCE: ICD-10-PCS | Performed by: INTERNAL MEDICINE

## 2019-08-14 PROCEDURE — 84100 ASSAY OF PHOSPHORUS: CPT

## 2019-08-14 PROCEDURE — 87070 CULTURE OTHR SPECIMN AEROBIC: CPT

## 2019-08-14 PROCEDURE — 700111 HCHG RX REV CODE 636 W/ 250 OVERRIDE (IP): Performed by: INTERNAL MEDICINE

## 2019-08-14 PROCEDURE — 99292 CRITICAL CARE ADDL 30 MIN: CPT | Mod: 25 | Performed by: INTERNAL MEDICINE

## 2019-08-14 PROCEDURE — 99152 MOD SED SAME PHYS/QHP 5/>YRS: CPT

## 2019-08-14 PROCEDURE — 86706 HEP B SURFACE ANTIBODY: CPT

## 2019-08-14 PROCEDURE — 80048 BASIC METABOLIC PNL TOTAL CA: CPT

## 2019-08-14 PROCEDURE — 94002 VENT MGMT INPAT INIT DAY: CPT

## 2019-08-14 PROCEDURE — 83605 ASSAY OF LACTIC ACID: CPT | Mod: 91

## 2019-08-14 PROCEDURE — 82803 BLOOD GASES ANY COMBINATION: CPT | Mod: 91

## 2019-08-14 PROCEDURE — 700111 HCHG RX REV CODE 636 W/ 250 OVERRIDE (IP): Performed by: STUDENT IN AN ORGANIZED HEALTH CARE EDUCATION/TRAINING PROGRAM

## 2019-08-14 PROCEDURE — 82962 GLUCOSE BLOOD TEST: CPT | Mod: 91

## 2019-08-14 PROCEDURE — 0BCB8ZZ EXTIRPATION OF MATTER FROM LEFT LOWER LOBE BRONCHUS, VIA NATURAL OR ARTIFICIAL OPENING ENDOSCOPIC: ICD-10-PCS | Performed by: INTERNAL MEDICINE

## 2019-08-14 PROCEDURE — 0BC58ZZ EXTIRPATION OF MATTER FROM RIGHT MIDDLE LOBE BRONCHUS, VIA NATURAL OR ARTIFICIAL OPENING ENDOSCOPIC: ICD-10-PCS | Performed by: INTERNAL MEDICINE

## 2019-08-14 PROCEDURE — 31624 DX BRONCHOSCOPE/LAVAGE: CPT | Performed by: INTERNAL MEDICINE

## 2019-08-14 PROCEDURE — 84145 PROCALCITONIN (PCT): CPT

## 2019-08-14 PROCEDURE — 84478 ASSAY OF TRIGLYCERIDES: CPT

## 2019-08-14 PROCEDURE — 700101 HCHG RX REV CODE 250: Performed by: HOSPITALIST

## 2019-08-14 PROCEDURE — 36556 INSERT NON-TUNNEL CV CATH: CPT | Mod: RT,LT | Performed by: INTERNAL MEDICINE

## 2019-08-14 PROCEDURE — 5A1D70Z PERFORMANCE OF URINARY FILTRATION, INTERMITTENT, LESS THAN 6 HOURS PER DAY: ICD-10-PCS | Performed by: INTERNAL MEDICINE

## 2019-08-14 PROCEDURE — 87206 SMEAR FLUORESCENT/ACID STAI: CPT

## 2019-08-14 PROCEDURE — 87015 SPECIMEN INFECT AGNT CONCNTJ: CPT

## 2019-08-14 PROCEDURE — 700102 HCHG RX REV CODE 250 W/ 637 OVERRIDE(OP): Performed by: INTERNAL MEDICINE

## 2019-08-14 PROCEDURE — 83735 ASSAY OF MAGNESIUM: CPT

## 2019-08-14 PROCEDURE — 700111 HCHG RX REV CODE 636 W/ 250 OVERRIDE (IP): Performed by: HOSPITALIST

## 2019-08-14 PROCEDURE — 31645 BRNCHSC W/THER ASPIR 1ST: CPT | Performed by: INTERNAL MEDICINE

## 2019-08-14 PROCEDURE — 87102 FUNGUS ISOLATION CULTURE: CPT

## 2019-08-14 PROCEDURE — 90935 HEMODIALYSIS ONE EVALUATION: CPT

## 2019-08-14 PROCEDURE — 87205 SMEAR GRAM STAIN: CPT

## 2019-08-14 PROCEDURE — 87116 MYCOBACTERIA CULTURE: CPT

## 2019-08-14 PROCEDURE — 05H533Z INSERTION OF INFUSION DEVICE INTO RIGHT SUBCLAVIAN VEIN, PERCUTANEOUS APPROACH: ICD-10-PCS | Performed by: INTERNAL MEDICINE

## 2019-08-14 RX ORDER — HEPARIN SODIUM 1000 [USP'U]/ML
3000 INJECTION, SOLUTION INTRAVENOUS; SUBCUTANEOUS
Status: DISCONTINUED | OUTPATIENT
Start: 2019-08-14 | End: 2019-08-23 | Stop reason: HOSPADM

## 2019-08-14 RX ORDER — LIDOCAINE 50 MG/G
2 PATCH TOPICAL EVERY 24 HOURS
Status: DISCONTINUED | OUTPATIENT
Start: 2019-08-14 | End: 2019-08-14

## 2019-08-14 RX ORDER — FAMOTIDINE 20 MG/1
20 TABLET, FILM COATED ORAL EVERY 12 HOURS
Status: DISCONTINUED | OUTPATIENT
Start: 2019-08-14 | End: 2019-08-14

## 2019-08-14 RX ORDER — FAMOTIDINE 20 MG/1
20 TABLET, FILM COATED ORAL EVERY EVENING
Status: DISCONTINUED | OUTPATIENT
Start: 2019-08-14 | End: 2019-08-22

## 2019-08-14 RX ORDER — BISACODYL 10 MG
10 SUPPOSITORY, RECTAL RECTAL
Status: DISCONTINUED | OUTPATIENT
Start: 2019-08-14 | End: 2019-08-20

## 2019-08-14 RX ORDER — POLYETHYLENE GLYCOL 3350 17 G/17G
1 POWDER, FOR SOLUTION ORAL
Status: DISCONTINUED | OUTPATIENT
Start: 2019-08-14 | End: 2019-08-20

## 2019-08-14 RX ORDER — AMOXICILLIN 250 MG
2 CAPSULE ORAL 2 TIMES DAILY
Status: DISCONTINUED | OUTPATIENT
Start: 2019-08-14 | End: 2019-08-20

## 2019-08-14 RX ORDER — SODIUM CHLORIDE 9 MG/ML
500 INJECTION, SOLUTION INTRAVENOUS
Status: DISCONTINUED | OUTPATIENT
Start: 2019-08-14 | End: 2019-08-14

## 2019-08-14 RX ORDER — POLYETHYLENE GLYCOL 3350 17 G/17G
1 POWDER, FOR SOLUTION ORAL
Status: DISCONTINUED | OUTPATIENT
Start: 2019-08-14 | End: 2019-08-14

## 2019-08-14 RX ORDER — HEPARIN SODIUM 1000 [USP'U]/ML
2500 INJECTION, SOLUTION INTRAVENOUS; SUBCUTANEOUS
Status: DISCONTINUED | OUTPATIENT
Start: 2019-08-14 | End: 2019-08-23 | Stop reason: HOSPADM

## 2019-08-14 RX ORDER — SODIUM CHLORIDE, SODIUM LACTATE, POTASSIUM CHLORIDE, CALCIUM CHLORIDE 600; 310; 30; 20 MG/100ML; MG/100ML; MG/100ML; MG/100ML
INJECTION, SOLUTION INTRAVENOUS CONTINUOUS
Status: DISCONTINUED | OUTPATIENT
Start: 2019-08-14 | End: 2019-08-14

## 2019-08-14 RX ORDER — BISACODYL 10 MG
10 SUPPOSITORY, RECTAL RECTAL
Status: DISCONTINUED | OUTPATIENT
Start: 2019-08-14 | End: 2019-08-14

## 2019-08-14 RX ORDER — HEPARIN SODIUM 5000 [USP'U]/ML
5000 INJECTION, SOLUTION INTRAVENOUS; SUBCUTANEOUS EVERY 8 HOURS
Status: DISCONTINUED | OUTPATIENT
Start: 2019-08-14 | End: 2019-08-23 | Stop reason: HOSPADM

## 2019-08-14 RX ORDER — AMOXICILLIN 250 MG
2 CAPSULE ORAL 2 TIMES DAILY
Status: DISCONTINUED | OUTPATIENT
Start: 2019-08-14 | End: 2019-08-14

## 2019-08-14 RX ADMIN — FAMOTIDINE 20 MG: 10 INJECTION INTRAVENOUS at 17:50

## 2019-08-14 RX ADMIN — PROPOFOL 20 MCG/KG/MIN: 10 INJECTION, EMULSION INTRAVENOUS at 21:30

## 2019-08-14 RX ADMIN — PROPOFOL 20 MCG/KG/MIN: 10 INJECTION, EMULSION INTRAVENOUS at 06:30

## 2019-08-14 RX ADMIN — METRONIDAZOLE 500 MG: 500 INJECTION, SOLUTION INTRAVENOUS at 21:56

## 2019-08-14 RX ADMIN — CEFTRIAXONE SODIUM 2 G: 2 INJECTION, POWDER, FOR SOLUTION INTRAMUSCULAR; INTRAVENOUS at 08:11

## 2019-08-14 RX ADMIN — HEPARIN SODIUM 3000 UNITS: 1000 INJECTION, SOLUTION INTRAVENOUS; SUBCUTANEOUS at 21:40

## 2019-08-14 RX ADMIN — METRONIDAZOLE 500 MG: 500 INJECTION, SOLUTION INTRAVENOUS at 08:46

## 2019-08-14 RX ADMIN — PROPOFOL 20 MCG/KG/MIN: 10 INJECTION, EMULSION INTRAVENOUS at 07:00

## 2019-08-14 RX ADMIN — SODIUM CHLORIDE 3 UNITS/HR: 9 INJECTION, SOLUTION INTRAVENOUS at 17:38

## 2019-08-14 RX ADMIN — SODIUM CHLORIDE, POTASSIUM CHLORIDE, SODIUM LACTATE AND CALCIUM CHLORIDE: 600; 310; 30; 20 INJECTION, SOLUTION INTRAVENOUS at 06:51

## 2019-08-14 RX ADMIN — HEPARIN SODIUM 2500 UNITS: 1000 INJECTION, SOLUTION INTRAVENOUS; SUBCUTANEOUS at 17:38

## 2019-08-14 RX ADMIN — FENTANYL CITRATE 100 MCG: 50 INJECTION, SOLUTION INTRAMUSCULAR; INTRAVENOUS at 22:17

## 2019-08-14 RX ADMIN — PROPOFOL 20 MCG/KG/MIN: 10 INJECTION, EMULSION INTRAVENOUS at 13:59

## 2019-08-14 RX ADMIN — HEPARIN SODIUM 5000 UNITS: 5000 INJECTION, SOLUTION INTRAVENOUS; SUBCUTANEOUS at 08:14

## 2019-08-14 RX ADMIN — METRONIDAZOLE 500 MG: 500 INJECTION, SOLUTION INTRAVENOUS at 13:48

## 2019-08-14 RX ADMIN — PROPOFOL 20 MCG/KG/MIN: 10 INJECTION, EMULSION INTRAVENOUS at 06:37

## 2019-08-14 RX ADMIN — FENTANYL CITRATE 100 MCG: 50 INJECTION, SOLUTION INTRAMUSCULAR; INTRAVENOUS at 06:31

## 2019-08-14 RX ADMIN — AZITHROMYCIN MONOHYDRATE 500 MG: 500 INJECTION, POWDER, LYOPHILIZED, FOR SOLUTION INTRAVENOUS at 10:05

## 2019-08-14 RX ADMIN — HEPARIN SODIUM 3000 UNITS: 1000 INJECTION, SOLUTION INTRAVENOUS; SUBCUTANEOUS at 15:24

## 2019-08-14 RX ADMIN — FAMOTIDINE 20 MG: 10 INJECTION INTRAVENOUS at 06:31

## 2019-08-14 RX ADMIN — Medication 50 MCG/HR: at 06:31

## 2019-08-14 RX ADMIN — HEPARIN SODIUM 5000 UNITS: 5000 INJECTION, SOLUTION INTRAVENOUS; SUBCUTANEOUS at 13:48

## 2019-08-14 RX ADMIN — HEPARIN SODIUM 5000 UNITS: 5000 INJECTION, SOLUTION INTRAVENOUS; SUBCUTANEOUS at 21:56

## 2019-08-14 RX ADMIN — SODIUM BICARBONATE 150 MEQ: 84 INJECTION, SOLUTION INTRAVENOUS at 11:25

## 2019-08-14 NOTE — CONSULTS
Nephrology Consultation Note    Date & Time: 8/14/2019  11:15 AM    REASON FOR CONSULTATION:   Acute Renal Failure    CHIEF COMPLAINT:   ARDS due to pneumonia    HISTORY OF PRESENT ILLNESS:    Patient is intubated and sedated. Unable to reach  via phone. History obtained from chart review.  54 y.o. female transferred from outside facility on 8/14/2019 for respiratory failure, suspected ARDS in the setting of multifocal pneumonia and acute on chronic kidney disease. She has a past medical history of CKD, polycystic kidney disease, DM2, hypothyroidism, hypertension, hyperlipidemia, AIME, and previous PE and DVT.      Per chart review, the patient presented to the outside facility with complaints of several days of shortness of breath and subjective fever. Chest x-ray obtained at the outside facility showed multifocal abnormality consistent with multifocal pneumonia the patient was admitted to the outside facility and started on a course of broad-spectrum antibiotics. Unfortunately, during the course of her hospitalization, the patient decompensated with worsening respiratory status.  At that point, development of ARDS was of concern therefore the patient was emergently intubated by the outside physician and patient transferred.     Additional work-up including WBC 15.7 hemoglobin 9.9 hematocrit 31.1 platelet 287 troponin 0.02, sodium 135 potassium 4.2 chloride 109 CO2 6 BUN 5.8 creatinine 5.1 and glucose 191.   Labs on admission here reflect hgb of 8.7, WBC 20.9, ANC 19.37, Na 132, K 5.2, CO2 8, AG 18, BUN 72, Cr 5.6, Ph 5.7, Ca 8.7, Mg 1.8, and LA 1.7.   On ABG Ph of 7.12  Patient admitted to ICU, intubated, sedated and started on multiple abx.     REVIEW OF SYSTEMS:   Unable to perform. Patient intubated and sedated.      PAST MEDICAL HISTORY:   Past Medical History:   Diagnosis Date   • Arthritis    • Cataract    • DVT (deep venous thrombosis) (HCC)    • HTN (hypertension) 10/25/2011   • Neuropathy in  diabetes (HCC) 10/25/2011   • Other specified symptom associated with female genital organs    • PE (pulmonary embolism)    • Pneumonia    • Presence of IVC filter    • Renal disorder    • Type II or unspecified type diabetes mellitus without mention of complication, not stated as uncontrolled 10/25/2011   • Unspecified disorder of thyroid        PAST SURGICAL HISTORY:      Past Surgical History:   Procedure Laterality Date   • GASTROSCOPY-ENDO N/A 7/3/2018    Procedure: GASTROSCOPY-ENDO;  Surgeon: Juan Miguel Soares M.D.;  Location: ENDOSCOPY [facility];  Service: Gastroenterology   • PEG PLACEMENT N/A 7/3/2018    Procedure: PEG PLACEMENT;  Surgeon: Juan Miguel Soares M.D.;  Location: ENDOSCOPY [facility];  Service: Gastroenterology   • GASTROSCOPY  2/22/2013    Performed by Reid Yi D.O. at SURGERY [facility]   • COLONOSCOPY  2/22/2013    Performed by Reid Yi D.O. at SURGERY [facility]   • ABDOMINAL HYSTERECTOMY TOTAL      Right ovary remains   • CHOLECYSTECTOMY     • KNEE ARTHROSCOPY      3 surgeries right knee       FAMILY HISTORY:   Family History   Problem Relation Age of Onset   • Cancer Mother         Bone   • Heart Disease Father    • Cancer Father         Bladder, Prostate       SOCIAL HISTORY:   Social History     Socioeconomic History   • Marital status:      Spouse name: Not on file   • Number of children: Not on file   • Years of education: Not on file   • Highest education level: Not on file   Occupational History   • Not on file   Social Needs   • Financial resource strain: Not on file   • Food insecurity:     Worry: Not on file     Inability: Not on file   • Transportation needs:     Medical: Not on file     Non-medical: Not on file   Tobacco Use   • Smoking status: Never Smoker   • Smokeless tobacco: Never Used   Substance and Sexual Activity   • Alcohol use: No     Alcohol/week: 0.0 oz   • Drug use: No   • Sexual activity: Yes     Partners: Male      Birth control/protection: Post-Menopausal   Lifestyle   • Physical activity:     Days per week: Not on file     Minutes per session: Not on file   • Stress: Not on file   Relationships   • Social connections:     Talks on phone: Not on file     Gets together: Not on file     Attends Cheondoism service: Not on file     Active member of club or organization: Not on file     Attends meetings of clubs or organizations: Not on file     Relationship status: Not on file   • Intimate partner violence:     Fear of current or ex partner: Not on file     Emotionally abused: Not on file     Physically abused: Not on file     Forced sexual activity: Not on file   Other Topics Concern   • Not on file   Social History Narrative   • Not on file       HOME MEDICATIONS:   No current facility-administered medications on file prior to encounter.      Current Outpatient Medications on File Prior to Encounter   Medication Sig Dispense Refill   • aspirin (ASA) 325 MG Tab 1 Tab by Per NG Tube route every day. 100 Tab    • insulin regular (HUMULIN R) 100 Unit/mL Solution Inject 1-6 Units as instructed 4 Times a Day,Before Meals and at Bedtime. 10 mL    • glucose 4 g 4 g Chew Tab Take 4 Tabs by mouth as needed for Low Blood Sugar (If FSBG is less than or equal to 70 mg/dL and patient able to eat or drink).     • dextrose 50% (D50W) 50 % Solution 25 mL by Intravenous route as needed (If FSBG is less than or equal to 70 mg/dL and If patient is NPO).  0   • meclizine (ANTIVERT) 25 MG Tab 1 Tab by Per NG Tube route every 8 hours as needed for Dizziness or Vertigo. 30 Tab 0   • ondansetron (ZOFRAN ODT) 4 MG TABLET DISPERSIBLE Take 1 Tab by mouth every four hours as needed (give PO if no IV route available). 10 Tab 0   • atorvastatin (LIPITOR) 80 MG tablet 1 Tab by Per NG Tube route every evening. 30 Tab    • cloNIDine (CATAPRES) 0.1 MG/24HR PATCH WEEKLY Apply 1 Patch to skin as directed every 7 days. 4 Patch    • metoprolol (LOPRESSOR) 25 MG  Tab 0.5 Tabs by Per NG Tube route 2 Times a Day. 60 Tab    • guaiFENesin (ROBITUSSIN) 100 MG/5ML Solution 10 mL by Per G Tube route every 6 hours as needed for Cough.     • senna-docusate (PERICOLACE OR SENOKOT S) 8.6-50 MG Tab Take 2 Tabs by mouth 2 times a day.     • bisacodyl (DULCOLAX) 10 MG Suppos Insert 1 Suppository in rectum 2 times a day as needed. If oral laxatives ineffective.     • metoclopramide (REGLAN) 5 MG tablet 1 Tab by Per NG Tube route 4 Times a Day,Before Meals and at Bedtime. 120 Tab    • therapeutic multivitamin-minerals (THERAGRAN-M) Tab 1 Tab by Per NG Tube route every day. 30 Tab 11   • artificial tears 1.4 % Solution Place 2 Drops in both eyes every 2 hours as needed. 1 Bottle 3   • famotidine (PEPCID) 20 MG Tab 1 Tab by Per NG Tube route every day. 60 Tab    • glycopyrrolate (ROBINUL) 1 MG Tab Take 0.5 Tabs by mouth 2 Times a Day. 90 Tab    • ascorbic acid (VITAMIN C) 1000 MG tablet Take 1 Tab by mouth every day.     • hydrOXYzine HCl (ATARAX) 25 MG Tab 1 Tab by Per NG Tube route 3 times a day as needed for Itching. 30 Tab 0   • heparin 5000 UNIT/ML Solution Inject 1 mL as instructed every 8 hours.  0   • temazepam (RESTORIL) 15 MG Cap Take 15 mg by mouth at bedtime as needed for Sleep.     • levothyroxine (SYNTHROID) 112 MCG Tab Take 1 Tab by mouth every day. 90 Tab 3   • duloxetine (CYMBALTA) 60 MG Cap DR Particles delayed-release capsule Take 1 Cap by mouth every day. 90 Cap 3   • lidocaine (LIDODERM) 5 % Patch Apply 2 Patches to skin as directed every 24 hours. 20 Patch 3   • amlodipine (NORVASC) 10 MG Tab Take 1 Tab by mouth every day. 90 Tab 3   • Misc. Devices Misc Freestyle lite test strips; patient has DM type 2 and tests 3 times per day 100 Strip 3   • Misc. Devices Misc Unilateral diagnostic mammogram right breast with ultrasound if indicated; fax [phone] 1 Device 0   • ergocalciferol (DRISDOL) 40719 UNIT capsule Take 1 Cap by mouth every 7 days. 12 Cap 0   • polyethylene  "glycol 3350 (MIRALAX) Powder Take 17 g by mouth 2 times a day. 3 Bottle 3   • fluticasone (FLONASE) 50 MCG/ACT nasal spray Spray 2 Sprays in nose every day. 16 g 11       ALLERGIES:  Toradol; Levaquin; Lisinopril; and Tape    PHYSICAL EXAM:  VS:  /56   Pulse 76   Temp 36.5 °C (97.7 °F) (Temporal)   Resp (!) 29   Ht 1.778 m (5' 10\")   Wt 104.6 kg (230 lb 9.6 oz)   SpO2 97%   BMI 33.09 kg/m²   GENERAL: lying in bed, intubated with eyes closed.   HEAD: Normocephalic, atraumatic  EYES: no scleral icterus; normal conjunctiva, pupils sluggish  NECK: Supple; trachea midline  CV: RRR, S1 and S2 present  LUNGS: able to auscultate fine crackles  ABDOMEN: Soft, non-tender  EXTREMETIES: no significant lower extremity edema, no clubbing or cyanosis  SKIN: Warm and dry, no rashes  NEURO:  Unable to assess, patient intubated and sedated. not responding to painful stimuli  PSYCH: Unable to assess, patient intubated and sedated    LABS:  Recent Results (from the past 24 hour(s))   ACCU-CHEK GLUCOSE    Collection Time: 08/14/19  6:34 AM   Result Value Ref Range    Glucose - Accu-Ck 265 (H) 65 - 99 mg/dL   Triglycerides Starting now and then Every 3 Days    Collection Time: 08/14/19  6:35 AM   Result Value Ref Range    Triglycerides 143 0 - 149 mg/dL   Procalcitonin    Collection Time: 08/14/19  6:35 AM   Result Value Ref Range    Procalcitonin 32.07 (H) <0.25 ng/mL   Basic Metabolic Panel    Collection Time: 08/14/19  6:35 AM   Result Value Ref Range    Sodium 132 (L) 135 - 145 mmol/L    Potassium 5.2 3.6 - 5.5 mmol/L    Chloride 106 96 - 112 mmol/L    Co2 8 (LL) 20 - 33 mmol/L    Glucose 294 (H) 65 - 99 mg/dL    Bun 72 (HH) 8 - 22 mg/dL    Creatinine 5.60 (HH) 0.50 - 1.40 mg/dL    Calcium 8.7 8.5 - 10.5 mg/dL    Anion Gap 18.0 (H) 0.0 - 11.9   MAGNESIUM    Collection Time: 08/14/19  6:35 AM   Result Value Ref Range    Magnesium 1.8 1.5 - 2.5 mg/dL   PHOSPHORUS    Collection Time: 08/14/19  6:35 AM   Result Value Ref " Range    Phosphorus 5.7 (H) 2.5 - 4.5 mg/dL   ESTIMATED GFR    Collection Time: 08/14/19  6:35 AM   Result Value Ref Range    GFR If African American 10 (A) >60 mL/min/1.73 m 2    GFR If Non  8 (A) >60 mL/min/1.73 m 2   ISTAT ARTERIAL BLOOD GAS    Collection Time: 08/14/19  7:11 AM   Result Value Ref Range    Ph 7.120 (LL) 7.400 - 7.500    Pco2 45.5 (H) 26.0 - 37.0 mmHg    Po2 189 (H) 64 - 87 mmHg    Tco2 16 (L) 20 - 33 mmol/L    S02 99 93 - 99 %    Hco3 14.8 (L) 17.0 - 25.0 mmol/L    BE -14 (L) -4 - 3 mmol/L    Body Temp 97.7 F degrees    O2 Therapy 100 %    iPF Ratio 189     Ph Temp Deon 7.126 (LL) 7.400 - 7.500    Pco2 Temp Co 44.5 (H) 26.0 - 37.0 mmHg    Po2 Temp Cor 186 (H) 64 - 87 mmHg    Specimen Arterial     Action Range Triggered YES     Inst. Qualified Patient YES    CBC WITH DIFFERENTIAL    Collection Time: 08/14/19  7:45 AM   Result Value Ref Range    WBC 20.9 (H) 4.8 - 10.8 K/uL    RBC 3.09 (L) 4.20 - 5.40 M/uL    Hemoglobin 8.7 (L) 12.0 - 16.0 g/dL    Hematocrit 29.2 (L) 37.0 - 47.0 %    MCV 94.5 81.4 - 97.8 fL    MCH 28.2 27.0 - 33.0 pg    MCHC 29.8 (L) 33.6 - 35.0 g/dL    RDW 48.5 35.9 - 50.0 fL    Platelet Count 276 164 - 446 K/uL    MPV 10.2 9.0 - 12.9 fL    Neutrophils-Polys 92.80 (H) 44.00 - 72.00 %    Lymphocytes 3.10 (L) 22.00 - 41.00 %    Monocytes 2.90 0.00 - 13.40 %    Eosinophils 0.00 0.00 - 6.90 %    Basophils 0.30 0.00 - 1.80 %    Immature Granulocytes 0.90 0.00 - 0.90 %    Nucleated RBC 0.00 /100 WBC    Neutrophils (Absolute) 19.37 (H) 2.00 - 7.15 K/uL    Lymphs (Absolute) 0.65 (L) 1.00 - 4.80 K/uL    Monos (Absolute) 0.60 0.00 - 0.85 K/uL    Eos (Absolute) 0.00 0.00 - 0.51 K/uL    Baso (Absolute) 0.07 0.00 - 0.12 K/uL    Immature Granulocytes (abs) 0.18 (H) 0.00 - 0.11 K/uL    NRBC (Absolute) 0.00 K/uL   PERIPHERAL SMEAR REVIEW    Collection Time: 08/14/19  7:45 AM   Result Value Ref Range    Peripheral Smear Review see below    PLATELET ESTIMATE    Collection Time:  08/14/19  7:45 AM   Result Value Ref Range    Plt Estimation Normal    MORPHOLOGY    Collection Time: 08/14/19  7:45 AM   Result Value Ref Range    RBC Morphology Present     Poikilocytosis 1+     Echinocytes 1+    DIFFERENTIAL COMMENT    Collection Time: 08/14/19  7:45 AM   Result Value Ref Range    Comments-Diff see below    ACCU-CHEK GLUCOSE    Collection Time: 08/14/19  8:11 AM   Result Value Ref Range    Glucose - Accu-Ck 282 (H) 65 - 99 mg/dL   LACTIC ACID    Collection Time: 08/14/19  9:30 AM   Result Value Ref Range    Lactic Acid 1.7 0.5 - 2.0 mmol/L       (click the triangle to expand results)    IMAGING:  DX-CHEST-FOR LINE PLACEMENT Perform procedure in: Patient's Room   Final Result      1.  Left IJ central catheter tip is in the superior cavoatrial junction. No pneumothorax.   2.  Well-positioned ETT.   3.  Catheter tubing projecting over the right neck, tip directed cephalad.   4.  Slight interval improvement in bilateral interstitial/alveolar opacities.      DX-CHEST-PORTABLE (1 VIEW)   Final Result         1.  Extensive bilateral pulmonary parenchymal opacities compatible with ARDS, severe pulmonary edema, and/or superimposed pulmonary infiltrates.          ASSESSMENT:  # CAMPOS on CKD: Baseline Cr~2.5 - 3.0, increased to 5.60. Likely pre-renal.   # CKD Stage 4 based on previous labs  # HTN - currently hypotensive to low normal BPs  # AGMA- ph 7.12, AG elevated  # Hypervolemia  # polycystic kidney disease- [facility] reflecting this date back to 2012 per chart review  # ARDS, due to multifocal PNA - on AZT, C3, and flagyl  # Pneumonia- on AZT, C3, and flagyl  # DM2  # hypothyroidism    PLAN:  - Appropriate candidate for urgent RRT  - Place temporary line  - HD today  - Renal diet, when initiated  - Strict I/Os  - Dose all meds per ESRD  - Daily electrolytes and renal labs    Thank you for this consult, we will continue to follow.    Juan Diego Langston Internal Medicine resident. Discussed with attending.

## 2019-08-14 NOTE — PROCEDURES
Procedures  Procedure Note    Date: 8/14/2019  Time: 08:45    Procedure: Bronchoscopy    Indication: ARDS, severe hypoxia, elevated peak and plateau ventilator pressures, multifocal pneumonia    Consent: Next of kin unable to be reached, implied consent    Procedure: After obtaining consent, a time-out was performed. Respiratory therapy and nursing at bedside throughout procedure. Patient provided sedation and analgesia throughout the procedure. Placed on full ventilator support with an FiO2 of 100% throughout the procedure. Using a fiberoptic bronchoscope, trachea entered via ET tube.  5 mL of local anesthetic sprayed at the nirmala (2% lidocaine) achieving appropriate comfort level for patient. Airways visualized directly and the following intervention was performed: Bilateral bronchial washings and suctioning of mucus. Findings as below. Patient tolerated procedure well without any difficulties and left in care of bedside nurse/RT.     Medications: Propofol, light  Findings: Upper airway - Not visualized as bronchoscope passed through ETT.        Trachea to nirmala -inflamed appearing mucosa without lesions or mass, ETT tip measured 5 cm from the nirmala, advanced 2 cm under bronchoscopic guidance.        R proximal and distal airways -inflamed appearing mucosa without mass/lesion/anatomic variance, secretions: Minimal, thick, tan/clear mucus in the distal bronchi of the right lower middle lobes.  All bronchi sequentially lavaged until clear effluent returned        L proximal and distal airways -inflamed appearing mucosa without mass/lesion/anatomic variance, secretions: Minimal, thick, tan/clear mucus in the distal bronchi of the left lower lobe.  All bronchi sequentially lavaged until clear effluent returned        Samples -bronchial washings    Complications: None apparent  CXR (if applicable): Unchanged, no pneumothorax    Doron Robles, DO  Critical Care Medicine

## 2019-08-14 NOTE — CARE PLAN
Problem: Ventilation Defect:  Goal: Ability to achieve and maintain unassisted ventilation or tolerate decreased levels of ventilator support  Intervention: Support and monitor invasive and noninvasive mechanical ventilation  Note:      Ventilator Daily Summary    Vent Day # 1  RR: 28 VT: 380 PEEP: 8 FiO2: 70%    Ventilator settings changed this shift: RR-28 VT-380 FiO2-70%    Plan: Continue current ventilator settings and wean mechanical ventilation as tolerated per physician orders.

## 2019-08-14 NOTE — H&P
Hospital Medicine History & Physical Note    Date of Service  8/14/2019    Primary Care Physician  ORA Melendrez.    Consultants  Dr. Robles, pulmonary medicine    Code Status  Full    Chief Complaint  Transfer from outside facility for respiratory failure with suspected ARDS    History of Presenting Illness  54 y.o. female who presented on 8/14/2019 with in transfer from outside facility for respiratory failure.  This is a middle-aged female who was transferred from an outside facility for suspected development of ARDS in the setting of multifocal pneumonia and acute on chronic kidney disease.  In short, the patient presented to the outside facility with complaints of several days of shortness of breath and subjective fever.  She does carry a history of hypertension, hyperlipidemia, and previous DVT status post IV filter as well as ASA.  Chest x-ray obtained at the outside facility showed multifocal abnormality consistent with multifocal pneumonia the patient was admitted to the outside facility and started on a course of broad-spectrum antibiotics.  Unfortunately, during the course of her hospitalization, the patient decompensated had a had worsening respiratory status.  At that point, development of ARDS was of concern therefore the patient was emergently intubated by the outside physician.    Additional work-up including WBC 15.7 hemoglobin 9.9 hematocrit 31.1 platelet 287 troponin 0.02, sodium 135 potassium 4.2 chloride 109 CO2 6 BUN 5.8 creatinine 5.1 and glucose 191.  The patient was then transferred to our facility for higher level of care and specialist consultation.    Review of Systems  Review of Systems   Unable to perform ROS: Intubated       Past Medical History  Past Medical History:   Diagnosis Date   • Type II or unspecified type diabetes mellitus without mention of complication, not stated as uncontrolled 10/25/2011   • HTN (hypertension) 10/25/2011   • Neuropathy in diabetes (HCC)  10/25/2011   • Arthritis    • Cataract    • DVT (deep venous thrombosis) (HCC)    • Other specified symptom associated with female genital organs    • PE (pulmonary embolism)    • Pneumonia    • Presence of IVC filter    • Renal disorder    • Unspecified disorder of thyroid        Surgical History  Past Surgical History:   Procedure Laterality Date   • GASTROSCOPY-ENDO N/A 7/3/2018    Procedure: GASTROSCOPY-ENDO;  Surgeon: Juan Miguel Soares M.D.;  Location: ENDOSCOPY [facility];  Service: Gastroenterology   • PEG PLACEMENT N/A 7/3/2018    Procedure: PEG PLACEMENT;  Surgeon: Juan Miguel Soares M.D.;  Location: ENDOSCOPY [facility];  Service: Gastroenterology   • GASTROSCOPY  2/22/2013    Performed by Reid Yi D.O. at SURGERY [facility]   • COLONOSCOPY  2/22/2013    Performed by Reid Yi D.O. at SURGERY [facility]   • ABDOMINAL HYSTERECTOMY TOTAL      Right ovary remains   • CHOLECYSTECTOMY     • KNEE ARTHROSCOPY      3 surgeries right knee       Family History  Family History   Problem Relation Age of Onset   • Cancer Mother         Bone   • Heart Disease Father    • Cancer Father         Bladder, Prostate       Social History  Social History     Tobacco Use   • Smoking status: Never Smoker   • Smokeless tobacco: Never Used   Substance Use Topics   • Alcohol use: No     Alcohol/week: 0.0 oz   • Drug use: No       Allergies  Allergies   Allergen Reactions   • Toradol Hives   • Levaquin      Blood boils   • Lisinopril Unspecified     Caused potassium build up in blood   • Tape        Medications  No current facility-administered medications on file prior to encounter.      Current Outpatient Medications on File Prior to Encounter   Medication Sig Dispense Refill   • aspirin (ASA) 325 MG Tab 1 Tab by Per NG Tube route every day. 100 Tab    • insulin regular (HUMULIN R) 100 Unit/mL Solution Inject 1-6 Units as instructed 4 Times a Day,Before Meals and at Bedtime. 10 mL    •  glucose 4 g 4 g Chew Tab Take 4 Tabs by mouth as needed for Low Blood Sugar (If FSBG is less than or equal to 70 mg/dL and patient able to eat or drink).     • dextrose 50% (D50W) 50 % Solution 25 mL by Intravenous route as needed (If FSBG is less than or equal to 70 mg/dL and If patient is NPO).  0   • meclizine (ANTIVERT) 25 MG Tab 1 Tab by Per NG Tube route every 8 hours as needed for Dizziness or Vertigo. 30 Tab 0   • ondansetron (ZOFRAN ODT) 4 MG TABLET DISPERSIBLE Take 1 Tab by mouth every four hours as needed (give PO if no IV route available). 10 Tab 0   • atorvastatin (LIPITOR) 80 MG tablet 1 Tab by Per NG Tube route every evening. 30 Tab    • cloNIDine (CATAPRES) 0.1 MG/24HR PATCH WEEKLY Apply 1 Patch to skin as directed every 7 days. 4 Patch    • metoprolol (LOPRESSOR) 25 MG Tab 0.5 Tabs by Per NG Tube route 2 Times a Day. 60 Tab    • guaiFENesin (ROBITUSSIN) 100 MG/5ML Solution 10 mL by Per G Tube route every 6 hours as needed for Cough.     • senna-docusate (PERICOLACE OR SENOKOT S) 8.6-50 MG Tab Take 2 Tabs by mouth 2 times a day.     • bisacodyl (DULCOLAX) 10 MG Suppos Insert 1 Suppository in rectum 2 times a day as needed. If oral laxatives ineffective.     • metoclopramide (REGLAN) 5 MG tablet 1 Tab by Per NG Tube route 4 Times a Day,Before Meals and at Bedtime. 120 Tab    • therapeutic multivitamin-minerals (THERAGRAN-M) Tab 1 Tab by Per NG Tube route every day. 30 Tab 11   • artificial tears 1.4 % Solution Place 2 Drops in both eyes every 2 hours as needed. 1 Bottle 3   • famotidine (PEPCID) 20 MG Tab 1 Tab by Per NG Tube route every day. 60 Tab    • glycopyrrolate (ROBINUL) 1 MG Tab Take 0.5 Tabs by mouth 2 Times a Day. 90 Tab    • ascorbic acid (VITAMIN C) 1000 MG tablet Take 1 Tab by mouth every day.     • hydrOXYzine HCl (ATARAX) 25 MG Tab 1 Tab by Per NG Tube route 3 times a day as needed for Itching. 30 Tab 0   • heparin 5000 UNIT/ML Solution Inject 1 mL as instructed every 8 hours.  0   •  temazepam (RESTORIL) 15 MG Cap Take 15 mg by mouth at bedtime as needed for Sleep.     • levothyroxine (SYNTHROID) 112 MCG Tab Take 1 Tab by mouth every day. 90 Tab 3   • duloxetine (CYMBALTA) 60 MG Cap DR Particles delayed-release capsule Take 1 Cap by mouth every day. 90 Cap 3   • lidocaine (LIDODERM) 5 % Patch Apply 2 Patches to skin as directed every 24 hours. 20 Patch 3   • amlodipine (NORVASC) 10 MG Tab Take 1 Tab by mouth every day. 90 Tab 3   • Misc. Devices Misc Freestyle lite test strips; patient has DM type 2 and tests 3 times per day 100 Strip 3   • Misc. Devices Misc Unilateral diagnostic mammogram right breast with ultrasound if indicated; fax [phone] Device 0   • ergocalciferol (DRISDOL) 41375 UNIT capsule Take 1 Cap by mouth every 7 days. 12 Cap 0   • polyethylene glycol 3350 (MIRALAX) Powder Take 17 g by mouth 2 times a day. 3 Bottle 3   • fluticasone (FLONASE) 50 MCG/ACT nasal spray Spray 2 Sprays in nose every day. 16 g 11       Physical Exam  Hemodynamics  No data recorded.      No data recorded           Respiratory  Suresh Vent Mode: APVCMV, Rate (breaths/min): 20, PEEP/CPAP: 12, PEEP/CPAP: 12, FiO2: 100, P Peak (PIP): 29, P MEAN: 16         Work Of Breathing / Effort: Vented  RUL Breath Sounds: Coarse Crackles, RML Breath Sounds: Coarse Crackles, RLL Breath Sounds: Diminished, DIANNE Breath Sounds: Coarse Crackles, LLL Breath Sounds: Diminished    Physical Exam   Constitutional: No distress.   Morbidly obese   HENT:   Head: Normocephalic and atraumatic.   Right Ear: External ear normal.   Left Ear: External ear normal.   Eyes: EOM are normal. Right eye exhibits no discharge. Left eye exhibits no discharge.   Neck: Neck supple. No JVD present.   Cardiovascular: Normal rate, regular rhythm and normal heart sounds.   Pulmonary/Chest: Effort normal. No respiratory distress. She has rales. She exhibits no tenderness.   Abdominal: Soft. Bowel sounds are normal. She exhibits no distension. There is  no tenderness.   Musculoskeletal: Normal range of motion. She exhibits no edema.   Neurological: She is alert. No cranial nerve deficit.   Patient is alert and able to answer questions by nodding yes or no.  Her intermittent episodes of arousability is not consistent however.   Skin: Skin is warm and dry. She is not diaphoretic. No erythema.   Nursing note and vitals reviewed.    Capillary refill less than 3 seconds, distal pulses intact    Laboratory:          No results for input(s): ALTSGPT, ASTSGOT, ALKPHOSPHAT, TBILIRUBIN, DBILIRUBIN, GAMMAGT, AMYLASE, LIPASE, ALB, PREALBUMIN, GLUCOSE in the last 72 hours.              Lab Results   Component Value Date    TROPONINI 0.01 03/20/2012       Imaging  No results found.      Assessment/Plan:  Anticipate that patient will need greater than 2 midnights for management of the discussed medical issues.    * Respiratory failure (HCC)  Assessment & Plan  Patient was intubated at the outside facility with concerns of developing ARDS in the setting of multifocal pneumonia and acute on chronic renal failure.  By report, chest x-ray at the outside facility showed evidence of multifocal pneumonia.  Dr. Robles of pulmonary medicine was consulted and I appreciate his recommendations and management of the patient while she is on full mechanical ventilation.  Of note, the patient was suctioned at bedside by her RN and there was multiple chunky material, potentially previously swallowed pills and food.  She does carry a history of CVA with dysphasia.    Multifocal pneumonia  Assessment & Plan  As noted above, patient carries a history of CVA with persistent dysphagia, she does have leukocytosis and respiratory failure but otherwise her blood pressure stable.  She will be started on goal-directed therapy for potential aspiration pneumonia and started on empiric antibiotics along with IV fluids including ceftriaxone and Flagyl.  Will monitor sputum cultures and any blood cultures  obtained at the outside facility for speciation and sensitivities in order to better guide our antibiotic choices.    Acute on chronic kidney failure (HCC)  Assessment & Plan  Likely secondary to sepsis with hypoperfusion.  Check urine electrolytes for completeness, continue IV fluids and monitor renal function.    Sepsis (HCC)  Assessment & Plan  Treatment as noted above.    HLD (hyperlipidemia)  Assessment & Plan  This is chronic and stable, continue to hold home medications until medically improved and has been seen by speech therapy with determination of safe ability to swallow.    HTN (hypertension)  Assessment & Plan  Blood pressure was low normal at the time of my visit to her bedside.  In light of her suspected sepsis, I am holding her home metoprolol, clonidine, and Norvasc until she is clinically improved with stable vital signs at which time we will resume her home medications.    Diabetes mellitus type 2, uncontrolled, with complications (HCC)- (present on admission)  Assessment & Plan  Holding all long-acting hypoglycemics while the patient is n.p.o., continue to monitor.  Cover with Accu-Cheks and insulin sliding scale.    Hypothyroid- (present on admission)  Assessment & Plan  Keeping the patient n.p.o. for now in light of suspected aspiration, hold Synthroid.       Prophylaxis: Heparin for DVT prophylaxis, no PPI indicated, bowel protocol as needed    I spent a total of 35 minutes of non face to face time performing additional research, reviewing medical records from transferring facility, discussing plan of care with other healthcare providers. Start time: 6:15AM. End time: 6:45AM.

## 2019-08-14 NOTE — PROGRESS NOTES
RN able to reach pt's grandmother June. June gave RN phone number for pt's son Josue. Josue reached via telephone, RN updated him on POC and got consent for procedures. Dr. Robles updated Josue as well.

## 2019-08-14 NOTE — DIETARY
"Nutrition Support Assessment   Day 0 of admit.  Bryan Garcia is a 54 y.o. female with admitting DX of respiratory failure, acute hypoxemic respiratory failure.     Current problem list:  1. Respiratory failure  2. Multifocal pneumonia  3. Sepsis  4. Acute on chronic kidney failure  5. HTN (hypertension)  6. HLD (hyperlipidemia)  7. Diabetes mellitus type 2, uncontrolled with complications  8. Hypothyroid     Assessment:  Estimated Nutritional Needs: based on:   Height: 177.8 cm (5' 10\")  Weight: 104.6 kg (230 lb 9.6 oz)  Weight to Use in Calculations: 104.6 kg (230 lb 9.6 oz) - bed scale wt from admit  Ideal Body Weight: 68 kg (150 lb)  Percent Ideal Body Weight: 153.7  Body mass index is 33.09 kg/m²., BMI classification: obese class I    Calculation/Equation: MSJ x 1.0 = 1723 kcals/day (65-70% = 2514-8755 kcals/day); RMR per [facility] (vent L/min: 10.7, Tmax/24 hrs: 36.6) = 1884 kcals (65-70% = 2377-1604 kcal/day)  Total Calories / day: 1151 - 1464 (Calories / k - 14)  Total Grams Protein / day: 129+ (Grams Protein / kg IBW: 1.9+)     Evaluation:   1. Pt admitted with acute respiratory failure.  2. Pt is intubated and sedated.  3. Awaiting Cortrak placement and verification.  4. Hx of CKD (Stage 4)  5. Nephrology following pt; urgent RRT; HD cath placed and HD today.  6. Sodium 132, Glucose 294, A1c 8.3 (), BUN 72, Creatinine 5.60, Phos 5.7  7. Pertinent meds: Zithromax, Rocephin, Pepcid, SSI, Flagyl, Pericolace  8. Propofol currently running @ 20 mcg/min (12.6 mL/hr) = 333 kcals/day; RD will monitor and adjust TF as needed.  9. No pressors running at this time.  10. Consult pending to wound for injury to hip.  11. Specialized tube feeding formula indicated to meet pt's estimated protein needs, while maintaining glycemic control.     Malnutrition Risk: No risk identified at this time.     Recommendations/Plan:  1. Start Peptamen Intense VHP @ 25 mL/hr, and advance per protocol to goal rate of 60 mL/hr (goal " on and off propofol), providing 1440 kcals, 132 grams of protein, 109 grams of CHO and 1210 mL of free water per day.  2. Fluids per MD; pt would need ~1400 mL of additional free water per day to meet estimated fluid needs.  3. RD to monitor wt and lab trends.  4. PO diet when safe/appropriate per SLP/MD and pt can adequately consume.    RD following.

## 2019-08-14 NOTE — ASSESSMENT & PLAN NOTE
Blood pressure still on the higher side.  Continue Norvasc and increase the dose of carvedilol.  Her blood pressure still poorly controlled even I increase the dose of carvedilol.  On August 23, 2019 I started her on hydralazine 25 mg 3 times daily with holding parameters.  IV hydralazine as needed for SBP greater than 160

## 2019-08-14 NOTE — PROGRESS NOTES
Telephone report received from Neelima KINNEY. Care flight currently at bedside.pt will go to Room S-130

## 2019-08-14 NOTE — CONSULTS
Critical Care Consultation    Date of consult: 8/14/2019    Referring Physician  Deyvi Yañez M.D.    Reason for Consultation  Acute hypoxic respiratory failure    History of Presenting Illness  54 y.o. female with past medical history of hypertension, PE, DVT, diabetes, stage IV chronic kidney disease presumed to be secondary to polycystic kidney disease, type 2 diabetes, neuropathy, stroke with dysphasia (PEG tube removed), who presented 8/14/2019 as a transfer from [facility] where she presented yesterday for increasing shortness of breath and subjective fever for several days.  Patient was found to have multifocal pneumonia and was admitted to [facility] on broad-spectrum antibiotics however decompensated overnight with worsening hypoxia suggesting ARDS.  Patient was intubated at [facility] and transferred to our facility for higher level care.  Labs are facility show leukocytosis with white blood cell count of 20.9, anemia with a hemoglobin of 8.7, normal platelet count at 276, conference of panel shows a mild hyponatremia 132, potassium 5.2, CO2 8, glucose 294, BUN 72, creatinine 5.6.    Code Status  Full Code    Review of Systems  Review of Systems   Unable to perform ROS: Acuity of condition       Past Medical History   has a past medical history of Arthritis, Cataract, DVT (deep venous thrombosis) ([facility]), HTN (hypertension) (10/25/2011), Neuropathy in diabetes (HCC) (10/25/2011), Other specified symptom associated with female genital organs, PE (pulmonary embolism), Pneumonia, Presence of IVC filter, Renal disorder, Type II or unspecified type diabetes mellitus without mention of complication, not stated as uncontrolled (10/25/2011), and Unspecified disorder of thyroid.    Surgical History   has a past surgical history that includes abdominal hysterectomy total; knee arthroscopy; cholecystectomy; gastroscopy (2/22/2013); colonoscopy (2/22/2013); gastroscopy-endo (N/A, 7/3/2018); and  peg placement (N/A, 7/3/2018).    Family History  family history includes Cancer in her father and mother; Heart Disease in her father.    Social History   reports that she has never smoked. She has never used smokeless tobacco. She reports that she does not drink alcohol or use drugs.    Medications  Home Medications    **Home medications have not yet been reviewed for this encounter**       Current Facility-Administered Medications   Medication Dose Route Frequency Provider Last Rate Last Dose   • propofol (DIPRIVAN) injection  0-80 mcg/kg/min Intravenous Continuous Berna Cavazos M.D. 12.6 mL/hr at 08/14/19 0637 20 mcg/kg/min at 08/14/19 0637   • Respiratory Care per Protocol   Nebulization Continuous RT GISELA Velez Jr..BONNIE.       • MD Alert...ICU Electrolyte Replacement per Pharmacy   Other PHARMACY TO DOSE GISELA Velez Jr..BONNIE.       • lidocaine (XYLOCAINE) 1 % injection 1-2 mL  1-2 mL Tracheal Tube Q30 MIN PRN GISELA Velez Jr..BONNIE.       • Pharmacy Consult: Enteral tube insertion - review meds/change route/product selection   Other PHARMACY TO DOSE GISELA Velez Jr..BONNIE.       • fentaNYL (SUBLIMAZE) injection 100 mcg  100 mcg Intravenous Q15 MIN PRN GISELA Velez Jr..O.   100 mcg at 08/14/19 0631    And   • fentaNYL (SUBLIMAZE) injection 200 mcg  200 mcg Intravenous Q15 MIN PRN GISELA Velez Jr..OYoel        And   • fentaNYL (SUBLIMAZE) 50 mcg/mL in 50mL (Continuous Infusion)   Intravenous Continuous GISELA Velez Jr..O. 1 mL/hr at 08/14/19 0631 50 mcg/hr at 08/14/19 0631    And   • propofol (DIPRIVAN) injection  0-80 mcg/kg/min Intravenous Continuous GISELA Velez Jr..ELOY       • lactated ringers infusion   Intravenous Continuous Berna Cavazos M.D. 100 mL/hr at 08/14/19 0651     • heparin injection 5,000 Units  5,000 Units Subcutaneous Q8HRS Berna Cavazos M.D.       • cefTRIAXone (ROCEPHIN) 2 g in  mL IVPB  2,000 mg Intravenous Q24HRS Berna Cavazos M.D.       •  metroNIDAZOLE (FLAGYL) IVPB 500 mg  500 mg Intravenous Q8HRS Berna Cavazos M.D.       • insulin regular (HUMULIN R) injection 2-9 Units  2-9 Units Subcutaneous Q6HRS Berna Cavazos M.D.        And   • DEXTROSE 10% BOLUS 250 mL  250 mL Intravenous Q15 MIN PRN Berna Cavazos M.D.       • lidocaine (LIDODERM) 5 % 2 Patch  2 Patch Transdermal Q24HRS Benra Cavazos M.D.       • polyethylene glycol/lytes (MIRALAX) PACKET 1 Packet  1 Packet Enteral Tube QDAY PRN Doron Robles Jr., D.O.        And   • senna-docusate (PERICOLACE or SENOKOT S) 8.6-50 MG per tablet 2 Tab  2 Tab Enteral Tube BID Doron Robles Jr., D.O.        And   • magnesium hydroxide (MILK OF MAGNESIA) suspension 30 mL  30 mL Enteral Tube QDAY PRN Doron Robles Jr., D.O.        And   • bisacodyl (DULCOLAX) suppository 10 mg  10 mg Rectal QDAY PRN Doron Robles Jr., D.O.       • famotidine (PEPCID) tablet 20 mg  20 mg Enteral Tube Q EVENING Doron Robles Jr. D.O.        Or   • famotidine (PEPCID) injection 20 mg  20 mg Intravenous Q EVENING Doron Robles Jr., D.O.           Allergies  Allergies   Allergen Reactions   • Toradol Hives   • Levaquin      Blood boils   • Lisinopril Unspecified     Caused potassium build up in blood   • Tape        Vital Signs last 24 hours  Temp:  [36.4 °C (97.6 °F)] 36.4 °C (97.6 °F)  Pulse:  [74-81] 74  Resp:  [15-23] 19  BP: ()/(53-72) 93/53  SpO2:  [80 %-99 %] 99 %    Physical Exam  Physical Exam   Constitutional: She appears well-developed and well-nourished. She appears toxic. She appears distressed. She is intubated.   HENT:   Head: Normocephalic and atraumatic.   Right Ear: External ear normal.   Left Ear: External ear normal.   Nose: Nose normal.   ETT in position   Neck: Neck supple. No JVD present. No tracheal deviation present.   Cardiovascular: Normal rate, regular rhythm and intact distal pulses.   Pulmonary/Chest: No accessory muscle usage. She is intubated. She is in respiratory distress.  She has wheezes. She has rhonchi. She has rales.   Abdominal: Soft. Bowel sounds are normal. She exhibits no distension. There is no tenderness.   Obese   Musculoskeletal: Normal range of motion. She exhibits no tenderness or deformity.   Neurological:   sedated   Skin: Skin is warm and dry. No rash noted.   Psychiatric:   sedated   Nursing note and vitals reviewed.      Fluids    Intake/Output Summary (Last 24 hours) at 8/15/2019 0023  Last data filed at 8/15/2019 0000  Gross per 24 hour   Intake 1889.42 ml   Output 3582 ml   Net -1692.58 ml       Laboratory  Recent Results (from the past 48 hour(s))   GRAM STAIN    Collection Time: 08/14/19  6:33 AM   Result Value Ref Range    Significant Indicator .     Source RESP     Site Tracheal Aspirate     Gram Stain Result Few WBCs.  No organisms seen.      ACCU-CHEK GLUCOSE    Collection Time: 08/14/19  6:34 AM   Result Value Ref Range    Glucose - Accu-Ck 265 (H) 65 - 99 mg/dL   Triglycerides Starting now and then Every 3 Days    Collection Time: 08/14/19  6:35 AM   Result Value Ref Range    Triglycerides 143 0 - 149 mg/dL   Procalcitonin    Collection Time: 08/14/19  6:35 AM   Result Value Ref Range    Procalcitonin 32.07 (H) <0.25 ng/mL   Basic Metabolic Panel    Collection Time: 08/14/19  6:35 AM   Result Value Ref Range    Sodium 132 (L) 135 - 145 mmol/L    Potassium 5.2 3.6 - 5.5 mmol/L    Chloride 106 96 - 112 mmol/L    Co2 8 (LL) 20 - 33 mmol/L    Glucose 294 (H) 65 - 99 mg/dL    Bun 72 (HH) 8 - 22 mg/dL    Creatinine 5.60 (HH) 0.50 - 1.40 mg/dL    Calcium 8.7 8.5 - 10.5 mg/dL    Anion Gap 18.0 (H) 0.0 - 11.9   MAGNESIUM    Collection Time: 08/14/19  6:35 AM   Result Value Ref Range    Magnesium 1.8 1.5 - 2.5 mg/dL   PHOSPHORUS    Collection Time: 08/14/19  6:35 AM   Result Value Ref Range    Phosphorus 5.7 (H) 2.5 - 4.5 mg/dL   ESTIMATED GFR    Collection Time: 08/14/19  6:35 AM   Result Value Ref Range    GFR If African American 10 (A) >60 mL/min/1.73 m 2    GFR  If Non  8 (A) >60 mL/min/1.73 m 2   ISTAT ARTERIAL BLOOD GAS    Collection Time: 08/14/19  7:11 AM   Result Value Ref Range    Ph 7.120 (LL) 7.400 - 7.500    Pco2 45.5 (H) 26.0 - 37.0 mmHg    Po2 189 (H) 64 - 87 mmHg    Tco2 16 (L) 20 - 33 mmol/L    S02 99 93 - 99 %    Hco3 14.8 (L) 17.0 - 25.0 mmol/L    BE -14 (L) -4 - 3 mmol/L    Body Temp 97.7 F degrees    O2 Therapy 100 %    iPF Ratio 189     Ph Temp Deon 7.126 (LL) 7.400 - 7.500    Pco2 Temp Co 44.5 (H) 26.0 - 37.0 mmHg    Po2 Temp Cor 186 (H) 64 - 87 mmHg    Specimen Arterial     Action Range Triggered YES     Inst. Qualified Patient YES    CBC WITH DIFFERENTIAL    Collection Time: 08/14/19  7:45 AM   Result Value Ref Range    WBC 20.9 (H) 4.8 - 10.8 K/uL    RBC 3.09 (L) 4.20 - 5.40 M/uL    Hemoglobin 8.7 (L) 12.0 - 16.0 g/dL    Hematocrit 29.2 (L) 37.0 - 47.0 %    MCV 94.5 81.4 - 97.8 fL    MCH 28.2 27.0 - 33.0 pg    MCHC 29.8 (L) 33.6 - 35.0 g/dL    RDW 48.5 35.9 - 50.0 fL    Platelet Count 276 164 - 446 K/uL    MPV 10.2 9.0 - 12.9 fL    Neutrophils-Polys 92.80 (H) 44.00 - 72.00 %    Lymphocytes 3.10 (L) 22.00 - 41.00 %    Monocytes 2.90 0.00 - 13.40 %    Eosinophils 0.00 0.00 - 6.90 %    Basophils 0.30 0.00 - 1.80 %    Immature Granulocytes 0.90 0.00 - 0.90 %    Nucleated RBC 0.00 /100 WBC    Neutrophils (Absolute) 19.37 (H) 2.00 - 7.15 K/uL    Lymphs (Absolute) 0.65 (L) 1.00 - 4.80 K/uL    Monos (Absolute) 0.60 0.00 - 0.85 K/uL    Eos (Absolute) 0.00 0.00 - 0.51 K/uL    Baso (Absolute) 0.07 0.00 - 0.12 K/uL    Immature Granulocytes (abs) 0.18 (H) 0.00 - 0.11 K/uL    NRBC (Absolute) 0.00 K/uL   PERIPHERAL SMEAR REVIEW    Collection Time: 08/14/19  7:45 AM   Result Value Ref Range    Peripheral Smear Review see below    PLATELET ESTIMATE    Collection Time: 08/14/19  7:45 AM   Result Value Ref Range    Plt Estimation Normal    MORPHOLOGY    Collection Time: 08/14/19  7:45 AM   Result Value Ref Range    RBC Morphology Present      Poikilocytosis 1+     Echinocytes 1+    DIFFERENTIAL COMMENT    Collection Time: 08/14/19  7:45 AM   Result Value Ref Range    Comments-Diff see below    ACCU-CHEK GLUCOSE    Collection Time: 08/14/19  8:11 AM   Result Value Ref Range    Glucose - Accu-Ck 282 (H) 65 - 99 mg/dL   AFB Culture - Washings    Collection Time: 08/14/19  8:45 AM   Result Value Ref Range    Significant Indicator NEG     Source RESP     Site BRONCHOALVEOLAR LAVAGE     Culture Result Culture in progress.     AFB Smear Results No acid fast bacilli seen.    Fungal Culture - BAL    Collection Time: 08/14/19  8:45 AM   Result Value Ref Range    Significant Indicator NEG     Source RESP     Site BRONCHOALVEOLAR LAVAGE     Culture Result Culture in progress.    Fungal Smear - BAL    Collection Time: 08/14/19  8:45 AM   Result Value Ref Range    Significant Indicator NEG     Source RESP     Site BRONCHOALVEOLAR LAVAGE     Fungal Smear Results No fungal elements seen.    GRAM STAIN    Collection Time: 08/14/19  8:45 AM   Result Value Ref Range    Significant Indicator .     Source RESP     Site BRONCHOALVEOLAR LAVAGE     Gram Stain Result       Moderate WBCs.  Few epithelial cells.  No organisms seen.     Acid Fast Stain    Collection Time: 08/14/19  8:45 AM   Result Value Ref Range    Significant Indicator NEG     Source RESP     Site BRONCHOALVEOLAR LAVAGE     AFB Smear Results No acid fast bacilli seen.    LACTIC ACID    Collection Time: 08/14/19  9:30 AM   Result Value Ref Range    Lactic Acid 1.7 0.5 - 2.0 mmol/L   POTASSIUM SERUM (K)    Collection Time: 08/14/19  9:30 AM   Result Value Ref Range    Potassium 5.1 3.6 - 5.5 mmol/L   ACCU-CHEK GLUCOSE    Collection Time: 08/14/19 11:33 AM   Result Value Ref Range    Glucose - Accu-Ck 343 (H) 65 - 99 mg/dL   HEPATITIS PANEL ACUTE(4 COMPONENTS)    Collection Time: 08/14/19 12:00 PM   Result Value Ref Range    Hepatitis B Surface Antigen Negative Negative    Hepatitis B Cors Ab,IgM Negative Negative     Hepatitis A Virus Ab, IgM Negative Negative    Hepatitis C Antibody Negative Negative   HEP B SURFACE AB    Collection Time: 08/14/19 12:00 PM   Result Value Ref Range    Hep B Surface Antibody Quant 4.91 0.00 - 10.00 mIU/mL   LACTIC ACID    Collection Time: 08/14/19  1:56 PM   Result Value Ref Range    Lactic Acid 2.2 (H) 0.5 - 2.0 mmol/L   ISTAT ARTERIAL BLOOD GAS    Collection Time: 08/14/19  4:37 PM   Result Value Ref Range    Ph 7.169 (LL) 7.400 - 7.500    Pco2 36.4 26.0 - 37.0 mmHg    Po2 131 (H) 64 - 87 mmHg    Tco2 14 (L) 20 - 33 mmol/L    S02 98 93 - 99 %    Hco3 13.2 (L) 17.0 - 25.0 mmol/L    BE -14 (L) -4 - 3 mmol/L    Body Temp 35.0 C degrees    O2 Therapy 70 %    iPF Ratio 187     Ph Temp Deon 7.195 (LL) 7.400 - 7.500    Pco2 Temp Co 33.3 26.0 - 37.0 mmHg    Po2 Temp Cor 119 (H) 64 - 87 mmHg    Specimen Arterial     Action Range Triggered YES     Inst. Qualified Patient YES    ACCU-CHEK GLUCOSE    Collection Time: 08/14/19  5:37 PM   Result Value Ref Range    Glucose - Accu-Ck 311 (H) 65 - 99 mg/dL   LACTIC ACID    Collection Time: 08/14/19  5:50 PM   Result Value Ref Range    Lactic Acid 1.7 0.5 - 2.0 mmol/L   ACCU-CHEK GLUCOSE    Collection Time: 08/14/19  6:48 PM   Result Value Ref Range    Glucose - Accu-Ck 202 (H) 65 - 99 mg/dL       Imaging  DX-ABDOMEN FOR TUBE PLACEMENT   Final Result         1.  Nonspecific bowel gas pattern.   2.  Dobbhoff tube tip terminates overlying the expected location of the gastric antrum.   3.  Bilateral pulmonary edema and/or infiltrates.      DX-CHEST-FOR LINE PLACEMENT Perform procedure in: Patient's Room   Final Result         1. Right IJ dialysis catheter is now well-positioned. No pneumothorax.   2. Unchanged pulmonary findings. Unchanged other lines and tubes.         DX-CHEST-FOR LINE PLACEMENT Perform procedure in: Patient's Room   Final Result   Addendum 1 of 1   Findings were discussed with MARCIAL BEAVER JR on 8/14/2019 12:40 PM.      Final       DX-CHEST-FOR LINE PLACEMENT Perform procedure in: Patient's Room   Final Result      1.  Left IJ central catheter tip is in the superior cavoatrial junction. No pneumothorax.   2.  Well-positioned ETT.   3.  Catheter tubing projecting over the right neck, tip directed cephalad.   4.  Slight interval improvement in bilateral interstitial/alveolar opacities.      DX-CHEST-PORTABLE (1 VIEW)   Final Result         1.  Extensive bilateral pulmonary parenchymal opacities compatible with ARDS, severe pulmonary edema, and/or superimposed pulmonary infiltrates.      DX-CHEST-PORTABLE (1 VIEW)    (Results Pending)       Assessment/Plan  * Respiratory failure (HCC)- (present on admission)  Assessment & Plan  Likely due to pneumonia and ARDS, intubated 8/14/2019 at outside hospital  RT/O2 protocols  Daily and PRN ABGs  Titration of ventilator therapy based on ABGs and patient's status  Sedation as tolerated/indicated  Daily CXR  HOB >30 degrees and peridex for VAP prevention  Pepcid for GI prophylaxis  SAT/SBT when able (ABCDEF Bundle)  Early mobility  PEEP/FiO2 ladder per ARDSnet  Low tidal volume ventilation    Multifocal pneumonia- (present on admission)  Assessment & Plan  Chest x-ray, P:F and history suggestive of ARDS.  Will continue to treat for community acquired pneumonia with Rocephin, azithromycin, Flagyl  Patient is at high risk for aspiration given history of dysphasia with PEG tube placement (now removed)    Acute on chronic kidney failure (HCC)- (present on admission)  Assessment & Plan  Likely secondary to sepsis and hypoperfusion  Oliguric  Renal dose meds, avoid nephrotoxins  Strict I/Os  Follow renal function  urine studies  SNN patient, Dr. Salazar consulted.  Plan for HD today with volume reduction      Sepsis (HCC)- (present on admission)  Assessment & Plan  This is severe sepsis with the following associated acute organ dysfunction(s): acute kidney failure, acute respiratory failure.   sepsis  protocol  Source likely pulmonary  source targeted antibiotics: Rocephin, azithromycin, Flagyl.  Bronchoscopy for endobronchial evaluation of pneumonia with targeted cultures  30 mL/kg crystalloid bolus provided at outside hospital  PRN IVF bolus to maintain MAP >65 mmHg  Pressors if needed to maintain MAP >65 mmHg  blood, respiratory and urine cultures  lactate every 4 hours until normalized or downtrending      ARDS (adult respiratory distress syndrome) (HCC)- (present on admission)  Assessment & Plan  Meets diagnostic requirements by [place] criteria  Bilateral, multifocal opacities.  Considered unlikely to be cardiac  Onset < 7 days  PaO2/FiO2 187 consistent with moderate ARDS  Low tidal volume ventilation  PEEP/FiO2 ladder per ARDSnet  Judicious IV fluid use to maintain CVP <4 (FACTT trial)  Progress with full tube feeding (Oma trial)  Low threshold to progress with further ARDS treatments including prone positioning, paralysis only if needed (as RAUL trial prove no additional mortality benefit)  Flolan and APRV to be reserved for salvage therapies  Elizabeth Lung Score: 2    HLD (hyperlipidemia)- (present on admission)  Assessment & Plan  Restart Lipitor when clinically stable    History of venous thromboembolism- (present on admission)  Assessment & Plan  Only on aspirin at home  Will need to clarify anticoagulation strategy with family    Diabetes mellitus type 2, uncontrolled, with complications (HCC)- (present on admission)  Assessment & Plan  Goal blood glucose 120-180  sliding scale insulin, accuchecks  hypoglycemia protocol  A1c 8.3 1 year ago  Low threshold to begin insulin infusion      Hypothyroid- (present on admission)  Assessment & Plan  Continue levothyroxine      Discussed patient condition and risk of morbidity and/or mortality with Hospitalist, Family, RN, RT, Pharmacy, Dietary, , Code status disscussed, Charge nurse / hot rounds and nephrology.      The patient remains critically  ill.  Critical care time = 85 minutes in directly providing and coordinating critical care and extensive data review.  No time overlap and excludes procedures.

## 2019-08-14 NOTE — ASSESSMENT & PLAN NOTE
Patient has been extubated  Currently she is on room air and maintaining oxygen saturation.  She completed the course of antibiotic.

## 2019-08-14 NOTE — ASSESSMENT & PLAN NOTE
Suspect aspiration  She completed course of antibiotic.  Speech therapy evaluated her and made recommendations.  I requested a skilled nursing facility referral as recommended by speech therapy.

## 2019-08-14 NOTE — PROGRESS NOTES
· 2 RN skin check completed with Mimi KINNEY  · Devices in place: ETT and ETT prajapati; EKG leads; BP Cuff; pulse oximetry device; PIV x 3; simon catheter; SCDs  · Skin assessed under devices: completed  · Confirmed pressure ulcers found on: n/a   · New potential pressure ulcers noted on: Left Hip  · Wound consult placed and wound reported: yes and picture taken.  · The following interventions in place: Q2h turns; repositioning lines, devices, and tubing with turns; bilateral heels and elbows floated on pillows for protection; will continue to assess;     Patient has multiple small scabs on bilateral shins as well as generalized bruising throughout. Left hip has quarter size purple-red eliezer. Patient CHG bath given.

## 2019-08-14 NOTE — PROGRESS NOTES
At 0548 patient arrived on unit via careflight intubated and sedated on 40 mcg  of propofol and 50 mcg/hr of fentanyl infusing through IV. Vital signs stable. Report received from [facility]. [facility] stated that pt received a bolus of 300ml of NS due to bp being in the 80s and that propofol drip was previously on 50 mcg and they decreased that to 40 mcg.  Pt bp on arrival in the low 100s via [facility] transport monitor. [facility] also stated that patient peep was weaned down from 18 to 12.

## 2019-08-14 NOTE — PROGRESS NOTES
53 yo female hx of CKD and Polycystic kidney disease concern for multifocal pneumonia and early ARDS evaluated by teleintensivist who instructed the patient be intubated at the OSH,now patient is on propofol and fentanyl ggt Dr. Vallecillo consulted as well. Creatinine is at 5.5 per reports baseline around 4.

## 2019-08-14 NOTE — PROGRESS NOTES
2 RN skin check completed with NIRMAL Dye.  Devices in place - ett and holister, R HD cath, L IJ CVC, 3 PIV, tele leads and stickers, smion, bp cuff, pulse ox, scds  Skin assessed under devices - yes  Confirmed pressure ulcers found on - possible DTI to L hip  New potential pressure ulcers noted on - n/a  Wound consult and midas placed - placed this AM  The following interventions in place - ett repositioned, q2h turns, simon padded, bp cuff moved to forearm, pulse ox rotated, care to ensure tubing not on or under skin.

## 2019-08-15 ENCOUNTER — HOSPITAL ENCOUNTER (OUTPATIENT)
Dept: RADIOLOGY | Facility: MEDICAL CENTER | Age: 54
End: 2019-08-15

## 2019-08-15 ENCOUNTER — APPOINTMENT (OUTPATIENT)
Dept: RADIOLOGY | Facility: MEDICAL CENTER | Age: 54
DRG: 870 | End: 2019-08-15
Attending: INTERNAL MEDICINE
Payer: MEDICAID

## 2019-08-15 PROBLEM — J80 ARDS (ADULT RESPIRATORY DISTRESS SYNDROME) (HCC): Status: ACTIVE | Noted: 2019-08-15

## 2019-08-15 LAB
ACTION RANGE TRIGGERED IACRT: NO
ACTION RANGE TRIGGERED IACRT: NO
ALBUMIN SERPL BCP-MCNC: 2.5 G/DL (ref 3.2–4.9)
ALP SERPL-CCNC: 67 U/L (ref 30–99)
ALT SERPL-CCNC: 5 U/L (ref 2–50)
ANION GAP SERPL CALC-SCNC: 10 MMOL/L (ref 0–11.9)
ANION GAP SERPL CALC-SCNC: 12 MMOL/L (ref 0–11.9)
AST SERPL-CCNC: 10 U/L (ref 12–45)
BASE EXCESS BLDA CALC-SCNC: 4 MMOL/L (ref -4–3)
BASE EXCESS BLDA CALC-SCNC: 5 MMOL/L (ref -4–3)
BASOPHILS # BLD AUTO: 0.1 % (ref 0–1.8)
BASOPHILS # BLD: 0.01 K/UL (ref 0–0.12)
BILIRUB CONJ SERPL-MCNC: <0.1 MG/DL (ref 0.1–0.5)
BILIRUB INDIRECT SERPL-MCNC: ABNORMAL MG/DL (ref 0–1)
BILIRUB SERPL-MCNC: 0.2 MG/DL (ref 0.1–1.5)
BODY TEMPERATURE: ABNORMAL DEGREES
BODY TEMPERATURE: ABNORMAL DEGREES
BUN SERPL-MCNC: 41 MG/DL (ref 8–22)
BUN SERPL-MCNC: 43 MG/DL (ref 8–22)
CALCIUM SERPL-MCNC: 7.7 MG/DL (ref 8.5–10.5)
CALCIUM SERPL-MCNC: 8.3 MG/DL (ref 8.5–10.5)
CHLORIDE SERPL-SCNC: 100 MMOL/L (ref 96–112)
CHLORIDE SERPL-SCNC: 100 MMOL/L (ref 96–112)
CO2 BLDA-SCNC: 28 MMOL/L (ref 20–33)
CO2 BLDA-SCNC: 30 MMOL/L (ref 20–33)
CO2 SERPL-SCNC: 28 MMOL/L (ref 20–33)
CO2 SERPL-SCNC: 29 MMOL/L (ref 20–33)
CREAT SERPL-MCNC: 3.22 MG/DL (ref 0.5–1.4)
CREAT SERPL-MCNC: 3.25 MG/DL (ref 0.5–1.4)
CRP SERPL HS-MCNC: 11.51 MG/DL (ref 0–0.75)
CRP SERPL HS-MCNC: 13.94 MG/DL (ref 0–0.75)
EOSINOPHIL # BLD AUTO: 0.01 K/UL (ref 0–0.51)
EOSINOPHIL NFR BLD: 0.1 % (ref 0–6.9)
ERYTHROCYTE [DISTWIDTH] IN BLOOD BY AUTOMATED COUNT: 43.6 FL (ref 35.9–50)
GLUCOSE BLD-MCNC: 104 MG/DL (ref 65–99)
GLUCOSE BLD-MCNC: 109 MG/DL (ref 65–99)
GLUCOSE BLD-MCNC: 119 MG/DL (ref 65–99)
GLUCOSE BLD-MCNC: 121 MG/DL (ref 65–99)
GLUCOSE BLD-MCNC: 133 MG/DL (ref 65–99)
GLUCOSE BLD-MCNC: 134 MG/DL (ref 65–99)
GLUCOSE BLD-MCNC: 140 MG/DL (ref 65–99)
GLUCOSE BLD-MCNC: 154 MG/DL (ref 65–99)
GLUCOSE BLD-MCNC: 159 MG/DL (ref 65–99)
GLUCOSE BLD-MCNC: 159 MG/DL (ref 65–99)
GLUCOSE SERPL-MCNC: 111 MG/DL (ref 65–99)
GLUCOSE SERPL-MCNC: 113 MG/DL (ref 65–99)
HCO3 BLDA-SCNC: 27.4 MMOL/L (ref 17–25)
HCO3 BLDA-SCNC: 28.5 MMOL/L (ref 17–25)
HCT VFR BLD AUTO: 24.2 % (ref 37–47)
HGB BLD-MCNC: 7.8 G/DL (ref 12–16)
HOROWITZ INDEX BLDA+IHG-RTO: 120 MM[HG]
HOROWITZ INDEX BLDA+IHG-RTO: 176 MM[HG]
IMM GRANULOCYTES # BLD AUTO: 0.09 K/UL (ref 0–0.11)
IMM GRANULOCYTES NFR BLD AUTO: 0.8 % (ref 0–0.9)
INST. QUALIFIED PATIENT IIQPT: YES
INST. QUALIFIED PATIENT IIQPT: YES
LYMPHOCYTES # BLD AUTO: 1.02 K/UL (ref 1–4.8)
LYMPHOCYTES NFR BLD: 9.3 % (ref 22–41)
MAGNESIUM SERPL-MCNC: 1.7 MG/DL (ref 1.5–2.5)
MCH RBC QN AUTO: 28.4 PG (ref 27–33)
MCHC RBC AUTO-ENTMCNC: 32.2 G/DL (ref 33.6–35)
MCV RBC AUTO: 88 FL (ref 81.4–97.8)
MONOCYTES # BLD AUTO: 0.41 K/UL (ref 0–0.85)
MONOCYTES NFR BLD AUTO: 3.8 % (ref 0–13.4)
NEUTROPHILS # BLD AUTO: 9.39 K/UL (ref 2–7.15)
NEUTROPHILS NFR BLD: 85.9 % (ref 44–72)
NRBC # BLD AUTO: 0 K/UL
NRBC BLD-RTO: 0 /100 WBC
O2/TOTAL GAS SETTING VFR VENT: 50 %
O2/TOTAL GAS SETTING VFR VENT: 50 %
PCO2 BLDA: 33.8 MMHG (ref 26–37)
PCO2 BLDA: 35.3 MMHG (ref 26–37)
PCO2 TEMP ADJ BLDA: 33.1 MMHG (ref 26–37)
PCO2 TEMP ADJ BLDA: 37.2 MMHG (ref 26–37)
PH BLDA: 7.51 [PH] (ref 7.4–7.5)
PH BLDA: 7.52 [PH] (ref 7.4–7.5)
PH TEMP ADJ BLDA: 7.5 [PH] (ref 7.4–7.5)
PH TEMP ADJ BLDA: 7.53 [PH] (ref 7.4–7.5)
PHOSPHATE SERPL-MCNC: 3.8 MG/DL (ref 2.5–4.5)
PLATELET # BLD AUTO: 231 K/UL (ref 164–446)
PMV BLD AUTO: 10.3 FL (ref 9–12.9)
PO2 BLDA: 60 MMHG (ref 64–87)
PO2 BLDA: 88 MMHG (ref 64–87)
PO2 TEMP ADJ BLDA: 58 MMHG (ref 64–87)
PO2 TEMP ADJ BLDA: 95 MMHG (ref 64–87)
POTASSIUM SERPL-SCNC: 3.2 MMOL/L (ref 3.6–5.5)
POTASSIUM SERPL-SCNC: 3.3 MMOL/L (ref 3.6–5.5)
PREALB SERPL-MCNC: 10 MG/DL (ref 18–38)
PREALB SERPL-MCNC: 10 MG/DL (ref 18–38)
PROT SERPL-MCNC: 5.1 G/DL (ref 6–8.2)
RBC # BLD AUTO: 2.75 M/UL (ref 4.2–5.4)
SAO2 % BLDA: 93 % (ref 93–99)
SAO2 % BLDA: 98 % (ref 93–99)
SODIUM SERPL-SCNC: 139 MMOL/L (ref 135–145)
SODIUM SERPL-SCNC: 140 MMOL/L (ref 135–145)
SPECIMEN DRAWN FROM PATIENT: ABNORMAL
SPECIMEN DRAWN FROM PATIENT: ABNORMAL
WBC # BLD AUTO: 10.9 K/UL (ref 4.8–10.8)

## 2019-08-15 PROCEDURE — C1751 CATH, INF, PER/CENT/MIDLINE: HCPCS | Performed by: HOSPITALIST

## 2019-08-15 PROCEDURE — 82803 BLOOD GASES ANY COMBINATION: CPT

## 2019-08-15 PROCEDURE — 700105 HCHG RX REV CODE 258: Performed by: INTERNAL MEDICINE

## 2019-08-15 PROCEDURE — 99291 CRITICAL CARE FIRST HOUR: CPT | Performed by: INTERNAL MEDICINE

## 2019-08-15 PROCEDURE — 86140 C-REACTIVE PROTEIN: CPT

## 2019-08-15 PROCEDURE — 82962 GLUCOSE BLOOD TEST: CPT

## 2019-08-15 PROCEDURE — 700101 HCHG RX REV CODE 250: Performed by: HOSPITALIST

## 2019-08-15 PROCEDURE — 83735 ASSAY OF MAGNESIUM: CPT

## 2019-08-15 PROCEDURE — 700111 HCHG RX REV CODE 636 W/ 250 OVERRIDE (IP): Performed by: HOSPITALIST

## 2019-08-15 PROCEDURE — 80076 HEPATIC FUNCTION PANEL: CPT

## 2019-08-15 PROCEDURE — 84134 ASSAY OF PREALBUMIN: CPT

## 2019-08-15 PROCEDURE — A9270 NON-COVERED ITEM OR SERVICE: HCPCS | Performed by: INTERNAL MEDICINE

## 2019-08-15 PROCEDURE — 85025 COMPLETE CBC W/AUTO DIFF WBC: CPT

## 2019-08-15 PROCEDURE — 770022 HCHG ROOM/CARE - ICU (200)

## 2019-08-15 PROCEDURE — 700102 HCHG RX REV CODE 250 W/ 637 OVERRIDE(OP): Performed by: INTERNAL MEDICINE

## 2019-08-15 PROCEDURE — 700111 HCHG RX REV CODE 636 W/ 250 OVERRIDE (IP): Performed by: STUDENT IN AN ORGANIZED HEALTH CARE EDUCATION/TRAINING PROGRAM

## 2019-08-15 PROCEDURE — 700105 HCHG RX REV CODE 258: Performed by: HOSPITALIST

## 2019-08-15 PROCEDURE — 84100 ASSAY OF PHOSPHORUS: CPT

## 2019-08-15 PROCEDURE — 5A1D70Z PERFORMANCE OF URINARY FILTRATION, INTERMITTENT, LESS THAN 6 HOURS PER DAY: ICD-10-PCS | Performed by: INTERNAL MEDICINE

## 2019-08-15 PROCEDURE — 90935 HEMODIALYSIS ONE EVALUATION: CPT

## 2019-08-15 PROCEDURE — 71045 X-RAY EXAM CHEST 1 VIEW: CPT

## 2019-08-15 PROCEDURE — 80048 BASIC METABOLIC PNL TOTAL CA: CPT

## 2019-08-15 PROCEDURE — 36600 WITHDRAWAL OF ARTERIAL BLOOD: CPT

## 2019-08-15 PROCEDURE — 700111 HCHG RX REV CODE 636 W/ 250 OVERRIDE (IP): Performed by: INTERNAL MEDICINE

## 2019-08-15 PROCEDURE — 94003 VENT MGMT INPAT SUBQ DAY: CPT

## 2019-08-15 RX ORDER — MAGNESIUM SULFATE HEPTAHYDRATE 40 MG/ML
2 INJECTION, SOLUTION INTRAVENOUS ONCE
Status: COMPLETED | OUTPATIENT
Start: 2019-08-15 | End: 2019-08-15

## 2019-08-15 RX ADMIN — MAGNESIUM SULFATE IN WATER 2 G: 40 INJECTION, SOLUTION INTRAVENOUS at 09:35

## 2019-08-15 RX ADMIN — FAMOTIDINE 20 MG: 20 TABLET ORAL at 17:16

## 2019-08-15 RX ADMIN — HEPARIN SODIUM 5000 UNITS: 5000 INJECTION, SOLUTION INTRAVENOUS; SUBCUTANEOUS at 21:44

## 2019-08-15 RX ADMIN — HEPARIN SODIUM 5000 UNITS: 5000 INJECTION, SOLUTION INTRAVENOUS; SUBCUTANEOUS at 15:18

## 2019-08-15 RX ADMIN — PROPOFOL 20 MCG/KG/MIN: 10 INJECTION, EMULSION INTRAVENOUS at 12:54

## 2019-08-15 RX ADMIN — METRONIDAZOLE 500 MG: 500 INJECTION, SOLUTION INTRAVENOUS at 15:18

## 2019-08-15 RX ADMIN — CEFTRIAXONE SODIUM 2 G: 2 INJECTION, POWDER, FOR SOLUTION INTRAMUSCULAR; INTRAVENOUS at 04:55

## 2019-08-15 RX ADMIN — Medication 200 MCG/HR: at 00:21

## 2019-08-15 RX ADMIN — Medication 200 MCG/HR: at 11:48

## 2019-08-15 RX ADMIN — HEPARIN SODIUM 2500 UNITS: 1000 INJECTION, SOLUTION INTRAVENOUS; SUBCUTANEOUS at 10:47

## 2019-08-15 RX ADMIN — METRONIDAZOLE 500 MG: 500 INJECTION, SOLUTION INTRAVENOUS at 05:20

## 2019-08-15 RX ADMIN — HEPARIN SODIUM 3000 UNITS: 1000 INJECTION, SOLUTION INTRAVENOUS; SUBCUTANEOUS at 15:03

## 2019-08-15 RX ADMIN — SENNOSIDES, DOCUSATE SODIUM 2 TABLET: 50; 8.6 TABLET, FILM COATED ORAL at 17:15

## 2019-08-15 RX ADMIN — METRONIDAZOLE 500 MG: 500 INJECTION, SOLUTION INTRAVENOUS at 21:44

## 2019-08-15 RX ADMIN — PROPOFOL 20 MCG/KG/MIN: 10 INJECTION, EMULSION INTRAVENOUS at 19:09

## 2019-08-15 RX ADMIN — AZITHROMYCIN MONOHYDRATE 500 MG: 500 INJECTION, POWDER, LYOPHILIZED, FOR SOLUTION INTRAVENOUS at 06:21

## 2019-08-15 RX ADMIN — PROPOFOL 20 MCG/KG/MIN: 10 INJECTION, EMULSION INTRAVENOUS at 04:56

## 2019-08-15 RX ADMIN — HEPARIN SODIUM 5000 UNITS: 5000 INJECTION, SOLUTION INTRAVENOUS; SUBCUTANEOUS at 04:55

## 2019-08-15 RX ADMIN — SENNOSIDES, DOCUSATE SODIUM 2 TABLET: 50; 8.6 TABLET, FILM COATED ORAL at 04:56

## 2019-08-15 RX ADMIN — Medication 200 MCG/HR: at 23:46

## 2019-08-15 NOTE — ASSESSMENT & PLAN NOTE
Likely secondary to sepsis and hypoperfusion  Hemodialysis held now, nephrology following  Increased urine output in response to Lasix yesterday, continue to monitor closely

## 2019-08-15 NOTE — PROCEDURES
Central Line Insertion  Date/Time: 8/14/2019 11:13 AM  Performed by: Doron Robles Jr., D.O.  Authorized by: Doron Robles Jr., D.O.     Consent:     Consent obtained:  Emergent situation  Pre-procedure details:     Hand hygiene: Hand hygiene performed prior to insertion      Sterile barrier technique: All elements of maximal sterile technique followed      Skin preparation:  2% chlorhexidine    Skin preparation agent: Skin preparation agent completely dried prior to procedure    Sedation:     Sedation type: vent.  Anesthesia:     Anesthesia method:  Local infiltration    Local anesthetic:  Lidocaine 1% w/o epi  Procedure details:     Location:  R internal jugular    Patient position:  Flat    Procedural supplies: Temporary dialysis catheter.    Landmarks identified: yes      Ultrasound guidance: yes      Sterile ultrasound techniques: Sterile gel and sterile probe covers were used      Number of attempts:  1  Post-procedure details:     Post-procedure:  Dressing applied and line sutured    Assessment:  Blood return through all ports, free fluid flow and no pneumothorax on x-ray  Comments:      Tube of catheter noted to be projecting over the right axillary/subclavian vein on x-ray, see follow-up procedural note for subsequent temporary dialysis catheter placement

## 2019-08-15 NOTE — CARE PLAN
Problem: Ventilation Defect:  Goal: Ability to achieve and maintain unassisted ventilation or tolerate decreased levels of ventilator support  Outcome: PROGRESSING AS EXPECTED  Intervention: Support and monitor invasive and noninvasive mechanical ventilation  Note:   Adult Ventilation Update    Total Vent Days: 2    Patient Lines/Drains/Airways Status      Active Airway       Name: Placement date: Placement time: Site: Days:    Airway ETT Oral 8.0  --   --   Oral                  Current ventilator settings:  APVcmv  28/380/+12/50%       Plateau Pressure (Q Shift): 24 (08/15/19 0204)  Static Compliance (ml / cm H2O): 35.1 (08/15/19 0204)    Patient failed trials because of Barriers to Wean: FiO2 >60% or PEEP >10 CM H2O (08/15/19 0346)  Barriers to SBT      Cough: Productive (08/15/19 0204)  Sputum Amount: Small (08/15/19 0204)  Sputum Color: Unable to Evaluate (08/15/19 0204)  Sputum Consistency: Unable to Evaluate (08/15/19 0204)    Events/Summary/Plan: No SBT due to Peep > 10 (08/15/19 0346)

## 2019-08-15 NOTE — PROGRESS NOTES
Rings removed from pts fingers. Placed in med container with pt label attached, placed in pt's med drawer.

## 2019-08-15 NOTE — ASSESSMENT & PLAN NOTE
Only on aspirin at home  Will need to clarify anticoagulation strategy with family when they are available

## 2019-08-15 NOTE — PROGRESS NOTES
HEMODIALYSIS NOTES:     HD today x 4 hours per Dr. Salazar . Initiated at 1045 and ended at 1501. Patient tolerated treatment. See paper flowsheet for details.    UF Net: 2904 mL    R IJ intact and patent with good flow on reversed during dialysis. No s/sx of infection, no redness nor bleeding noted on the CVC site. CVC dressing clean, dry and intact. Blood returned and CVC port flushed with NS and Heparin 1000 units/mL used  to lock catheter given per designated amount. CVC port clamped and capped.     Report given to ALICIA Fontanez RN.

## 2019-08-15 NOTE — PROCEDURES
Central Line Insertion  Date/Time: 8/14/2019 12:45 PM  Performed by: Doron Robles Jr., D.O.  Authorized by: Doron Robles Jr., D.O.     Consent:     Consent obtained:  Emergent situation  Pre-procedure details:     Hand hygiene: Hand hygiene performed prior to insertion      Sterile barrier technique: All elements of maximal sterile technique followed      Skin preparation:  2% chlorhexidine    Skin preparation agent: Skin preparation agent completely dried prior to procedure    Sedation:     Sedation type: vent.  Anesthesia:     Anesthesia method:  Local infiltration    Local anesthetic:  Lidocaine 1% w/o epi  Procedure details:     Location:  R internal jugular    Patient position:  Flat    Procedural supplies: 16 cm temporary dialysis catheter.    Landmarks identified: yes      Ultrasound guidance: yes      Sterile ultrasound techniques: Sterile gel and sterile probe covers were used      Number of attempts:  1    Successful placement: yes    Post-procedure details:     Post-procedure:  Dressing applied and line sutured    Assessment:  Blood return through all ports, placement verified by x-ray, no pneumothorax on x-ray and free fluid flow    Patient tolerance of procedure:  Tolerated well, no immediate complications

## 2019-08-15 NOTE — PROGRESS NOTES
Hospitalist to sign off patient's care until extubated or if additional medical care/input requested.  Discussed with Dr Robles, Intensivist    Thank you.   Sudeep Chapman

## 2019-08-15 NOTE — CARE PLAN
Problem: Safety  Goal: Will remain free from falls  Outcome: MET     Problem: Knowledge Deficit  Goal: Knowledge of disease process/condition, treatment plan, diagnostic tests, and medications will improve  Outcome: MET  Note:   Provided with each interaction     Problem: Fluid Volume:  Goal: Will maintain balanced intake and output  Outcome: MET     Problem: Pain Management  Goal: Pain level will decrease to patient's comfort goal  Outcome: MET  Intervention: Follow pain managment plan developed in collaboration with patient and Interdisciplinary Team  Note:   Fentanyl drip continually infusing, assessing q2 hrs      Problem: Safety - Medical Restraint  Goal: Free from restraint(s) (Restraint for Interference with Medical Device)  Description  INTERVENTIONS:  1. ONCE/SHIFT or MINIMUM Q12H: Assess and document the continuing need for restraints  2. Q24H: Continued use of restraint requires LIP to perform face to face examination and written order  3. Identify and implement measures to help patient regain control  Flowsheets (Taken 8/15/2019 3391)  Addressed this shift: Free from restraint(s) (restraint for interference with medical device): ONCE/SHIFT or MINIMUM Every 12 hours: Assess and document the continuing need for restraints; Every 24 hours: Continued use of restraint requires Licensed Independent Practitioner to perform face to face examination and written order; Identify and implement measures to help patient regain control  Note:   Monitoring and assessing restraints

## 2019-08-15 NOTE — PROGRESS NOTES
Spoke to laboratory regarding Hepatitis results that were collected at 1200 8/14 at 2125. They stated machines were down.  At 2252 received a call stating one of the machines would is working and results would be posted around 2330.

## 2019-08-15 NOTE — PROGRESS NOTES
Nephrology Progress Note    Date & Time: 8/15/2019  11:02 AM    HPI:  Patient is intubated and sedated. Unable to reach  via phone. History obtained from chart review.  54 y.o. female transferred from outside facility on 8/14/2019 for respiratory failure, suspected ARDS in the setting of multifocal pneumonia and acute on chronic kidney disease. She has a past medical history of CKD, polycystic kidney disease, DM2, hypothyroidism, hypertension, hyperlipidemia, AIME, and previous PE and DVT.       Per chart review, the patient presented to the outside facility with complaints of several days of shortness of breath and subjective fever. Chest x-ray obtained at the outside facility showed multifocal abnormality consistent with multifocal pneumonia the patient was admitted to the outside facility and started on a course of broad-spectrum antibiotics. Unfortunately, during the course of her hospitalization, the patient decompensated with worsening respiratory status.  At that point, development of ARDS was of concern therefore the patient was emergently intubated by the outside physician and patient transferred.     Additional work-up including WBC 15.7 hemoglobin 9.9 hematocrit 31.1 platelet 287 troponin 0.02, sodium 135 potassium 4.2 chloride 109 CO2 6 BUN 5.8 creatinine 5.1 and glucose 191.   Labs on admission here reflect hgb of 8.7, WBC 20.9, ANC 19.37, Na 132, K 5.2, CO2 8, AG 18, BUN 72, Cr 5.6, Ph 5.7, Ca 8.7, Mg 1.8, and LA 1.7.   On ABG Ph of 7.12  Patient admitted to ICU, intubated, sedated and started on multiple abx.     DAILY NEPHROLOGY SUMMARY:   8/15: patient tolerated HD yesterday with 3.5 liters fluid removed. Patient appears to have slight increase in UOP to 168cc overnight and 100 cc this am. Otherwise still intubated and sedated. Ph 7.5 in am. Plan for HD    PAST FAMILY HISTORY: Reviewed and Unchanged    SOCIAL HISTORY: Reviewed and Unchanged  CURRENT MEDICATIONS: Reviewed  IMAGING STUDIES:  "Reviewed    ROS  Unable to perform. Patient intubated and sedated.     PHYSICAL EXAM  VS:  /71   Pulse 76   Temp 36.8 °C (98.3 °F) (Temporal)   Resp (!) 27   Ht 1.778 m (5' 10\")   Wt 104.6 kg (230 lb 9.6 oz)   SpO2 98%   BMI 33.09 kg/m²   GENERAL: Lying in bed, intubated with eyes closed.   HEAD: Normocephalic, atraumatic  EYES: no scleral icterus; normal conjunctiva  NECK: Supple; trachea midline  CV: RRR, S1 and S2 present  LUNGS:able to auscultate fine crackles  ABDOMEN: Soft, non-tender  EXTREMETIES: No lower extremity edema, no clubbing or cyanosis  SKIN: Warm and dry, no rashes  NEURO:  Unable to assess, patient intubated and sedated. not responding to painful stimuli  PSYCH: Unable to assess, patient intubated and sedated    Fluids:  In: 2534.4 [I.V.:554.5; Other:120; Dialysis:500]  Out: 3702     LABS:  Recent Results (from the past 24 hour(s))   ACCU-CHEK GLUCOSE    Collection Time: 08/14/19 11:33 AM   Result Value Ref Range    Glucose - Accu-Ck 343 (H) 65 - 99 mg/dL   HEPATITIS PANEL ACUTE(4 COMPONENTS)    Collection Time: 08/14/19 12:00 PM   Result Value Ref Range    Hepatitis B Surface Antigen Negative Negative    Hepatitis B Cors Ab,IgM Negative Negative    Hepatitis A Virus Ab, IgM Negative Negative    Hepatitis C Antibody Negative Negative   HEP B SURFACE AB    Collection Time: 08/14/19 12:00 PM   Result Value Ref Range    Hep B Surface Antibody Quant 4.91 0.00 - 10.00 mIU/mL   LACTIC ACID    Collection Time: 08/14/19  1:56 PM   Result Value Ref Range    Lactic Acid 2.2 (H) 0.5 - 2.0 mmol/L   ISTAT ARTERIAL BLOOD GAS    Collection Time: 08/14/19  4:37 PM   Result Value Ref Range    Ph 7.169 (LL) 7.400 - 7.500    Pco2 36.4 26.0 - 37.0 mmHg    Po2 131 (H) 64 - 87 mmHg    Tco2 14 (L) 20 - 33 mmol/L    S02 98 93 - 99 %    Hco3 13.2 (L) 17.0 - 25.0 mmol/L    BE -14 (L) -4 - 3 mmol/L    Body Temp 35.0 C degrees    O2 Therapy 70 %    iPF Ratio 187     Ph Temp Deon 7.195 (LL) 7.400 - 7.500    Pco2 " Temp Co 33.3 26.0 - 37.0 mmHg    Po2 Temp Cor 119 (H) 64 - 87 mmHg    Specimen Arterial     Action Range Triggered YES     Inst. Qualified Patient YES    ACCU-CHEK GLUCOSE    Collection Time: 08/14/19  5:37 PM   Result Value Ref Range    Glucose - Accu-Ck 311 (H) 65 - 99 mg/dL   LACTIC ACID    Collection Time: 08/14/19  5:50 PM   Result Value Ref Range    Lactic Acid 1.7 0.5 - 2.0 mmol/L   ACCU-CHEK GLUCOSE    Collection Time: 08/14/19  6:48 PM   Result Value Ref Range    Glucose - Accu-Ck 202 (H) 65 - 99 mg/dL   ACCU-CHEK GLUCOSE    Collection Time: 08/14/19  8:26 PM   Result Value Ref Range    Glucose - Accu-Ck 154 (H) 65 - 99 mg/dL   ACCU-CHEK GLUCOSE    Collection Time: 08/14/19  9:59 PM   Result Value Ref Range    Glucose - Accu-Ck 159 (H) 65 - 99 mg/dL   ACCU-CHEK GLUCOSE    Collection Time: 08/14/19 11:10 PM   Result Value Ref Range    Glucose - Accu-Ck 159 (H) 65 - 99 mg/dL   ACCU-CHEK GLUCOSE    Collection Time: 08/15/19 12:02 AM   Result Value Ref Range    Glucose - Accu-Ck 140 (H) 65 - 99 mg/dL   ACCU-CHEK GLUCOSE    Collection Time: 08/15/19  1:01 AM   Result Value Ref Range    Glucose - Accu-Ck 133 (H) 65 - 99 mg/dL   ACCU-CHEK GLUCOSE    Collection Time: 08/15/19  2:01 AM   Result Value Ref Range    Glucose - Accu-Ck 134 (H) 65 - 99 mg/dL   ACCU-CHEK GLUCOSE    Collection Time: 08/15/19  3:07 AM   Result Value Ref Range    Glucose - Accu-Ck 121 (H) 65 - 99 mg/dL   ISTAT ARTERIAL BLOOD GAS    Collection Time: 08/15/19  3:29 AM   Result Value Ref Range    Ph 7.517 (H) 7.400 - 7.500    Pco2 33.8 26.0 - 37.0 mmHg    Po2 60 (L) 64 - 87 mmHg    Tco2 28 20 - 33 mmol/L    S02 93 93 - 99 %    Hco3 27.4 (H) 17.0 - 25.0 mmol/L    BE 4 (H) -4 - 3 mmol/L    Body Temp 97.7 F degrees    O2 Therapy 50 %    iPF Ratio 120     Ph Temp Deon 7.525 (H) 7.400 - 7.500    Pco2 Temp Co 33.1 26.0 - 37.0 mmHg    Po2 Temp Cor 58 (L) 64 - 87 mmHg    Specimen Arterial     Action Range Triggered NO     Inst. Qualified Patient YES     ACCU-CHEK GLUCOSE    Collection Time: 08/15/19  5:13 AM   Result Value Ref Range    Glucose - Accu-Ck 104 (H) 65 - 99 mg/dL   CBC with Differential    Collection Time: 08/15/19  5:14 AM   Result Value Ref Range    WBC 10.9 (H) 4.8 - 10.8 K/uL    RBC 2.75 (L) 4.20 - 5.40 M/uL    Hemoglobin 7.8 (L) 12.0 - 16.0 g/dL    Hematocrit 24.2 (L) 37.0 - 47.0 %    MCV 88.0 81.4 - 97.8 fL    MCH 28.4 27.0 - 33.0 pg    MCHC 32.2 (L) 33.6 - 35.0 g/dL    RDW 43.6 35.9 - 50.0 fL    Platelet Count 231 164 - 446 K/uL    MPV 10.3 9.0 - 12.9 fL    Neutrophils-Polys 85.90 (H) 44.00 - 72.00 %    Lymphocytes 9.30 (L) 22.00 - 41.00 %    Monocytes 3.80 0.00 - 13.40 %    Eosinophils 0.10 0.00 - 6.90 %    Basophils 0.10 0.00 - 1.80 %    Immature Granulocytes 0.80 0.00 - 0.90 %    Nucleated RBC 0.00 /100 WBC    Neutrophils (Absolute) 9.39 (H) 2.00 - 7.15 K/uL    Lymphs (Absolute) 1.02 1.00 - 4.80 K/uL    Monos (Absolute) 0.41 0.00 - 0.85 K/uL    Eos (Absolute) 0.01 0.00 - 0.51 K/uL    Baso (Absolute) 0.01 0.00 - 0.12 K/uL    Immature Granulocytes (abs) 0.09 0.00 - 0.11 K/uL    NRBC (Absolute) 0.00 K/uL   Basic Metabolic Panel (BMP)    Collection Time: 08/15/19  5:14 AM   Result Value Ref Range    Sodium 140 135 - 145 mmol/L    Potassium 3.2 (L) 3.6 - 5.5 mmol/L    Chloride 100 96 - 112 mmol/L    Co2 28 20 - 33 mmol/L    Glucose 111 (H) 65 - 99 mg/dL    Bun 41 (H) 8 - 22 mg/dL    Creatinine 3.22 (H) 0.50 - 1.40 mg/dL    Calcium 8.3 (L) 8.5 - 10.5 mg/dL    Anion Gap 12.0 (H) 0.0 - 11.9   Magnesium    Collection Time: 08/15/19  5:14 AM   Result Value Ref Range    Magnesium 1.7 1.5 - 2.5 mg/dL   Phosphorus    Collection Time: 08/15/19  5:14 AM   Result Value Ref Range    Phosphorus 3.8 2.5 - 4.5 mg/dL   CRP QUANTITIVE (NON-CARDIAC)    Collection Time: 08/15/19  5:14 AM   Result Value Ref Range    Stat C-Reactive Protein 13.94 (H) 0.00 - 0.75 mg/dL   PREALBUMIN    Collection Time: 08/15/19  5:14 AM   Result Value Ref Range    Pre-Albumin 10.0 (L)  18.0 - 38.0 mg/dL   ESTIMATED GFR    Collection Time: 08/15/19  5:14 AM   Result Value Ref Range    GFR If  18 (A) >60 mL/min/1.73 m 2    GFR If Non African American 15 (A) >60 mL/min/1.73 m 2   ISTAT ARTERIAL BLOOD GAS    Collection Time: 08/15/19 10:07 AM   Result Value Ref Range    Ph 7.514 (H) 7.400 - 7.500    Pco2 35.3 26.0 - 37.0 mmHg    Po2 88 (H) 64 - 87 mmHg    Tco2 30 20 - 33 mmol/L    S02 98 93 - 99 %    Hco3 28.5 (H) 17.0 - 25.0 mmol/L    BE 5 (H) -4 - 3 mmol/L    Body Temp 38.2 C degrees    O2 Therapy 50 %    iPF Ratio 176     Ph Temp Deon 7.496 7.400 - 7.500    Pco2 Temp Co 37.2 (H) 26.0 - 37.0 mmHg    Po2 Temp Cor 95 (H) 64 - 87 mmHg    Specimen Arterial     Action Range Triggered NO     Inst. Qualified Patient YES        (click the triangle to expand results)      ASSESSMENT:  # CAMPOS on CKD: Baseline Cr~2.5 - 3.0, increased to 5.60.    # CKD Stage 4 based on previous labs. Likely secondary to polycystic kidney disease  # HTN - currently hypotensive to low normal BPs  # AGMA- ph 7.12, AG elevated  # Hypervolemia  # polycystic kidney disease- [facility] reflecting this date back to 2012 per chart review  # ARDS, due to multifocal PNA - on AZT, C3, and flagyl  # Pneumonia- on AZT, C3, and flagyl  # DM2  # hypothyroidism    PLAN:  - Appropriate candidate for urgent RRT - performed 6/14/2019  - Temporary line in place  - Plan for HD, improve volume status   - Renal diet, when initiated  - Strict I/Os  - Dose all meds per ESRD  - Daily electrolytes and renal labs    Thank you for this consult, we will continue to follow.    Juan Diego Langston Internal Medicine resident. Discussed with attending.

## 2019-08-15 NOTE — PROGRESS NOTES
Cortrak Placement    Tube Team verified patient name and medical record number prior to tube placement.  Cortrak tube (43 inches, 10 Micron[place]) placed at 70 cm in right nare.  Per Cortrak picture, tube appears to be in the stomach.  Nursing Instructions: Awaiting KUB to confirm placement before use for medications or feeding. Once placement confirmed, flush tube with 30 ml of water, and then remove and save stylet, in patient medication drawer.

## 2019-08-15 NOTE — ASSESSMENT & PLAN NOTE
This is severe sepsis with the following associated acute organ dysfunction(s): acute kidney failure, acute respiratory failure.   sepsis protocol  Source likely pulmonary  Continue source targeted antibiotics: Rocephin, azithromycin, Flagyl for 7 days  Completing antibiotic course

## 2019-08-15 NOTE — CARE PLAN
Problem: Ventilation Defect:  Goal: Ability to achieve and maintain unassisted ventilation or tolerate decreased levels of ventilator support  Intervention: Manage ventilation therapy by monitoring diagnostic test results  Note:      Ventilator Daily Summary    Vent Day # 2  RR: 24 VT: 380 PEEP: 12 FiO2: 50%    Weaning trials: Not at this time    Plan: Continue current ventilator settings and wean mechanical ventilation as tolerated per physician orders.

## 2019-08-15 NOTE — ASSESSMENT & PLAN NOTE
Meets diagnostic requirements by Warren criteria  Clinically improved, continue to maintain net negative fluid balance  Complete antibiotic therapy for pneumonia  Liberated from mechanical ventilation 8/19

## 2019-08-15 NOTE — ASSESSMENT & PLAN NOTE
Likely due to pneumonia and ARDS +/- volume overload, plus minus aspiration  Liberated from mechanical ventilation 8/19  Continue titrated oxygen therapy, RT protocols

## 2019-08-15 NOTE — PROGRESS NOTES
"Critical Care Progress Note    Date of admission  8/14/2019    Chief Complaint  54 y.o. female admitted 8/14/2019 with ARDS    Hospital Course    \"54 y.o. female with past medical history of hypertension, PE, DVT, diabetes, stage IV chronic kidney disease presumed to be secondary to polycystic kidney disease, type 2 diabetes, neuropathy, stroke with dysphasia (PEG tube removed), who presented 8/14/2019 as a transfer from [facility] where she presented yesterday for increasing shortness of breath and subjective fever for several days.  Patient was found to have multifocal pneumonia and was admitted to [facility] on broad-spectrum antibiotics however decompensated overnight with worsening hypoxia suggesting ARDS.  Patient was intubated at [facility] and transferred to our facility for higher level care.  Labs are facility show leukocytosis with white blood cell count of 20.9, anemia with a hemoglobin of 8.7, normal platelet count at 276, conference of panel shows a mild hyponatremia 132, potassium 5.2, CO2 8, glucose 294, BUN 72, creatinine 5.6.\"      Interval Problem Update  Reviewed last 24 hour events:   - ARDS, P:F worsened overnight. Improved following dialysis   - Neuro: follows   - HR: 50s-70s   - SBP: 110s-140s   - GI: TF at goal   - UOP: 202 mL/24h   - Wallace: yes   - Tm: 98.3   - Lines: CVC, TDC   - PPx: GI pepcid, DVT heparin   - ABG: metabolic alk, hypoxia   - CXR (personally reviewed and compared to prior): improved edema    Review of Systems  ROS     Vital Signs for last 24 hours   Temp:  [36.2 °C (97.1 °F)-36.8 °C (98.3 °F)] 36.8 °C (98.3 °F)  Pulse:  [63-89] 76  Resp:  [12-29] 27  BP: ()/(48-78) 141/71  SpO2:  [90 %-100 %] 99 %    Hemodynamic parameters for last 24 hours       Respiratory Information for the last 24 hours  Suresh Vent Mode: APVCMV  Rate (breaths/min): 28  Vt Target (mL): 380  PEEP/CPAP: 12  FiO2: 50  P MEAN: 17  Control VTE (exp VT): 370    Physical Exam "   Physical Exam   Constitutional: She appears well-developed and well-nourished. She appears distressed. She is intubated.   HENT:   Head: Normocephalic and atraumatic.   Right Ear: External ear normal.   Left Ear: External ear normal.   Nose: Nose normal.   Mouth/Throat: Oropharynx is clear and moist.   ETT in position   Eyes: Pupils are equal, round, and reactive to light. Conjunctivae and EOM are normal.   Neck: Neck supple. No JVD present. No tracheal deviation present.   Right IJ temporary dialysis catheter, left IJ triple-lumen   Cardiovascular: Normal rate, regular rhythm and intact distal pulses.   Pulmonary/Chest: No accessory muscle usage. She is intubated. She is in respiratory distress. She has rales.   Abdominal: Soft. Bowel sounds are normal. She exhibits no distension. There is no tenderness.   obese   Musculoskeletal: Normal range of motion. She exhibits no tenderness or deformity.   Neurological:   Sedated, follows   Skin: Skin is warm and dry. No rash noted.   Psychiatric:   Unable to assess   Nursing note and vitals reviewed.      Medications  Current Facility-Administered Medications   Medication Dose Route Frequency Provider Last Rate Last Dose   • insulin regular (HUMULIN R) injection 2-9 Units  2-9 Units Subcutaneous Q6HRS Héctor Vallecillo M.D.   Stopped at 08/15/19 0600    And   • glucose 4 g chewable tablet 16 g  16 g Oral Q15 MIN PRN Héctor Vallecillo M.D.        And   • DEXTROSE 10% BOLUS 250 mL  250 mL Intravenous Q15 MIN PRN Héctor Vallecillo M.D.       • Respiratory Care per Protocol   Nebulization Continuous RT Doron Robles Jr., D.O.       • MD Alert...ICU Electrolyte Replacement per Pharmacy   Other PHARMACY TO DOSE Doron Robles Jr., D.O.       • lidocaine (XYLOCAINE) 1 % injection 1-2 mL  1-2 mL Tracheal Tube Q30 MIN PRN Doron Robles Jr., D.O.       • Pharmacy Consult: Enteral tube insertion - review meds/change route/product selection   Other PHARMACY TO DOSE Doron Robles  FRED LuO.       • fentaNYL (SUBLIMAZE) injection 100 mcg  100 mcg Intravenous Q15 MIN PRN GISELA Velez Jr..OYoel   100 mcg at 08/14/19 2217    And   • fentaNYL (SUBLIMAZE) injection 200 mcg  200 mcg Intravenous Q15 MIN PRN GISELA Velez Jr..OYoel        And   • fentaNYL (SUBLIMAZE) 50 mcg/mL in 50mL (Continuous Infusion)   Intravenous Continuous GISELA Velez Jr..OYoel 4 mL/hr at 08/15/19 0727 200 mcg/hr at 08/15/19 0727    And   • propofol (DIPRIVAN) injection  0-80 mcg/kg/min Intravenous Continuous GISELA Velez Jr..OYoel   Stopped at 08/15/19 0728   • heparin injection 5,000 Units  5,000 Units Subcutaneous Q8HRS Berna Cavazos M.D.   5,000 Units at 08/15/19 0455   • cefTRIAXone (ROCEPHIN) 2 g in  mL IVPB  2,000 mg Intravenous Q24HRS Berna Cavazos M.D.   Stopped at 08/15/19 0525   • metroNIDAZOLE (FLAGYL) IVPB 500 mg  500 mg Intravenous Q8HRS Berna Cavazos M.D.   Stopped at 08/15/19 0620   • polyethylene glycol/lytes (MIRALAX) PACKET 1 Packet  1 Packet Enteral Tube QDAY PRN GISELA Velez Jr..OYoel        And   • senna-docusate (PERICOLACE or SENOKOT S) 8.6-50 MG per tablet 2 Tab  2 Tab Enteral Tube BID GISELA Velez Jr..O.   2 Tab at 08/15/19 0456    And   • magnesium hydroxide (MILK OF MAGNESIA) suspension 30 mL  30 mL Enteral Tube QDAY PRN GISELA Velez Jr..OYoel        And   • bisacodyl (DULCOLAX) suppository 10 mg  10 mg Rectal QDAY PRN GISELA Velez Jr..O.       • famotidine (PEPCID) tablet 20 mg  20 mg Enteral Tube Q EVENING GISELA Velez Jr..O.        Or   • famotidine (PEPCID) injection 20 mg  20 mg Intravenous Q EVENING GISELA Velez Jr..O.   20 mg at 08/14/19 1750   • azithromycin (ZITHROMAX) 500 mg in D5W 250 mL IVPB  500 mg Intravenous Q24HRS Doron Robles Jr., D.O.   Stopped at 08/15/19 0721   • lidocaine (XYLOCAINE) 1 % injection 1 mL  1 mL Intradermal PRN Andrés Salazar M.D.       • heparin injection 2,500 Units  2,500 Units Intravenous DIALYSIS PRN  Andrés Salazar M.D.   2,500 Units at 08/14/19 1738   • heparin injection 3,000 Units  3,000 Units Intracatheter DIALYSIS PRN Juan Diego Langston M.D.   3,000 Units at 08/14/19 2140   • norepinephrine (LEVOPHED) 8 mg in  mL Infusion  0-30 mcg/min Intravenous Continuous Doron Robles Jr., D.O.   Stopped at 08/14/19 1530       Fluids    Intake/Output Summary (Last 24 hours) at 8/15/2019 0830  Last data filed at 8/15/2019 0800  Gross per 24 hour   Intake 2725.84 ml   Output 3802 ml   Net -1076.16 ml       Laboratory  Recent Labs     08/14/19  0711 08/14/19  1637 08/15/19  0329   ISTATAPH 7.120* 7.169* 7.517*   ISTATAPCO2 45.5* 36.4 33.8   ISTATAPO2 189* 131* 60*   ISTATATCO2 16* 14* 28   KHMACRP0PBA 99 98 93   ISTATARTHCO3 14.8* 13.2* 27.4*   ISTATARTBE -14* -14* 4*   ISTATTEMP 97.7 F 35.0 C 97.7 F   ISTATFIO2 100 70 50   ISTATSPEC Arterial Arterial Arterial   ISTATAPHTC 7.126* 7.195* 7.525*   ZDWPROQH0DE 186* 119* 58*         Recent Labs     08/14/19  0635 08/14/19  0930 08/15/19  0514   SODIUM 132*  --  140   POTASSIUM 5.2 5.1 3.2*   CHLORIDE 106  --  100   CO2 8*  --  28   BUN 72*  --  41*   CREATININE 5.60*  --  3.22*   MAGNESIUM 1.8  --  1.7   PHOSPHORUS 5.7*  --  3.8   CALCIUM 8.7  --  8.3*     Recent Labs     08/14/19  0635 08/15/19  0514   PREALBUMIN  --  10.0*   GLUCOSE 294* 111*     Recent Labs     08/14/19  0745 08/15/19  0514   WBC 20.9* 10.9*   NEUTSPOLYS 92.80* 85.90*   LYMPHOCYTES 3.10* 9.30*   MONOCYTES 2.90 3.80   EOSINOPHILS 0.00 0.10   BASOPHILS 0.30 0.10     Recent Labs     08/14/19  0745 08/15/19  0514   RBC 3.09* 2.75*   HEMOGLOBIN 8.7* 7.8*   HEMATOCRIT 29.2* 24.2*   PLATELETCT 276 231       Imaging  X-Ray:  I have personally reviewed the images and compared with prior images.    Assessment/Plan  * Respiratory failure (HCC)- (present on admission)  Assessment & Plan  Likely due to pneumonia and ARDS, intubated 8/14/2019 at outside hospital  RT/O2 protocols  Daily and PRN ABGs  Titration of  ventilator therapy based on ABGs and patient's status  Sedation as tolerated/indicated  Daily CXR  HOB >30 degrees and peridex for VAP prevention  Pepcid for GI prophylaxis  SAT/SBT when able (ABCDEF Bundle)  Early mobility  PEEP/FiO2 ladder per ARDSnet  Low tidal volume ventilation  Decrease FiO2 and PEEP today    Multifocal pneumonia- (present on admission)  Assessment & Plan  Chest x-ray, P:F and history suggestive of ARDS.  Will continue to treat for community acquired pneumonia with Rocephin, azithromycin, Flagyl  Patient is at high risk for aspiration given history of dysphasia with PEG tube placement (now removed)    Acute on chronic kidney failure (HCC)- (present on admission)  Assessment & Plan  Likely secondary to sepsis and hypoperfusion  Oliguric  Renal dose meds, avoid nephrotoxins  Strict I/Os  Follow renal function  urine studies  Continue HD per nephrology      Sepsis (HCC)- (present on admission)  Assessment & Plan  This is severe sepsis with the following associated acute organ dysfunction(s): acute kidney failure, acute respiratory failure.   sepsis protocol  Source likely pulmonary  Continue source targeted antibiotics: Rocephin, azithromycin, Flagyl.  30 mL/kg crystalloid bolus provided at outside hospital  PRN IVF bolus to maintain MAP >65 mmHg  Pressors if needed to maintain MAP >65 mmHg  Follow-up blood, respiratory and urine cultures  Lactate normal    ARDS (adult respiratory distress syndrome) (HCC)- (present on admission)  Assessment & Plan  Meets diagnostic requirements by [facility] criteria  Bilateral, multifocal opacities.  Considered unlikely to be cardiac  Onset < 7 days  PaO2/FiO2 187 consistent with moderate ARDS  Low tidal volume ventilation  PEEP/FiO2 ladder per ARDSnet  Judicious IV fluid use to maintain CVP <4 (FACTT trial)  Progress with full tube feeding (Oma trial)  Low threshold to progress with further ARDS treatments including prone positioning, paralysis only if needed (as  RAUL trial prove no additional mortality benefit)  Flolan and APRV to be reserved for salvage therapies  Elizabeth Lung Score: 2    HLD (hyperlipidemia)- (present on admission)  Assessment & Plan  Restart Lipitor when clinically stable    History of venous thromboembolism- (present on admission)  Assessment & Plan  Only on aspirin at home  Will need to clarify anticoagulation strategy with family    Diabetes mellitus type 2, uncontrolled, with complications (HCC)- (present on admission)  Assessment & Plan  Goal blood glucose 120-180  sliding scale insulin, accuchecks  hypoglycemia protocol  A1c 8.3 1 year ago  Low threshold to begin insulin infusion protocol    Hypothyroid- (present on admission)  Assessment & Plan  Continue levothyroxine       VTE:  Heparin  Ulcer: H2 Antagonist  Lines: Central Line  Ongoing indication addressed and Wallace Catheter  Ongoing indication addressed    I have performed a physical exam and reviewed and updated ROS and Plan today (8/15/2019). In review of yesterday's note (8/14/2019), there are no changes except as documented above.     Titrating ventilator, treating moderate ARDS, highly likely to worsen to severe ARDS requiring proning protocol. This patient is critically ill, at high risk for decompensation leading to worsening vital organ dysfunction and death without critical care interventions.    Discussed patient condition and risk of morbidity and/or mortality with Hospitalist, RN, RT, Pharmacy, Dietary, , Code status disscussed, Charge nurse / hot rounds, Patient and nephrology     The patient remains critically ill.  Critical care time = 48 minutes in directly providing and coordinating critical care and extensive data review.  No time overlap and excludes procedures.

## 2019-08-15 NOTE — PROCEDURES
Central Line Insertion  Date/Time: 8/14/2019 10:11 AM  Performed by: Doron Robles Jr., D.O.  Authorized by: Doron Robles Jr., D.O.     Consent:     Consent obtained:  Emergent situation  Pre-procedure details:     Hand hygiene: Hand hygiene performed prior to insertion      Sterile barrier technique: All elements of maximal sterile technique followed      Skin preparation:  2% chlorhexidine    Skin preparation agent: Skin preparation agent completely dried prior to procedure    Sedation:     Sedation type: vent sedation.  Anesthesia:     Anesthesia method:  Local infiltration    Local anesthetic:  Lidocaine 1% w/o epi  Procedure details:     Location:  L internal jugular    Patient position:  Flat    Procedural supplies:  Triple lumen    Catheter size:  7 Fr    Landmarks identified: yes      Ultrasound guidance: yes      Sterile ultrasound techniques: Sterile gel and sterile probe covers were used      Number of attempts:  1    Successful placement: yes    Post-procedure details:     Post-procedure:  Dressing applied and line sutured    Assessment:  Blood return through all ports, free fluid flow, no pneumothorax on x-ray and placement verified by x-ray    Patient tolerance of procedure:  Tolerated well, no immediate complications

## 2019-08-15 NOTE — ASSESSMENT & PLAN NOTE
Chest x-ray, P:F and history suggestive of ARDS.  Continue to treat for community acquired pneumonia, complete antimicrobial therapy  Likely some component of aspiration, further speech-language pathology evaluation ongoing

## 2019-08-15 NOTE — PROGRESS NOTES
Hd treatment ordered by Dr Salazar for 4 hours initial HD started at 1738 and ended at 2140 with net uf of 3000 ml as bp tolerated. Prior to hd, verified CVC placement and consent was signed. Tolerated treatment well. Right IJ temporary catheter patent with good BFR x 2 when reversed. Post treatment, flushed CVC with normal saline, instilled heparin 1:1000 per designated amount each port and capped. See flow sheet for details.

## 2019-08-16 ENCOUNTER — APPOINTMENT (OUTPATIENT)
Dept: RADIOLOGY | Facility: MEDICAL CENTER | Age: 54
DRG: 870 | End: 2019-08-16
Attending: INTERNAL MEDICINE
Payer: MEDICAID

## 2019-08-16 LAB
25(OH)D3 SERPL-MCNC: 17 NG/ML (ref 30–100)
ACTION RANGE TRIGGERED IACRT: NO
ANION GAP SERPL CALC-SCNC: 11 MMOL/L (ref 0–11.9)
BACTERIA BRONCH AEROBE CULT: NORMAL
BACTERIA SPEC RESP CULT: NORMAL
BASE EXCESS BLDA CALC-SCNC: 3 MMOL/L (ref -4–3)
BASOPHILS # BLD AUTO: 0.4 % (ref 0–1.8)
BASOPHILS # BLD: 0.03 K/UL (ref 0–0.12)
BODY TEMPERATURE: ABNORMAL DEGREES
BUN SERPL-MCNC: 35 MG/DL (ref 8–22)
CA-I SERPL-SCNC: 1.1 MMOL/L (ref 1.1–1.3)
CALCIUM SERPL-MCNC: 7.9 MG/DL (ref 8.5–10.5)
CHLORIDE SERPL-SCNC: 101 MMOL/L (ref 96–112)
CO2 BLDA-SCNC: 28 MMOL/L (ref 20–33)
CO2 SERPL-SCNC: 26 MMOL/L (ref 20–33)
CREAT SERPL-MCNC: 2.6 MG/DL (ref 0.5–1.4)
EOSINOPHIL # BLD AUTO: 0.16 K/UL (ref 0–0.51)
EOSINOPHIL NFR BLD: 2.1 % (ref 0–6.9)
ERYTHROCYTE [DISTWIDTH] IN BLOOD BY AUTOMATED COUNT: 47.9 FL (ref 35.9–50)
FERRITIN SERPL-MCNC: 311.4 NG/ML (ref 10–291)
GLUCOSE BLD-MCNC: 102 MG/DL (ref 65–99)
GLUCOSE BLD-MCNC: 112 MG/DL (ref 65–99)
GLUCOSE BLD-MCNC: 122 MG/DL (ref 65–99)
GLUCOSE BLD-MCNC: 89 MG/DL (ref 65–99)
GLUCOSE SERPL-MCNC: 130 MG/DL (ref 65–99)
GRAM STN SPEC: NORMAL
GRAM STN SPEC: NORMAL
HCO3 BLDA-SCNC: 26.9 MMOL/L (ref 17–25)
HCT VFR BLD AUTO: 25.1 % (ref 37–47)
HGB BLD-MCNC: 7.7 G/DL (ref 12–16)
HOROWITZ INDEX BLDA+IHG-RTO: 98 MM[HG]
IMM GRANULOCYTES # BLD AUTO: 0.05 K/UL (ref 0–0.11)
IMM GRANULOCYTES NFR BLD AUTO: 0.6 % (ref 0–0.9)
INST. QUALIFIED PATIENT IIQPT: YES
IRON SATN MFR SERPL: 26 % (ref 15–55)
IRON SERPL-MCNC: 37 UG/DL (ref 40–170)
LYMPHOCYTES # BLD AUTO: 1.61 K/UL (ref 1–4.8)
LYMPHOCYTES NFR BLD: 20.9 % (ref 22–41)
MAGNESIUM SERPL-MCNC: 2 MG/DL (ref 1.5–2.5)
MCH RBC QN AUTO: 27.9 PG (ref 27–33)
MCHC RBC AUTO-ENTMCNC: 30.7 G/DL (ref 33.6–35)
MCV RBC AUTO: 90.9 FL (ref 81.4–97.8)
MONOCYTES # BLD AUTO: 0.37 K/UL (ref 0–0.85)
MONOCYTES NFR BLD AUTO: 4.8 % (ref 0–13.4)
NEUTROPHILS # BLD AUTO: 5.48 K/UL (ref 2–7.15)
NEUTROPHILS NFR BLD: 71.2 % (ref 44–72)
NRBC # BLD AUTO: 0 K/UL
NRBC BLD-RTO: 0 /100 WBC
O2/TOTAL GAS SETTING VFR VENT: 60 %
PCO2 BLDA: 37.4 MMHG (ref 26–37)
PCO2 TEMP ADJ BLDA: 37.6 MMHG (ref 26–37)
PH BLDA: 7.46 [PH] (ref 7.4–7.5)
PH TEMP ADJ BLDA: 7.46 [PH] (ref 7.4–7.5)
PHOSPHATE SERPL-MCNC: 2.3 MG/DL (ref 2.5–4.5)
PLATELET # BLD AUTO: 230 K/UL (ref 164–446)
PMV BLD AUTO: 11 FL (ref 9–12.9)
PO2 BLDA: 59 MMHG (ref 64–87)
PO2 TEMP ADJ BLDA: 60 MMHG (ref 64–87)
POTASSIUM SERPL-SCNC: 3.7 MMOL/L (ref 3.6–5.5)
PROCALCITONIN SERPL-MCNC: 25.1 NG/ML
PTH-INTACT SERPL-MCNC: 297.9 PG/ML (ref 14–72)
RBC # BLD AUTO: 2.76 M/UL (ref 4.2–5.4)
SAO2 % BLDA: 92 % (ref 93–99)
SIGNIFICANT IND 70042: NORMAL
SIGNIFICANT IND 70042: NORMAL
SITE SITE: NORMAL
SITE SITE: NORMAL
SODIUM SERPL-SCNC: 138 MMOL/L (ref 135–145)
SOURCE SOURCE: NORMAL
SOURCE SOURCE: NORMAL
SPECIMEN DRAWN FROM PATIENT: ABNORMAL
TIBC SERPL-MCNC: 140 UG/DL (ref 250–450)
WBC # BLD AUTO: 7.7 K/UL (ref 4.8–10.8)

## 2019-08-16 PROCEDURE — 99291 CRITICAL CARE FIRST HOUR: CPT | Performed by: INTERNAL MEDICINE

## 2019-08-16 PROCEDURE — 82330 ASSAY OF CALCIUM: CPT

## 2019-08-16 PROCEDURE — 700105 HCHG RX REV CODE 258: Performed by: INTERNAL MEDICINE

## 2019-08-16 PROCEDURE — 82306 VITAMIN D 25 HYDROXY: CPT

## 2019-08-16 PROCEDURE — 84145 PROCALCITONIN (PCT): CPT

## 2019-08-16 PROCEDURE — 770022 HCHG ROOM/CARE - ICU (200)

## 2019-08-16 PROCEDURE — 83970 ASSAY OF PARATHORMONE: CPT

## 2019-08-16 PROCEDURE — 83735 ASSAY OF MAGNESIUM: CPT

## 2019-08-16 PROCEDURE — 82962 GLUCOSE BLOOD TEST: CPT | Mod: 91

## 2019-08-16 PROCEDURE — 700102 HCHG RX REV CODE 250 W/ 637 OVERRIDE(OP): Performed by: INTERNAL MEDICINE

## 2019-08-16 PROCEDURE — 700111 HCHG RX REV CODE 636 W/ 250 OVERRIDE (IP): Performed by: HOSPITALIST

## 2019-08-16 PROCEDURE — 80048 BASIC METABOLIC PNL TOTAL CA: CPT

## 2019-08-16 PROCEDURE — 71045 X-RAY EXAM CHEST 1 VIEW: CPT

## 2019-08-16 PROCEDURE — 83540 ASSAY OF IRON: CPT

## 2019-08-16 PROCEDURE — 82728 ASSAY OF FERRITIN: CPT

## 2019-08-16 PROCEDURE — 83550 IRON BINDING TEST: CPT

## 2019-08-16 PROCEDURE — 94003 VENT MGMT INPAT SUBQ DAY: CPT

## 2019-08-16 PROCEDURE — A9270 NON-COVERED ITEM OR SERVICE: HCPCS | Performed by: INTERNAL MEDICINE

## 2019-08-16 PROCEDURE — 84100 ASSAY OF PHOSPHORUS: CPT

## 2019-08-16 PROCEDURE — 36600 WITHDRAWAL OF ARTERIAL BLOOD: CPT

## 2019-08-16 PROCEDURE — 700105 HCHG RX REV CODE 258: Performed by: HOSPITALIST

## 2019-08-16 PROCEDURE — 700101 HCHG RX REV CODE 250: Performed by: HOSPITALIST

## 2019-08-16 PROCEDURE — 82803 BLOOD GASES ANY COMBINATION: CPT

## 2019-08-16 PROCEDURE — 85025 COMPLETE CBC W/AUTO DIFF WBC: CPT

## 2019-08-16 PROCEDURE — 700111 HCHG RX REV CODE 636 W/ 250 OVERRIDE (IP): Performed by: INTERNAL MEDICINE

## 2019-08-16 RX ORDER — ASPIRIN 325 MG
325 TABLET ORAL DAILY
Status: DISCONTINUED | OUTPATIENT
Start: 2019-08-16 | End: 2019-08-22

## 2019-08-16 RX ORDER — DULOXETIN HYDROCHLORIDE 30 MG/1
60 CAPSULE, DELAYED RELEASE ORAL DAILY
Status: DISCONTINUED | OUTPATIENT
Start: 2019-08-16 | End: 2019-08-22

## 2019-08-16 RX ORDER — METRONIDAZOLE 500 MG/1
500 TABLET ORAL EVERY 8 HOURS
Status: COMPLETED | OUTPATIENT
Start: 2019-08-16 | End: 2019-08-20

## 2019-08-16 RX ORDER — METOCLOPRAMIDE 10 MG/1
5 TABLET ORAL
Status: DISCONTINUED | OUTPATIENT
Start: 2019-08-16 | End: 2019-08-22

## 2019-08-16 RX ORDER — LEVOTHYROXINE SODIUM 112 UG/1
112 TABLET ORAL DAILY
Status: DISCONTINUED | OUTPATIENT
Start: 2019-08-16 | End: 2019-08-22

## 2019-08-16 RX ORDER — GLYCOPYRROLATE 1 MG/1
0.5 TABLET ORAL 2 TIMES DAILY
Status: DISCONTINUED | OUTPATIENT
Start: 2019-08-16 | End: 2019-08-19

## 2019-08-16 RX ORDER — ATORVASTATIN CALCIUM 40 MG/1
80 TABLET, FILM COATED ORAL EVERY EVENING
Status: DISCONTINUED | OUTPATIENT
Start: 2019-08-16 | End: 2019-08-22

## 2019-08-16 RX ADMIN — GLYCOPYRROLATE 0.5 MG: 1 TABLET ORAL at 09:52

## 2019-08-16 RX ADMIN — SENNOSIDES, DOCUSATE SODIUM 2 TABLET: 50; 8.6 TABLET, FILM COATED ORAL at 18:00

## 2019-08-16 RX ADMIN — ATORVASTATIN CALCIUM 80 MG: 40 TABLET, FILM COATED ORAL at 17:34

## 2019-08-16 RX ADMIN — HEPARIN SODIUM 5000 UNITS: 5000 INJECTION, SOLUTION INTRAVENOUS; SUBCUTANEOUS at 14:39

## 2019-08-16 RX ADMIN — HEPARIN SODIUM 5000 UNITS: 5000 INJECTION, SOLUTION INTRAVENOUS; SUBCUTANEOUS at 22:07

## 2019-08-16 RX ADMIN — METOCLOPRAMIDE 5 MG: 10 TABLET ORAL at 22:39

## 2019-08-16 RX ADMIN — ASPIRIN 325 MG: 325 TABLET, FILM COATED ORAL at 09:52

## 2019-08-16 RX ADMIN — GLYCOPYRROLATE 0.5 MG: 1 TABLET ORAL at 17:35

## 2019-08-16 RX ADMIN — DULOXETINE HYDROCHLORIDE 60 MG: 60 CAPSULE, DELAYED RELEASE ORAL at 09:52

## 2019-08-16 RX ADMIN — LEVOTHYROXINE SODIUM 112 MCG: 112 TABLET ORAL at 09:52

## 2019-08-16 RX ADMIN — AZITHROMYCIN MONOHYDRATE 500 MG: 500 INJECTION, POWDER, LYOPHILIZED, FOR SOLUTION INTRAVENOUS at 05:48

## 2019-08-16 RX ADMIN — METRONIDAZOLE 500 MG: 500 TABLET ORAL at 14:40

## 2019-08-16 RX ADMIN — SENNOSIDES, DOCUSATE SODIUM 2 TABLET: 50; 8.6 TABLET, FILM COATED ORAL at 05:23

## 2019-08-16 RX ADMIN — Medication 300 MCG/HR: at 17:42

## 2019-08-16 RX ADMIN — METOCLOPRAMIDE 5 MG: 10 TABLET ORAL at 12:37

## 2019-08-16 RX ADMIN — FENTANYL CITRATE 100 MCG: 50 INJECTION, SOLUTION INTRAMUSCULAR; INTRAVENOUS at 12:30

## 2019-08-16 RX ADMIN — METOCLOPRAMIDE 5 MG: 10 TABLET ORAL at 09:52

## 2019-08-16 RX ADMIN — METOCLOPRAMIDE 5 MG: 10 TABLET ORAL at 17:35

## 2019-08-16 RX ADMIN — PROPOFOL 20 MCG/KG/MIN: 10 INJECTION, EMULSION INTRAVENOUS at 03:06

## 2019-08-16 RX ADMIN — FAMOTIDINE 20 MG: 20 TABLET ORAL at 17:35

## 2019-08-16 RX ADMIN — METRONIDAZOLE 500 MG: 500 TABLET ORAL at 22:07

## 2019-08-16 RX ADMIN — METRONIDAZOLE 500 MG: 500 INJECTION, SOLUTION INTRAVENOUS at 06:48

## 2019-08-16 RX ADMIN — CEFTRIAXONE SODIUM 2 G: 2 INJECTION, POWDER, FOR SOLUTION INTRAMUSCULAR; INTRAVENOUS at 05:07

## 2019-08-16 RX ADMIN — HEPARIN SODIUM 5000 UNITS: 5000 INJECTION, SOLUTION INTRAVENOUS; SUBCUTANEOUS at 05:19

## 2019-08-16 RX ADMIN — Medication 300 MCG/HR: at 09:52

## 2019-08-16 NOTE — CARE PLAN
Problem: Respiratory:  Goal: Respiratory status will improve  Outcome: NOT MET  Intervention: Assess and monitor pulmonary status  Flowsheets  Taken 8/15/2019 2300 by Janay Sloan R.N.  Resp: 24 !  Taken 8/15/2019 2000 by Janay Sloan R.N.  Respiratory Pattern: Vented  Taken 8/15/2019 2224 by Héctor Petty RRT  Work Of Breathing / Effort: Vented  RUL Breath Sounds: Clear  RML Breath Sounds: Diminished  RLL Breath Sounds: Diminished  DIANNE Breath Sounds: Clear  LLL Breath Sounds: Diminished  Note:   Pulmonary status including lung sounds assessed at start of shift and monitored throughout shift.      Problem: Venous Thromboembolism (VTW)/Deep Vein Thrombosis (DVT) Prevention:  Goal: Patient will participate in Venous Thrombosis (VTE)/Deep Vein Thrombosis (DVT)Prevention Measures  Outcome: PROGRESSING AS EXPECTED  Intervention: Assess and monitor for anticoagulation complications  Note:   Patient assessed at start of shift and monitored throughout shift.   Intervention: Ensure patient wears graduated elastic stockings (RUDDY hose) and/or SCDs, if ordered, when in bed or chair (Remove at least once per shift for skin check)  Flowsheets  Taken 8/15/2019 2000  Mechanical Prophylaxis : SCDs, Sequential Compression Device  Taken 8/15/2019 2321  SCDs, Sequential Compression Device: On

## 2019-08-16 NOTE — CARE PLAN
Adult Ventilation Update    Total Vent Days: 3    Patient Lines/Drains/Airways Status      Active Airway       Name: Placement date: Placement time: Site: Days:    Airway ETT Oral 8.0  --   --   Oral                      Plateau Pressure (Q Shift): 26 (08/15/19 1757)  Static Compliance (ml / cm H2O): 31 (08/16/19 0225)    Patient failed trials because of Barriers to Wean: FiO2 >60% or PEEP >10 CM H2O (08/15/19 0346)  Barriers to SBT    Length of Weaning Trial        Cough: Strong;Productive (08/16/19 0225)  Sputum Amount: Small (08/16/19 0225)  Sputum Color: Tan;Clear (08/16/19 0225)  Sputum Consistency: Thin;Thick (08/16/19 0225)    Mobility  Level of Mobility: Level IV (08/15/19 2000)  Activity Performed: Unable to mobilize (08/15/19 2000)  Reason Not Mobilized: Bed rest(per report: bedrest per Dr. Robles; to be reassessed 8/15) (08/15/19 2000)  Mobilization Comments: (pt fighting ventilator, currently receiving diaylysis ) (08/14/19 2000)    Events/Summary/Plan: PEEP to 10, per MD (08/15/19 2995)

## 2019-08-16 NOTE — PROGRESS NOTES
Skin check complete.  Devices in place: Cortrak, SpO2 probe, EKG leads, BP cuff, PIV, LIJ, Et tube, RIJ dialysis catheter, SCDs.  Skin assessed under devices: yes.  No new wounds or pressure injuries observed.  The following interventions in place: q 2 hour turns, pillows used for repositioning, pillows used to float heels, Cortrak floated with tape, skin under devices assessed, devices rotated.

## 2019-08-16 NOTE — WOUND TEAM
[facility]  Inpatient Services  Initial Wound and Skin Care Evaluation    Admission Date:  8/14/2019   HPI, PMH, SH: Reviewed  Unit where seen by Wound Team: S130/00    WOUND CONSULT RELATED TO: pressure injury mgmt L Hip    SUBJECTIVE:  intubated    Self Report / Pain Level: no c/o pain      OBJECTIVE: on JON bed, heels floated of pillows  WOUND TYPE, LOCATION, CHARACTERISTICS (Pressure ulcers: location, stage, POA or date identified)    Pressure Injury 08/14/19 Hip POA  (Active)   Pressure Injury Stage DTPI    Site Assessment Clean;Dry;Intact;Light purple;Pink    Jacqui-wound Assessment Clean;Dry;Intact    Margins Defined edges;Attached edges    Wound Length (cm) 3 cm 8/15/2019  1:30 PM   Wound Width (cm) 2.5 cm 8/15/2019  1:30 PM   Wound Depth (cm) 0 cm 8/15/2019  1:30 PM   Wound Surface Area (cm^2) 7.5 cm^2 8/15/2019  1:30 PM   Tunneling 0 cm 8/15/2019  1:30 PM   Undermining 0 cm 8/15/2019  1:30 PM   Drainage Amount None    Cleansing Not Applicable    Dressing Options Open to Air    NEXT Weekly Photo (Inpatient Only) 08/21/19    Odor None     Exposed Structures None     Tissue Type and Percentage 100% red with some purple      Vascular:  Wounds not r/t vascular problem  Lab Values:    WBC:       Lab Results   Component Value Date/Time    WBC 10.9 (H) 08/15/2019 05:14 AM    RBC 2.75 (L) 08/15/2019 05:14 AM      AIC:      Lab Results   Component Value Date/Time    HBA1C 8.3 (H) 06/20/2018 03:06 AM          Culture: na    INTERVENTIONS BY WOUND TEAM: assessed L hip wound, left COREY. Pt repositioned.   Dressing Options: Open to Air    Interdisciplinary consultation:   With Nursing;With Patient     EVALUATION: Pt has POA DTI to L hip. May be a bruise but unable to determine etiology. Will Tx as pressure injury and wait for it to reveal.     Factors affecting wound healing: sepsis, DM, HTN, ARDS  Goals:  Maintain intact skin until DTI reveals      NURSING PLAN OF CARE ORDERS (X):    Dressing changes: See  Dressing Care orders:     Skin care: See Skin Care orders: x  Rectal tube care: See Rectal Tube Care orders:   Other orders:    RSKIN: CURRENT (X) ORDERED (O)  Q shift Manuel:  X  Q shift pressure point assessments:  X  Pressure redistribution mattress            JON   x       Bariatric JON        Bariatric foam           Heel float boots         Float Heels off Bed with Pillows  x                Barrier Cream     x    Barrier paste        Sacral silicone dressing         Silicone O2 tubing  x        Anchorfast   x      Cannula fixation Device (Tender )          Gray Foam Ear protectors           Trach with split foam               Waffle cushion        Waffle Overlay         Rectal tube or BMS         Antifungal tx   Interdry         Reposition q 2 hours    x   Up to chair        Ambulate      PT/OT        Dietician        Diabetes Education      PO    TF   x  TPN     NPO   # days   Other     WOUND TEAM PLAN OF CARE (X):   NPWT change 3 x week:        Dressing changes by wound team:       Follow up as needed: x Nsg to notify if wound worsens      Other (explain):    Anticipated discharge plans (X):  SNF:           Home Care:           Outpatient Wound Center:            Self Care:            Other:    Unsure of needs @ this time

## 2019-08-16 NOTE — PROGRESS NOTES
"Critical Care Progress Note    Date of admission  8/14/2019    Chief Complaint  54 y.o. female admitted 8/14/2019 with ARDS    Hospital Course    \"54 y.o. female with past medical history of hypertension, PE, DVT, diabetes, stage IV chronic kidney disease presumed to be secondary to polycystic kidney disease, type 2 diabetes, neuropathy, stroke with dysphasia (PEG tube removed), who presented 8/14/2019 as a transfer from [facility] where she presented yesterday for increasing shortness of breath and subjective fever for several days.  Patient was found to have multifocal pneumonia and was admitted to [facility] on broad-spectrum antibiotics however decompensated overnight with worsening hypoxia suggesting ARDS.  Patient was intubated at [facility] and transferred to our facility for higher level care.  Labs are facility show leukocytosis with white blood cell count of 20.9, anemia with a hemoglobin of 8.7, normal platelet count at 276, conference of panel shows a mild hyponatremia 132, potassium 5.2, CO2 8, glucose 294, BUN 72, creatinine 5.6.\"      Interval Problem Update  Reviewed last 24 hour events:   -No acute events overnight   - Neuro: Alert, follows, 11T   - HR: 50s-60s   - SBP: 110s-150s   - GI: TF at goal, no BM   - UOP: oliguric, HD   - Wallace: yes   - Tm: 37.4   - Lines: Central venous catheter, hemodialysis catheter   - PPx: GI pepcid, DVT heparin   - ABG: hypoxia, PF ratio 153.3   - VD #3   - CXR (personally reviewed and compared to prior): improved    Yesterday   - ARDS, P:F worsened overnight. Improved following dialysis   - Neuro: follows   - HR: 50s-70s   - SBP: 110s-140s   - GI: TF at goal   - UOP: 202 mL/24h   - Wallace: yes   - Tm: 98.3   - Lines: CVC, TDC   - PPx: GI pepcid, DVT heparin   - ABG: metabolic alk, hypoxia   - CXR (personally reviewed and compared to prior): improved edema    Review of Systems  Review of Systems   Unable to perform ROS: Intubated        Vital Signs " for last 24 hours   Pulse:  [53-63] 58  Resp:  [22-27] 23  BP: (103-140)/(57-72) 113/59  SpO2:  [92 %-100 %] 95 %    Hemodynamic parameters for last 24 hours       Respiratory Information for the last 24 hours  [place] Vent Mode: APVCMV  Rate (breaths/min): 24  Vt Target (mL): 380  PEEP/CPAP: 10  FiO2: 60  P MEAN: 14  Control VTE (exp VT): 384    Physical Exam   Physical Exam   Constitutional: She appears well-developed and well-nourished. She appears distressed. She is intubated.   HENT:   Head: Normocephalic and atraumatic.   Right Ear: External ear normal.   Left Ear: External ear normal.   Nose: Nose normal.   Mouth/Throat: Oropharynx is clear and moist.   ETT in position   Eyes: Conjunctivae and EOM are normal.   Neck: Neck supple. No JVD present. No tracheal deviation present.   Right IJ temporary dialysis catheter, left IJ triple-lumen   Cardiovascular: Normal rate, regular rhythm and intact distal pulses.   Pulmonary/Chest: No accessory muscle usage. She is intubated. She is in respiratory distress. She has rales.   Abdominal: Soft. Bowel sounds are normal. She exhibits no distension. There is no tenderness.   obese   Musculoskeletal: Normal range of motion. She exhibits no tenderness or deformity.   Neurological:   Sedated, follows   Skin: Skin is warm and dry. No rash noted.   Psychiatric:   Unable to assess   Nursing note and vitals reviewed.      Medications  Current Facility-Administered Medications   Medication Dose Route Frequency Provider Last Rate Last Dose   • insulin regular (HUMULIN R) injection 2-9 Units  2-9 Units Subcutaneous Q6HRS Héctor Vallecillo M.D.   Stopped at 08/15/19 0600    And   • glucose 4 g chewable tablet 16 g  16 g Oral Q15 MIN PRN Héctor Vallecillo M.D.        And   • DEXTROSE 10% BOLUS 250 mL  250 mL Intravenous Q15 MIN PRN Héctor Vallecillo M.D.       • Respiratory Care per Protocol   Nebulization Continuous RT Doron Robles Jr., D.O.       • MD Alert...ICU Electrolyte  Replacement per Pharmacy   Other PHARMACY TO DOSE GISELA Velez Jr..BONNIE.       • lidocaine (XYLOCAINE) 1 % injection 1-2 mL  1-2 mL Tracheal Tube Q30 MIN PRN GISELA Velez Jr..BONNIE.       • Pharmacy Consult: Enteral tube insertion - review meds/change route/product selection   Other PHARMACY TO DOSE GISELA Velez Jr..O.       • fentaNYL (SUBLIMAZE) injection 100 mcg  100 mcg Intravenous Q15 MIN PRN GISELA Velez Jr..O.   100 mcg at 08/14/19 2217    And   • fentaNYL (SUBLIMAZE) injection 200 mcg  200 mcg Intravenous Q15 MIN PRN GISELA Velez Jr..ELOY        And   • fentaNYL (SUBLIMAZE) 50 mcg/mL in 50mL (Continuous Infusion)   Intravenous Continuous GISELA Velez Jr..OYoel 5.4 mL/hr at 08/16/19 0704 270 mcg/hr at 08/16/19 0704    And   • propofol (DIPRIVAN) injection  0-80 mcg/kg/min Intravenous Continuous GISELA Velez Jr..OYoel 12.6 mL/hr at 08/16/19 0306 20 mcg/kg/min at 08/16/19 0306   • heparin injection 5,000 Units  5,000 Units Subcutaneous Q8HRS Berna Cavazos M.D.   5,000 Units at 08/16/19 0519   • cefTRIAXone (ROCEPHIN) 2 g in  mL IVPB  2,000 mg Intravenous Q24HRS Berna Cavazos M.D.   Stopped at 08/16/19 0537   • metroNIDAZOLE (FLAGYL) IVPB 500 mg  500 mg Intravenous Q8HRS Berna Cavazos M.D. 100 mL/hr at 08/16/19 0648 500 mg at 08/16/19 0648   • polyethylene glycol/lytes (MIRALAX) PACKET 1 Packet  1 Packet Enteral Tube QDAY PRN GISELA Velez Jr..ELOY        And   • senna-docusate (PERICOLACE or SENOKOT S) 8.6-50 MG per tablet 2 Tab  2 Tab Enteral Tube BID GISELA Velez Jr..O.   2 Tab at 08/16/19 0523    And   • magnesium hydroxide (MILK OF MAGNESIA) suspension 30 mL  30 mL Enteral Tube QDAY PRN Doron Robles Jr., D.O.        And   • bisacodyl (DULCOLAX) suppository 10 mg  10 mg Rectal QDAY PRN Doron Robles Jr., D.O.       • famotidine (PEPCID) tablet 20 mg  20 mg Enteral Tube Q EVENING Doron Robles Jr., D.O.   20 mg at 08/15/19 1716    Or   • famotidine  (PEPCID) injection 20 mg  20 mg Intravenous Q EVENING Doron Robles Jr., D.O.   20 mg at 08/14/19 1750   • lidocaine (XYLOCAINE) 1 % injection 1 mL  1 mL Intradermal PRN Andrés Salazar M.D.       • heparin injection 2,500 Units  2,500 Units Intravenous DIALYSIS PRN Andrés Salazar M.D.   2,500 Units at 08/15/19 1047   • heparin injection 3,000 Units  3,000 Units Intracatheter DIALYSIS PRN Juan Diego Langston M.D.   3,000 Units at 08/15/19 1503   • norepinephrine (LEVOPHED) 8 mg in  mL Infusion  0-30 mcg/min Intravenous Continuous Doron Robles Jr., D.O.   Stopped at 08/14/19 1530       Fluids    Intake/Output Summary (Last 24 hours) at 8/16/2019 0837  Last data filed at 8/16/2019 0600  Gross per 24 hour   Intake 2979.02 ml   Output 3943 ml   Net -963.98 ml       Laboratory  Recent Labs     08/15/19  0329 08/15/19  1007 08/16/19  0509   ISTATAPH 7.517* 7.514* 7.465   ISTATAPCO2 33.8 35.3 37.4*   ISTATAPO2 60* 88* 59*   ISTATATCO2 28 30 28   [id number] 93 98 92*   ISTATARTHCO3 27.4* 28.5* 26.9*   ISTATARTBE 4* 5* 3   ISTATTEMP 97.7 F 38.2 C 98.8 F   ISTATFIO2 50 50 60   ISTATSPEC Arterial Arterial Arterial   ISTATAPHTC 7.525* 7.496 7.464   [id number] 58* 95* 60*         Recent Labs     08/14/19  0635 08/14/19  0930 08/15/19  0514 08/15/19  1010   SODIUM 132*  --  140 139   POTASSIUM 5.2 5.1 3.2* 3.3*   CHLORIDE 106  --  100 100   CO2 8*  --  28 29   BUN 72*  --  41* 43*   CREATININE 5.60*  --  3.22* 3.25*   MAGNESIUM 1.8  --  1.7  --    PHOSPHORUS 5.7*  --  3.8  --    CALCIUM 8.7  --  8.3* 7.7*     Recent Labs     08/14/19  0635 08/15/19  0514 08/15/19  1010   ALTSGPT  --   --  5   ASTSGOT  --   --  10*   ALKPHOSPHAT  --   --  67   TBILIRUBIN  --   --  0.2   DBILIRUBIN  --   --  <0.1   PREALBUMIN  --  10.0* 10.0*   GLUCOSE 294* 111* 113*     Recent Labs     08/14/19  0745 08/15/19  0514 08/15/19  1010 08/16/19  0512   WBC 20.9* 10.9*  --  7.7   NEUTSPOLYS 92.80* 85.90*  --  71.20   LYMPHOCYTES 3.10* 9.30*  --   20.90*   MONOCYTES 2.90 3.80  --  4.80   EOSINOPHILS 0.00 0.10  --  2.10   BASOPHILS 0.30 0.10  --  0.40   ASTSGOT  --   --  10*  --    ALTSGPT  --   --  5  --    ALKPHOSPHAT  --   --  67  --    TBILIRUBIN  --   --  0.2  --      Recent Labs     08/14/19  0745 08/15/19  0514 08/16/19  0512   RBC 3.09* 2.75* 2.76*   HEMOGLOBIN 8.7* 7.8* 7.7*   HEMATOCRIT 29.2* 24.2* 25.1*   PLATELETCT 276 231 230       Imaging  X-Ray:  I have personally reviewed the images and compared with prior images.    Assessment/Plan  * Respiratory failure (HCC)- (present on admission)  Assessment & Plan  Likely due to pneumonia and ARDS, intubated 8/14/2019 at outside hospital  RT/O2 protocols  Daily and PRN ABGs  Titration of ventilator therapy based on ABGs and patient's status  Sedation as tolerated/indicated  Daily CXR  HOB >30 degrees and peridex for VAP prevention  Pepcid for GI prophylaxis  SAT/SBT when able (ABCDEF Bundle)  Early mobility  PEEP/FiO2 ladder per ARDSnet  Low tidal volume ventilation  Slightly increase PEEP and continue to wean FiO2, she will likely require higher PEEP given body habitus    Multifocal pneumonia- (present on admission)  Assessment & Plan  Chest x-ray, P:F and history suggestive of ARDS.  Continue to treat for community acquired pneumonia with Rocephin, azithromycin, Flagyl  Patient is at high risk for aspiration given history of dysphasia with PEG tube placement (now removed)    Acute on chronic kidney failure (HCC)- (present on admission)  Assessment & Plan  Likely secondary to sepsis and hypoperfusion  Oliguric  Renal dose meds, avoid nephrotoxins  Strict I/Os  Follow renal function  urine studies  Continue HD per nephrology      Sepsis ([facility])- (present on admission)  Assessment & Plan  This is severe sepsis with the following associated acute organ dysfunction(s): acute kidney failure, acute respiratory failure.   sepsis protocol  Source likely pulmonary  Continue source targeted antibiotics: Rocephin,  azithromycin, Flagyl.  30 mL/kg crystalloid bolus provided at outside hospital, now de-recruiting with dialysis for volume control  PRN IVF bolus to maintain MAP >65 mmHg  Pressors if needed to maintain MAP >65 mmHg  Follow-up blood, respiratory and urine cultures  Lactate normal    ARDS (adult respiratory distress syndrome) (HCC)- (present on admission)  Assessment & Plan  Meets diagnostic requirements by McGrann criteria  Bilateral, multifocal opacities.  Considered unlikely to be cardiac  Onset < 7 days  PaO2/FiO2 187 consistent with moderate ARDS  Low tidal volume ventilation  PEEP/FiO2 ladder per ARDSnet  Judicious IV fluid use to maintain CVP <4 (FACTT trial), ongoing dialysis for volume control  Progress with full tube feeding (Oma trial)  Low threshold to progress with further ARDS treatments including prone positioning, paralysis only if needed (as RAUL trial prove no additional mortality benefit)  Flolan and APRV to be reserved for salvage therapies  Elizabeth Lung Score: <2    HLD (hyperlipidemia)- (present on admission)  Assessment & Plan  Restart Lipitor 80 mg nightly    History of venous thromboembolism- (present on admission)  Assessment & Plan  Only on aspirin at home  Will need to clarify anticoagulation strategy with family when they are available    Diabetes mellitus type 2, uncontrolled, with complications (HCC)- (present on admission)  Assessment & Plan  Goal blood glucose 120-180  sliding scale insulin, accuchecks  hypoglycemia protocol  A1c 8.3 1 year ago  Low threshold to begin insulin infusion protocol    Hypothyroid- (present on admission)  Assessment & Plan  Continue levothyroxine       VTE:  Heparin  Ulcer: H2 Antagonist  Lines: Central Line  Ongoing indication addressed and Wallace Catheter  Ongoing indication addressed    I have performed a physical exam and reviewed and updated ROS and Plan today (8/16/2019). In review of yesterday's note (8/15/2019), there are no changes except as  documented above.     This patient continues to remain critically ill requiring active management of ventilator, sedation, volume control and antimicrobials. This patient is critically ill, at high risk for decompensation leading to worsening vital organ dysfunction and death without critical care interventions.    Discussed patient condition and risk of morbidity and/or mortality with Hospitalist, RN, RT, Pharmacy, Dietary, , Code status disscussed, Charge nurse / hot rounds, Patient and nephrology     The patient remains critically ill.  Critical care time = 39 minutes in directly providing and coordinating critical care and extensive data review.  No time overlap and excludes procedures.

## 2019-08-16 NOTE — CARE PLAN
Problem: Nutritional:  Goal: Nutrition support tolerated and meeting greater than 85% of estimated needs  Outcome: MET  TF @ goal: Peptamen Intense VHP @ 50 mL/hr

## 2019-08-16 NOTE — CARE PLAN
Conscious Sedation Respiratory Update    FiO2%: 50 % (08/16/19 1600)          Events/Summary/Plan: Pt. resting on new vent settings w/o distress.  No SBT until tomorrow per Dr. Pérez. (08/16/19 0263)

## 2019-08-16 NOTE — PROGRESS NOTES
Nephrology Progress Note    Date & Time: 8/16/2019  1:58 PM    HPI:  Patient is intubated and sedated. Unable to reach  via phone. History obtained from chart review.  54 y.o. female transferred from outside facility on 8/14/2019 for respiratory failure, suspected ARDS in the setting of multifocal pneumonia and acute on chronic kidney disease. She has a past medical history of CKD, polycystic kidney disease, DM2, hypothyroidism, hypertension, hyperlipidemia, AIME, and previous PE and DVT.       Per chart review, the patient presented to the outside facility with complaints of several days of shortness of breath and subjective fever. Chest x-ray obtained at the outside facility showed multifocal abnormality consistent with multifocal pneumonia the patient was admitted to the outside facility and started on a course of broad-spectrum antibiotics. Unfortunately, during the course of her hospitalization, the patient decompensated with worsening respiratory status.  At that point, development of ARDS was of concern therefore the patient was emergently intubated by the outside physician and patient transferred.     Additional work-up including WBC 15.7 hemoglobin 9.9 hematocrit 31.1 platelet 287 troponin 0.02, sodium 135 potassium 4.2 chloride 109 CO2 6 BUN 5.8 creatinine 5.1 and glucose 191.   Labs on admission here reflect hgb of 8.7, WBC 20.9, ANC 19.37, Na 132, K 5.2, CO2 8, AG 18, BUN 72, Cr 5.6, Ph 5.7, Ca 8.7, Mg 1.8, and LA 1.7.   On ABG Ph of 7.12  Patient admitted to ICU, intubated, sedated and started on multiple abx.     DAILY NEPHROLOGY SUMMARY:   8/15: patient tolerated HD yesterday with 3.5 liters fluid removed. Patient appears to have slight increase in UOP to  168cc overnight and 100 cc this am. Otherwise still intubated and sedated. Ph 7.5 in am. Plan for HD   8/16: No acute events overnight. Patient drowsy but arousable during SBT. Moving all extremities and following  commands. UOP 151cc  "overnight. Creatinine stable, not up trending with normal Na and K    PAST FAMILY HISTORY: Reviewed and Unchanged    SOCIAL HISTORY: Reviewed and Unchanged  CURRENT MEDICATIONS: Reviewed  IMAGING STUDIES: Reviewed    ROS  Unable to perform. Patient intubated and sedated.     PHYSICAL EXAM  VS:  /72   Pulse 67   Temp 36.8 °C (98.3 °F) (Temporal)   Resp 19   Ht 1.778 m (5' 10\")   Wt 101.5 kg (223 lb 12.3 oz)   SpO2 98%   BMI 32.11 kg/m²   GENERAL: Lying in bed, intubated with eyes closed.   HEAD: Normocephalic, atraumatic  EYES: no scleral icterus; normal conjunctiva  NECK: Supple; trachea midline  CV: RRR, S1 and S2 present  LUNGS: clear breath sounds in areas auscultated  ABDOMEN: Soft, non-tender  EXTREMETIES: No lower extremity edema, no clubbing or cyanosis  SKIN: Warm and dry, no rashes  NEURO:  Unable to assess, patient intubated   PSYCH: Unable to assess, patient intubated    Fluids:  In: 3335.5 [I.V.:435.5; Other:300; Dialysis:800]  Out: 4043     LABS:  Recent Results (from the past 24 hour(s))   ACCU-CHEK GLUCOSE    Collection Time: 08/15/19  5:19 PM   Result Value Ref Range    Glucose - Accu-Ck 119 (H) 65 - 99 mg/dL   ACCU-CHEK GLUCOSE    Collection Time: 08/15/19 11:45 PM   Result Value Ref Range    Glucose - Accu-Ck 102 (H) 65 - 99 mg/dL   ISTAT ARTERIAL BLOOD GAS    Collection Time: 08/16/19  5:09 AM   Result Value Ref Range    Ph 7.465 7.400 - 7.500    Pco2 37.4 (H) 26.0 - 37.0 mmHg    Po2 59 (L) 64 - 87 mmHg    Tco2 28 20 - 33 mmol/L    S02 92 (L) 93 - 99 %    Hco3 26.9 (H) 17.0 - 25.0 mmol/L    BE 3 -4 - 3 mmol/L    Body Temp 98.8 F degrees    O2 Therapy 60 %    iPF Ratio 98     Ph Temp Deon 7.464 7.400 - 7.500    Pco2 Temp Co 37.6 (H) 26.0 - 37.0 mmHg    Po2 Temp Cor 60 (L) 64 - 87 mmHg    Specimen Arterial     Action Range Triggered NO     Inst. Qualified Patient YES    CBC with Differential    Collection Time: 08/16/19  5:12 AM   Result Value Ref Range    WBC 7.7 4.8 - 10.8 K/uL    RBC " 2.76 (L) 4.20 - 5.40 M/uL    Hemoglobin 7.7 (L) 12.0 - 16.0 g/dL    Hematocrit 25.1 (L) 37.0 - 47.0 %    MCV 90.9 81.4 - 97.8 fL    MCH 27.9 27.0 - 33.0 pg    MCHC 30.7 (L) 33.6 - 35.0 g/dL    RDW 47.9 35.9 - 50.0 fL    Platelet Count 230 164 - 446 K/uL    MPV 11.0 9.0 - 12.9 fL    Neutrophils-Polys 71.20 44.00 - 72.00 %    Lymphocytes 20.90 (L) 22.00 - 41.00 %    Monocytes 4.80 0.00 - 13.40 %    Eosinophils 2.10 0.00 - 6.90 %    Basophils 0.40 0.00 - 1.80 %    Immature Granulocytes 0.60 0.00 - 0.90 %    Nucleated RBC 0.00 /100 WBC    Neutrophils (Absolute) 5.48 2.00 - 7.15 K/uL    Lymphs (Absolute) 1.61 1.00 - 4.80 K/uL    Monos (Absolute) 0.37 0.00 - 0.85 K/uL    Eos (Absolute) 0.16 0.00 - 0.51 K/uL    Baso (Absolute) 0.03 0.00 - 0.12 K/uL    Immature Granulocytes (abs) 0.05 0.00 - 0.11 K/uL    NRBC (Absolute) 0.00 K/uL   Basic Metabolic Panel (BMP)    Collection Time: 08/16/19  5:12 AM   Result Value Ref Range    Sodium 138 135 - 145 mmol/L    Potassium 3.7 3.6 - 5.5 mmol/L    Chloride 101 96 - 112 mmol/L    Co2 26 20 - 33 mmol/L    Glucose 130 (H) 65 - 99 mg/dL    Bun 35 (H) 8 - 22 mg/dL    Creatinine 2.60 (H) 0.50 - 1.40 mg/dL    Calcium 7.9 (L) 8.5 - 10.5 mg/dL    Anion Gap 11.0 0.0 - 11.9   Magnesium    Collection Time: 08/16/19  5:12 AM   Result Value Ref Range    Magnesium 2.0 1.5 - 2.5 mg/dL   Phosphorus    Collection Time: 08/16/19  5:12 AM   Result Value Ref Range    Phosphorus 2.3 (L) 2.5 - 4.5 mg/dL   Procalcitonin    Collection Time: 08/16/19  5:12 AM   Result Value Ref Range    Procalcitonin 25.10 (H) <0.25 ng/mL   ESTIMATED GFR    Collection Time: 08/16/19  5:12 AM   Result Value Ref Range    GFR If  23 (A) >60 mL/min/1.73 m 2    GFR If Non  19 (A) >60 mL/min/1.73 m 2   ACCU-CHEK GLUCOSE    Collection Time: 08/16/19  5:18 AM   Result Value Ref Range    Glucose - Accu-Ck 122 (H) 65 - 99 mg/dL   IRON/TOTAL IRON BIND    Collection Time: 08/16/19 10:52 AM   Result Value  Ref Range    Iron 37 (L) 40 - 170 ug/dL    Total Iron Binding 140 (L) 250 - 450 ug/dL    % Saturation 26 15 - 55 %   FERRITIN    Collection Time: 08/16/19 10:52 AM   Result Value Ref Range    Ferritin 311.4 (H) 10.0 - 291.0 ng/mL   PTH WITH IONIZED CALCIUM    Collection Time: 08/16/19 10:52 AM   Result Value Ref Range    Pth, Intact 297.9 (H) 14.0 - 72.0 pg/mL    Ionized Calcium 1.1 1.1 - 1.3 mmol/L   VITAMIN D,25 HYDROXY    Collection Time: 08/16/19 10:52 AM   Result Value Ref Range    25-Hydroxy   Vitamin D 25 17 (L) 30 - 100 ng/mL   ACCU-CHEK GLUCOSE    Collection Time: 08/16/19 12:36 PM   Result Value Ref Range    Glucose - Accu-Ck 89 65 - 99 mg/dL       (click the triangle to expand results)      ASSESSMENT:  # CAMPOS on CKD: Baseline Cr~2.5 - 3.0, increased to 5.60.    # CKD Stage 4 based on previous labs. Likely secondary to polycystic kidney disease  # HTN - stable  # AGMA- ph 7.12, AG elevated- resolved  # polycystic kidney disease- [facility] reflecting this date back to 2012 per chart review  # ARDS, due to multifocal PNA - on AZT, C3, and flagyl- on vent  # Pneumonia- on AZT, C3, and flagyl  # DM2  # hypothyroidism    PLAN:  - Appropriate candidate for urgent RRT - performed 6/14/2019, repeat 8/15/2019  - Daily evaluation for HD consideration, likely tomorrow for volume optimization  - Renal formula tube feeds in the near future  - Temporary line in place  - Strict I/Os  - Dose all meds per ESRD  - Daily electrolytes and renal labs    Thank you for this consult, we will continue to follow.    Juan Diego Langston Internal Medicine resident. Discussed with attending.

## 2019-08-17 ENCOUNTER — APPOINTMENT (OUTPATIENT)
Dept: RADIOLOGY | Facility: MEDICAL CENTER | Age: 54
DRG: 870 | End: 2019-08-17
Attending: INTERNAL MEDICINE
Payer: MEDICAID

## 2019-08-17 LAB
ACTION RANGE TRIGGERED IACRT: NO
ANION GAP SERPL CALC-SCNC: 9 MMOL/L (ref 0–11.9)
BASE EXCESS BLDA CALC-SCNC: 0 MMOL/L (ref -4–3)
BASOPHILS # BLD AUTO: 0.5 % (ref 0–1.8)
BASOPHILS # BLD: 0.04 K/UL (ref 0–0.12)
BODY TEMPERATURE: ABNORMAL DEGREES
BUN SERPL-MCNC: 60 MG/DL (ref 8–22)
CALCIUM SERPL-MCNC: 8.4 MG/DL (ref 8.5–10.5)
CHLORIDE SERPL-SCNC: 100 MMOL/L (ref 96–112)
CO2 BLDA-SCNC: 28 MMOL/L (ref 20–33)
CO2 SERPL-SCNC: 27 MMOL/L (ref 20–33)
CREAT SERPL-MCNC: 3.44 MG/DL (ref 0.5–1.4)
EOSINOPHIL # BLD AUTO: 0.36 K/UL (ref 0–0.51)
EOSINOPHIL NFR BLD: 4.2 % (ref 0–6.9)
ERYTHROCYTE [DISTWIDTH] IN BLOOD BY AUTOMATED COUNT: 48.1 FL (ref 35.9–50)
GLUCOSE BLD-MCNC: 114 MG/DL (ref 65–99)
GLUCOSE BLD-MCNC: 124 MG/DL (ref 65–99)
GLUCOSE BLD-MCNC: 127 MG/DL (ref 65–99)
GLUCOSE BLD-MCNC: 129 MG/DL (ref 65–99)
GLUCOSE SERPL-MCNC: 128 MG/DL (ref 65–99)
HCO3 BLDA-SCNC: 26.1 MMOL/L (ref 17–25)
HCT VFR BLD AUTO: 26.9 % (ref 37–47)
HGB BLD-MCNC: 8.2 G/DL (ref 12–16)
HOROWITZ INDEX BLDA+IHG-RTO: 227 MM[HG]
IMM GRANULOCYTES # BLD AUTO: 0.17 K/UL (ref 0–0.11)
IMM GRANULOCYTES NFR BLD AUTO: 2 % (ref 0–0.9)
INST. QUALIFIED PATIENT IIQPT: YES
LYMPHOCYTES # BLD AUTO: 1.17 K/UL (ref 1–4.8)
LYMPHOCYTES NFR BLD: 13.6 % (ref 22–41)
MAGNESIUM SERPL-MCNC: 2.2 MG/DL (ref 1.5–2.5)
MCH RBC QN AUTO: 28.4 PG (ref 27–33)
MCHC RBC AUTO-ENTMCNC: 30.5 G/DL (ref 33.6–35)
MCV RBC AUTO: 93.1 FL (ref 81.4–97.8)
MONOCYTES # BLD AUTO: 0.46 K/UL (ref 0–0.85)
MONOCYTES NFR BLD AUTO: 5.3 % (ref 0–13.4)
NEUTROPHILS # BLD AUTO: 6.4 K/UL (ref 2–7.15)
NEUTROPHILS NFR BLD: 74.4 % (ref 44–72)
NRBC # BLD AUTO: 0 K/UL
NRBC BLD-RTO: 0 /100 WBC
O2/TOTAL GAS SETTING VFR VENT: 30 %
PCO2 BLDA: 46.8 MMHG (ref 26–37)
PCO2 TEMP ADJ BLDA: 45.6 MMHG (ref 26–37)
PH BLDA: 7.35 [PH] (ref 7.4–7.5)
PH TEMP ADJ BLDA: 7.36 [PH] (ref 7.4–7.5)
PHOSPHATE SERPL-MCNC: 3.6 MG/DL (ref 2.5–4.5)
PLATELET # BLD AUTO: 230 K/UL (ref 164–446)
PMV BLD AUTO: 10.6 FL (ref 9–12.9)
PO2 BLDA: 68 MMHG (ref 64–87)
PO2 TEMP ADJ BLDA: 65 MMHG (ref 64–87)
POTASSIUM SERPL-SCNC: 4 MMOL/L (ref 3.6–5.5)
RBC # BLD AUTO: 2.89 M/UL (ref 4.2–5.4)
SAO2 % BLDA: 92 % (ref 93–99)
SODIUM SERPL-SCNC: 136 MMOL/L (ref 135–145)
SPECIMEN DRAWN FROM PATIENT: ABNORMAL
TRIGL SERPL-MCNC: 105 MG/DL (ref 0–149)
WBC # BLD AUTO: 8.6 K/UL (ref 4.8–10.8)

## 2019-08-17 PROCEDURE — 71045 X-RAY EXAM CHEST 1 VIEW: CPT

## 2019-08-17 PROCEDURE — 5A1D70Z PERFORMANCE OF URINARY FILTRATION, INTERMITTENT, LESS THAN 6 HOURS PER DAY: ICD-10-PCS | Performed by: INTERNAL MEDICINE

## 2019-08-17 PROCEDURE — 84478 ASSAY OF TRIGLYCERIDES: CPT

## 2019-08-17 PROCEDURE — 90935 HEMODIALYSIS ONE EVALUATION: CPT

## 2019-08-17 PROCEDURE — 700111 HCHG RX REV CODE 636 W/ 250 OVERRIDE (IP): Performed by: HOSPITALIST

## 2019-08-17 PROCEDURE — 83735 ASSAY OF MAGNESIUM: CPT

## 2019-08-17 PROCEDURE — 94003 VENT MGMT INPAT SUBQ DAY: CPT

## 2019-08-17 PROCEDURE — 36600 WITHDRAWAL OF ARTERIAL BLOOD: CPT

## 2019-08-17 PROCEDURE — 700111 HCHG RX REV CODE 636 W/ 250 OVERRIDE (IP): Performed by: STUDENT IN AN ORGANIZED HEALTH CARE EDUCATION/TRAINING PROGRAM

## 2019-08-17 PROCEDURE — 85025 COMPLETE CBC W/AUTO DIFF WBC: CPT

## 2019-08-17 PROCEDURE — A9270 NON-COVERED ITEM OR SERVICE: HCPCS | Performed by: INTERNAL MEDICINE

## 2019-08-17 PROCEDURE — 700111 HCHG RX REV CODE 636 W/ 250 OVERRIDE (IP): Performed by: INTERNAL MEDICINE

## 2019-08-17 PROCEDURE — 82803 BLOOD GASES ANY COMBINATION: CPT

## 2019-08-17 PROCEDURE — 770022 HCHG ROOM/CARE - ICU (200)

## 2019-08-17 PROCEDURE — 82962 GLUCOSE BLOOD TEST: CPT

## 2019-08-17 PROCEDURE — 99291 CRITICAL CARE FIRST HOUR: CPT | Performed by: INTERNAL MEDICINE

## 2019-08-17 PROCEDURE — 700102 HCHG RX REV CODE 250 W/ 637 OVERRIDE(OP): Performed by: INTERNAL MEDICINE

## 2019-08-17 PROCEDURE — 700105 HCHG RX REV CODE 258: Performed by: HOSPITALIST

## 2019-08-17 PROCEDURE — 84100 ASSAY OF PHOSPHORUS: CPT

## 2019-08-17 PROCEDURE — 80048 BASIC METABOLIC PNL TOTAL CA: CPT

## 2019-08-17 RX ORDER — HYDRALAZINE HYDROCHLORIDE 20 MG/ML
10-20 INJECTION INTRAMUSCULAR; INTRAVENOUS EVERY 4 HOURS PRN
Status: DISCONTINUED | OUTPATIENT
Start: 2019-08-17 | End: 2019-08-23 | Stop reason: HOSPADM

## 2019-08-17 RX ADMIN — SENNOSIDES, DOCUSATE SODIUM 2 TABLET: 50; 8.6 TABLET, FILM COATED ORAL at 05:00

## 2019-08-17 RX ADMIN — POLYETHYLENE GLYCOL 3350 1 PACKET: 17 POWDER, FOR SOLUTION ORAL at 09:35

## 2019-08-17 RX ADMIN — HYDRALAZINE HYDROCHLORIDE 10 MG: 20 INJECTION INTRAMUSCULAR; INTRAVENOUS at 23:35

## 2019-08-17 RX ADMIN — METRONIDAZOLE 500 MG: 500 TABLET ORAL at 21:47

## 2019-08-17 RX ADMIN — DULOXETINE HYDROCHLORIDE 60 MG: 60 CAPSULE, DELAYED RELEASE ORAL at 05:00

## 2019-08-17 RX ADMIN — IRON SUCROSE 200 MG: 20 INJECTION, SOLUTION INTRAVENOUS at 14:00

## 2019-08-17 RX ADMIN — GLYCOPYRROLATE 0.5 MG: 1 TABLET ORAL at 05:00

## 2019-08-17 RX ADMIN — HEPARIN SODIUM 5000 UNITS: 5000 INJECTION, SOLUTION INTRAVENOUS; SUBCUTANEOUS at 14:50

## 2019-08-17 RX ADMIN — HEPARIN SODIUM 3000 UNITS: 1000 INJECTION, SOLUTION INTRAVENOUS; SUBCUTANEOUS at 18:25

## 2019-08-17 RX ADMIN — METOCLOPRAMIDE 5 MG: 10 TABLET ORAL at 12:33

## 2019-08-17 RX ADMIN — METOCLOPRAMIDE 5 MG: 10 TABLET ORAL at 06:31

## 2019-08-17 RX ADMIN — METRONIDAZOLE 500 MG: 500 TABLET ORAL at 05:00

## 2019-08-17 RX ADMIN — FENTANYL CITRATE 100 MCG: 50 INJECTION, SOLUTION INTRAMUSCULAR; INTRAVENOUS at 23:01

## 2019-08-17 RX ADMIN — FAMOTIDINE 20 MG: 20 TABLET ORAL at 17:27

## 2019-08-17 RX ADMIN — ATORVASTATIN CALCIUM 80 MG: 40 TABLET, FILM COATED ORAL at 17:27

## 2019-08-17 RX ADMIN — HEPARIN SODIUM 5000 UNITS: 5000 INJECTION, SOLUTION INTRAVENOUS; SUBCUTANEOUS at 05:01

## 2019-08-17 RX ADMIN — METOCLOPRAMIDE 5 MG: 10 TABLET ORAL at 21:47

## 2019-08-17 RX ADMIN — METRONIDAZOLE 500 MG: 500 TABLET ORAL at 14:53

## 2019-08-17 RX ADMIN — ASPIRIN 325 MG: 325 TABLET, FILM COATED ORAL at 05:00

## 2019-08-17 RX ADMIN — HEPARIN SODIUM 5000 UNITS: 5000 INJECTION, SOLUTION INTRAVENOUS; SUBCUTANEOUS at 21:47

## 2019-08-17 RX ADMIN — FENTANYL CITRATE 100 MCG: 50 INJECTION, SOLUTION INTRAMUSCULAR; INTRAVENOUS at 22:31

## 2019-08-17 RX ADMIN — SENNOSIDES, DOCUSATE SODIUM 2 TABLET: 50; 8.6 TABLET, FILM COATED ORAL at 17:27

## 2019-08-17 RX ADMIN — METOCLOPRAMIDE 5 MG: 10 TABLET ORAL at 17:27

## 2019-08-17 RX ADMIN — LEVOTHYROXINE SODIUM 112 MCG: 112 TABLET ORAL at 05:00

## 2019-08-17 RX ADMIN — Medication 300 MCG/HR: at 08:58

## 2019-08-17 RX ADMIN — Medication 150 MCG/HR: at 20:02

## 2019-08-17 RX ADMIN — GLYCOPYRROLATE 0.5 MG: 1 TABLET ORAL at 17:29

## 2019-08-17 RX ADMIN — Medication 300 MCG/HR: at 02:57

## 2019-08-17 RX ADMIN — CEFTRIAXONE SODIUM 2 G: 2 INJECTION, POWDER, FOR SOLUTION INTRAMUSCULAR; INTRAVENOUS at 04:56

## 2019-08-17 NOTE — CARE PLAN
Problem: Respiratory:  Goal: Respiratory status will improve  Outcome: PROGRESSING AS EXPECTED  Intervention: Assess and monitor pulmonary status  Flowsheets (Taken 8/16/2019 2000)  Resp: 21 !  SpO2: 96 %  Respiratory Pattern: Vented  Work Of Breathing / Effort: Vented  Cough: Strong;Productive  Pre/Post Intervention: Pre Intervention Assessment  RUL Breath Sounds: Clear  RML Breath Sounds: Diminished  RLL Breath Sounds: Diminished  DIANNE Breath Sounds: Clear  LLL Breath Sounds: Diminished     Problem: Mechanical Ventilation  Goal: Ability to express needs and understand communication  Outcome: PROGRESSING AS EXPECTED  Intervention: Assess ability to communicate and understand  Note:   Patient's ability to communicate and understand assessed. Patient able to nod appropriately when asked questions.

## 2019-08-17 NOTE — PROGRESS NOTES
Nephrology Progress Note    Date & Time: 8/17/2019  12:28 PM    HPI:  Patient is intubated and sedated. Unable to reach  via phone. History obtained from chart review.  54 y.o. female transferred from outside facility on 8/14/2019 for respiratory failure, suspected ARDS in the setting of multifocal pneumonia and acute on chronic kidney disease. She has a past medical history of CKD, polycystic kidney disease, DM2, hypothyroidism, hypertension, hyperlipidemia, AIME, and previous PE and DVT.       Per chart review, the patient presented to the outside facility with complaints of several days of shortness of breath and subjective fever. Chest x-ray obtained at the outside facility showed multifocal abnormality consistent with multifocal pneumonia the patient was admitted to the outside facility and started on a course of broad-spectrum antibiotics. Unfortunately, during the course of her hospitalization, the patient decompensated with worsening respiratory status.  At that point, development of ARDS was of concern therefore the patient was emergently intubated by the outside physician and patient transferred.     Additional work-up including WBC 15.7 hemoglobin 9.9 hematocrit 31.1 platelet 287 troponin 0.02, sodium 135 potassium 4.2 chloride 109 CO2 6 BUN 5.8 creatinine 5.1 and glucose 191.   Labs on admission here reflect hgb of 8.7, WBC 20.9, ANC 19.37, Na 132, K 5.2, CO2 8, AG 18, BUN 72, Cr 5.6, Ph 5.7, Ca 8.7, Mg 1.8, and LA 1.7.   On ABG Ph of 7.12  Patient admitted to ICU, intubated, sedated and started on multiple abx.     DAILY NEPHROLOGY SUMMARY:   8/15: patient tolerated HD yesterday with 3.5 liters fluid removed. Patient appears to have slight increase in UOP to  168cc overnight and 100 cc this am. Otherwise still intubated and sedated. Ph 7.5 in am. Plan for HD   8/16: No acute events overnight. Patient drowsy but arousable during SBT. Moving all extremities and following  commands. UOP 151cc  "overnight. Creatinine stable, not up trending with normal Na and K   8/17: ongoing pulm edema, attempting to wean from vent      CURRENT MEDICATIONS: Reviewed  IMAGING STUDIES: Reviewed    ROS  Unable to perform. Patient intubated and sedated.     PHYSICAL EXAM  VS:  /75   Pulse (!) 56   Temp 36.8 °C (98.3 °F) (Temporal)   Resp 19   Ht 1.778 m (5' 10\")   Wt 101.8 kg (224 lb 6.9 oz)   SpO2 96%   BMI 32.20 kg/m²   GENERAL: Lying in bed, wn/wd  HEAD: Normocephalic, atraumatic  EYES: no scleral icterus; normal conjunctiva  NECK: Supple; trachea midline  CV: RRR, S1 and S2 present  LUNGS: scattered crackles, intubated/ventilated  ABDOMEN: Soft, non-tender  EXTREMETIES: trace lower extremity edema, no clubbing or cyanosis      Fluids:  In: 2169.9 [I.V.:179.9; Other:450]  Out: 623     LABS:    Lab Results   Component Value Date/Time    SODIUM 136 08/17/2019 04:54 AM    POTASSIUM 4.0 08/17/2019 04:54 AM    CHLORIDE 100 08/17/2019 04:54 AM    CO2 27 08/17/2019 04:54 AM    GLUCOSE 128 (H) 08/17/2019 04:54 AM    BUN 60 (H) 08/17/2019 04:54 AM    CREATININE 3.44 (H) 08/17/2019 04:54 AM      Lab Results   Component Value Date/Time    WBC 8.6 08/17/2019 04:54 AM    RBC 2.89 (L) 08/17/2019 04:54 AM    HEMOGLOBIN 8.2 (L) 08/17/2019 04:54 AM    HEMATOCRIT 26.9 (L) 08/17/2019 04:54 AM    MCV 93.1 08/17/2019 04:54 AM    MCH 28.4 08/17/2019 04:54 AM    MCHC 30.5 (L) 08/17/2019 04:54 AM    MPV 10.6 08/17/2019 04:54 AM    NEUTSPOLYS 74.40 (H) 08/17/2019 04:54 AM    LYMPHOCYTES 13.60 (L) 08/17/2019 04:54 AM    MONOCYTES 5.30 08/17/2019 04:54 AM    EOSINOPHILS 4.20 08/17/2019 04:54 AM    BASOPHILS 0.50 08/17/2019 04:54 AM            ASSESSMENT:  # CAMPOS on CKD: Baseline Cr~2.5 - 3.0, increased to 5.60.    # CKD Stage 4 based on previous labs. Likely secondary to polycystic kidney disease  # HTN - stable  # AGMA- improved  # polycystic kidney disease- [facility] reflecting this date back to 2012 per chart review  # ARDS, due to " multifocal PNA - on AZT, C3, and flagyl- on vent; HD today for volume removal  # Pneumonia- on AZT, C3, and flagyl  # DM2  # hypothyroidism  #anemia - iron deficient  # CKD MBD - phos acceptable, PTH elevated, no changes today    PLAN:  - Appropriate candidate for urgent RRT, HD today  - Daily evaluation for HD consideration  - Venofer ordered  - Temporary line in place  - Strict I/Os  - Dose all meds per ESRD  - Daily electrolytes and renal labs

## 2019-08-17 NOTE — PROGRESS NOTES
"Critical Care Progress Note    Date of admission  8/14/2019    Chief Complaint  54 y.o. female admitted 8/14/2019 with ARDS    Hospital Course    \"54 y.o. female with past medical history of hypertension, PE, DVT, diabetes, stage IV chronic kidney disease presumed to be secondary to polycystic kidney disease, type 2 diabetes, neuropathy, stroke with dysphasia (PEG tube removed), who presented 8/14/2019 as a transfer from [facility] where she presented yesterday for increasing shortness of breath and subjective fever for several days.  Patient was found to have multifocal pneumonia and was admitted to [facility] on broad-spectrum antibiotics however decompensated overnight with worsening hypoxia suggesting ARDS.  Patient was intubated at [facility] and transferred to our facility for higher level care.  Labs are facility show leukocytosis with white blood cell count of 20.9, anemia with a hemoglobin of 8.7, normal platelet count at 276, conference of panel shows a mild hyponatremia 132, potassium 5.2, CO2 8, glucose 294, BUN 72, creatinine 5.6.\"      Interval Problem Update  Reviewed last 24 hour events:   - No acute events overnight   - Neuro: follows   - HR: 50s-60s   - SBP: 130s-150s   - GI: TF at goal   - UOP: anuric, 623 mL out   - Wallace: yes   - Tm: afeb   - Lines: CVC, HD   - PPx: GI pepcid, DVT heparin   - ABG: , drop PEEP   - VD #4   - SBT: to be completed after PEEP drop   - CXR (personally reviewed and compared to prior): worsening edema    Yesterday   -No acute events overnight   - Neuro: Alert, follows, 11T   - HR: 50s-60s   - SBP: 110s-150s   - GI: TF at goal, no BM   - UOP: oliguric, HD   - Wallace: yes   - Tm: 37.4   - Lines: Central venous catheter, hemodialysis catheter   - PPx: GI pepcid, DVT heparin   - ABG: hypoxia, PF ratio 153.3   - VD #3   - CXR (personally reviewed and compared to prior): improved    Review of Systems  Review of Systems   Unable to perform ROS: Intubated "        Vital Signs for last 24 hours   Pulse:  [55-74] 59  Resp:  [5-21] 20  BP: (126-158)/(63-82) 145/75  SpO2:  [93 %-99 %] 97 %    Hemodynamic parameters for last 24 hours       Respiratory Information for the last 24 hours  Suresh Vent Mode: APVCMV  Rate (breaths/min): 20  Vt Target (mL): 380  PEEP/CPAP: 12  FiO2: 30  P MEAN: 12  Control VTE (exp VT): 382    Physical Exam   Physical Exam   Constitutional: She appears well-developed and well-nourished. She appears distressed. She is intubated.   HENT:   Head: Normocephalic and atraumatic.   Right Ear: External ear normal.   Left Ear: External ear normal.   Nose: Nose normal.   Mouth/Throat: Oropharynx is clear and moist.   ETT in position   Eyes: Conjunctivae and EOM are normal.   Neck: Neck supple. No JVD present. No tracheal deviation present.   Right IJ temporary dialysis catheter, left IJ triple-lumen   Cardiovascular: Normal rate, regular rhythm and intact distal pulses.   Pulmonary/Chest: No accessory muscle usage. She is intubated. She is in respiratory distress. She has rales.   Abdominal: Soft. Bowel sounds are normal. She exhibits no distension. There is no tenderness.   obese   Musculoskeletal: Normal range of motion. She exhibits no tenderness or deformity.   Neurological:   Sedated, follows   Skin: Skin is warm and dry. No rash noted.   Psychiatric:   Unable to assess   Nursing note and vitals reviewed.      Medications  Current Facility-Administered Medications   Medication Dose Route Frequency Provider Last Rate Last Dose   • aspirin (ASA) tablet 325 mg  325 mg Per NG Tube DAILY Doron Robles Jr., D.O.   325 mg at 08/17/19 0500   • atorvastatin (LIPITOR) tablet 80 mg  80 mg Per NG Tube Q EVENING Doron Robles Jr., D.O.   80 mg at 08/16/19 1734   • levothyroxine (SYNTHROID) tablet 112 mcg  112 mcg Enteral Tube DAILY Doron Robles Jr., D.O.   112 mcg at 08/17/19 0500   • metoclopramide (REGLAN) tablet 5 mg  5 mg Per NG Tube 4X/DAY ACHS  GISELA Velez Jr..O.   5 mg at 08/17/19 0631   • glycopyrrolate (ROBINUL) tablet 0.5 mg  0.5 mg Enteral Tube BID GISELA Velez Jr..O.   0.5 mg at 08/17/19 0500   • DULoxetine (CYMBALTA) capsule 60 mg  60 mg Enteral Tube DAILY GISELA Velez Jr..O.   60 mg at 08/17/19 0500   • metroNIDAZOLE (FLAGYL) tablet 500 mg  500 mg Enteral Tube Q8HRS GISELA Velez Jr..O.   500 mg at 08/17/19 0500   • insulin regular (HUMULIN R) injection 2-9 Units  2-9 Units Subcutaneous Q6HRS Héctor Vallecillo M.D.   Stopped at 08/15/19 0600    And   • glucose 4 g chewable tablet 16 g  16 g Oral Q15 MIN PRN Héctor Vallecillo M.D.        And   • DEXTROSE 10% BOLUS 250 mL  250 mL Intravenous Q15 MIN PRN Héctor Vallecillo M.D.       • Respiratory Care per Protocol   Nebulization Continuous RT GISELA Velez Jr..O.       • MD Alert...ICU Electrolyte Replacement per Pharmacy   Other PHARMACY TO DOSE GISELA Velez Jr..[facility].       • lidocaine (XYLOCAINE) 1 % injection 1-2 mL  1-2 mL Tracheal Tube Q30 MIN PRN GISELA Velez Jr..O.       • Pharmacy Consult: Enteral tube insertion - review meds/change route/product selection   Other PHARMACY TO DOSE GISELA Velez Jr..[facility]NIE.       • fentaNYL (SUBLIMAZE) injection 100 mcg  100 mcg Intravenous Q15 MIN PRN GISELA Velez Jr..O.   100 mcg at 08/16/19 1230    And   • fentaNYL (SUBLIMAZE) injection 200 mcg  200 mcg Intravenous Q15 MIN PRN GISELA Velez Jr..OYoel        And   • fentaNYL (SUBLIMAZE) 50 mcg/mL in 50mL (Continuous Infusion)   Intravenous Continuous GISELA Velez Jr..O. 6 mL/hr at 08/17/19 0705 300 mcg/hr at 08/17/19 0705    And   • propofol (DIPRIVAN) injection  0-80 mcg/kg/min Intravenous Continuous GISELA Velez Jr..O.   Stopped at 08/16/19 1001   • heparin injection 5,000 Units  5,000 Units Subcutaneous Q8HRS Berna Cavazos M.D.   5,000 Units at 08/17/19 0501   • cefTRIAXone (ROCEPHIN) 2 g in  mL IVPB  2,000 mg Intravenous Q24HRS Berna Cavazos,  M.D.   Stopped at 08/17/19 0526   • polyethylene glycol/lytes (MIRALAX) PACKET 1 Packet  1 Packet Enteral Tube QDAY PRN Doron Robles Jr., D.O.        And   • senna-docusate (PERICOLACE or SENOKOT S) 8.6-50 MG per tablet 2 Tab  2 Tab Enteral Tube BID Doron Robles Jr., D.O.   2 Tab at 08/17/19 0500    And   • magnesium hydroxide (MILK OF MAGNESIA) suspension 30 mL  30 mL Enteral Tube QDAY PRN Doron Robles Jr., D.O.        And   • bisacodyl (DULCOLAX) suppository 10 mg  10 mg Rectal QDAY PRN Doron Robles Jr., D.O.       • famotidine (PEPCID) tablet 20 mg  20 mg Enteral Tube Q EVENING Doron Robles Jr., D.O.   20 mg at 08/16/19 1735    Or   • famotidine (PEPCID) injection 20 mg  20 mg Intravenous Q EVENING Doron Robles Jr., D.O.   20 mg at 08/14/19 1750   • lidocaine (XYLOCAINE) 1 % injection 1 mL  1 mL Intradermal PRN Andrés Salazar M.D.       • heparin injection 2,500 Units  2,500 Units Intravenous DIALYSIS PRN Andrés Salazar M.D.   2,500 Units at 08/15/19 1047   • heparin injection 3,000 Units  3,000 Units Intracatheter DIALYSIS PRN Juan Diego Langston M.D.   3,000 Units at 08/15/19 1503       Fluids    Intake/Output Summary (Last 24 hours) at 8/17/2019 0839  Last data filed at 8/17/2019 0600  Gross per 24 hour   Intake 1884.71 ml   Output 593 ml   Net 1291.71 ml       Laboratory  Recent Labs     08/15/19  1007 08/16/19  0509 08/17/19  0338   ISTATAPH 7.514* 7.465 7.354*   ISTATAPCO2 35.3 37.4* 46.8*   ISTATAPO2 88* 59* 68   ISTATATCO2 30 28 28   LIYYTVN2ONF 98 92* 92*   ISTATARTHCO3 28.5* 26.9* 26.1*   ISTATARTBE 5* 3 0   ISTATTEMP 38.2 C 98.8 F 97.5 F   ISTATFIO2 50 60 30   ISTATSPEC Arterial Arterial Arterial   ISTATAPHTC 7.496 7.464 7.363*   FZFFVHKK3QL 95* 60* 65         Recent Labs     08/15/19  0514 08/15/19  1010 08/16/19  0512 08/17/19  0454   SODIUM 140 139 138 136   POTASSIUM 3.2* 3.3* 3.7 4.0   CHLORIDE 100 100 101 100   CO2 28 29 26 27   BUN 41* 43* 35* 60*   CREATININE 3.22* 3.25* 2.60*  3.44*   MAGNESIUM 1.7  --  2.0 2.2   PHOSPHORUS 3.8  --  2.3* 3.6   CALCIUM 8.3* 7.7* 7.9* 8.4*     Recent Labs     08/15/19  0514 08/15/19  1010 08/16/19  0512 08/17/19  0454   ALTSGPT  --  5  --   --    ASTSGOT  --  10*  --   --    ALKPHOSPHAT  --  67  --   --    TBILIRUBIN  --  0.2  --   --    DBILIRUBIN  --  <0.1  --   --    PREALBUMIN 10.0* 10.0*  --   --    GLUCOSE 111* 113* 130* 128*     Recent Labs     08/15/19  0514 08/15/19  1010 08/16/19  0512 08/17/19  0454   WBC 10.9*  --  7.7 8.6   NEUTSPOLYS 85.90*  --  71.20 74.40*   LYMPHOCYTES 9.30*  --  20.90* 13.60*   MONOCYTES 3.80  --  4.80 5.30   EOSINOPHILS 0.10  --  2.10 4.20   BASOPHILS 0.10  --  0.40 0.50   ASTSGOT  --  10*  --   --    ALTSGPT  --  5  --   --    ALKPHOSPHAT  --  67  --   --    TBILIRUBIN  --  0.2  --   --      Recent Labs     08/15/19  0514 08/16/19  0512 08/16/19  1052 08/17/19  0454   RBC 2.75* 2.76*  --  2.89*   HEMOGLOBIN 7.8* 7.7*  --  8.2*   HEMATOCRIT 24.2* 25.1*  --  26.9*   PLATELETCT 231 230  --  230   IRON  --   --  37*  --    FERRITIN  --   --  311.4*  --    TOTIRONBC  --   --  140*  --        Imaging  X-Ray:  I have personally reviewed the images and compared with prior images.    Assessment/Plan  * Respiratory failure (HCC)- (present on admission)  Assessment & Plan  Likely due to pneumonia and ARDS, intubated 8/14/2019 at outside hospital  RT/O2 protocols  Daily and PRN ABGs  Titration of ventilator therapy based on ABGs and patient's status  Sedation as tolerated/indicated  Daily CXR  HOB >30 degrees and peridex for VAP prevention  Pepcid for GI prophylaxis  SAT/SBT when able (ABCDEF Bundle)  Early mobility  PEEP/FiO2 ladder per ARDSnet  Low tidal volume ventilation  decreased PEEP and continue to wean FiO2, SBT starting today, likely extubate in the next 1-2 days    Multifocal pneumonia- (present on admission)  Assessment & Plan  Chest x-ray, P:F and history suggestive of ARDS.  Continue to treat for community acquired  pneumonia with Rocephin, azithromycin, Flagyl  Patient is at high risk for aspiration given history of dysphasia with PEG tube placement (now removed)    Acute on chronic kidney failure (HCC)- (present on admission)  Assessment & Plan  Likely secondary to sepsis and hypoperfusion  Oliguric  Renal dose meds, avoid nephrotoxins  Strict I/Os  Follow renal function  urine studies  Continue HD per nephrology, to pull 2.5-3.5L today    Sepsis (HCC)- (present on admission)  Assessment & Plan  This is severe sepsis with the following associated acute organ dysfunction(s): acute kidney failure, acute respiratory failure.   sepsis protocol  Source likely pulmonary  Continue source targeted antibiotics: Rocephin, azithromycin, Flagyl.  30 mL/kg crystalloid bolus provided at outside hospital, now de-recruiting with dialysis for volume control  PRN IVF bolus to maintain MAP >65 mmHg  Pressors if needed to maintain MAP >65 mmHg  Follow-up blood, respiratory and urine cultures  Lactate normal    ARDS (adult respiratory distress syndrome) (HCC)- (present on admission)  Assessment & Plan  Meets diagnostic requirements by Morley criteria  Bilateral, multifocal opacities.  Considered unlikely to be cardiac  Onset < 7 days  PaO2/FiO2 187 consistent with moderate ARDS  Low tidal volume ventilation  PEEP/FiO2 ladder per ARDSnet  Judicious IV fluid use to maintain CVP <4 (FACTT trial), ongoing dialysis for volume control  Progress with full tube feeding (Oma trial)  Low threshold to progress with further ARDS treatments including prone positioning, paralysis only if needed (as RAUL trial prove no additional mortality benefit)  Flolan and APRV to be reserved for salvage therapies  Elizabeth Lung Score: <2    HLD (hyperlipidemia)- (present on admission)  Assessment & Plan  Restart Lipitor 80 mg nightly    History of venous thromboembolism- (present on admission)  Assessment & Plan  Only on aspirin at home  Will need to clarify anticoagulation  strategy with family when they are available    Diabetes mellitus type 2, uncontrolled, with complications (HCC)- (present on admission)  Assessment & Plan  Goal blood glucose 120-180  sliding scale insulin, accuchecks  hypoglycemia protocol  A1c 8.3, 1 year ago  Low threshold to begin insulin infusion protocol    Hypothyroid- (present on admission)  Assessment & Plan  Continue levothyroxine       VTE:  Heparin  Ulcer: H2 Antagonist  Lines: Central Line  Ongoing indication addressed and Wallace Catheter  Ongoing indication addressed    I have performed a physical exam and reviewed and updated ROS and Plan today (8/17/2019). In review of yesterday's note (8/16/2019), there are no changes except as documented above.     Continuing to actively manage ventilator, sedation and volume control. This patient is critically ill, at high risk for decompensation leading to worsening vital organ dysfunction and death without critical care interventions.    Discussed patient condition and risk of morbidity and/or mortality with Hospitalist, RN, RT, Pharmacy, Dietary, , Code status disscussed, Charge nurse / hot rounds, Patient and nephrology     The patient remains critically ill.  Critical care time = 43 minutes in directly providing and coordinating critical care and extensive data review.  No time overlap and excludes procedures.

## 2019-08-17 NOTE — CARE PLAN
Problem: Skin Integrity  Goal: Risk for impaired skin integrity will decrease  Outcome: MET  Intervention: Assess risk factors for impaired skin integrity and/or pressure ulcers  Note:   Monitoring skin, and providing pressure relief from heels and turns along with rotating medical equipment     Problem: Psychosocial Needs:  Goal: Level of anxiety will decrease  Outcome: MET     Problem: Risk for Impaired Mobility--Activity Intolerance  Goal: Mobilize and/or Transfer Safely with Maximum [place]  Outcome: MET  Intervention: Progressive Mobilization--ADL Flow Sheet  Note:   Pt sat EOB for 10 mins, minimal assist to EOB and held self up on EOB     Problem: Mechanical Ventilation  Goal: Safe management of artificial airway and ventilation  Outcome: MET

## 2019-08-17 NOTE — FLOWSHEET NOTE
Adult Ventilation Update    Total Vent Days: 4    Patient Lines/Drains/Airways Status    Active Airway     Name: Placement date: Placement time: Site: Days:    Airway ETT Oral 8.0  --   --   Oral                             Plateau Pressure (Q Shift): 21 (08/17/19 1531)  Static Compliance (ml / cm H2O): 33 (08/17/19 1531)    Patient failed trials because of Barriers to Wean: Other (Comments)(Pt. on dialysis now.) (08/17/19 1531)  Barriers to SBT    Length of Weaning Trial                   Cough: Strong;Productive (08/17/19 1130)  Sputum Amount: Small (08/17/19 1531)  Sputum Color: Clear;Tan;White (08/17/19 1531)  Sputum Consistency: Thick;Thin (08/17/19 1531)    Mobility  Level of Mobility: Level II (08/17/19 1130)  Activity Performed: Unable to mobilize (08/17/19 1130)  Time Activity Tolerated: 10 min (08/17/19 0800)  Distance Per Occurrence (ft.): 0 feet (08/16/19 1200)  Assistance: Assistance of Two or More (08/17/19 1130)  Ambulation Tolerance: Tolerates Well (08/17/19 1130)  Pt Calls for Assistance: No (08/17/19 1130)  Staff Present for Mobilization: RN (08/17/19 1130)  Reason Not Mobilized: Bed rest (08/17/19 1130)  Mobilization Comments: (pt fighting ventilator, currently receiving diaylysis ) (08/14/19 2000)    Events/Summary/Plan:  Pt. resting on vent w/o distress and getting dialysis at this time. (08/17/19 1531)

## 2019-08-18 ENCOUNTER — APPOINTMENT (OUTPATIENT)
Dept: RADIOLOGY | Facility: MEDICAL CENTER | Age: 54
DRG: 870 | End: 2019-08-18
Attending: INTERNAL MEDICINE
Payer: MEDICAID

## 2019-08-18 LAB
ACTION RANGE TRIGGERED IACRT: NO
ANION GAP SERPL CALC-SCNC: 8 MMOL/L (ref 0–11.9)
BASE EXCESS BLDA CALC-SCNC: -1 MMOL/L (ref -4–3)
BASOPHILS # BLD AUTO: 0.5 % (ref 0–1.8)
BASOPHILS # BLD: 0.06 K/UL (ref 0–0.12)
BODY TEMPERATURE: ABNORMAL DEGREES
BUN SERPL-MCNC: 39 MG/DL (ref 8–22)
CALCIUM SERPL-MCNC: 8.9 MG/DL (ref 8.5–10.5)
CHLORIDE SERPL-SCNC: 100 MMOL/L (ref 96–112)
CO2 BLDA-SCNC: 26 MMOL/L (ref 20–33)
CO2 SERPL-SCNC: 26 MMOL/L (ref 20–33)
CREAT SERPL-MCNC: 2.27 MG/DL (ref 0.5–1.4)
EOSINOPHIL # BLD AUTO: 0.47 K/UL (ref 0–0.51)
EOSINOPHIL NFR BLD: 3.9 % (ref 0–6.9)
ERYTHROCYTE [DISTWIDTH] IN BLOOD BY AUTOMATED COUNT: 48.4 FL (ref 35.9–50)
GLUCOSE BLD-MCNC: 109 MG/DL (ref 65–99)
GLUCOSE BLD-MCNC: 115 MG/DL (ref 65–99)
GLUCOSE SERPL-MCNC: 127 MG/DL (ref 65–99)
HCO3 BLDA-SCNC: 24.6 MMOL/L (ref 17–25)
HCT VFR BLD AUTO: 29.8 % (ref 37–47)
HGB BLD-MCNC: 8.7 G/DL (ref 12–16)
HOROWITZ INDEX BLDA+IHG-RTO: 283 MM[HG]
IMM GRANULOCYTES # BLD AUTO: 0.45 K/UL (ref 0–0.11)
IMM GRANULOCYTES NFR BLD AUTO: 3.8 % (ref 0–0.9)
INST. QUALIFIED PATIENT IIQPT: YES
LYMPHOCYTES # BLD AUTO: 0.94 K/UL (ref 1–4.8)
LYMPHOCYTES NFR BLD: 7.9 % (ref 22–41)
MCH RBC QN AUTO: 27.6 PG (ref 27–33)
MCHC RBC AUTO-ENTMCNC: 29.2 G/DL (ref 33.6–35)
MCV RBC AUTO: 94.6 FL (ref 81.4–97.8)
MONOCYTES # BLD AUTO: 0.56 K/UL (ref 0–0.85)
MONOCYTES NFR BLD AUTO: 4.7 % (ref 0–13.4)
NEUTROPHILS # BLD AUTO: 9.46 K/UL (ref 2–7.15)
NEUTROPHILS NFR BLD: 79.2 % (ref 44–72)
NRBC # BLD AUTO: 0.02 K/UL
NRBC BLD-RTO: 0.2 /100 WBC
O2/TOTAL GAS SETTING VFR VENT: 30 %
PCO2 BLDA: 41.6 MMHG (ref 26–37)
PCO2 TEMP ADJ BLDA: 42.1 MMHG (ref 26–37)
PH BLDA: 7.38 [PH] (ref 7.4–7.5)
PH TEMP ADJ BLDA: 7.38 [PH] (ref 7.4–7.5)
PLATELET # BLD AUTO: 269 K/UL (ref 164–446)
PMV BLD AUTO: 10.6 FL (ref 9–12.9)
PO2 BLDA: 85 MMHG (ref 64–87)
PO2 TEMP ADJ BLDA: 87 MMHG (ref 64–87)
POTASSIUM SERPL-SCNC: 4.4 MMOL/L (ref 3.6–5.5)
RBC # BLD AUTO: 3.15 M/UL (ref 4.2–5.4)
SAO2 % BLDA: 96 % (ref 93–99)
SODIUM SERPL-SCNC: 134 MMOL/L (ref 135–145)
SPECIMEN DRAWN FROM PATIENT: ABNORMAL
WBC # BLD AUTO: 11.9 K/UL (ref 4.8–10.8)

## 2019-08-18 PROCEDURE — 5A1D70Z PERFORMANCE OF URINARY FILTRATION, INTERMITTENT, LESS THAN 6 HOURS PER DAY: ICD-10-PCS | Performed by: INTERNAL MEDICINE

## 2019-08-18 PROCEDURE — 700111 HCHG RX REV CODE 636 W/ 250 OVERRIDE (IP): Performed by: HOSPITALIST

## 2019-08-18 PROCEDURE — 99292 CRITICAL CARE ADDL 30 MIN: CPT | Performed by: INTERNAL MEDICINE

## 2019-08-18 PROCEDURE — 74018 RADEX ABDOMEN 1 VIEW: CPT

## 2019-08-18 PROCEDURE — 700105 HCHG RX REV CODE 258: Performed by: HOSPITALIST

## 2019-08-18 PROCEDURE — A9270 NON-COVERED ITEM OR SERVICE: HCPCS | Performed by: INTERNAL MEDICINE

## 2019-08-18 PROCEDURE — 82962 GLUCOSE BLOOD TEST: CPT

## 2019-08-18 PROCEDURE — 85025 COMPLETE CBC W/AUTO DIFF WBC: CPT

## 2019-08-18 PROCEDURE — 770022 HCHG ROOM/CARE - ICU (200)

## 2019-08-18 PROCEDURE — 36600 WITHDRAWAL OF ARTERIAL BLOOD: CPT

## 2019-08-18 PROCEDURE — 700111 HCHG RX REV CODE 636 W/ 250 OVERRIDE (IP): Performed by: INTERNAL MEDICINE

## 2019-08-18 PROCEDURE — 700111 HCHG RX REV CODE 636 W/ 250 OVERRIDE (IP)

## 2019-08-18 PROCEDURE — 80048 BASIC METABOLIC PNL TOTAL CA: CPT

## 2019-08-18 PROCEDURE — 94003 VENT MGMT INPAT SUBQ DAY: CPT

## 2019-08-18 PROCEDURE — 71045 X-RAY EXAM CHEST 1 VIEW: CPT

## 2019-08-18 PROCEDURE — 94150 VITAL CAPACITY TEST: CPT

## 2019-08-18 PROCEDURE — 700102 HCHG RX REV CODE 250 W/ 637 OVERRIDE(OP): Performed by: INTERNAL MEDICINE

## 2019-08-18 PROCEDURE — 99291 CRITICAL CARE FIRST HOUR: CPT | Performed by: INTERNAL MEDICINE

## 2019-08-18 PROCEDURE — 82803 BLOOD GASES ANY COMBINATION: CPT

## 2019-08-18 PROCEDURE — 90935 HEMODIALYSIS ONE EVALUATION: CPT

## 2019-08-18 RX ORDER — ONDANSETRON 2 MG/ML
4 INJECTION INTRAMUSCULAR; INTRAVENOUS EVERY 4 HOURS PRN
Status: DISCONTINUED | OUTPATIENT
Start: 2019-08-18 | End: 2019-08-23 | Stop reason: HOSPADM

## 2019-08-18 RX ORDER — ONDANSETRON 2 MG/ML
INJECTION INTRAMUSCULAR; INTRAVENOUS
Status: COMPLETED
Start: 2019-08-18 | End: 2019-08-18

## 2019-08-18 RX ORDER — NALOXONE HYDROCHLORIDE 1 MG/ML
2 INJECTION PARENTERAL EVERY 8 HOURS
Status: DISCONTINUED | OUTPATIENT
Start: 2019-08-18 | End: 2019-08-20

## 2019-08-18 RX ORDER — CARVEDILOL 3.12 MG/1
3.12 TABLET ORAL 2 TIMES DAILY WITH MEALS
Status: DISCONTINUED | OUTPATIENT
Start: 2019-08-18 | End: 2019-08-22

## 2019-08-18 RX ADMIN — DULOXETINE HYDROCHLORIDE 60 MG: 60 CAPSULE, DELAYED RELEASE ORAL at 05:15

## 2019-08-18 RX ADMIN — FENTANYL CITRATE 50 MCG: 0.05 INJECTION, SOLUTION INTRAMUSCULAR; INTRAVENOUS at 17:21

## 2019-08-18 RX ADMIN — SENNOSIDES, DOCUSATE SODIUM 2 TABLET: 50; 8.6 TABLET, FILM COATED ORAL at 05:15

## 2019-08-18 RX ADMIN — NALOXONE HYDROCHLORIDE 2 MG: 1 INJECTION PARENTERAL at 11:17

## 2019-08-18 RX ADMIN — IRON SUCROSE 200 MG: 20 INJECTION, SOLUTION INTRAVENOUS at 05:39

## 2019-08-18 RX ADMIN — GLYCOPYRROLATE 0.5 MG: 1 TABLET ORAL at 19:39

## 2019-08-18 RX ADMIN — METOCLOPRAMIDE 5 MG: 10 TABLET ORAL at 16:10

## 2019-08-18 RX ADMIN — FENTANYL CITRATE 50 MCG: 0.05 INJECTION, SOLUTION INTRAMUSCULAR; INTRAVENOUS at 16:09

## 2019-08-18 RX ADMIN — NALOXONE HYDROCHLORIDE 2 MG: 1 INJECTION PARENTERAL at 21:18

## 2019-08-18 RX ADMIN — Medication 250 MCG/HR: at 06:11

## 2019-08-18 RX ADMIN — CARVEDILOL 3.12 MG: 3.12 TABLET, FILM COATED ORAL at 19:27

## 2019-08-18 RX ADMIN — ASPIRIN 325 MG: 325 TABLET, FILM COATED ORAL at 05:15

## 2019-08-18 RX ADMIN — MAGNESIUM HYDROXIDE 30 ML: 400 SUSPENSION ORAL at 05:28

## 2019-08-18 RX ADMIN — METOCLOPRAMIDE 5 MG: 10 TABLET ORAL at 21:19

## 2019-08-18 RX ADMIN — HEPARIN SODIUM 5000 UNITS: 5000 INJECTION, SOLUTION INTRAVENOUS; SUBCUTANEOUS at 21:19

## 2019-08-18 RX ADMIN — CEFTRIAXONE SODIUM 2 G: 2 INJECTION, POWDER, FOR SOLUTION INTRAMUSCULAR; INTRAVENOUS at 05:31

## 2019-08-18 RX ADMIN — HEPARIN SODIUM 5000 UNITS: 5000 INJECTION, SOLUTION INTRAVENOUS; SUBCUTANEOUS at 05:31

## 2019-08-18 RX ADMIN — ATORVASTATIN CALCIUM 80 MG: 40 TABLET, FILM COATED ORAL at 19:27

## 2019-08-18 RX ADMIN — METRONIDAZOLE 500 MG: 500 TABLET ORAL at 13:20

## 2019-08-18 RX ADMIN — HEPARIN SODIUM 3000 UNITS: 1000 INJECTION, SOLUTION INTRAVENOUS; SUBCUTANEOUS at 12:34

## 2019-08-18 RX ADMIN — ONDANSETRON 4 MG: 2 INJECTION INTRAMUSCULAR; INTRAVENOUS at 00:04

## 2019-08-18 RX ADMIN — ONDANSETRON 4 MG: 2 INJECTION INTRAMUSCULAR; INTRAVENOUS at 06:09

## 2019-08-18 RX ADMIN — GLYCOPYRROLATE 0.5 MG: 1 TABLET ORAL at 05:16

## 2019-08-18 RX ADMIN — HEPARIN SODIUM 5000 UNITS: 5000 INJECTION, SOLUTION INTRAVENOUS; SUBCUTANEOUS at 13:22

## 2019-08-18 RX ADMIN — METOCLOPRAMIDE 5 MG: 10 TABLET ORAL at 11:16

## 2019-08-18 RX ADMIN — METOCLOPRAMIDE 5 MG: 10 TABLET ORAL at 05:16

## 2019-08-18 RX ADMIN — METRONIDAZOLE 500 MG: 500 TABLET ORAL at 21:19

## 2019-08-18 RX ADMIN — SENNOSIDES, DOCUSATE SODIUM 2 TABLET: 50; 8.6 TABLET, FILM COATED ORAL at 19:27

## 2019-08-18 RX ADMIN — CARVEDILOL 3.12 MG: 3.12 TABLET, FILM COATED ORAL at 09:48

## 2019-08-18 RX ADMIN — FAMOTIDINE 20 MG: 20 TABLET ORAL at 19:27

## 2019-08-18 RX ADMIN — LEVOTHYROXINE SODIUM 112 MCG: 112 TABLET ORAL at 05:15

## 2019-08-18 RX ADMIN — METRONIDAZOLE 500 MG: 500 TABLET ORAL at 05:15

## 2019-08-18 ASSESSMENT — PULMONARY FUNCTION TESTS
FVC: 1.7
FVC: 1.35

## 2019-08-18 NOTE — PROGRESS NOTES
Nephrology Progress Note    Date & Time: 8/18/2019  9:13 AM    HPI:  Patient is intubated and sedated. Unable to reach  via phone. History obtained from chart review.  54 y.o. female transferred from outside facility on 8/14/2019 for respiratory failure, suspected ARDS in the setting of multifocal pneumonia and acute on chronic kidney disease. She has a past medical history of CKD, polycystic kidney disease, DM2, hypothyroidism, hypertension, hyperlipidemia, AIME, and previous PE and DVT.       Per chart review, the patient presented to the outside facility with complaints of several days of shortness of breath and subjective fever. Chest x-ray obtained at the outside facility showed multifocal abnormality consistent with multifocal pneumonia the patient was admitted to the outside facility and started on a course of broad-spectrum antibiotics. Unfortunately, during the course of her hospitalization, the patient decompensated with worsening respiratory status.  At that point, development of ARDS was of concern therefore the patient was emergently intubated by the outside physician and patient transferred.     Additional work-up including WBC 15.7 hemoglobin 9.9 hematocrit 31.1 platelet 287 troponin 0.02, sodium 135 potassium 4.2 chloride 109 CO2 6 BUN 5.8 creatinine 5.1 and glucose 191.   Labs on admission here reflect hgb of 8.7, WBC 20.9, ANC 19.37, Na 132, K 5.2, CO2 8, AG 18, BUN 72, Cr 5.6, Ph 5.7, Ca 8.7, Mg 1.8, and LA 1.7.   On ABG Ph of 7.12  Patient admitted to ICU, intubated, sedated and started on multiple abx.     DAILY NEPHROLOGY SUMMARY:   8/15: patient tolerated HD yesterday with 3.5 liters fluid removed. Patient appears to have slight increase in UOP to  168cc overnight and 100 cc this am. Otherwise still intubated and sedated. Ph 7.5 in am. Plan for HD   8/16: No acute events overnight. Patient drowsy but arousable during SBT. Moving all extremities and following  commands. UOP 151cc  "overnight. Creatinine stable, not up trending with normal Na and K   8/17: ongoing pulm edema, attempting to wean from vent   8/18: tolerated HD, planning to extubate in next 24 hours      CURRENT MEDICATIONS: Reviewed  IMAGING STUDIES: Reviewed    ROS  Unable to perform. Patient intubated and sedated.     PHYSICAL EXAM  VS:  /70   Pulse 73   Temp 36.8 °C (98.3 °F) (Temporal)   Resp 20   Ht 1.778 m (5' 10\")   Wt 101.8 kg (224 lb 6.9 oz)   SpO2 97%   BMI 32.20 kg/m²   GENERAL: Lying in bed, wn/wd  HEAD: Normocephalic, atraumatic  EYES: no scleral icterus; normal conjunctiva  NECK: Supple; trachea midline  CV: RRR, S1 and S2 present  LUNGS: scattered crackles, intubated/ventilated  ABDOMEN: Soft, non-tender  EXTREMETIES: trace lower extremity edema, no clubbing or cyanosis      Fluids:  In: 1474.5 [I.V.:84.5; Other:130; Dialysis:300]  Out: 4880     LABS:    Lab Results   Component Value Date/Time    SODIUM 134 (L) 08/18/2019 04:07 AM    POTASSIUM 4.4 08/18/2019 04:07 AM    CHLORIDE 100 08/18/2019 04:07 AM    CO2 26 08/18/2019 04:07 AM    GLUCOSE 127 (H) 08/18/2019 04:07 AM    BUN 39 (H) 08/18/2019 04:07 AM    CREATININE 2.27 (H) 08/18/2019 04:07 AM      Lab Results   Component Value Date/Time    WBC 11.9 (H) 08/18/2019 04:07 AM    RBC 3.15 (L) 08/18/2019 04:07 AM    HEMOGLOBIN 8.7 (L) 08/18/2019 04:07 AM    HEMATOCRIT 29.8 (L) 08/18/2019 04:07 AM    MCV 94.6 08/18/2019 04:07 AM    MCH 27.6 08/18/2019 04:07 AM    MCHC 29.2 (L) 08/18/2019 04:07 AM    MPV 10.6 08/18/2019 04:07 AM    NEUTSPOLYS 79.20 (H) 08/18/2019 04:07 AM    LYMPHOCYTES 7.90 (L) 08/18/2019 04:07 AM    MONOCYTES 4.70 08/18/2019 04:07 AM    EOSINOPHILS 3.90 08/18/2019 04:07 AM    BASOPHILS 0.50 08/18/2019 04:07 AM            ASSESSMENT:  # CAMPOS on CKD: Baseline Cr~2.5 - 3.0   # CKD Stage 4 based on previous labs. Likely secondary to polycystic kidney disease  # HTN - UF today  # Metabolic acidosis improved  # polycystic kidney disease- USG " reflecting this date back to 2012 per chart review  # ARDS, due to multifocal PNA - on AZT, C3, and flagyl- on vent; HD today for volume removal  # Pneumonia- on AZT, C3, and flagyl  # DM2  # hypothyroidism  #anemia - iron deficient  # CKD MBD - phos acceptable 8/17, PTH elevated, no changes today    PLAN:  - RRT today for UF  - change metoprolol to carvedilol  - Daily evaluation for HD consideration  - complete venofer course  - Temporary line in place  - Strict I/Os  - Dose all meds per ESRD  - Daily electrolytes and renal labs

## 2019-08-18 NOTE — PROGRESS NOTES
Patient tolerated her 3.5hr HD and 3500 ml net UF. RIJ CDI. Vs stable during dialysis. Report given to her Primary RN.

## 2019-08-18 NOTE — PROGRESS NOTES
Hd treatment ordered by Dr Zhong started at 1032 and ended at 1234 with net uf of 2500 ml as bp tolerated. Right IJ temporary catheter has good blood flow at arterial port and sluggish on venous. Stable v/s entire treatment with last bp of 122/66 mm Hg, HR- 74, 02 sat of 99% and T- 99.0 F. Flushed CVC with normal saline, instilled heparin 1:1000 per designated amount each catheter and capped. Dressing still clean,dry and intact. Gave report to NIRMAL Chiang. See flow sheet for details.

## 2019-08-18 NOTE — PROGRESS NOTES
"Critical Care Progress Note    Date of admission  8/14/2019    Chief Complaint  54 y.o. female admitted 8/14/2019 with ARDS    Hospital Course    \"54 y.o. female with past medical history of hypertension, PE, DVT, diabetes, stage IV chronic kidney disease presumed to be secondary to polycystic kidney disease, type 2 diabetes, neuropathy, stroke with dysphasia (PEG tube removed), who presented 8/14/2019 as a transfer from [facility] where she presented yesterday for increasing shortness of breath and subjective fever for several days.  Patient was found to have multifocal pneumonia and was admitted to [facility] on broad-spectrum antibiotics however decompensated overnight with worsening hypoxia suggesting ARDS.  Patient was intubated at [facility] and transferred to our facility for higher level care.  Labs are facility show leukocytosis with white blood cell count of 20.9, anemia with a hemoglobin of 8.7, normal platelet count at 276, conference of panel shows a mild hyponatremia 132, potassium 5.2, CO2 8, glucose 294, BUN 72, creatinine 5.6.\"      Interval Problem Update  Reviewed last 24 hour events:   - HD again today   - Neuro: alert, follows   - HR: 60s-80s   - SBP: 140s-160s   - GI: TF at goal   - UOP: 1L/24hrs   - Wallace: yes   - Tm: afeb   - Lines: CVC, HD cath   - PPx: GI pepcid, DVT heparin   - VD #5   - SBT: bradypnea with RR 4-6   - ABG: looks good   - CXR (personally reviewed and compared to prior): clearing edema    Updates: multiple SBTs with apnea and bradypnea. Fentanyl gtt off only PRN pushes to attempt another SBT in the AM.    Yesterday   - No acute events overnight   - Neuro: follows   - HR: 50s-60s   - SBP: 130s-150s   - GI: TF at goal   - UOP: anuric, 623 mL out   - Wallace: yes   - Tm: afeb   - Lines: CVC, HD   - PPx: GI pepcid, DVT heparin   - ABG: , drop PEEP   - VD #4   - SBT: to be completed after PEEP drop   - CXR (personally reviewed and compared to prior): " worsening edema    Review of Systems  Review of Systems   Unable to perform ROS: Intubated        Vital Signs for last 24 hours   Pulse:  [56-89] 73  Resp:  [4-22] 20  BP: (118-184)/(57-81) 149/70  SpO2:  [94 %-98 %] 97 %    Hemodynamic parameters for last 24 hours       Respiratory Information for the last 24 hours  Suresh Vent Mode: APVCMV  Rate (breaths/min): 20  Vt Target (mL): 380  PEEP/CPAP: 10  FiO2: 30  P Support: 5  P MEAN: 14  Control VTE (exp VT): 379    Physical Exam   Physical Exam   Constitutional: She appears well-developed and well-nourished. She appears distressed. She is intubated.   HENT:   Head: Normocephalic and atraumatic.   Right Ear: External ear normal.   Left Ear: External ear normal.   Nose: Nose normal.   Mouth/Throat: Oropharynx is clear and moist.   ETT in position   Eyes: Conjunctivae and EOM are normal.   Neck: Neck supple. No JVD present. No tracheal deviation present.   Right IJ temporary dialysis catheter, left IJ triple-lumen   Cardiovascular: Normal rate, regular rhythm and intact distal pulses.   Pulmonary/Chest: No accessory muscle usage. Bradypnea noted. She is intubated. No respiratory distress. She has decreased breath sounds. She has rales.   Abdominal: Soft. Bowel sounds are normal. She exhibits no distension. There is no tenderness.   obese   Musculoskeletal: Normal range of motion. She exhibits no tenderness or deformity.   Neurological:   Sedated, follows   Skin: Skin is warm and dry. No rash noted.   Psychiatric:   Unable to assess   Nursing note and vitals reviewed.      Medications  Current Facility-Administered Medications   Medication Dose Route Frequency Provider Last Rate Last Dose   • ondansetron (ZOFRAN) syringe/vial injection 4 mg  4 mg Intravenous Q4HRS PRN Héctor Vallecillo M.D.   4 mg at 08/18/19 0609   • iron sucrose (VENOFER) injection 200 mg  200 mg Intravenous DAILY Karla Zhong D.O.   200 mg at 08/18/19 0539   • hydrALAZINE (APRESOLINE)  injection 10-20 mg  10-20 mg Intravenous Q4HRS PRN Héctor Vallecillo M.D.   10 mg at 08/17/19 2335   • aspirin (ASA) tablet 325 mg  325 mg Per NG Tube DAILY Doron Robles Jr., D.O.   325 mg at 08/18/19 0515   • atorvastatin (LIPITOR) tablet 80 mg  80 mg Per NG Tube Q EVENING Doron Robles Jr. D.O.   80 mg at 08/17/19 1727   • levothyroxine (SYNTHROID) tablet 112 mcg  112 mcg Enteral Tube DAILY Doron Robles Jr., D.O.   112 mcg at 08/18/19 0515   • metoclopramide (REGLAN) tablet 5 mg  5 mg Per NG Tube 4X/DAY ACHS Doron Robles Jr., D.O.   5 mg at 08/18/19 0516   • glycopyrrolate (ROBINUL) tablet 0.5 mg  0.5 mg Enteral Tube BID Doron Robles Jr., D.O.   0.5 mg at 08/18/19 0516   • DULoxetine (CYMBALTA) capsule 60 mg  60 mg Enteral Tube DAILY Doron Robles Jr., D.O.   60 mg at 08/18/19 0515   • metroNIDAZOLE (FLAGYL) tablet 500 mg  500 mg Enteral Tube Q8HRS Doron Robles Jr., D.O.   500 mg at 08/18/19 0515   • insulin regular (HUMULIN R) injection 2-9 Units  2-9 Units Subcutaneous Q6HRS Héctor Vallecillo M.D.   Stopped at 08/15/19 0600    And   • glucose 4 g chewable tablet 16 g  16 g Oral Q15 MIN PRN Héctor Vallecillo M.D.        And   • DEXTROSE 10% BOLUS 250 mL  250 mL Intravenous Q15 MIN PRN Héctor Vallecillo M.D.       • Respiratory Care per Protocol   Nebulization Continuous RT Doron Robles Jr. D.O.       • MD Alert...ICU Electrolyte Replacement per Pharmacy   Other PHARMACY TO DOSE Doron Robles Jr. D.O.       • lidocaine (XYLOCAINE) 1 % injection 1-2 mL  1-2 mL Tracheal Tube Q30 MIN PRN Doron Robles Jr., D.O.       • Pharmacy Consult: Enteral tube insertion - review meds/change route/product selection   Other PHARMACY TO DOSE GISELA Velez Jr..O.       • fentaNYL (SUBLIMAZE) injection 100 mcg  100 mcg Intravenous Q15 MIN PRN GISELA Velez Jr..O.   100 mcg at 08/17/19 2301    And   • fentaNYL (SUBLIMAZE) injection 200 mcg  200 mcg Intravenous Q15 MIN PRN GISELA Velez Jr..O.         And   • fentaNYL (SUBLIMAZE) 50 mcg/mL in 50mL (Continuous Infusion)   Intravenous Continuous Doron Robles Jr. D.O. 5 mL/hr at 08/18/19 0706 250 mcg/hr at 08/18/19 0706    And   • propofol (DIPRIVAN) injection  0-80 mcg/kg/min Intravenous Continuous Doron Robles Jr. D.O.   Stopped at 08/16/19 1001   • heparin injection 5,000 Units  5,000 Units Subcutaneous Q8HRS Berna Cavazos M.D.   5,000 Units at 08/18/19 0531   • cefTRIAXone (ROCEPHIN) 2 g in  mL IVPB  2,000 mg Intravenous Q24HRS Berna Cavazos M.D.   Stopped at 08/18/19 0601   • polyethylene glycol/lytes (MIRALAX) PACKET 1 Packet  1 Packet Enteral Tube QDAY PRN Doron Robles Jr. D.O.   1 Packet at 08/17/19 0935    And   • senna-docusate (PERICOLACE or SENOKOT S) 8.6-50 MG per tablet 2 Tab  2 Tab Enteral Tube BID Droon Robles Jr. D.O.   2 Tab at 08/18/19 0515    And   • magnesium hydroxide (MILK OF MAGNESIA) suspension 30 mL  30 mL Enteral Tube QDAY PRN Doron Robles Jr. D.O.   30 mL at 08/18/19 0528    And   • bisacodyl (DULCOLAX) suppository 10 mg  10 mg Rectal QDAY PRN Doron Robles Jr., D.O.       • famotidine (PEPCID) tablet 20 mg  20 mg Enteral Tube Q EVENING Doron Robles Jr., D.O.   20 mg at 08/17/19 1727   • lidocaine (XYLOCAINE) 1 % injection 1 mL  1 mL Intradermal PRN Andrés Salazar M.D.       • heparin injection 2,500 Units  2,500 Units Intravenous DIALYSIS PRN Andrés Salazar M.D.   2,500 Units at 08/15/19 1047   • heparin injection 3,000 Units  3,000 Units Intracatheter DIALYSIS PRN Juan Diego Langston M.D.   3,000 Units at 08/17/19 1825       Fluids    Intake/Output Summary (Last 24 hours) at 8/18/2019 0817  Last data filed at 8/18/2019 0600  Gross per 24 hour   Intake 1242.5 ml   Output 4690 ml   Net -3447.5 ml       Laboratory  Recent Labs     08/16/19  0509 08/17/19  0338 08/18/19  0331   ISTATAPH 7.465 7.354* 7.380*   ISTATAPCO2 37.4* 46.8* 41.6*   ISTATAPO2 59* 68 85   ISTATATCO2 28 28 26   TNJCVCV2SEK 92* 92* 96    ISTATARTHCO3 26.9* 26.1* 24.6   ISTATARTBE 3 0 -1   ISTATTEMP 98.8 F 97.5 F 99.1 F   ISTATFIO2 60 30 30   ISTATSPEC Arterial Arterial Arterial   ISTATAPHTC 7.464 7.363* 7.376*   HUAAVPRM6EE 60* 65 87         Recent Labs     08/16/19 0512 08/17/19 0454 08/18/19  0407   SODIUM 138 136 134*   POTASSIUM 3.7 4.0 4.4   CHLORIDE 101 100 100   CO2 26 27 26   BUN 35* 60* 39*   CREATININE 2.60* 3.44* 2.27*   MAGNESIUM 2.0 2.2  --    PHOSPHORUS 2.3* 3.6  --    CALCIUM 7.9* 8.4* 8.9     Recent Labs     08/15/19  1010 08/16/19  0512 08/17/19 0454 08/18/19 0407   ALTSGPT 5  --   --   --    ASTSGOT 10*  --   --   --    ALKPHOSPHAT 67  --   --   --    TBILIRUBIN 0.2  --   --   --    DBILIRUBIN <0.1  --   --   --    PREALBUMIN 10.0*  --   --   --    GLUCOSE 113* 130* 128* 127*     Recent Labs     08/15/19  1010 08/16/19  0512 08/17/19 0454 08/18/19 0407   WBC  --  7.7 8.6 11.9*   NEUTSPOLYS  --  71.20 74.40* 79.20*   LYMPHOCYTES  --  20.90* 13.60* 7.90*   MONOCYTES  --  4.80 5.30 4.70   EOSINOPHILS  --  2.10 4.20 3.90   BASOPHILS  --  0.40 0.50 0.50   ASTSGOT 10*  --   --   --    ALTSGPT 5  --   --   --    ALKPHOSPHAT 67  --   --   --    TBILIRUBIN 0.2  --   --   --      Recent Labs     08/16/19  0512 08/16/19  1052 08/17/19 0454 08/18/19 0407   RBC 2.76*  --  2.89* 3.15*   HEMOGLOBIN 7.7*  --  8.2* 8.7*   HEMATOCRIT 25.1*  --  26.9* 29.8*   PLATELETCT 230  --  230 269   IRON  --  37*  --   --    FERRITIN  --  311.4*  --   --    TOTIRONBC  --  140*  --   --        Imaging  X-Ray:  I have personally reviewed the images and compared with prior images.    Assessment/Plan  * Respiratory failure (HCC)- (present on admission)  Assessment & Plan  Likely due to pneumonia and ARDS +/- volume overload, intubated 8/14/2019 at outside hospital  RT/O2 protocols  Daily and PRN ABGs  Titration of ventilator therapy based on ABGs and patient's status  Sedation as tolerated/indicated  Daily CXR  HOB >30 degrees and peridex for VAP  prevention  Pepcid for GI prophylaxis  SAT/SBT when able (ABCDEF Bundle)  Early mobility  PEEP/FiO2 ladder per ARDSnet  Low tidal volume ventilation  SBT starting today, likely extubate in the next 1-2 days when bradypnea resolves. Stop fentanyl gtt    Multifocal pneumonia- (present on admission)  Assessment & Plan  Chest x-ray, P:F and history suggestive of ARDS.  Continue to treat for community acquired pneumonia with Rocephin, azithromycin, Flagyl for 7 days  Patient is at high risk for aspiration given history of dysphasia with PEG tube placement (now removed)    Acute on chronic kidney failure (HCC)- (present on admission)  Assessment & Plan  Likely secondary to sepsis and hypoperfusion  Oliguric  Renal dose meds, avoid nephrotoxins  Strict I/Os  Follow renal function  Continue HD per nephrology, UF to assist in ventilator liberation    Sepsis (HCC)- (present on admission)  Assessment & Plan  This is severe sepsis with the following associated acute organ dysfunction(s): acute kidney failure, acute respiratory failure.   sepsis protocol  Source likely pulmonary  Continue source targeted antibiotics: Rocephin, azithromycin, Flagyl for 7 days  Adequately resuscitated initially, now de-resuscitating   Pressors if needed to maintain MAP >65 mmHg  blood, respiratory and urine cultures negative  Lactate normal    ARDS (adult respiratory distress syndrome) (HCC)- (present on admission)  Assessment & Plan  Meets diagnostic requirements by Oxford criteria  Bilateral, multifocal opacities.  Considered unlikely to be cardiac  Onset < 7 days  PaO2/FiO2 283 consistent with mild ARDS  Low tidal volume ventilation  PEEP/FiO2 ladder per ARDSnet  Judicious IV fluid use to maintain CVP <4 (FACTT trial), ongoing dialysis for volume control  Progress with full tube feeding (Oma trial)  Low threshold to progress with further ARDS treatments including prone positioning, paralysis only if needed (RAUL trial)  Flolan and APRV to be  reserved for salvage therapies  Elizabeth Lung Score: <2    HLD (hyperlipidemia)- (present on admission)  Assessment & Plan  Continue Lipitor 80 mg nightly    History of venous thromboembolism- (present on admission)  Assessment & Plan  Only on aspirin at home  Will need to clarify anticoagulation strategy with family when they are available    Diabetes mellitus type 2, uncontrolled, with complications (HCC)- (present on admission)  Assessment & Plan  Goal blood glucose 120-180  sliding scale insulin, accuchecks  hypoglycemia protocol  A1c 8.3, 1 year ago    Hypothyroid- (present on admission)  Assessment & Plan  Continue levothyroxine       VTE:  Heparin  Ulcer: H2 Antagonist  Lines: Central Line  Ongoing indication addressed and Wallace Catheter  Ongoing indication addressed    I have performed a physical exam and reviewed and updated ROS and Plan today (8/18/2019). In review of yesterday's note (8/17/2019), there are no changes except as documented above.     Continuing to actively manage ventilator, volume control with HD, titrating sedation. This patient is critically ill, at high risk for decompensation leading to worsening vital organ dysfunction and death without critical care interventions.    Discussed patient condition and risk of morbidity and/or mortality with Hospitalist, RN, RT, Pharmacy, Dietary, , Code status disscussed, Charge nurse / hot rounds, Patient and nephrology     The patient remains critically ill.  Critical care time = 80 minutes in directly providing and coordinating critical care and extensive data review.  No time overlap and excludes procedures.

## 2019-08-19 ENCOUNTER — APPOINTMENT (OUTPATIENT)
Dept: RADIOLOGY | Facility: MEDICAL CENTER | Age: 54
DRG: 870 | End: 2019-08-19
Attending: INTERNAL MEDICINE
Payer: MEDICAID

## 2019-08-19 LAB
ACTION RANGE TRIGGERED IACRT: NO
ACTION RANGE TRIGGERED IACRT: NO
ALBUMIN SERPL BCP-MCNC: 3.2 G/DL (ref 3.2–4.9)
ALBUMIN/GLOB SERPL: 1 G/DL
ALP SERPL-CCNC: 232 U/L (ref 30–99)
ALT SERPL-CCNC: 15 U/L (ref 2–50)
ANION GAP SERPL CALC-SCNC: 14 MMOL/L (ref 0–11.9)
ANISOCYTOSIS BLD QL SMEAR: ABNORMAL
AST SERPL-CCNC: 14 U/L (ref 12–45)
BASE EXCESS BLDA CALC-SCNC: -2 MMOL/L (ref -4–3)
BASE EXCESS BLDA CALC-SCNC: -4 MMOL/L (ref -4–3)
BASOPHILS # BLD AUTO: 0.9 % (ref 0–1.8)
BASOPHILS # BLD: 0.11 K/UL (ref 0–0.12)
BILIRUB SERPL-MCNC: 0.3 MG/DL (ref 0.1–1.5)
BODY TEMPERATURE: ABNORMAL DEGREES
BODY TEMPERATURE: ABNORMAL DEGREES
BUN SERPL-MCNC: 39 MG/DL (ref 8–22)
CALCIUM SERPL-MCNC: 9.4 MG/DL (ref 8.5–10.5)
CHLORIDE SERPL-SCNC: 99 MMOL/L (ref 96–112)
CO2 BLDA-SCNC: 22 MMOL/L (ref 20–33)
CO2 BLDA-SCNC: 26 MMOL/L (ref 20–33)
CO2 SERPL-SCNC: 23 MMOL/L (ref 20–33)
CREAT SERPL-MCNC: 2.79 MG/DL (ref 0.5–1.4)
EOSINOPHIL # BLD AUTO: 0.11 K/UL (ref 0–0.51)
EOSINOPHIL NFR BLD: 0.9 % (ref 0–6.9)
ERYTHROCYTE [DISTWIDTH] IN BLOOD BY AUTOMATED COUNT: 49.7 FL (ref 35.9–50)
GLOBULIN SER CALC-MCNC: 3.3 G/DL (ref 1.9–3.5)
GLUCOSE SERPL-MCNC: 90 MG/DL (ref 65–99)
HCO3 BLDA-SCNC: 21.2 MMOL/L (ref 17–25)
HCO3 BLDA-SCNC: 24.9 MMOL/L (ref 17–25)
HCT VFR BLD AUTO: 31.8 % (ref 37–47)
HGB BLD-MCNC: 9.1 G/DL (ref 12–16)
HOROWITZ INDEX BLDA+IHG-RTO: 327 MM[HG]
HOROWITZ INDEX BLDA+IHG-RTO: 360 MM[HG]
HYPOCHROMIA BLD QL SMEAR: ABNORMAL
INST. QUALIFIED PATIENT IIQPT: YES
INST. QUALIFIED PATIENT IIQPT: YES
LYMPHOCYTES # BLD AUTO: 2.11 K/UL (ref 1–4.8)
LYMPHOCYTES NFR BLD: 17.3 % (ref 22–41)
MANUAL DIFF BLD: NORMAL
MCH RBC QN AUTO: 27.5 PG (ref 27–33)
MCHC RBC AUTO-ENTMCNC: 28.6 G/DL (ref 33.6–35)
MCV RBC AUTO: 96.1 FL (ref 81.4–97.8)
MICROCYTES BLD QL SMEAR: ABNORMAL
MONOCYTES # BLD AUTO: 0.55 K/UL (ref 0–0.85)
MONOCYTES NFR BLD AUTO: 4.5 % (ref 0–13.4)
MORPHOLOGY BLD-IMP: NORMAL
NEUTROPHILS # BLD AUTO: 9.32 K/UL (ref 2–7.15)
NEUTROPHILS NFR BLD: 76.4 % (ref 44–72)
NRBC # BLD AUTO: 0 K/UL
NRBC BLD-RTO: 0 /100 WBC
O2/TOTAL GAS SETTING VFR VENT: 30 %
O2/TOTAL GAS SETTING VFR VENT: 30 %
PCO2 BLDA: 39 MMHG (ref 26–37)
PCO2 BLDA: 48.9 MMHG (ref 26–37)
PCO2 TEMP ADJ BLDA: 49.5 MMHG (ref 26–37)
PH BLDA: 7.32 [PH] (ref 7.4–7.5)
PH BLDA: 7.34 [PH] (ref 7.4–7.5)
PH TEMP ADJ BLDA: 7.31 [PH] (ref 7.4–7.5)
PHOSPHATE SERPL-MCNC: 4.4 MG/DL (ref 2.5–4.5)
PLATELET # BLD AUTO: 306 K/UL (ref 164–446)
PLATELET BLD QL SMEAR: NORMAL
PMV BLD AUTO: 10.4 FL (ref 9–12.9)
PO2 BLDA: 108 MMHG (ref 64–87)
PO2 BLDA: 98 MMHG (ref 64–87)
PO2 TEMP ADJ BLDA: 100 MMHG (ref 64–87)
POLYCHROMASIA BLD QL SMEAR: NORMAL
POTASSIUM SERPL-SCNC: 4.6 MMOL/L (ref 3.6–5.5)
PROT SERPL-MCNC: 6.5 G/DL (ref 6–8.2)
RBC # BLD AUTO: 3.31 M/UL (ref 4.2–5.4)
RBC BLD AUTO: PRESENT
SAO2 % BLDA: 97 % (ref 93–99)
SAO2 % BLDA: 98 % (ref 93–99)
SODIUM SERPL-SCNC: 136 MMOL/L (ref 135–145)
SPECIMEN DRAWN FROM PATIENT: ABNORMAL
SPECIMEN DRAWN FROM PATIENT: ABNORMAL
WBC # BLD AUTO: 12.2 K/UL (ref 4.8–10.8)

## 2019-08-19 PROCEDURE — 770022 HCHG ROOM/CARE - ICU (200)

## 2019-08-19 PROCEDURE — 84100 ASSAY OF PHOSPHORUS: CPT

## 2019-08-19 PROCEDURE — 700102 HCHG RX REV CODE 250 W/ 637 OVERRIDE(OP): Performed by: INTERNAL MEDICINE

## 2019-08-19 PROCEDURE — 700111 HCHG RX REV CODE 636 W/ 250 OVERRIDE (IP): Performed by: INTERNAL MEDICINE

## 2019-08-19 PROCEDURE — 94003 VENT MGMT INPAT SUBQ DAY: CPT

## 2019-08-19 PROCEDURE — 85007 BL SMEAR W/DIFF WBC COUNT: CPT

## 2019-08-19 PROCEDURE — 85027 COMPLETE CBC AUTOMATED: CPT

## 2019-08-19 PROCEDURE — 36600 WITHDRAWAL OF ARTERIAL BLOOD: CPT

## 2019-08-19 PROCEDURE — 80053 COMPREHEN METABOLIC PANEL: CPT

## 2019-08-19 PROCEDURE — 700111 HCHG RX REV CODE 636 W/ 250 OVERRIDE (IP): Performed by: STUDENT IN AN ORGANIZED HEALTH CARE EDUCATION/TRAINING PROGRAM

## 2019-08-19 PROCEDURE — 92610 EVALUATE SWALLOWING FUNCTION: CPT

## 2019-08-19 PROCEDURE — 99291 CRITICAL CARE FIRST HOUR: CPT | Performed by: INTERNAL MEDICINE

## 2019-08-19 PROCEDURE — 94150 VITAL CAPACITY TEST: CPT

## 2019-08-19 PROCEDURE — A9270 NON-COVERED ITEM OR SERVICE: HCPCS | Performed by: INTERNAL MEDICINE

## 2019-08-19 PROCEDURE — 700111 HCHG RX REV CODE 636 W/ 250 OVERRIDE (IP): Performed by: HOSPITALIST

## 2019-08-19 PROCEDURE — 700105 HCHG RX REV CODE 258: Performed by: HOSPITALIST

## 2019-08-19 PROCEDURE — 71045 X-RAY EXAM CHEST 1 VIEW: CPT

## 2019-08-19 PROCEDURE — 82803 BLOOD GASES ANY COMBINATION: CPT | Mod: 91

## 2019-08-19 RX ORDER — FUROSEMIDE 10 MG/ML
80 INJECTION INTRAMUSCULAR; INTRAVENOUS
Status: DISCONTINUED | OUTPATIENT
Start: 2019-08-19 | End: 2019-08-20

## 2019-08-19 RX ADMIN — SENNOSIDES, DOCUSATE SODIUM 2 TABLET: 50; 8.6 TABLET, FILM COATED ORAL at 18:35

## 2019-08-19 RX ADMIN — HEPARIN SODIUM 5000 UNITS: 5000 INJECTION, SOLUTION INTRAVENOUS; SUBCUTANEOUS at 15:01

## 2019-08-19 RX ADMIN — BISACODYL 10 MG: 10 SUPPOSITORY RECTAL at 05:28

## 2019-08-19 RX ADMIN — ATORVASTATIN CALCIUM 80 MG: 40 TABLET, FILM COATED ORAL at 18:35

## 2019-08-19 RX ADMIN — METOCLOPRAMIDE 5 MG: 10 TABLET ORAL at 11:24

## 2019-08-19 RX ADMIN — CEFTRIAXONE SODIUM 2 G: 2 INJECTION, POWDER, FOR SOLUTION INTRAMUSCULAR; INTRAVENOUS at 05:13

## 2019-08-19 RX ADMIN — IRON SUCROSE 200 MG: 20 INJECTION, SOLUTION INTRAVENOUS at 05:17

## 2019-08-19 RX ADMIN — CARVEDILOL 3.12 MG: 3.12 TABLET, FILM COATED ORAL at 18:35

## 2019-08-19 RX ADMIN — METOCLOPRAMIDE 5 MG: 10 TABLET ORAL at 17:00

## 2019-08-19 RX ADMIN — METOCLOPRAMIDE 5 MG: 10 TABLET ORAL at 21:26

## 2019-08-19 RX ADMIN — ASPIRIN 325 MG: 325 TABLET, FILM COATED ORAL at 05:05

## 2019-08-19 RX ADMIN — FENTANYL CITRATE 50 MCG: 0.05 INJECTION, SOLUTION INTRAMUSCULAR; INTRAVENOUS at 22:33

## 2019-08-19 RX ADMIN — METOCLOPRAMIDE 5 MG: 10 TABLET ORAL at 05:06

## 2019-08-19 RX ADMIN — NALOXONE HYDROCHLORIDE 2 MG: 1 INJECTION PARENTERAL at 21:27

## 2019-08-19 RX ADMIN — ONDANSETRON 4 MG: 2 INJECTION INTRAMUSCULAR; INTRAVENOUS at 11:45

## 2019-08-19 RX ADMIN — FUROSEMIDE 80 MG: 10 INJECTION, SOLUTION INTRAMUSCULAR; INTRAVENOUS at 15:02

## 2019-08-19 RX ADMIN — METRONIDAZOLE 500 MG: 500 TABLET ORAL at 15:01

## 2019-08-19 RX ADMIN — FUROSEMIDE 80 MG: 10 INJECTION, SOLUTION INTRAMUSCULAR; INTRAVENOUS at 11:23

## 2019-08-19 RX ADMIN — FENTANYL CITRATE 100 MCG: 50 INJECTION, SOLUTION INTRAMUSCULAR; INTRAVENOUS at 03:54

## 2019-08-19 RX ADMIN — FAMOTIDINE 20 MG: 20 TABLET ORAL at 18:39

## 2019-08-19 RX ADMIN — HEPARIN SODIUM 5000 UNITS: 5000 INJECTION, SOLUTION INTRAVENOUS; SUBCUTANEOUS at 05:22

## 2019-08-19 RX ADMIN — CARVEDILOL 3.12 MG: 3.12 TABLET, FILM COATED ORAL at 06:49

## 2019-08-19 RX ADMIN — SENNOSIDES, DOCUSATE SODIUM 2 TABLET: 50; 8.6 TABLET, FILM COATED ORAL at 05:06

## 2019-08-19 RX ADMIN — GLYCOPYRROLATE 0.5 MG: 1 TABLET ORAL at 05:06

## 2019-08-19 RX ADMIN — LEVOTHYROXINE SODIUM 112 MCG: 112 TABLET ORAL at 05:06

## 2019-08-19 RX ADMIN — DULOXETINE HYDROCHLORIDE 60 MG: 60 CAPSULE, DELAYED RELEASE ORAL at 05:06

## 2019-08-19 RX ADMIN — METRONIDAZOLE 500 MG: 500 TABLET ORAL at 05:06

## 2019-08-19 RX ADMIN — HEPARIN SODIUM 5000 UNITS: 5000 INJECTION, SOLUTION INTRAVENOUS; SUBCUTANEOUS at 21:26

## 2019-08-19 RX ADMIN — METRONIDAZOLE 500 MG: 500 TABLET ORAL at 21:27

## 2019-08-19 RX ADMIN — NALOXONE HYDROCHLORIDE 2 MG: 1 INJECTION PARENTERAL at 05:13

## 2019-08-19 ASSESSMENT — LIFESTYLE VARIABLES: EVER_SMOKED: NEVER

## 2019-08-19 ASSESSMENT — COGNITIVE AND FUNCTIONAL STATUS - GENERAL
DRESSING REGULAR LOWER BODY CLOTHING: A LOT
DRESSING REGULAR UPPER BODY CLOTHING: A LOT
STANDING UP FROM CHAIR USING ARMS: A LOT
TOILETING: A LOT
HELP NEEDED FOR BATHING: A LOT
MOVING FROM LYING ON BACK TO SITTING ON SIDE OF FLAT BED: A LOT
DAILY ACTIVITIY SCORE: 12
PERSONAL GROOMING: A LOT
SUGGESTED CMS G CODE MODIFIER DAILY ACTIVITY: CL
SUGGESTED CMS G CODE MODIFIER MOBILITY: CL
MOVING TO AND FROM BED TO CHAIR: A LOT
TURNING FROM BACK TO SIDE WHILE IN FLAT BAD: A LOT
EATING MEALS: A LOT
WALKING IN HOSPITAL ROOM: A LOT
CLIMB 3 TO 5 STEPS WITH RAILING: A LOT
MOBILITY SCORE: 12

## 2019-08-19 ASSESSMENT — PULMONARY FUNCTION TESTS: FVC: 1.7

## 2019-08-19 NOTE — DISCHARGE PLANNING
Care Transition Team Assessment  In the case of an emergency, pt's legal NOK is grandmother Kristine Alanis     RNCM met with pt at bedside and obtained the information used in this assessment. Pt verified accuracy of facesheet. Pt lives in alone in single story home.  Prior to current hospitalization, pt was completely independent in ADLS/IADLS. Pt drives and is able to attend necessary MD appointments.  Pt has no financial concerns. Pt has a good support system. Pt denies any hx of substance use and denies any dx of mh.     Information Source:pt  Orientation : Oriented x 4  Information Given By: Patient  Informant's Name: (Bryan)  Who is responsible for making decisions for patient? : Patient         Elopement Risk  Legal Hold: No  Ambulatory or Self Mobile in Wheelchair: No-Not an Elopement Risk  Elopement Risk: Not at Risk for Elopement    Interdisciplinary Discharge Planning  Does Admitting Nurse Feel This Could be a Complex Discharge?: No  Primary Care Physician: (Kandace CHARLES)  Lives with - Patient's Self Care Capacity: Alone and Able to Care For Self  Support Systems: Family Member(s)  Housing / Facility: [address]  Do You Take your Prescribed Medications Regularly: Yes  Mobility Issues: No  Prior Services: None    Discharge Preparedness  What are your discharge supports?: Child  Prior Functional Level: Ambulatory, Independent with Activities of Daily Living, Independent with Medication Management  Difficulity with ADLs: None  Difficulity with IADLs: None    Functional Assesment  Prior Functional Level: Ambulatory, Independent with Activities of Daily Living, Independent with Medication Management    Finances  Financial Barriers to Discharge: No  Prescription Coverage: Yes                        Psychological Assessment  History of Substance Abuse: None  History of Psychiatric Problems: No

## 2019-08-19 NOTE — CARE PLAN
Problem: Communication  Goal: The ability to communicate needs accurately and effectively will improve  Outcome: NOT MET  Intervention: [place] patient and significant other/support system to call light to alert staff of needs  Flowsheets (Taken 8/18/2019 2200)  Oriented to:: Call Light & Bedside Controls; Bedside Rail Policy  Intervention: Reorient patient to environment as needed  Flowsheets (Taken 8/18/2019 2200)  Oriented to:: Call Light & Bedside Controls; Bedside Rail Policy  Note:   Patient reoriented to diagnosis and plan of care. Education given on use of call light.   Intervention: Develop alternate methods of communication with patient and significant other/support system  Note:   Patient provided with pen and paper to write, but unable to perform effectively. Patient also provided with letter board.      Problem: Bowel/Gastric:  Goal: Normal bowel function is maintained or improved  Outcome: NOT MET  Intervention: Educate patient and significant other/support system about signs and symptoms of constipation and interventions to implement  Note:   Patient educated on signs and symptoms of constipation and information provided on interventions.

## 2019-08-19 NOTE — RESPIRATORY CARE
COPD EDUCATION by COPD CLINICAL EDUCATOR  8/19/2019 at 6:32 AM by Shawna Braun     Patient reviewed by COPD education team. Patient does not have a history or diagnosis of COPD and is a non-smoker, therefore does not qualify for the COPD program.

## 2019-08-19 NOTE — RESPIRATORY CARE
SBT done and tolerated well. Pt has had consistent low RR ranging from 4-12 bpm but achieving adequate Ve d/t high Vt. Flow trigger dropped to 1L which seemed to help during SBT. NIF -44 and FVC 1.7. ABG done after 1 hr on spont pH 7.34,CO2 39, O2 108, HCO3 21.

## 2019-08-19 NOTE — PROGRESS NOTES
Nephrology Progress Note    Date & Time: 8/19/2019  10:54 AM    HPI:  Patient is intubated and sedated. Unable to reach  via phone. History obtained from chart review.  54 y.o. female transferred from outside facility on 8/14/2019 for respiratory failure, suspected ARDS in the setting of multifocal pneumonia and acute on chronic kidney disease. She has a past medical history of CKD, polycystic kidney disease, DM2, hypothyroidism, hypertension, hyperlipidemia, AIME, and previous PE and DVT.       Per chart review, the patient presented to the outside facility with complaints of several days of shortness of breath and subjective fever. Chest x-ray obtained at the outside facility showed multifocal abnormality consistent with multifocal pneumonia the patient was admitted to the outside facility and started on a course of broad-spectrum antibiotics. Unfortunately, during the course of her hospitalization, the patient decompensated with worsening respiratory status.  At that point, development of ARDS was of concern therefore the patient was emergently intubated by the outside physician and patient transferred.     Additional work-up including WBC 15.7 hemoglobin 9.9 hematocrit 31.1 platelet 287 troponin 0.02, sodium 135 potassium 4.2 chloride 109 CO2 6 BUN 5.8 creatinine 5.1 and glucose 191.   Labs on admission here reflect hgb of 8.7, WBC 20.9, ANC 19.37, Na 132, K 5.2, CO2 8, AG 18, BUN 72, Cr 5.6, Ph 5.7, Ca 8.7, Mg 1.8, and LA 1.7.   On ABG Ph of 7.12  Patient admitted to ICU, intubated, sedated and started on multiple abx.     DAILY NEPHROLOGY SUMMARY:   8/15: patient tolerated HD yesterday with 3.5 liters fluid removed. Patient appears to have slight increase in UOP to  168cc overnight and 100 cc this am. Otherwise still intubated and sedated. Ph 7.5 in am. Plan for HD   8/16: No acute events overnight. Patient drowsy but arousable during SBT. Moving all extremities and following  commands. UOP 151cc  "overnight. Creatinine stable, not up trending with normal Na and K   8/17: ongoing pulm edema, attempting to wean from vent   8/18: tolerated HD, planning to extubate in next 24 hours    8/19: Patient extubated today. Tolerating well, on NC. Drowsy but responsive and easily arousable. Trial of lasix 80mg I V BID today. Monitor UOP.       CURRENT MEDICATIONS: Reviewed  IMAGING STUDIES: Reviewed    ROS  General:  Has some weakness and malaise  HEENT:  No vision changes, no hearing changes  CV:  No chest pain, no palpitations  Lungs:  No cough; no PND  GI:  No Nausea; no vomiting  : No dysuria or gross hematuria- Urinary catheter  MSK:  No joint pain; no trauma  Skin: No rashes; no lesions  Neuro: No tremors; no LOC  Psych: No depression; no anxiety. drowsy      PHYSICAL EXAM  VS:  /87   Pulse 87   Temp 37.8 °C (100.1 °F) (Temporal)   Resp 19   Ht 1.778 m (5' 10\")   Wt 96.1 kg (211 lb 13.8 oz)   SpO2 96%   BMI 30.40 kg/m²   GENERAL: Lying in bed, wn/wd  HEAD: Normocephalic, atraumatic  EYES: no scleral icterus; normal conjunctiva  NECK: Supple; trachea midline  CV: RRR, S1 and S2 present  LUNGS: scattered crackles  ABDOMEN: Soft, non-tender  EXTREMETIES: trace lower extremity edema, no clubbing or cyanosis      Fluids:  In: 1287 [I.V.:27; Other:260; Dialysis:1000]  Out: 3876     LABS:    Lab Results   Component Value Date/Time    SODIUM 136 08/19/2019 04:01 AM    POTASSIUM 4.6 08/19/2019 04:01 AM    CHLORIDE 99 08/19/2019 04:01 AM    CO2 23 08/19/2019 04:01 AM    GLUCOSE 90 08/19/2019 04:01 AM    BUN 39 (H) 08/19/2019 04:01 AM    CREATININE 2.79 (H) 08/19/2019 04:01 AM      Lab Results   Component Value Date/Time    WBC 12.2 (H) 08/19/2019 04:01 AM    RBC 3.31 (L) 08/19/2019 04:01 AM    HEMOGLOBIN 9.1 (L) 08/19/2019 04:01 AM    HEMATOCRIT 31.8 (L) 08/19/2019 04:01 AM    MCV 96.1 08/19/2019 04:01 AM    MCH 27.5 08/19/2019 04:01 AM    MCHC 28.6 (L) 08/19/2019 04:01 AM    MPV 10.4 08/19/2019 04:01 AM    " NEUTSPOLYS 76.40 (H) 08/19/2019 04:01 AM    LYMPHOCYTES 17.30 (L) 08/19/2019 04:01 AM    MONOCYTES 4.50 08/19/2019 04:01 AM    EOSINOPHILS 0.90 08/19/2019 04:01 AM    BASOPHILS 0.90 08/19/2019 04:01 AM    HYPOCHROMIA 1+ 08/19/2019 04:01 AM    ANISOCYTOSIS 1+ 08/19/2019 04:01 AM            ASSESSMENT:  # CAMPOS on CKD: Baseline Cr~2.5 - 3.0   # CKD Stage 4 based on previous labs. Likely secondary to polycystic kidney disease  # HTN - UF today  # Metabolic acidosis improved  # polycystic kidney disease- [facility] reflecting this date back to 2012 per chart review  # ARDS, due to multifocal PNA - on AZT, C3, and flagyl- extubated 8/19/2019  # Pneumonia- on C3, and flagyl  # DM2  # hypothyroidism  #anemia - iron deficient - IV replacement  # CKD MBD - phos acceptable, PTH elevated     PLAN:  - RRT not necessary today  - Trial of Lasix 80mg IV BID  - Daily evaluation for HD consideration  - Temporary line in place  - Strict I/Os  - Dose all meds per ESRD  - Daily electrolytes and renal labs

## 2019-08-19 NOTE — THERAPY
SPEECH PATHOLOGY:  Order received for swallow evaluation.  Patient just extubated this morning, and although she has an NGT, she is not receiving feeds via NGT due to gagging on tube.  RN asking if patient can possibly be seen this afternoon.  SLP will try to see later this afternoon.  Thank you.

## 2019-08-19 NOTE — PROGRESS NOTES
"Critical Care Progress Note    Date of admission  8/14/2019    Chief Complaint  54 y.o. female admitted 8/14/2019 with ARDS    Hospital Course    \"54 y.o. female with past medical history of hypertension, PE, DVT, diabetes, stage IV chronic kidney disease presumed to be secondary to polycystic kidney disease, type 2 diabetes, neuropathy, stroke with dysphasia (PEG tube removed), who presented 8/14/2019 as a transfer from [facility] [place] where she presented yesterday for increasing shortness of breath and subjective fever for several days.  Patient was found to have multifocal pneumonia and was admitted to [facility] [place] on broad-spectrum antibiotics however decompensated overnight with worsening hypoxia suggesting ARDS.  Patient was intubated at [facility] and transferred to our facility for higher level care.  Labs are facility show leukocytosis with white blood cell count of 20.9, anemia with a hemoglobin of 8.7, normal platelet count at 276, conference of panel shows a mild hyponatremia 132, potassium 5.2, CO2 8, glucose 294, BUN 72, creatinine 5.6.\"      Interval Problem Update  Reviewed last 24 hour events:   Lethargic; off fentanyl, sedation   Follows, self suctions   SR 70 - 80    - 150   Tm    + emesis   TF held overngiht   No BM   I/O =   Wallace in place   Mobilized   Vent day 6; SBT, RSBI 50, 1.7, NIF -40   7.37/   16/380/8/30   SC heparin   C3/flagyl day 6 of 7   nephro following; HD   PO naloxone   Mobilized     Yesterday   - HD again today   - Neuro: alert, follows   - HR: 60s-80s   - SBP: 140s-160s   - GI: TF at goal   - UOP: 1L/24hrs   - Wallace: yes   - Tm: afeb   - Lines: CVC, HD cath   - PPx: GI pepcid, DVT heparin   - VD #5   - SBT: bradypnea with RR 4-6   - ABG: looks good   - CXR (personally reviewed and compared to prior): clearing edema    Updates: multiple SBTs with apnea and bradypnea. Fentanyl gtt off only PRN pushes to attempt another SBT in the AM.    Review of " Systems  Review of Systems   Unable to perform ROS: Intubated        Vital Signs for last 24 hours   Pulse:  [67-88] 85  Resp:  [6-25] 15  BP: (123-181)/() 134/62  SpO2:  [93 %-99 %] 98 %    Hemodynamic parameters for last 24 hours       Respiratory Information for the last 24 hours  Suresh Vent Mode: Spont  Rate (breaths/min): 16  Vt Target (mL): 380  PEEP/CPAP: 8  FiO2: 30  P Support: 5  P MEAN: 11  Length of Weaning Trial (Hours): 1  Control VTE (exp VT): 382    Physical Exam   Physical Exam   Constitutional: She appears well-developed and well-nourished. She appears distressed. She is intubated.   HENT:   Head: Normocephalic and atraumatic.   Right Ear: External ear normal.   Left Ear: External ear normal.   Nose: Nose normal.   Mouth/Throat: Oropharynx is clear and moist.   ETT in position   Eyes: Conjunctivae and EOM are normal.   Neck: Neck supple. No JVD present. No tracheal deviation present.   Right IJ temporary dialysis catheter, left IJ triple-lumen   Cardiovascular: Normal rate, regular rhythm and intact distal pulses.   Pulmonary/Chest: No accessory muscle usage. Bradypnea noted. She is intubated. No respiratory distress. She has decreased breath sounds. She has rales.   Abdominal: Soft. Bowel sounds are normal. She exhibits no distension. There is no tenderness.   obese   Musculoskeletal: Normal range of motion. She exhibits no tenderness or deformity.   Neurological:   Sedated, follows   Skin: Skin is warm and dry. No rash noted.   Psychiatric:   Unable to assess   Nursing note and vitals reviewed.      Medications  Current Facility-Administered Medications   Medication Dose Route Frequency Provider Last Rate Last Dose   • ondansetron (ZOFRAN) syringe/vial injection 4 mg  4 mg Intravenous Q4HRS PRN Héctor Vallecillo M.D.   4 mg at 08/18/19 0609   • Naloxone (NARCAN) 2 mg/2 mL oral syringe 2 mg  2 mg Enteral Tube Q8HRS Doron Robles Jr., D.O.   2 mg at 08/19/19 0513   • carvedilol (COREG)  tablet 3.125 mg  3.125 mg Per NG Tube BID WITH MEALS Karla Zhong D.O.   3.125 mg at 08/19/19 0649   • fentaNYL (SUBLIMAZE) injection 50 mcg  50 mcg Intravenous Q15 MIN PRN Doron Robles Jr., D.O.   50 mcg at 08/18/19 1721    And   • fentaNYL (SUBLIMAZE) injection 100 mcg  100 mcg Intravenous Q15 MIN PRN Doron Robles Jr. D.O.   100 mcg at 08/19/19 0354   • iron sucrose (VENOFER) injection 200 mg  200 mg Intravenous DAILY Karla Zhong D.O.   200 mg at 08/19/19 0517   • hydrALAZINE (APRESOLINE) injection 10-20 mg  10-20 mg Intravenous Q4HRS PRN Héctor Vallecillo M.D.   10 mg at 08/17/19 2335   • aspirin (ASA) tablet 325 mg  325 mg Per NG Tube DAILY Doron Robles Jr. D.O.   325 mg at 08/19/19 0505   • atorvastatin (LIPITOR) tablet 80 mg  80 mg Per NG Tube Q EVENING Doron Robles Jr., D.O.   80 mg at 08/18/19 1927   • levothyroxine (SYNTHROID) tablet 112 mcg  112 mcg Enteral Tube DAILY Doron Robles Jr., D.O.   112 mcg at 08/19/19 0506   • metoclopramide (REGLAN) tablet 5 mg  5 mg Per NG Tube 4X/DAY ACHS Doron Robles Jr., D.O.   5 mg at 08/19/19 0506   • glycopyrrolate (ROBINUL) tablet 0.5 mg  0.5 mg Enteral Tube BID Doron Robles Jr., D.O.   0.5 mg at 08/19/19 0506   • DULoxetine (CYMBALTA) capsule 60 mg  60 mg Enteral Tube DAILY Doron Robles Jr., D.O.   60 mg at 08/19/19 0506   • metroNIDAZOLE (FLAGYL) tablet 500 mg  500 mg Enteral Tube Q8HRS Doron Robles Jr., D.O.   500 mg at 08/19/19 0506   • Respiratory Care per Protocol   Nebulization Continuous RT Doron Robles Jr., D.O.       • lidocaine (XYLOCAINE) 1 % injection 1-2 mL  1-2 mL Tracheal Tube Q30 MIN PRN Doorn Robles Jr., D.O.       • Pharmacy Consult: Enteral tube insertion - review meds/change route/product selection   Other PHARMACY TO DOSE GISELA Velez Jr..O.       • heparin injection 5,000 Units  5,000 Units Subcutaneous Q8HRS Berna Cavazos M.D.   5,000 Units at 08/19/19 0522   • cefTRIAXone (ROCEPHIN) 2 g in   mL IVPB  2,000 mg Intravenous Q24HRS Berna Cavazos M.D.   Stopped at 08/19/19 0543   • polyethylene glycol/lytes (MIRALAX) PACKET 1 Packet  1 Packet Enteral Tube QDAY PRN Doron Robles Jr., D.O.   1 Packet at 08/17/19 0935    And   • senna-docusate (PERICOLACE or SENOKOT S) 8.6-50 MG per tablet 2 Tab  2 Tab Enteral Tube BID Doron Robles Jr., D.O.   2 Tab at 08/19/19 0506    And   • magnesium hydroxide (MILK OF MAGNESIA) suspension 30 mL  30 mL Enteral Tube QDAY PRN Doron Robles Jr., D.O.   30 mL at 08/18/19 0528    And   • bisacodyl (DULCOLAX) suppository 10 mg  10 mg Rectal QDAY PRN Doron Robles Jr., D.O.   10 mg at 08/19/19 0528   • famotidine (PEPCID) tablet 20 mg  20 mg Enteral Tube Q EVENING Doron Robles Jr., D.O.   20 mg at 08/18/19 1927   • lidocaine (XYLOCAINE) 1 % injection 1 mL  1 mL Intradermal PRN Andrés Salazar M.D.       • heparin injection 2,500 Units  2,500 Units Intravenous DIALYSIS PRN Andrés Salazar M.D.   2,500 Units at 08/15/19 1047   • heparin injection 3,000 Units  3,000 Units Intracatheter DIALYSIS PRN Juan Diego Langston M.D.   3,000 Units at 08/18/19 1234       Fluids    Intake/Output Summary (Last 24 hours) at 8/19/2019 0750  Last data filed at 8/19/2019 0600  Gross per 24 hour   Intake 1287 ml   Output 3876 ml   Net -2589 ml       Laboratory  Recent Labs     08/17/19  0338 08/18/19  0331 08/19/19  0459 08/19/19  0733   ISTATAPH 7.354* 7.380* 7.315* 7.343*   ISTATAPCO2 46.8* 41.6* 48.9* 39.0*   ISTATAPO2 68 85 98* 108*   ISTATATCO2 28 26 26 22   [id number] 92* 96 97 98   ISTATARTHCO3 26.1* 24.6 24.9 21.2   ISTATARTBE 0 -1 -2 -4   ISTATTEMP 97.5 F 99.1 F 99.1 F see below   ISTATFIO2 30 30 30 30   ISTATSPEC Arterial Arterial Arterial Arterial   ISTATAPHTC 7.363* 7.376* 7.311*  --    [id number] 65 87 100*  --          Recent Labs     08/17/19  0454 08/18/19  0407 08/19/19  0401   SODIUM 136 134* 136   POTASSIUM 4.0 4.4 4.6   CHLORIDE 100 100 99   CO2 27 26 23   BUN 60* 39*  39*   CREATININE 3.44* 2.27* 2.79*   MAGNESIUM 2.2  --   --    PHOSPHORUS 3.6  --  4.4   CALCIUM 8.4* 8.9 9.4     Recent Labs     08/17/19  0454 08/18/19  0407 08/19/19  0401   ALTSGPT  --   --  15   ASTSGOT  --   --  14   ALKPHOSPHAT  --   --  232*   TBILIRUBIN  --   --  0.3   GLUCOSE 128* 127* 90     Recent Labs     08/17/19  0454 08/18/19  0407 08/19/19  0401   WBC 8.6 11.9* 12.2*   NEUTSPOLYS 74.40* 79.20* 76.40*   LYMPHOCYTES 13.60* 7.90* 17.30*   MONOCYTES 5.30 4.70 4.50   EOSINOPHILS 4.20 3.90 0.90   BASOPHILS 0.50 0.50 0.90   ASTSGOT  --   --  14   ALTSGPT  --   --  15   ALKPHOSPHAT  --   --  232*   TBILIRUBIN  --   --  0.3     Recent Labs     08/16/19  1052 08/17/19  0454 08/18/19  0407 08/19/19  0401   RBC  --  2.89* 3.15* 3.31*   HEMOGLOBIN  --  8.2* 8.7* 9.1*   HEMATOCRIT  --  26.9* 29.8* 31.8*   PLATELETCT  --  230 269 306   IRON 37*  --   --   --    FERRITIN 311.4*  --   --   --    TOTIRONBC 140*  --   --   --        Imaging  X-Ray:  I have personally reviewed the images and compared with prior images.    Assessment/Plan  * Respiratory failure (HCC)- (present on admission)  Assessment & Plan  Likely due to pneumonia and ARDS +/- volume overload, intubated 8/14/2019 at outside hospital  RT/O2 protocols  Daily and PRN ABGs  Titration of ventilator therapy based on ABGs and patient's status  Sedation as tolerated/indicated  Daily CXR  HOB >30 degrees and peridex for VAP prevention  Pepcid for GI prophylaxis  SAT/SBT when able (ABCDEF Bundle)  Early mobility  PEEP/FiO2 ladder per ARDSnet  Low tidal volume ventilation  SBT starting today, likely extubate in the next 1-2 days when bradypnea resolves. Stop fentanyl gtt    Multifocal pneumonia- (present on admission)  Assessment & Plan  Chest x-ray, P:F and history suggestive of ARDS.  Continue to treat for community acquired pneumonia with Rocephin, azithromycin, Flagyl for 7 days  Patient is at high risk for aspiration given history of dysphasia with PEG  tube placement (now removed)    Acute on chronic kidney failure (HCC)- (present on admission)  Assessment & Plan  Likely secondary to sepsis and hypoperfusion  Oliguric  Renal dose meds, avoid nephrotoxins  Strict I/Os  Follow renal function  Continue HD per nephrology, UF to assist in ventilator liberation    Sepsis (HCC)- (present on admission)  Assessment & Plan  This is severe sepsis with the following associated acute organ dysfunction(s): acute kidney failure, acute respiratory failure.   sepsis protocol  Source likely pulmonary  Continue source targeted antibiotics: Rocephin, azithromycin, Flagyl for 7 days  Adequately resuscitated initially, now de-resuscitating   Pressors if needed to maintain MAP >65 mmHg  blood, respiratory and urine cultures negative  Lactate normal    ARDS (adult respiratory distress syndrome) (HCC)- (present on admission)  Assessment & Plan  Meets diagnostic requirements by Dupree criteria  Bilateral, multifocal opacities.  Considered unlikely to be cardiac  Onset < 7 days  PaO2/FiO2 283 consistent with mild ARDS  Low tidal volume ventilation  PEEP/FiO2 ladder per ARDSnet  Judicious IV fluid use to maintain CVP <4 (FACTT trial), ongoing dialysis for volume control  Progress with full tube feeding (Oma trial)  Low threshold to progress with further ARDS treatments including prone positioning, paralysis only if needed (RAUL trial)  Flolan and APRV to be reserved for salvage therapies  Elizabeth Lung Score: <2    HLD (hyperlipidemia)- (present on admission)  Assessment & Plan  Continue Lipitor 80 mg nightly    History of venous thromboembolism- (present on admission)  Assessment & Plan  Only on aspirin at home  Will need to clarify anticoagulation strategy with family when they are available    Diabetes mellitus type 2, uncontrolled, with complications (HCC)- (present on admission)  Assessment & Plan  Goal blood glucose 120-180  sliding scale insulin, accuchecks  hypoglycemia protocol  A1c  8.3, 1 year ago    Hypothyroid- (present on admission)  Assessment & Plan  Continue levothyroxine     Updated plan:  I have made further ventilator adjustments today.  Proceed with SAT/SBT and liberate when clinically appropriate  I will minimize sedation and narcotics  Continue oral naloxone and follow ileus closely  I have examined and reexamined the patient, further recommendations to follow, plan as above    VTE:  Heparin  Ulcer: H2 Antagonist  Lines: Central Line  Ongoing indication addressed and Wallace Catheter  Ongoing indication addressed    I have performed a physical exam and reviewed and updated ROS and Plan today (8/19/2019). In review of yesterday's note (8/18/2019), there are no changes except as documented above.       Discussed patient condition and risk of morbidity and/or mortality with Hospitalist, RN, RT, Pharmacy, Dietary, , Code status disscussed, Charge nurse / hot rounds, Patient and nephrology     The patient remains critically ill.  Critical care time = 36 minutes in directly providing and coordinating critical care and extensive data review.  No time overlap and excludes procedures.

## 2019-08-19 NOTE — THERAPY
"  Speech Language Therapy Clinical Swallow Evaluation completed.  Functional Status: Called by RN to see patient this afternoon due to her gagging on cortrak feeding tube and really wanting to eat.  After thorough chart review, patient is s/p PEG tube placement on 7/3/18 due to dysphagia 2/2 CVA.  Pt indicated that the PEG tube was removed in June 2019, and that she has only seen SLP X1 since DC from [facility] [place] last year.  She reports she has been eating for 4-5 months, but is not able to really tell this writer what types of foods she has been eating.  When asked if she was on thick liquids, she said yes, and stated \"I drink juice,\" but was not able to tell me how she thickens liquids.  When asked later in the session, she said she does not thicken her liquids.      Today, patient seen while up in chair with her father present at bedside. She was awake, alert and oriented to person, place, month and year with some confusion to recent events.  She states she is in the hospital \"because I aspirated.\"  As the session progressed, she was noted to fatigue and did start to doze off.  Vocal quality was noted to be hoarse and wet/gurgly. She was noted to be coughing intermittently and spitting up phlegm.  She does have slight left side facial weakness present, suspect baseline due to history of CVA.  Presentation of PO consisted of ice, nectars, purees and sips of thin liquids.  Laryngeal elevation was noted to be weak and patient was triggering 2-4 audible swallows per bolus in order to clear.  She had wet voice and delayed coughing following 3/3 trials of thin liquids.  On nectars and purees, she had subtle vocal quality changes and intermittent delayed coughing which can be concerning for possible penetration/aspiration.  At this time, given her history of dysphagia, recent intubation, hoarse vocal quality and concerns for aspiration, would recommend proceeding with FEES to further assess oropharyngeal " "function and r/o aspiration. Discussed with patient and her father, and both were in agreement.  Order obtained from Dr. Chapman.  FEES tomorrow at 1300.  For today, continue with NPO and use of cortrak for medications and nutrition.     Recommendations - Diet:  NPO with cortrak                          Strategies: to be determined                           Medication Administration: via cortrak   Plan of Care: Will benefit from Speech Therapy 5 times per week    Post-Acute Therapy: Recommend inpatient transitional care services for continued speech therapy services.      See \"Rehab Therapy-Acute\" Patient Summary Report for complete documentation.   "

## 2019-08-19 NOTE — CARE PLAN
Problem: Ventilation Defect:  Goal: Ability to achieve and maintain unassisted ventilation or tolerate decreased levels of ventilator support  Outcome: PROGRESSING AS EXPECTED   Adult Ventilation Update    Total Vent Days: 6    Patient Lines/Drains/Airways Status      Active Airway       Name: Placement date: Placement time: Site: Days:    Airway ETT Oral 8.0  --   --   Oral                      In the last 24 hours, the patient tolerated SBT for 2 hours on settings of 5/8.    #FVC / Vital Capacity (liters) : 1.35 (08/18/19 1851)  NIF (cm H2O) : -39 (08/18/19 0543)  Rapid Shallow Breathing Index (RR/VT): 15 (08/18/19 1851)  Plateau Pressure (Q Shift): 18 (08/19/19 0220)  Static Compliance (ml / cm H2O): 45.1 (08/19/19 0220)    Patient failed trials because of Barriers to Wean: Other (Comments) (08/18/19 1610)  Barriers to SBT Weaning Trial Stopped due to:: (placed back on rest settings per MD) (08/18/19 1851)  Length of Weaning Trial Length of Weaning Trial (Hours): 40 minutes (08/18/19 1851)      Sputum/Suction  Cough: Strong;Productive (08/19/19 0400)  Sputum Amount: Small (08/19/19 0400)  Sputum Color: Tan;Clear (08/19/19 0400)  Sputum Consistency: Thin;Thick (08/19/19 0400)    Mobility  Level of Mobility: Level IV (08/19/19 0200)  Activity Performed: Edge of bed (08/19/19 0200)  Time Activity Tolerated: 5 min (08/19/19 0200)  Distance Per Occurrence (ft.): 0 feet (08/19/19 0200)  Assistance: Assistance of Two or More (08/19/19 0200)  Ambulation Tolerance: Tolerates Poorly (08/19/19 0200)  Pt Calls for Assistance: Yes (08/19/19 0200)  Staff Present for Mobilization: RN;CNA (08/19/19 0200)  Gait: Unable to Ambulate (08/19/19 0400)  Assistive Devices: Hand held assist (08/19/19 0400)  Reason Not Mobilized: Other (comment) (08/18/19 1700)  Mobilization Comments: (pt fighting ventilator, currently receiving diaylysis ) (08/14/19 2000)    Events/Summary/Plan: placed pt back on a rate. Decreased RR to 16 per MD  (08/18/19 7917)

## 2019-08-20 ENCOUNTER — APPOINTMENT (OUTPATIENT)
Dept: RADIOLOGY | Facility: MEDICAL CENTER | Age: 54
DRG: 870 | End: 2019-08-20
Attending: INTERNAL MEDICINE
Payer: MEDICAID

## 2019-08-20 LAB
ANION GAP SERPL CALC-SCNC: 12 MMOL/L (ref 0–11.9)
ANISOCYTOSIS BLD QL SMEAR: ABNORMAL
BASOPHILS # BLD AUTO: 0.9 % (ref 0–1.8)
BASOPHILS # BLD: 0.11 K/UL (ref 0–0.12)
BUN SERPL-MCNC: 49 MG/DL (ref 8–22)
CALCIUM SERPL-MCNC: 9.3 MG/DL (ref 8.5–10.5)
CHLORIDE SERPL-SCNC: 101 MMOL/L (ref 96–112)
CO2 SERPL-SCNC: 23 MMOL/L (ref 20–33)
CREAT SERPL-MCNC: 3.77 MG/DL (ref 0.5–1.4)
CRP SERPL HS-MCNC: 2.38 MG/DL (ref 0–0.75)
CYTOLOGY REG CYTOL: NORMAL
EOSINOPHIL # BLD AUTO: 0.52 K/UL (ref 0–0.51)
EOSINOPHIL NFR BLD: 4.3 % (ref 0–6.9)
ERYTHROCYTE [DISTWIDTH] IN BLOOD BY AUTOMATED COUNT: 48.1 FL (ref 35.9–50)
GLUCOSE SERPL-MCNC: 77 MG/DL (ref 65–99)
HCT VFR BLD AUTO: 30 % (ref 37–47)
HGB BLD-MCNC: 8.8 G/DL (ref 12–16)
HYPOCHROMIA BLD QL SMEAR: ABNORMAL
LYMPHOCYTES # BLD AUTO: 1.56 K/UL (ref 1–4.8)
LYMPHOCYTES NFR BLD: 12.9 % (ref 22–41)
MANUAL DIFF BLD: NORMAL
MCH RBC QN AUTO: 28 PG (ref 27–33)
MCHC RBC AUTO-ENTMCNC: 29.3 G/DL (ref 33.6–35)
MCV RBC AUTO: 95.5 FL (ref 81.4–97.8)
METAMYELOCYTES NFR BLD MANUAL: 0.9 %
MONOCYTES # BLD AUTO: 0.41 K/UL (ref 0–0.85)
MONOCYTES NFR BLD AUTO: 3.4 % (ref 0–13.4)
MORPHOLOGY BLD-IMP: NORMAL
MYELOCYTES NFR BLD MANUAL: 0.9 %
NEUTROPHILS # BLD AUTO: 9.28 K/UL (ref 2–7.15)
NEUTROPHILS NFR BLD: 76.7 % (ref 44–72)
NRBC # BLD AUTO: 0 K/UL
NRBC BLD-RTO: 0 /100 WBC
PLATELET # BLD AUTO: 287 K/UL (ref 164–446)
PLATELET BLD QL SMEAR: NORMAL
PMV BLD AUTO: 10 FL (ref 9–12.9)
POTASSIUM SERPL-SCNC: 4.5 MMOL/L (ref 3.6–5.5)
PREALB SERPL-MCNC: 18 MG/DL (ref 18–38)
RBC # BLD AUTO: 3.14 M/UL (ref 4.2–5.4)
RBC BLD AUTO: PRESENT
SODIUM SERPL-SCNC: 136 MMOL/L (ref 135–145)
WBC # BLD AUTO: 12.1 K/UL (ref 4.8–10.8)

## 2019-08-20 PROCEDURE — 97161 PT EVAL LOW COMPLEX 20 MIN: CPT

## 2019-08-20 PROCEDURE — 700105 HCHG RX REV CODE 258: Performed by: HOSPITALIST

## 2019-08-20 PROCEDURE — A9270 NON-COVERED ITEM OR SERVICE: HCPCS | Performed by: INTERNAL MEDICINE

## 2019-08-20 PROCEDURE — 85007 BL SMEAR W/DIFF WBC COUNT: CPT

## 2019-08-20 PROCEDURE — 700102 HCHG RX REV CODE 250 W/ 637 OVERRIDE(OP): Performed by: INTERNAL MEDICINE

## 2019-08-20 PROCEDURE — 99233 SBSQ HOSP IP/OBS HIGH 50: CPT | Performed by: INTERNAL MEDICINE

## 2019-08-20 PROCEDURE — 80048 BASIC METABOLIC PNL TOTAL CA: CPT

## 2019-08-20 PROCEDURE — 84134 ASSAY OF PREALBUMIN: CPT

## 2019-08-20 PROCEDURE — 700111 HCHG RX REV CODE 636 W/ 250 OVERRIDE (IP): Performed by: INTERNAL MEDICINE

## 2019-08-20 PROCEDURE — 700101 HCHG RX REV CODE 250: Performed by: INTERNAL MEDICINE

## 2019-08-20 PROCEDURE — 71045 X-RAY EXAM CHEST 1 VIEW: CPT

## 2019-08-20 PROCEDURE — 92612 ENDOSCOPY SWALLOW (FEES) VID: CPT

## 2019-08-20 PROCEDURE — 99233 SBSQ HOSP IP/OBS HIGH 50: CPT | Performed by: HOSPITALIST

## 2019-08-20 PROCEDURE — 86140 C-REACTIVE PROTEIN: CPT

## 2019-08-20 PROCEDURE — 85027 COMPLETE CBC AUTOMATED: CPT

## 2019-08-20 PROCEDURE — 770022 HCHG ROOM/CARE - ICU (200)

## 2019-08-20 PROCEDURE — 700111 HCHG RX REV CODE 636 W/ 250 OVERRIDE (IP): Performed by: HOSPITALIST

## 2019-08-20 PROCEDURE — 97166 OT EVAL MOD COMPLEX 45 MIN: CPT

## 2019-08-20 RX ORDER — ALPRAZOLAM 0.5 MG/1
0.5 TABLET ORAL EVERY 6 HOURS PRN
Status: DISCONTINUED | OUTPATIENT
Start: 2019-08-20 | End: 2019-08-23 | Stop reason: HOSPADM

## 2019-08-20 RX ORDER — LABETALOL HYDROCHLORIDE 5 MG/ML
10-20 INJECTION, SOLUTION INTRAVENOUS EVERY 4 HOURS PRN
Status: DISCONTINUED | OUTPATIENT
Start: 2019-08-20 | End: 2019-08-23 | Stop reason: HOSPADM

## 2019-08-20 RX ORDER — AMLODIPINE BESYLATE 5 MG/1
5 TABLET ORAL
Status: DISCONTINUED | OUTPATIENT
Start: 2019-08-21 | End: 2019-08-21

## 2019-08-20 RX ORDER — HYDROCODONE BITARTRATE AND ACETAMINOPHEN 5; 325 MG/1; MG/1
1-2 TABLET ORAL EVERY 6 HOURS PRN
Status: DISCONTINUED | OUTPATIENT
Start: 2019-08-20 | End: 2019-08-23 | Stop reason: HOSPADM

## 2019-08-20 RX ORDER — BISACODYL 10 MG
10 SUPPOSITORY, RECTAL RECTAL
Status: DISCONTINUED | OUTPATIENT
Start: 2019-08-20 | End: 2019-08-22

## 2019-08-20 RX ORDER — POLYETHYLENE GLYCOL 3350 17 G/17G
1 POWDER, FOR SOLUTION ORAL 2 TIMES DAILY
Status: DISCONTINUED | OUTPATIENT
Start: 2019-08-20 | End: 2019-08-22

## 2019-08-20 RX ORDER — ACETAMINOPHEN 325 MG/1
650 TABLET ORAL EVERY 6 HOURS PRN
Status: DISCONTINUED | OUTPATIENT
Start: 2019-08-20 | End: 2019-08-20

## 2019-08-20 RX ORDER — AMOXICILLIN 250 MG
2 CAPSULE ORAL 2 TIMES DAILY
Status: DISCONTINUED | OUTPATIENT
Start: 2019-08-20 | End: 2019-08-22

## 2019-08-20 RX ADMIN — HEPARIN SODIUM 5000 UNITS: 5000 INJECTION, SOLUTION INTRAVENOUS; SUBCUTANEOUS at 21:47

## 2019-08-20 RX ADMIN — ATORVASTATIN CALCIUM 80 MG: 40 TABLET, FILM COATED ORAL at 18:31

## 2019-08-20 RX ADMIN — METOCLOPRAMIDE 5 MG: 10 TABLET ORAL at 18:31

## 2019-08-20 RX ADMIN — METOCLOPRAMIDE 5 MG: 10 TABLET ORAL at 21:47

## 2019-08-20 RX ADMIN — METOCLOPRAMIDE 5 MG: 10 TABLET ORAL at 06:12

## 2019-08-20 RX ADMIN — FENTANYL CITRATE 50 MCG: 0.05 INJECTION, SOLUTION INTRAMUSCULAR; INTRAVENOUS at 10:20

## 2019-08-20 RX ADMIN — HEPARIN SODIUM 5000 UNITS: 5000 INJECTION, SOLUTION INTRAVENOUS; SUBCUTANEOUS at 06:11

## 2019-08-20 RX ADMIN — ASPIRIN 325 MG: 325 TABLET, FILM COATED ORAL at 06:11

## 2019-08-20 RX ADMIN — FAMOTIDINE 20 MG: 20 TABLET ORAL at 17:00

## 2019-08-20 RX ADMIN — HYDRALAZINE HYDROCHLORIDE 20 MG: 20 INJECTION INTRAMUSCULAR; INTRAVENOUS at 17:21

## 2019-08-20 RX ADMIN — METRONIDAZOLE 500 MG: 500 TABLET ORAL at 06:14

## 2019-08-20 RX ADMIN — CEFTRIAXONE SODIUM 2 G: 2 INJECTION, POWDER, FOR SOLUTION INTRAMUSCULAR; INTRAVENOUS at 06:12

## 2019-08-20 RX ADMIN — HEPARIN SODIUM 5000 UNITS: 5000 INJECTION, SOLUTION INTRAVENOUS; SUBCUTANEOUS at 14:53

## 2019-08-20 RX ADMIN — METRONIDAZOLE 500 MG: 500 TABLET ORAL at 14:52

## 2019-08-20 RX ADMIN — HYDROCODONE BITARTRATE AND ACETAMINOPHEN 2 TABLET: 5; 325 TABLET ORAL at 19:42

## 2019-08-20 RX ADMIN — FENTANYL CITRATE 50 MCG: 0.05 INJECTION, SOLUTION INTRAMUSCULAR; INTRAVENOUS at 06:12

## 2019-08-20 RX ADMIN — IRON SUCROSE 200 MG: 20 INJECTION, SOLUTION INTRAVENOUS at 06:17

## 2019-08-20 RX ADMIN — FENTANYL CITRATE 50 MCG: 0.05 INJECTION, SOLUTION INTRAMUSCULAR; INTRAVENOUS at 00:54

## 2019-08-20 RX ADMIN — METOCLOPRAMIDE 5 MG: 10 TABLET ORAL at 14:52

## 2019-08-20 RX ADMIN — SENNOSIDES, DOCUSATE SODIUM 2 TABLET: 50; 8.6 TABLET, FILM COATED ORAL at 06:11

## 2019-08-20 RX ADMIN — ACETAMINOPHEN 650 MG: 325 TABLET, FILM COATED ORAL at 17:28

## 2019-08-20 RX ADMIN — DULOXETINE HYDROCHLORIDE 60 MG: 60 CAPSULE, DELAYED RELEASE ORAL at 06:16

## 2019-08-20 RX ADMIN — LABETALOL HYDROCHLORIDE 10 MG: 5 INJECTION INTRAVENOUS at 21:49

## 2019-08-20 RX ADMIN — METRONIDAZOLE 500 MG: 500 TABLET ORAL at 21:47

## 2019-08-20 RX ADMIN — FENTANYL CITRATE 50 MCG: 0.05 INJECTION, SOLUTION INTRAMUSCULAR; INTRAVENOUS at 02:57

## 2019-08-20 RX ADMIN — ONDANSETRON 4 MG: 2 INJECTION INTRAMUSCULAR; INTRAVENOUS at 14:53

## 2019-08-20 RX ADMIN — ALPRAZOLAM 0.5 MG: 0.25 TABLET ORAL at 21:47

## 2019-08-20 RX ADMIN — CARVEDILOL 3.12 MG: 3.12 TABLET, FILM COATED ORAL at 17:00

## 2019-08-20 RX ADMIN — LEVOTHYROXINE SODIUM 112 MCG: 112 TABLET ORAL at 06:17

## 2019-08-20 RX ADMIN — CARVEDILOL 3.12 MG: 3.12 TABLET, FILM COATED ORAL at 08:33

## 2019-08-20 ASSESSMENT — COGNITIVE AND FUNCTIONAL STATUS - GENERAL
PERSONAL GROOMING: A LITTLE
STANDING UP FROM CHAIR USING ARMS: A LITTLE
MOVING TO AND FROM BED TO CHAIR: A LOT
SUGGESTED CMS G CODE MODIFIER DAILY ACTIVITY: CK
DRESSING REGULAR LOWER BODY CLOTHING: A LOT
WALKING IN HOSPITAL ROOM: A LITTLE
TOILETING: A LITTLE
MOBILITY SCORE: 14
DRESSING REGULAR UPPER BODY CLOTHING: A LITTLE
CLIMB 3 TO 5 STEPS WITH RAILING: A LOT
DAILY ACTIVITIY SCORE: 17
MOVING FROM LYING ON BACK TO SITTING ON SIDE OF FLAT BED: A LOT
HELP NEEDED FOR BATHING: A LOT
TURNING FROM BACK TO SIDE WHILE IN FLAT BAD: A LOT
SUGGESTED CMS G CODE MODIFIER MOBILITY: CL

## 2019-08-20 ASSESSMENT — GAIT ASSESSMENTS
GAIT LEVEL OF ASSIST: SUPERVISED
DEVIATION: BRADYKINETIC;DECREASED HEEL STRIKE;DECREASED TOE OFF
ASSISTIVE DEVICE: FRONT WHEEL WALKER
DISTANCE (FEET): 12

## 2019-08-20 ASSESSMENT — PAIN SCALES - WONG BAKER
WONGBAKER_NUMERICALRESPONSE: DOESN'T HURT AT ALL

## 2019-08-20 ASSESSMENT — ENCOUNTER SYMPTOMS
VOMITING: 0
CHILLS: 0
DIZZINESS: 0
ORTHOPNEA: 0
NAUSEA: 0
SPUTUM PRODUCTION: 0
HEMOPTYSIS: 0
PALPITATIONS: 0
COUGH: 0

## 2019-08-20 ASSESSMENT — ACTIVITIES OF DAILY LIVING (ADL): TOILETING: INDEPENDENT

## 2019-08-20 NOTE — PROGRESS NOTES
Nephrology Progress Note    Date & Time: 8/20/2019  10:06 AM    HPI:  Patient is intubated and sedated. Unable to reach  via phone. History obtained from chart review.  54 y.o. female transferred from outside facility on 8/14/2019 for respiratory failure, suspected ARDS in the setting of multifocal pneumonia and acute on chronic kidney disease. She has a past medical history of CKD, polycystic kidney disease, DM2, hypothyroidism, hypertension, hyperlipidemia, AIME, and previous PE and DVT.       Per chart review, the patient presented to the outside facility with complaints of several days of shortness of breath and subjective fever. Chest x-ray obtained at the outside facility showed multifocal abnormality consistent with multifocal pneumonia the patient was admitted to the outside facility and started on a course of broad-spectrum antibiotics. Unfortunately, during the course of her hospitalization, the patient decompensated with worsening respiratory status.  At that point, development of ARDS was of concern therefore the patient was emergently intubated by the outside physician and patient transferred.     Additional work-up including WBC 15.7 hemoglobin 9.9 hematocrit 31.1 platelet 287 troponin 0.02, sodium 135 potassium 4.2 chloride 109 CO2 6 BUN 5.8 creatinine 5.1 and glucose 191.   Labs on admission here reflect hgb of 8.7, WBC 20.9, ANC 19.37, Na 132, K 5.2, CO2 8, AG 18, BUN 72, Cr 5.6, Ph 5.7, Ca 8.7, Mg 1.8, and LA 1.7.   On ABG Ph of 7.12  Patient admitted to ICU, intubated, sedated and started on multiple abx.     DAILY NEPHROLOGY SUMMARY:   8/15: patient tolerated HD yesterday with 3.5 liters fluid removed. Patient appears to have slight increase in UOP to  168cc overnight and 100 cc this am. Otherwise still intubated and sedated. Ph 7.5 in am. Plan for HD   8/16: No acute events overnight. Patient drowsy but arousable during SBT. Moving all extremities and following  commands. UOP 151cc  "overnight. Creatinine stable, not up trending with normal Na and K   8/17: ongoing pulm edema, attempting to wean from vent   8/18: tolerated HD, planning to extubate in next 24 hours    8/19: Patient extubated today. Tolerating well, on NC. Drowsy but responsive and easily arousable. Trial of lasix 80mg I V BID today. Monitor UOP.   8/20: Patient much more alert today, sitting up. Feels much better, feels breathing has improved as well. Responding to lasix trial with 1.2 liters overnight and total about 2 liters in 24 hours with net negative 1.7 Liters. Stable vitals, electrolytes and Hco3 of 23. SCr trended up, monitor.       CURRENT MEDICATIONS: Reviewed  IMAGING STUDIES: Reviewed    ROS  General:  Has some weakness and malaise  HEENT:  No vision changes, no hearing changes  CV:  No chest pain, no palpitations  Lungs:  No cough; no PND  GI:  No Nausea; no vomiting  : No dysuria or gross hematuria- Urinary catheter  MSK:  No joint pain; no trauma. Has back pain though  Skin: No rashes; no lesions  Neuro: No tremors; no LOC  Psych: No depression; no anxiety. More alert.       PHYSICAL EXAM  VS:  /66   Pulse 84   Temp 36.4 °C (97.5 °F) (Temporal)   Resp (!) 10   Ht 1.778 m (5' 10\")   Wt 96.1 kg (211 lb 13.8 oz)   SpO2 95%   BMI 30.40 kg/m²   GENERAL: Lying in bed, wn/wd  HEAD: Normocephalic, atraumatic  EYES: no scleral icterus; normal conjunctiva  NECK: Supple; trachea midline  CV: RRR, S1 and S2 present  LUNGS: scattered fine basilar crackles  ABDOMEN: Soft, non-tender  EXTREMETIES: no lower extremity edema, no clubbing or cyanosis  NEURO: alert and oriented      Fluids:  In: 200   Out: 1975     LABS:    Lab Results   Component Value Date/Time    SODIUM 136 08/20/2019 04:15 AM    POTASSIUM 4.5 08/20/2019 04:15 AM    CHLORIDE 101 08/20/2019 04:15 AM    CO2 23 08/20/2019 04:15 AM    GLUCOSE 77 08/20/2019 04:15 AM    BUN 49 (H) 08/20/2019 04:15 AM    CREATININE 3.77 (H) 08/20/2019 04:15 AM      Lab " Results   Component Value Date/Time    WBC 12.1 (H) 08/20/2019 04:15 AM    RBC 3.14 (L) 08/20/2019 04:15 AM    HEMOGLOBIN 8.8 (L) 08/20/2019 04:15 AM    HEMATOCRIT 30.0 (L) 08/20/2019 04:15 AM    MCV 95.5 08/20/2019 04:15 AM    MCH 28.0 08/20/2019 04:15 AM    MCHC 29.3 (L) 08/20/2019 04:15 AM    MPV 10.0 08/20/2019 04:15 AM    NEUTSPOLYS 76.70 (H) 08/20/2019 04:15 AM    LYMPHOCYTES 12.90 (L) 08/20/2019 04:15 AM    MONOCYTES 3.40 08/20/2019 04:15 AM    EOSINOPHILS 4.30 08/20/2019 04:15 AM    BASOPHILS 0.90 08/20/2019 04:15 AM    HYPOCHROMIA 1+ 08/20/2019 04:15 AM    ANISOCYTOSIS 1+ 08/20/2019 04:15 AM            ASSESSMENT:  # CAMPOS on CKD: Baseline Cr~2.5 - 3.0   # CKD Stage 4 based on previous labs. Likely secondary to polycystic kidney disease  # HTN - stable, monitor  # Metabolic acidosis   # polycystic kidney disease- [facility] reflecting this date back to 2012 per chart review  # ARDS, due to multifocal PNA - on C3, and flagyl- extubated 8/19/2019  # Pneumonia- on C3, and flagyl  # DM2  # hypothyroidism  #anemia - iron deficient - inj replacement  # CKD MBD - phos acceptable, PTH elevated     PLAN:  - RRT not necessary today  - Daily evaluation for HD consideration  - Patient responded to trial of Lasix 80mg IV BID on 8/19. Discontinue lasix. PRN lasix dose to maintain euvolemia.   - Temporary line in place  - Strict I/Os  - Dose all meds per ESRD  - Daily electrolytes and renal labs

## 2019-08-20 NOTE — THERAPY
"Occupational Therapy Evaluation completed.   Functional Status:  SPV supine<>sit, Kenia progressed to SPV sit>stand, ModA LB dress, Kenia seated grooming, poor activity tolerance  Plan of Care: Will benefit from Occupational Therapy 3 times per week  Discharge Recommendations:  Equipment: Will Continue to Assess for Equipment Needs. Post-acute therapy Recommend post-acute placement for additional occupational therapy services prior to discharge home. Patient can tolerate post-acute therapies at a 5x/week frequency. However, pt may progress while in acute care setting for safe d/c home with 96yo grandmother, will continue to assess progress.     See \"Rehab Therapy-Acute\" Patient Summary Report for complete documentation.    Pt is 55yo female admitted with respiratory failure, PNA, ARDS, and acute on chronic kidney disease, required emergent intubation. Pt now extubated, presents to OT eval below baseline of functional independence. Pt lives with her 96yo grandmother who is in good health per pt report. Pt currently requires assist for ADLs and close SPV for functional mobility, independence is also impacted by poor activity tolerance. Pt will benefit from acute OT while in house to progress independence in basic ADLs, pt may improve and be able to d/c home with Home Health however currently pt would require post-acute placement for additional strengthening prior to d/c home to 96yo grandmothers house.   "

## 2019-08-20 NOTE — THERAPY
Speech Language Therapy FEES completed.    Functional Status: The patient was seen for FEES evaluation this date. FEES procedure completed. Upon insertion of scope, Patient was noted to have slightly raised areas on posterior aspects of vocal folds bilaterally, which may be consistent with intubation. Pt was also noted to have edematous arytenoids, possibly due to history of acid reflux/GERD. Patient’s vocal folds appeared asymmetric (glottic chink), with L vocal fold primarily at the 1 o'clock position with little to no movement appreciated during today's exam. Pt with incomplete adduction for airway protection due to limited L VF mobility to medial position; however, Pt was able to obtain improved adduction during voicing with L head turn.     Presentation of PO included ice chips, thins via teaspoon, nectars via teaspoon, cup sips and straw sip, as well as purees, pudding and soft solids. The patient presented with moderate oropharyngeal dysphagia as evidenced by intermittent penetration over laryngeal rim and aspiration with quick spilling thin liquids. Weak pharyngeal squeeze and decreased hyo-laryngeal elevation was also noted. Patient with intact sensation and cough response to aspiration events; however, demonstrated reduced sensation to residue and penetration episodes (with spontaneous multiple swallows noted intermittently.) Multiple swallow strategies attempted including chin tuck, double swallow strategy, cough/throat clear, and liquid wash. There is concern for possible retrograde flow, due to observed residue after the swallow near post-cricoid region; however, a Modified Barium Swallow Study would be necessary to fully evaluate UES clearance and to rule out aspiration during the swallow. At this time, recommend modified PO diet of nectar thick full liquid with adherence to strict swallow strategies and direct supervision with meals. Please hold PO with any throat clearing/coughing or s/sx of  "respiratory changes. SLP following for dysphagia therapy and assessment for further diagnostic swallow evaluation.    Note: Follow-up with outpatient ENT may be helpful in further evaluating vocal fold function.    Recommendations - Diet: Nectar Thick Full Liquid, Nectar Thick Liquid                          Strategies: Direct supervision during meals and Head of Bed at 90 Degrees                          Medication Administration: Crush all Medications in Puree    Plan of Care: Will benefit from Speech Therapy 5 times per week  Post-Acute Therapy: Recommend inpatient transitional care services for continued speech therapy services.      See \"Rehab Therapy-Acute\" Patient Summary Report for complete documentation.   "

## 2019-08-20 NOTE — PROGRESS NOTES
"Critical Care Progress Note    Date of admission  8/14/2019    Chief Complaint  54 y.o. female admitted 8/14/2019 with ARDS    Hospital Course    \"54 y.o. female with past medical history of hypertension, PE, DVT, diabetes, stage IV chronic kidney disease presumed to be secondary to polycystic kidney disease, type 2 diabetes, neuropathy, stroke with dysphasia (PEG tube removed), who presented 8/14/2019 as a transfer from [facility] where she presented yesterday for increasing shortness of breath and subjective fever for several days.  Patient was found to have multifocal pneumonia and was admitted to [facility] on broad-spectrum antibiotics however decompensated overnight with worsening hypoxia suggesting ARDS.  Patient was intubated at [facility] and transferred to our facility for higher level care.  Labs are facility show leukocytosis with white blood cell count of 20.9, anemia with a hemoglobin of 8.7, normal platelet count at 276, conference of panel shows a mild hyponatremia 132, potassium 5.2, CO2 8, glucose 294, BUN 72, creatinine 5.6.\"      Interval Problem Update  Reviewed last 24 hour events:   Alert, not oreitnted to time, old CVA, L sided weakness   SR, no gtts   SBP < 150   Tm 36.7   smita TF at 10, + loose BM, mod quantity   No further nausea   Failed swallow; FEES today   Increased UOP; lasix held; Cr up   2 lpm n/c   Sc heparin   Completing C3/flagyl   Start miralax    PT/OT, OOB       Yesterday:   Lethargic; off fentanyl, sedation   Follows, self suctions   SR 70 - 80    - 150   Tm    + emesis   TF held overngiht   No BM   I/O =   Wallace in place   Mobilized   Vent day 6; SBT, RSBI 50, 1.7, NIF -40   7.37/   16/380/8/30   SC heparin   C3/flagyl day 6 of 7   nephro following; HD   PO naloxone   Mobilized       Review of Systems  Review of Systems   Unable to perform ROS: Acuity of condition        Vital Signs for last 24 hours   Temp:  [36.4 °C (97.5 °F)-36.8 °C (98.3 °F)] 36.4 " °C (97.5 °F)  Pulse:  [77-94] 80  Resp:  [5-20] 7  BP: (136-171)/(58-87) 149/65  SpO2:  [94 %-100 %] 99 %    Hemodynamic parameters for last 24 hours       Respiratory Information for the last 24 hours       Physical Exam   Physical Exam   Constitutional: She appears well-developed and well-nourished.   HENT:   Head: Normocephalic and atraumatic.   Right Ear: External ear normal.   Left Ear: External ear normal.   Nose: Nose normal.   Mouth/Throat: Oropharynx is clear and moist.   Eyes: Conjunctivae and EOM are normal.   Neck: Neck supple. No JVD present. No tracheal deviation present.   Right IJ temporary dialysis catheter, left IJ triple-lumen   Cardiovascular: Normal rate, regular rhythm and intact distal pulses.   Pulmonary/Chest: No accessory muscle usage. No respiratory distress.   Moderately diminished, no wheezing   Abdominal: Soft. Bowel sounds are normal. She exhibits no distension. There is no tenderness.   obese   Musculoskeletal: Normal range of motion. She exhibits no tenderness or deformity.   Neurological:   Awake, follows commands, mild residual left-sided weakness   Skin: Skin is warm and dry. No rash noted.   Nursing note and vitals reviewed.      Medications  Current Facility-Administered Medications   Medication Dose Route Frequency Provider Last Rate Last Dose   • ondansetron (ZOFRAN) syringe/vial injection 4 mg  4 mg Intravenous Q4HRS PRN Héctor Vallecillo M.D.   4 mg at 08/19/19 1145   • Naloxone (NARCAN) 2 mg/2 mL oral syringe 2 mg  2 mg Enteral Tube Q8HRS Doron Robles Jr., D.O.   Stopped at 08/20/19 0600   • carvedilol (COREG) tablet 3.125 mg  3.125 mg Per NG Tube BID WITH MEALS Karla Zhong D.O.   3.125 mg at 08/20/19 0833   • fentaNYL (SUBLIMAZE) injection 50 mcg  50 mcg Intravenous Q15 MIN PRN Doron Robles Jr., D.O.   50 mcg at 08/20/19 0612    And   • fentaNYL (SUBLIMAZE) injection 100 mcg  100 mcg Intravenous Q15 MIN PRN Doron Robles Jr., D.O.   100 mcg at 08/19/19 0351    • iron sucrose (VENOFER) injection 200 mg  200 mg Intravenous DAILY Karla Zhong, D.O.   200 mg at 08/20/19 0617   • hydrALAZINE (APRESOLINE) injection 10-20 mg  10-20 mg Intravenous Q4HRS PRN Héctor Vallecillo M.D.   10 mg at 08/17/19 2335   • aspirin (ASA) tablet 325 mg  325 mg Per NG Tube DAILY Doron Robles Jr., D.O.   325 mg at 08/20/19 0611   • atorvastatin (LIPITOR) tablet 80 mg  80 mg Per NG Tube Q EVENING Doron Robles Jr., D.O.   80 mg at 08/19/19 1835   • levothyroxine (SYNTHROID) tablet 112 mcg  112 mcg Enteral Tube DAILY Doron Robles Jr., D.O.   112 mcg at 08/20/19 0617   • metoclopramide (REGLAN) tablet 5 mg  5 mg Per NG Tube 4X/DAY ACHS Doron Robles Jr., D.O.   5 mg at 08/20/19 0612   • DULoxetine (CYMBALTA) capsule 60 mg  60 mg Enteral Tube DAILY Doron Robles Jr., D.O.   60 mg at 08/20/19 0616   • metroNIDAZOLE (FLAGYL) tablet 500 mg  500 mg Enteral Tube Q8HRS Doron Robles Jr., D.O.   500 mg at 08/20/19 0614   • Respiratory Care per Protocol   Nebulization Continuous RT Doron Robles Jr., D.O.       • lidocaine (XYLOCAINE) 1 % injection 1-2 mL  1-2 mL Tracheal Tube Q30 MIN PRN Doron Robles Jr., D.O.       • Pharmacy Consult: Enteral tube insertion - review meds/change route/product selection   Other PHARMACY TO DOSE Doron Robles Jr., D.O.       • heparin injection 5,000 Units  5,000 Units Subcutaneous Q8HRS Berna Cavazos M.D.   5,000 Units at 08/20/19 0611   • polyethylene glycol/lytes (MIRALAX) PACKET 1 Packet  1 Packet Enteral Tube QDAY PRN Doron Robles Jr., D.O.   1 Packet at 08/17/19 0935    And   • senna-docusate (PERICOLACE or SENOKOT S) 8.6-50 MG per tablet 2 Tab  2 Tab Enteral Tube BID Doron Robles Jr., D.O.   2 Tab at 08/20/19 0611    And   • magnesium hydroxide (MILK OF MAGNESIA) suspension 30 mL  30 mL Enteral Tube QDAY PRN Doron Robles Jr., D.O.   30 mL at 08/18/19 0528    And   • bisacodyl (DULCOLAX) suppository 10 mg  10 mg Rectal QDAY PRN  Doron Robles Jr., D.O.   10 mg at 08/19/19 0528   • famotidine (PEPCID) tablet 20 mg  20 mg Enteral Tube Q EVENING Doron Robles Jr., D.O.   20 mg at 08/19/19 1839   • lidocaine (XYLOCAINE) 1 % injection 1 mL  1 mL Intradermal PRN Andrés Salazar M.D.       • heparin injection 2,500 Units  2,500 Units Intravenous DIALYSIS PRN Andrés Salazar M.D.   2,500 Units at 08/15/19 1047   • heparin injection 3,000 Units  3,000 Units Intracatheter DIALYSIS PRN Juan Diego Langston M.D.   3,000 Units at 08/18/19 1234       Fluids    Intake/Output Summary (Last 24 hours) at 8/20/2019 0858  Last data filed at 8/20/2019 0800  Gross per 24 hour   Intake 190 ml   Output 2160 ml   Net -1970 ml       Laboratory  Recent Labs     08/18/19  0331 08/19/19  0459 08/19/19  0733   ISTATAPH 7.380* 7.315* 7.343*   ISTATAPCO2 41.6* 48.9* 39.0*   ISTATAPO2 85 98* 108*   ISTATATCO2 26 26 22   [id number] 96 97 98   ISTATARTHCO3 24.6 24.9 21.2   ISTATARTBE -1 -2 -4   ISTATTEMP 99.1 F 99.1 F see below   ISTATFIO2 30 30 30   ISTATSPEC Arterial Arterial Arterial   ISTATAPHTC 7.376* 7.311*  --    [id number] 87 100*  --          Recent Labs     08/18/19  0407 08/19/19  0401 08/20/19  0415   SODIUM 134* 136 136   POTASSIUM 4.4 4.6 4.5   CHLORIDE 100 99 101   CO2 26 23 23   BUN 39* 39* 49*   CREATININE 2.27* 2.79* 3.77*   PHOSPHORUS  --  4.4  --    CALCIUM 8.9 9.4 9.3     Recent Labs     08/18/19  0407 08/19/19  0401 08/20/19  0415   ALTSGPT  --  15  --    ASTSGOT  --  14  --    ALKPHOSPHAT  --  232*  --    TBILIRUBIN  --  0.3  --    PREALBUMIN  --   --  18.0   GLUCOSE 127* 90 77     Recent Labs     08/18/19  0407 08/19/19 0401 08/20/19 0415   WBC 11.9* 12.2* 12.1*   NEUTSPOLYS 79.20* 76.40* 76.70*   LYMPHOCYTES 7.90* 17.30* 12.90*   MONOCYTES 4.70 4.50 3.40   EOSINOPHILS 3.90 0.90 4.30   BASOPHILS 0.50 0.90 0.90   ASTSGOT  --  14  --    ALTSGPT  --  15  --    ALKPHOSPHAT  --  232*  --    TBILIRUBIN  --  0.3  --      Recent Labs     08/18/19 0407  08/19/19  0401 08/20/19  0415   RBC 3.15* 3.31* 3.14*   HEMOGLOBIN 8.7* 9.1* 8.8*   HEMATOCRIT 29.8* 31.8* 30.0*   PLATELETCT 269 306 287       Imaging  X-Ray:  I have personally reviewed the images and compared with prior images.    Assessment/Plan  * Respiratory failure (HCC)- (present on admission)  Assessment & Plan  Likely due to pneumonia and ARDS +/- volume overload, plus minus aspiration  Liberated from mechanical ventilation 8/19  Continue titrated oxygen therapy, RT protocols    Multifocal pneumonia- (present on admission)  Assessment & Plan  Chest x-ray, P:F and history suggestive of ARDS.  Continue to treat for community acquired pneumonia, complete antimicrobial therapy  Likely some component of aspiration, further speech-language pathology evaluation ongoing    Acute on chronic kidney failure (HCC)- (present on admission)  Assessment & Plan  Likely secondary to sepsis and hypoperfusion  Hemodialysis held now, nephrology following  Increased urine output in response to Lasix yesterday, continue to monitor closely    Sepsis (HCC)- (present on admission)  Assessment & Plan  This is severe sepsis with the following associated acute organ dysfunction(s): acute kidney failure, acute respiratory failure.   sepsis protocol  Source likely pulmonary  Continue source targeted antibiotics: Rocephin, azithromycin, Flagyl for 7 days  Completing antibiotic course    Ileus (HCC)  Assessment & Plan  Secondary to narcotic therapy, limit narcotics  Oral Narcan initiated  Subcutaneous methylnaltrexone if no response    ARDS (adult respiratory distress syndrome) (HCC)- (present on admission)  Assessment & Plan  Meets diagnostic requirements by [place] criteria  Clinically improved, continue to maintain net negative fluid balance  Complete antibiotic therapy for pneumonia  Liberated from mechanical ventilation 8/19    HLD (hyperlipidemia)- (present on admission)  Assessment & Plan  Continue statin therapy    HTN  (hypertension)- (present on admission)  Assessment & Plan  Blood pressure management as above    Diabetes mellitus type 2, uncontrolled, with complications (HCC)- (present on admission)  Assessment & Plan  Goal blood glucose 120-180  Continue glycemic control    Hypothyroid- (present on admission)  Assessment & Plan  Continue levothyroxine    History of venous thromboembolism- (present on admission)  Assessment & Plan  Only on aspirin at home  Will need to clarify anticoagulation strategy with family when they are available  Updated plan:  Continue titrated oxygen, monitor closely for further respiratory deterioration  Ileus improved, ongoing bowel movements noted  She has history of chronic pain syndrome by her report was managed with a pain specialist on outpatient basis  As her ileus has improved and she is having complaints of increased pain now will resume lower dose hydrocodone and clarify her outpatient pain strategy  Increase mobility, therapy, speech-language pathology following, continue to follow closely in ICU today    VTE:  Heparin  Ulcer: H2 Antagonist  Lines: Central Line  Ongoing indication addressed and Wallace Catheter  Ongoing indication addressed    I have performed a physical exam and reviewed and updated ROS and Plan today (8/20/2019). In review of yesterday's note (8/19/2019), there are no changes except as documented above.       Discussed patient condition and risk of morbidity and/or mortality with Hospitalist, RN, RT, Pharmacy, Dietary, , Code status disscussed, Charge nurse / hot rounds, Patient and nephrology

## 2019-08-20 NOTE — THERAPY
"Physical Therapy Evaluation completed.   Bed Mobility:  Supine to Sit: Supervised  Transfers: Sit to Stand: Supervised  Gait: Level Of Assist: 12ft with supervision with Front-Wheel Walker       Plan of Care: Will benefit from Physical Therapy 4 times per week  Discharge Recommendations: Equipment: Will Continue to Assess for Equipment Needs.     See \"Rehab Therapy-Acute\" Patient Summary Report for complete documentation.     Pt is a 53yo female who transferred from outside facility for respiratory failure, ARDS, PNA, and acute on chronic kidney disease. Pt no longer intubated. Pt presents to PT below her functional baseline but was able to perform all mobility without physical assist. Extra time and effort to complete all tasks. Pt required continuous cues to attend to task and to slow down due to impulsivity. Pt only able to ambulate 12ft with FWW due to fatigue and back pain. No c/o SOB throughout on room air, SpO2 > 90%. Anticipate pt will be able to return home, however will need to continue to work with PT in acute setting for continued gait, stair, and balance assessment. May require postacute placement for safety depending on progression. Will continue to follow.   "

## 2019-08-20 NOTE — PROGRESS NOTES
Hospital Medicine Daily Progress Note    Date of Service  8/20/2019    Chief Complaint  54 y.o. female admitted 8/14/2019 with shortness of breath    Hospital Course    Ms Garcia has a history of chronic pain and CKD.  She presented to an outside hospital with shortness of breath and was found to have multifactorial pneumonia.  She was admitted and symptoms progressed, she required intubation.  The patient was then transferred to [facility] on 8/14/2019 for specialty coverage and higher level of care.  She was treated for pneumonia and successfully extubated on 8/19/2019.      Interval Problem Update  Extubated yesterday  Coughing a little, not short of breath, on nasal cannula  Tolerating tube feed, tube is bothersome, she wants to eat  Blood pressure uncontrolled with SBP to 180's, no headache, no chest pain    Consultants/Specialty  Critical Care, I discussed the patient's condition with Dr. Zee today on ICU Rounds  Nephrology    Code Status  Full Code    Disposition  Keep in ICU today  Likely to need post acute care at a facility      Review of Systems  Review of Systems   Constitutional: Negative for chills and malaise/fatigue.   Respiratory: Negative for cough, hemoptysis and sputum production.    Cardiovascular: Negative for chest pain, palpitations and orthopnea.   Gastrointestinal: Negative for nausea and vomiting.   Skin: Negative for itching and rash.   Neurological: Negative for dizziness.   All other systems reviewed and are negative.      Physical Exam  Temp:  [36.4 °C (97.5 °F)-36.8 °C (98.3 °F)] 36.4 °C (97.5 °F)  Pulse:  [77-94] 80  Resp:  [5-20] 7  BP: (144-171)/(58-75) 149/65  SpO2:  [94 %-100 %] 99 %    Physical Exam   Constitutional: She is oriented to person, place, and time. She appears well-developed and well-nourished.   HENT:   Head: Normocephalic and atraumatic.   Eyes: Conjunctivae and EOM are normal. Right eye exhibits no discharge. Left eye exhibits no discharge.    Cardiovascular: Normal rate, regular rhythm and intact distal pulses.   No murmur heard.  2+ Radial Pulses  Brisk Capillary Refill   Pulmonary/Chest: Effort normal and breath sounds normal. No respiratory distress. She has no wheezes.   Abdominal: Soft. Bowel sounds are normal. She exhibits no distension. There is no tenderness. There is no rebound.   Musculoskeletal: Normal range of motion. She exhibits no edema.   Neurological: She is alert and oriented to person, place, and time. No cranial nerve deficit.   Skin: Skin is warm and dry. She is not diaphoretic. No erythema.   Skin is warm and well perfused   Psychiatric: She has a normal mood and affect.       Fluids    Intake/Output Summary (Last 24 hours) at 8/20/2019 0901  Last data filed at 8/20/2019 0800  Gross per 24 hour   Intake 190 ml   Output 2160 ml   Net -1970 ml       Laboratory  Recent Labs     08/18/19 0407 08/19/19  0401 08/20/19  0415   WBC 11.9* 12.2* 12.1*   RBC 3.15* 3.31* 3.14*   HEMOGLOBIN 8.7* 9.1* 8.8*   HEMATOCRIT 29.8* 31.8* 30.0*   MCV 94.6 96.1 95.5   MCH 27.6 27.5 28.0   MCHC 29.2* 28.6* 29.3*   RDW 48.4 49.7 48.1   PLATELETCT 269 306 287   MPV 10.6 10.4 10.0     Recent Labs     08/18/19  0407 08/19/19  0401 08/20/19  0415   SODIUM 134* 136 136   POTASSIUM 4.4 4.6 4.5   CHLORIDE 100 99 101   CO2 26 23 23   GLUCOSE 127* 90 77   BUN 39* 39* 49*   CREATININE 2.27* 2.79* 3.77*   CALCIUM 8.9 9.4 9.3                   Imaging  DX-CHEST-PORTABLE (1 VIEW)   Final Result      Interval extubation. Exam is otherwise unchanged.      DX-CHEST-PORTABLE (1 VIEW)   Final Result      1.  Stable lines and tubes.   2.  No new consolidation or pleural effusions.      [id number] VIEW   Final Result      1.  There is a nonobstructive bowel gas pattern.   2.  The ET tube tip overlies the distal stomach.      DX-CHEST-PORTABLE (1 VIEW)   Final Result      1.  Stable lines and tubes.   2.  Bilateral pulmonary opacities have resolved. No new consolidation.       DX-CHEST-PORTABLE (1 VIEW)   Final Result      1.  Stable lines and tubes.   2.  Ill-defined perihilar/bibasilar opacities, which may represent edema, atelectasis or pneumonia. They are slightly worse since prior exam.      DX-CHEST-PORTABLE (1 VIEW)   Final Result         1.  Pulmonary edema and/or infiltrates are identified, which are stable since the prior exam.         OUTSIDE IMAGES-DX CHEST   Final Result      OUTSIDE IMAGES-DX CHEST   Final Result      DX-CHEST-PORTABLE (1 VIEW)   Final Result         1.  Pulmonary edema and/or infiltrates are identified, which are somewhat decreased since the prior exam.      DX-ABDOMEN FOR TUBE PLACEMENT   Final Result         1.  Nonspecific bowel gas pattern.   2.  Dobbhoff tube tip terminates overlying the expected location of the gastric antrum.   3.  Bilateral pulmonary edema and/or infiltrates.      DX-CHEST-FOR LINE PLACEMENT Perform procedure in: Patient's Room   Final Result         1. Right IJ dialysis catheter is now well-positioned. No pneumothorax.   2. Unchanged pulmonary findings. Unchanged other lines and tubes.         DX-CHEST-FOR LINE PLACEMENT Perform procedure in: Patient's Room   Final Result   Addendum 1 of 1   Findings were discussed with MARCIAL BEAVER JR on 8/14/2019 12:40 PM.      Final      DX-CHEST-FOR LINE PLACEMENT Perform procedure in: Patient's Room   Final Result      1.  Left IJ central catheter tip is in the superior cavoatrial junction. No pneumothorax.   2.  Well-positioned ETT.   3.  Catheter tubing projecting over the right neck, tip directed cephalad.   4.  Slight interval improvement in bilateral interstitial/alveolar opacities.      DX-CHEST-PORTABLE (1 VIEW)   Final Result         1.  Extensive bilateral pulmonary parenchymal opacities compatible with ARDS, severe pulmonary edema, and/or superimposed pulmonary infiltrates.           Assessment/Plan  * Respiratory failure (HCC)- (present on admission)  Assessment &  Plan  Patient has been extubated  Continue supplemental oxygen, wean as tolerated  Treat pneumonia    Multifocal pneumonia- (present on admission)  Assessment & Plan  Suspect aspiration  Continue ceftriaxone and flagyl  Speech therapy, modified diet    Acute on chronic kidney failure (HCC)- (present on admission)  Assessment & Plan  Discussed with Dr. Salazar, no plans for dialysis today  She responded to lasix  Monitor I/O's  Avoid nephrotoxins    Sepsis (HCC)- (present on admission)  Assessment & Plan  Treatment as noted above.    HLD (hyperlipidemia)- (present on admission)  Assessment & Plan  Atorvastatin    HTN (hypertension)- (present on admission)  Assessment & Plan  Blood pressure uncontrolled with SBP to 180's  Restart home norvasc  IV hydralazine as needed for SBP greater than 160    Diabetes mellitus type 2, uncontrolled, with complications (HCC)- (present on admission)  Assessment & Plan  Hold oral hypoglycemics while inpatient  Insulin sliding scale    Hypothyroid- (present on admission)  Assessment & Plan  Synthroid    History of venous thromboembolism- (present on admission)  Assessment & Plan  DVT prophylaxis       VTE prophylaxis: heparin

## 2019-08-21 PROBLEM — K56.7 ILEUS (HCC): Status: ACTIVE | Noted: 2019-08-21

## 2019-08-21 LAB
ANION GAP SERPL CALC-SCNC: 12 MMOL/L (ref 0–11.9)
BASOPHILS # BLD AUTO: 0.5 % (ref 0–1.8)
BASOPHILS # BLD: 0.06 K/UL (ref 0–0.12)
BUN SERPL-MCNC: 52 MG/DL (ref 8–22)
CALCIUM SERPL-MCNC: 9.3 MG/DL (ref 8.5–10.5)
CHLORIDE SERPL-SCNC: 99 MMOL/L (ref 96–112)
CO2 SERPL-SCNC: 24 MMOL/L (ref 20–33)
CREAT SERPL-MCNC: 4.02 MG/DL (ref 0.5–1.4)
EOSINOPHIL # BLD AUTO: 0.26 K/UL (ref 0–0.51)
EOSINOPHIL NFR BLD: 2.2 % (ref 0–6.9)
ERYTHROCYTE [DISTWIDTH] IN BLOOD BY AUTOMATED COUNT: 46.5 FL (ref 35.9–50)
GLUCOSE SERPL-MCNC: 131 MG/DL (ref 65–99)
HCT VFR BLD AUTO: 30.6 % (ref 37–47)
HGB BLD-MCNC: 9.1 G/DL (ref 12–16)
IMM GRANULOCYTES # BLD AUTO: 0.55 K/UL (ref 0–0.11)
IMM GRANULOCYTES NFR BLD AUTO: 4.6 % (ref 0–0.9)
LYMPHOCYTES # BLD AUTO: 1.36 K/UL (ref 1–4.8)
LYMPHOCYTES NFR BLD: 11.3 % (ref 22–41)
MCH RBC QN AUTO: 27.7 PG (ref 27–33)
MCHC RBC AUTO-ENTMCNC: 29.7 G/DL (ref 33.6–35)
MCV RBC AUTO: 93 FL (ref 81.4–97.8)
MONOCYTES # BLD AUTO: 1.01 K/UL (ref 0–0.85)
MONOCYTES NFR BLD AUTO: 8.4 % (ref 0–13.4)
NEUTROPHILS # BLD AUTO: 8.8 K/UL (ref 2–7.15)
NEUTROPHILS NFR BLD: 73 % (ref 44–72)
NRBC # BLD AUTO: 0 K/UL
NRBC BLD-RTO: 0 /100 WBC
PHOSPHATE SERPL-MCNC: 4.9 MG/DL (ref 2.5–4.5)
PLATELET # BLD AUTO: 295 K/UL (ref 164–446)
PMV BLD AUTO: 10 FL (ref 9–12.9)
POTASSIUM SERPL-SCNC: 3.8 MMOL/L (ref 3.6–5.5)
RBC # BLD AUTO: 3.29 M/UL (ref 4.2–5.4)
SODIUM SERPL-SCNC: 135 MMOL/L (ref 135–145)
WBC # BLD AUTO: 12 K/UL (ref 4.8–10.8)

## 2019-08-21 PROCEDURE — 92526 ORAL FUNCTION THERAPY: CPT

## 2019-08-21 PROCEDURE — 700111 HCHG RX REV CODE 636 W/ 250 OVERRIDE (IP): Performed by: INTERNAL MEDICINE

## 2019-08-21 PROCEDURE — A9270 NON-COVERED ITEM OR SERVICE: HCPCS | Performed by: INTERNAL MEDICINE

## 2019-08-21 PROCEDURE — 700102 HCHG RX REV CODE 250 W/ 637 OVERRIDE(OP): Performed by: INTERNAL MEDICINE

## 2019-08-21 PROCEDURE — 85025 COMPLETE CBC W/AUTO DIFF WBC: CPT

## 2019-08-21 PROCEDURE — 80048 BASIC METABOLIC PNL TOTAL CA: CPT

## 2019-08-21 PROCEDURE — 770006 HCHG ROOM/CARE - MED/SURG/GYN SEMI*

## 2019-08-21 PROCEDURE — 99233 SBSQ HOSP IP/OBS HIGH 50: CPT | Performed by: INTERNAL MEDICINE

## 2019-08-21 PROCEDURE — A9270 NON-COVERED ITEM OR SERVICE: HCPCS | Performed by: HOSPITALIST

## 2019-08-21 PROCEDURE — 99233 SBSQ HOSP IP/OBS HIGH 50: CPT | Performed by: HOSPITALIST

## 2019-08-21 PROCEDURE — 700102 HCHG RX REV CODE 250 W/ 637 OVERRIDE(OP): Performed by: HOSPITALIST

## 2019-08-21 PROCEDURE — 84100 ASSAY OF PHOSPHORUS: CPT

## 2019-08-21 PROCEDURE — 700111 HCHG RX REV CODE 636 W/ 250 OVERRIDE (IP): Performed by: HOSPITALIST

## 2019-08-21 RX ORDER — TRAMADOL HYDROCHLORIDE 50 MG/1
50 TABLET ORAL EVERY 6 HOURS PRN
Status: DISCONTINUED | OUTPATIENT
Start: 2019-08-21 | End: 2019-08-23 | Stop reason: HOSPADM

## 2019-08-21 RX ORDER — AMLODIPINE BESYLATE 10 MG/1
10 TABLET ORAL
Status: DISCONTINUED | OUTPATIENT
Start: 2019-08-22 | End: 2019-08-22

## 2019-08-21 RX ORDER — POTASSIUM CHLORIDE 20 MEQ/1
40 TABLET, EXTENDED RELEASE ORAL ONCE
Status: COMPLETED | OUTPATIENT
Start: 2019-08-21 | End: 2019-08-21

## 2019-08-21 RX ORDER — AMLODIPINE BESYLATE 5 MG/1
5 TABLET ORAL ONCE
Status: COMPLETED | OUTPATIENT
Start: 2019-08-21 | End: 2019-08-21

## 2019-08-21 RX ADMIN — CARVEDILOL 3.12 MG: 3.12 TABLET, FILM COATED ORAL at 08:20

## 2019-08-21 RX ADMIN — IRON SUCROSE 200 MG: 20 INJECTION, SOLUTION INTRAVENOUS at 05:01

## 2019-08-21 RX ADMIN — METOCLOPRAMIDE 5 MG: 10 TABLET ORAL at 17:28

## 2019-08-21 RX ADMIN — AMLODIPINE BESYLATE 5 MG: 5 TABLET ORAL at 09:41

## 2019-08-21 RX ADMIN — ASPIRIN 325 MG: 325 TABLET, FILM COATED ORAL at 05:00

## 2019-08-21 RX ADMIN — HEPARIN SODIUM 5000 UNITS: 5000 INJECTION, SOLUTION INTRAVENOUS; SUBCUTANEOUS at 15:09

## 2019-08-21 RX ADMIN — ALPRAZOLAM 0.5 MG: 0.25 TABLET ORAL at 03:42

## 2019-08-21 RX ADMIN — POTASSIUM CHLORIDE 40 MEQ: 20 TABLET, EXTENDED RELEASE ORAL at 09:41

## 2019-08-21 RX ADMIN — METOCLOPRAMIDE 5 MG: 10 TABLET ORAL at 21:20

## 2019-08-21 RX ADMIN — ATORVASTATIN CALCIUM 80 MG: 40 TABLET, FILM COATED ORAL at 17:28

## 2019-08-21 RX ADMIN — DULOXETINE HYDROCHLORIDE 60 MG: 60 CAPSULE, DELAYED RELEASE ORAL at 05:00

## 2019-08-21 RX ADMIN — HYDROCODONE BITARTRATE AND ACETAMINOPHEN 2 TABLET: 5; 325 TABLET ORAL at 15:42

## 2019-08-21 RX ADMIN — METOCLOPRAMIDE 5 MG: 10 TABLET ORAL at 11:58

## 2019-08-21 RX ADMIN — AMLODIPINE BESYLATE 5 MG: 5 TABLET ORAL at 05:01

## 2019-08-21 RX ADMIN — HYDROCODONE BITARTRATE AND ACETAMINOPHEN 2 TABLET: 5; 325 TABLET ORAL at 01:36

## 2019-08-21 RX ADMIN — TRAMADOL HYDROCHLORIDE 50 MG: 50 TABLET, FILM COATED ORAL at 15:09

## 2019-08-21 RX ADMIN — HEPARIN SODIUM 5000 UNITS: 5000 INJECTION, SOLUTION INTRAVENOUS; SUBCUTANEOUS at 21:20

## 2019-08-21 RX ADMIN — HYDROCODONE BITARTRATE AND ACETAMINOPHEN 2 TABLET: 5; 325 TABLET ORAL at 22:32

## 2019-08-21 RX ADMIN — ALPRAZOLAM 0.5 MG: 0.25 TABLET ORAL at 13:46

## 2019-08-21 RX ADMIN — LEVOTHYROXINE SODIUM 112 MCG: 112 TABLET ORAL at 05:00

## 2019-08-21 RX ADMIN — HYDROCODONE BITARTRATE AND ACETAMINOPHEN 2 TABLET: 5; 325 TABLET ORAL at 09:41

## 2019-08-21 RX ADMIN — HYDRALAZINE HYDROCHLORIDE 20 MG: 20 INJECTION INTRAMUSCULAR; INTRAVENOUS at 01:43

## 2019-08-21 RX ADMIN — FAMOTIDINE 20 MG: 20 TABLET ORAL at 17:29

## 2019-08-21 RX ADMIN — CARVEDILOL 3.12 MG: 3.12 TABLET, FILM COATED ORAL at 17:29

## 2019-08-21 RX ADMIN — HEPARIN SODIUM 5000 UNITS: 5000 INJECTION, SOLUTION INTRAVENOUS; SUBCUTANEOUS at 05:01

## 2019-08-21 RX ADMIN — ONDANSETRON 4 MG: 2 INJECTION INTRAMUSCULAR; INTRAVENOUS at 00:08

## 2019-08-21 RX ADMIN — TRAMADOL HYDROCHLORIDE 50 MG: 50 TABLET, FILM COATED ORAL at 21:20

## 2019-08-21 RX ADMIN — METOCLOPRAMIDE 5 MG: 10 TABLET ORAL at 07:00

## 2019-08-21 ASSESSMENT — ENCOUNTER SYMPTOMS
SPUTUM PRODUCTION: 0
CHILLS: 0
NAUSEA: 0
COUGH: 0
DIZZINESS: 0
HEMOPTYSIS: 0
ORTHOPNEA: 0
PALPITATIONS: 0
VOMITING: 0

## 2019-08-21 ASSESSMENT — PAIN SCALES - WONG BAKER: WONGBAKER_NUMERICALRESPONSE: DOESN'T HURT AT ALL

## 2019-08-21 NOTE — PROGRESS NOTES
Hospital Medicine Daily Progress Note    Date of Service  8/21/2019    Chief Complaint  54 y.o. female admitted 8/14/2019 with shortness of breath    Hospital Course    Ms Garcia has a history of chronic pain and CKD.  She presented to an outside hospital with shortness of breath and was found to have multifactorial pneumonia.  She was admitted and symptoms progressed, she required intubation.  The patient was then transferred to [facility] on 8/14/2019 for specialty coverage and higher level of care.  She was treated for pneumonia and successfully extubated on 8/19/2019.      Interval Problem Update  Anxious, complains of ankle pain, pain is improved  Hypertensive, with SBP's to 180's, rec'd IV hydralazine and labetalol overnight  -1.7 L over last 24 hours  Breathing is comfortable, she doesn't feel short of breath, minimal cough    Consultants/Specialty  Critical Care, I discussed the patient's condition with Dr. Zee today on ICU Rounds  Nephrology    Code Status  Full Code    Disposition  Keep in ICU today  Likely to need post acute care at a facility, PT/OT    Review of Systems  Review of Systems   Constitutional: Negative for chills and malaise/fatigue.   Respiratory: Negative for cough, hemoptysis and sputum production.    Cardiovascular: Negative for chest pain, palpitations and orthopnea.   Gastrointestinal: Negative for nausea and vomiting.   Skin: Negative for itching and rash.   Neurological: Negative for dizziness.   All other systems reviewed and are negative.      Physical Exam  Temp:  [37 °C (98.6 °F)] 37 °C (98.6 °F)  Pulse:  [] 80  Resp:  [8-21] 8  BP: ()/(42-89) 165/69  SpO2:  [91 %-98 %] 98 %    Physical Exam   Constitutional: She is oriented to person, place, and time. She appears well-developed and well-nourished.   HENT:   Head: Normocephalic and atraumatic.   Eyes: Conjunctivae and EOM are normal. Right eye exhibits no discharge. Left eye exhibits no  discharge.   Cardiovascular: Normal rate, regular rhythm and intact distal pulses.   No murmur heard.  2+ Radial Pulses  Brisk Capillary Refill   Pulmonary/Chest: Effort normal and breath sounds normal. No respiratory distress. She has no wheezes.   Abdominal: Soft. Bowel sounds are normal. She exhibits no distension. There is no tenderness. There is no rebound.   Musculoskeletal: Normal range of motion. She exhibits no edema.   Neurological: She is alert and oriented to person, place, and time. No cranial nerve deficit.   Skin: Skin is warm and dry. She is not diaphoretic. No erythema.   Skin is warm and well perfused   Psychiatric: She has a normal mood and affect.       Fluids    Intake/Output Summary (Last 24 hours) at 8/21/2019 0858  Last data filed at 8/21/2019 0800  Gross per 24 hour   Intake 320 ml   Output 2035 ml   Net -1715 ml       Laboratory  Recent Labs     08/19/19  0401 08/20/19  0415 08/21/19  0400   WBC 12.2* 12.1* 12.0*   RBC 3.31* 3.14* 3.29*   HEMOGLOBIN 9.1* 8.8* 9.1*   HEMATOCRIT 31.8* 30.0* 30.6*   MCV 96.1 95.5 93.0   MCH 27.5 28.0 27.7   MCHC 28.6* 29.3* 29.7*   RDW 49.7 48.1 46.5   PLATELETCT 306 287 295   MPV 10.4 10.0 10.0     Recent Labs     08/19/19  0401 08/20/19  0415 08/21/19  0400   SODIUM 136 136 135   POTASSIUM 4.6 4.5 3.8   CHLORIDE 99 101 99   CO2 23 23 24   GLUCOSE 90 77 131*   BUN 39* 49* 52*   CREATININE 2.79* 3.77* 4.02*   CALCIUM 9.4 9.3 9.3                   Imaging  DX-CHEST-PORTABLE (1 VIEW)   Final Result      Interval extubation. Exam is otherwise unchanged.      DX-CHEST-PORTABLE (1 VIEW)   Final Result      1.  Stable lines and tubes.   2.  No new consolidation or pleural effusions.      [id number]-2 VIEW   Final Result      1.  There is a nonobstructive bowel gas pattern.   2.  The ET tube tip overlies the distal stomach.      DX-CHEST-PORTABLE (1 VIEW)   Final Result      1.  Stable lines and tubes.   2.  Bilateral pulmonary opacities have resolved. No new  consolidation.      DX-CHEST-PORTABLE (1 VIEW)   Final Result      1.  Stable lines and tubes.   2.  Ill-defined perihilar/bibasilar opacities, which may represent edema, atelectasis or pneumonia. They are slightly worse since prior exam.      DX-CHEST-PORTABLE (1 VIEW)   Final Result         1.  Pulmonary edema and/or infiltrates are identified, which are stable since the prior exam.         OUTSIDE IMAGES-DX CHEST   Final Result      OUTSIDE IMAGES-DX CHEST   Final Result      DX-CHEST-PORTABLE (1 VIEW)   Final Result         1.  Pulmonary edema and/or infiltrates are identified, which are somewhat decreased since the prior exam.      DX-ABDOMEN FOR TUBE PLACEMENT   Final Result         1.  Nonspecific bowel gas pattern.   2.  Dobbhoff tube tip terminates overlying the expected location of the gastric antrum.   3.  Bilateral pulmonary edema and/or infiltrates.      DX-CHEST-FOR LINE PLACEMENT Perform procedure in: Patient's Room   Final Result         1. Right IJ dialysis catheter is now well-positioned. No pneumothorax.   2. Unchanged pulmonary findings. Unchanged other lines and tubes.         DX-CHEST-FOR LINE PLACEMENT Perform procedure in: Patient's Room   Final Result   Addendum 1 of 1   Findings were discussed with MARCIAL BEAVER JR on 8/14/2019 12:40 PM.      Final      DX-CHEST-FOR LINE PLACEMENT Perform procedure in: Patient's Room   Final Result      1.  Left IJ central catheter tip is in the superior cavoatrial junction. No pneumothorax.   2.  Well-positioned ETT.   3.  Catheter tubing projecting over the right neck, tip directed cephalad.   4.  Slight interval improvement in bilateral interstitial/alveolar opacities.      DX-CHEST-PORTABLE (1 VIEW)   Final Result         1.  Extensive bilateral pulmonary parenchymal opacities compatible with ARDS, severe pulmonary edema, and/or superimposed pulmonary infiltrates.           Assessment/Plan  * Respiratory failure (HCC)- (present on  admission)  Assessment & Plan  Patient has been extubated  Continue supplemental oxygen, wean as tolerated  Treat pneumonia    Multifocal pneumonia- (present on admission)  Assessment & Plan  Suspect aspiration  Continue ceftriaxone and flagyl  Speech therapy, modified diet    Acute on chronic kidney failure (HCC)- (present on admission)  Assessment & Plan  Patient continues to make good urine output  Nephrology following    Sepsis (HCC)- (present on admission)  Assessment & Plan  Treatment as noted above.    HLD (hyperlipidemia)- (present on admission)  Assessment & Plan  Atorvastatin    HTN (hypertension)- (present on admission)  Assessment & Plan  Blood pressure uncontrolled with SBP to 180's  Restart home norvasc and coreg  IV hydralazine as needed for SBP greater than 160    Diabetes mellitus type 2, uncontrolled, with complications ([facility])- (present on admission)  Assessment & Plan  Hold oral hypoglycemics while inpatient  Insulin sliding scale    Hypothyroid- (present on admission)  Assessment & Plan  Synthroid    History of venous thromboembolism- (present on admission)  Assessment & Plan  DVT prophylaxis       VTE prophylaxis: heparin

## 2019-08-21 NOTE — THERAPY
"Speech Language Therapy dysphagia treatment completed.   Functional Status:  Patient seen for swallowing therapy this date.  RN got her up to chair for session.  Patient awake, alert and oriented in all spheres.  She was able to verbalize understanding of swallowing recommendations.  Presentation of PO consisted of nectars, purees and ice chips.  Patient was able to complete a double swallow 90% of the time with intermittent cues.  She did need min cues to take smaller bites.  She did have a coughing episode following a large bite of pudding and wet voice X1 on larger sip of NTL via cup, but otherwise, she had no overt S/Sx of aspiration at all on any of the other trials.  Instructed patient on swallowing exercises with focus on base of tongue, laryngeal elevation and pharyngeal constriction (Breanne, Supraglottic Swallow, falsetto, velar sounds, effortful swallow).  She was able to complete all exercises 10 times with fair to good accuracy.  At this time, recommend continuation of nectar thick full liquids with direct 1:1 supervision for all PO intake.  Would recommend MBS for further assessment of oropharyngeal function and clearance through UES as well as to r/o any aspiration during the swallow. SLP will follow.     Recommendations: nectar thick full liquids with 1:1 assistance.  Double swallow. 90* angle for all PO intake and for 30 minutes following.    Plan of Care: Will benefit from Speech Therapy 5 times per week    Post-Acute Therapy: Recommend inpatient transitional care services for continued speech therapy services.      See \"Rehab Therapy-Acute\" Patient Summary Report for complete documentation.     "

## 2019-08-21 NOTE — CARE PLAN
Problem: Nutritional:  Goal: Achieve adequate nutritional intake  Description  Patient will consume 50% of meals   Outcome: PROGRESSING AS EXPECTED    PO % for one meal thus far.

## 2019-08-21 NOTE — PROGRESS NOTES
Nephrology Progress Note    Date & Time: 8/21/2019  1:49 PM    HPI:  Patient is intubated and sedated. Unable to reach  via phone. History obtained from chart review.  54 y.o. female transferred from outside facility on 8/14/2019 for respiratory failure, suspected ARDS in the setting of multifocal pneumonia and acute on chronic kidney disease. She has a past medical history of CKD, polycystic kidney disease, DM2, hypothyroidism, hypertension, hyperlipidemia, AIME, and previous PE and DVT.       Per chart review, the patient presented to the outside facility with complaints of several days of shortness of breath and subjective fever. Chest x-ray obtained at the outside facility showed multifocal abnormality consistent with multifocal pneumonia the patient was admitted to the outside facility and started on a course of broad-spectrum antibiotics. Unfortunately, during the course of her hospitalization, the patient decompensated with worsening respiratory status.  At that point, development of ARDS was of concern therefore the patient was emergently intubated by the outside physician and patient transferred.     Additional work-up including WBC 15.7 hemoglobin 9.9 hematocrit 31.1 platelet 287 troponin 0.02, sodium 135 potassium 4.2 chloride 109 CO2 6 BUN 5.8 creatinine 5.1 and glucose 191.   Labs on admission here reflect hgb of 8.7, WBC 20.9, ANC 19.37, Na 132, K 5.2, CO2 8, AG 18, BUN 72, Cr 5.6, Ph 5.7, Ca 8.7, Mg 1.8, and LA 1.7.   On ABG Ph of 7.12  Patient admitted to ICU, intubated, sedated and started on multiple abx.     DAILY NEPHROLOGY SUMMARY:   8/15: patient tolerated HD yesterday with 3.5 liters fluid removed. Patient appears to have slight increase in UOP  to 168cc overnight and 100 cc this am. Otherwise still intubated and sedated. Ph 7.5 in am. Plan for HD   8/16: No acute events overnight. Patient drowsy but arousable during SBT. Moving all extremities and following  commands. UOP 151cc  "overnight. Creatinine stable, not up trending with normal Na and K   8/17: ongoing pulm edema, attempting to wean from vent   8/18: tolerated HD, planning to extubate in next 24 hours    8/19: Patient extubated today. Tolerating well, on NC. Drowsy but responsive and easily arousable. Trial of lasix  80mg I V BID today. Monitor UOP.   8/20: Patient much more alert today, sitting up. Feels much better, feels breathing has improved as well.  Responding to lasix trial with 1.2 liters overnight and total about 2 liters in 24 hours with net negative 1.7 Liters.  Stable vitals, electrolytes and Hco3 of 23. SCr trended up, monitor.    8/21: patient continuing to improve clinically and feels well. However, Cr trending up. UOP increasing to 1.3 L  overnight and 24 hr total of 2 L without lasix use. BP trending up, amlodipine added back to patients coreg.       CURRENT MEDICATIONS: Reviewed  IMAGING STUDIES: Reviewed    ROS  General:  Has some weakness and malaise  HEENT:  No vision changes, no hearing changes  CV:  No chest pain, no palpitations  Lungs:  No cough; no PND  GI:  No Nausea; no vomiting  : No dysuria or gross hematuria- Urinary catheter in place  MSK:  No joint pain; no trauma. Has mild back pain   Skin: No rashes; no lesions  Neuro: No tremors; no LOC  Psych: No depression; no anxiety. More alert.       PHYSICAL EXAM  VS:  BP (!) 164/66   Pulse 87   Temp 37 °C (98.6 °F) (Temporal)   Resp 12   Ht 1.778 m (5' 10\")   Wt 96.1 kg (211 lb 13.8 oz)   SpO2 98%   BMI 30.40 kg/m²   GENERAL: sitting up in chair wn/wd  HEAD: Normocephalic, atraumatic  EYES: no scleral icterus; normal conjunctiva  NECK: Supple; trachea midline  CV: RRR, S1 and S2 present  LUNGS: scattered fine basilar crackles, improving  ABDOMEN: Soft, non-tender  EXTREMETIES: no lower extremity edema, no clubbing or cyanosis  NEURO: alert and oriented      Fluids:  In: 340 [Other:240]  Out: 2085     LABS:    Lab Results   Component Value " Date/Time    SODIUM 135 08/21/2019 04:00 AM    POTASSIUM 3.8 08/21/2019 04:00 AM    CHLORIDE 99 08/21/2019 04:00 AM    CO2 24 08/21/2019 04:00 AM    GLUCOSE 131 (H) 08/21/2019 04:00 AM    BUN 52 (H) 08/21/2019 04:00 AM    CREATININE 4.02 (H) 08/21/2019 04:00 AM      Lab Results   Component Value Date/Time    WBC 12.0 (H) 08/21/2019 04:00 AM    RBC 3.29 (L) 08/21/2019 04:00 AM    HEMOGLOBIN 9.1 (L) 08/21/2019 04:00 AM    HEMATOCRIT 30.6 (L) 08/21/2019 04:00 AM    MCV 93.0 08/21/2019 04:00 AM    MCH 27.7 08/21/2019 04:00 AM    MCHC 29.7 (L) 08/21/2019 04:00 AM    MPV 10.0 08/21/2019 04:00 AM    NEUTSPOLYS 73.00 (H) 08/21/2019 04:00 AM    LYMPHOCYTES 11.30 (L) 08/21/2019 04:00 AM    MONOCYTES 8.40 08/21/2019 04:00 AM    EOSINOPHILS 2.20 08/21/2019 04:00 AM    BASOPHILS 0.50 08/21/2019 04:00 AM    HYPOCHROMIA 1+ 08/20/2019 04:15 AM    ANISOCYTOSIS 1+ 08/20/2019 04:15 AM            ASSESSMENT:  # CAMPOS on CKD: Baseline Cr~2.5 - 3.0   # CKD Stage 4 based on previous labs. Likely secondary to polycystic kidney disease  # HTN - stable, monitor  # Metabolic acidosis   # polycystic kidney disease- [facility] reflecting this date back to 2012 per chart review  # ARDS, due to multifocal PNA - on C3, and flagyl- extubated 8/19/2019  # Pneumonia- on C3, and flagyl  # DM2  # hypothyroidism  #anemia - iron deficient - inj replacement  # CKD MBD - phos acceptable, PTH elevated     PLAN:  - RRT not necessary today, daily evaluation for HD consideration. optimistic will not need HD  - Please keep temporary catheter in place  - Avoid RAAS blockers at this time  - No need to match I/Os with IV fluids yet  - Strict I/Os  - Dose all meds per ESRD  - Daily electrolytes and renal labs      Thank you for this consult. We will follow.   Juan Diego Langston - Internal Medicine Resident.

## 2019-08-21 NOTE — ASSESSMENT & PLAN NOTE
Secondary to narcotic therapy, limit narcotics  Oral Narcan initiated  Subcutaneous methylnaltrexone if no response

## 2019-08-21 NOTE — DIETARY
Nutrition Services:  Brief Update    Pt previously on tube feedings.  Swallow evaluation by SLP 8/19, FEES 8/20; recommended Nectar thick full liquid diet.  Cortrak has been removed and tube feedings are off.  Diet has been advanced, and per ADL flow sheet, PO % for one documented meal thus far.    Recommendations/Plan:  1. Encourage intake of meals.  2. Document intake of all meals as % taken in ADLs to provide interdisciplinary communication across all shifts.   3. Monitor weight.  4. Nutrition rep will continue to see patient for ongoing meal and snack preferences.   5. Add supplement order per RD as needed.    RD will continue to follow for adequate PO intake.

## 2019-08-21 NOTE — PROGRESS NOTES
"Critical Care Progress Note    Date of admission  8/14/2019    Chief Complaint  54 y.o. female admitted 8/14/2019 with respiratory distress    Hospital Course    \"54 y.o. female with past medical history of hypertension, PE, DVT, diabetes, stage IV chronic kidney disease presumed to be secondary to polycystic kidney disease, type 2 diabetes, neuropathy, stroke with dysphasia (PEG tube removed), who presented 8/14/2019 as a transfer from [facility] where she presented yesterday for increasing shortness of breath and subjective fever for several days.  Patient was found to have multifocal pneumonia and was admitted to [facility] on broad-spectrum antibiotics however decompensated overnight with worsening hypoxia suggesting ARDS.  Patient was intubated at [facility] and transferred to our facility for higher level care.  Labs are facility show leukocytosis with white blood cell count of 20.9, anemia with a hemoglobin of 8.7, normal platelet count at 276, conference of panel shows a mild hyponatremia 132, potassium 5.2, CO2 8, glucose 294, BUN 72, creatinine 5.6.\"      Interval Problem Update  Reviewed last 24 hour events:   Anxious, ongoing leg pain   Extubated 8/19   More hypertensive now   Tm =    FEES - rec necter thick diet; remove cortrak   BM x 3 yesterday   I/O = 340/2.8, off lasix   Remove TLC   Keep simon in place   PT/OT/SLP - OOB   2 LPM n/c   SC heparin, home pepcid   Completed C3/flagyl yesterday   OK to medical floor       Yesterday:   Alert, not oreitnted to time, old CVA, L sided weakness   SR, no gtts   SBP < 150   Tm 36.7   smita TF at 10, + loose BM, mod quantity   No further nausea   Failed swallow; FEES today   Increased UOP; lasix held; Cr up   2 lpm n/c   Sc heparin   Completing C3/flagyl   Start miralax    PT/OT, OOB       Review of Systems  Review of Systems   Unable to perform ROS: Acuity of condition        Vital Signs for last 24 hours   Temp:  [37 °C (98.6 °F)] 37 °C (98.6 " °F)  Pulse:  [] 80  Resp:  [8-21] 8  BP: ()/(42-89) 165/69  SpO2:  [91 %-98 %] 98 %    Hemodynamic parameters for last 24 hours       Respiratory Information for the last 24 hours       Physical Exam   Physical Exam   Constitutional: She appears well-developed and well-nourished.   HENT:   Head: Normocephalic and atraumatic.   Right Ear: External ear normal.   Left Ear: External ear normal.   Nose: Nose normal.   Mouth/Throat: Oropharynx is clear and moist.   Eyes: Conjunctivae and EOM are normal.   Neck: Neck supple. No JVD present. No tracheal deviation present.   Right IJ temporary dialysis catheter, left IJ triple-lumen   Cardiovascular: Normal rate, regular rhythm and intact distal pulses.   Pulmonary/Chest: No accessory muscle usage. No respiratory distress.   Moderately diminished, no wheezing   Abdominal: Soft. Bowel sounds are normal. She exhibits no distension. There is no tenderness.   obese   Musculoskeletal: Normal range of motion. She exhibits no tenderness or deformity.   Neurological:   Awake, follows commands, mild residual left-sided weakness   Skin: Skin is warm and dry. No rash noted.   Nursing note and vitals reviewed.      Medications  Current Facility-Administered Medications   Medication Dose Route Frequency Provider Last Rate Last Dose   • polyethylene glycol/lytes (MIRALAX) PACKET 1 Packet  1 Packet Enteral Tube BID Bishnu Zee M.D.   Stopped at 08/20/19 1800    And   • senna-docusate (PERICOLACE or SENOKOT S) 8.6-50 MG per tablet 2 Tab  2 Tab Enteral Tube BID Bishnu Zee M.D.   Stopped at 08/20/19 1800    And   • magnesium hydroxide (MILK OF MAGNESIA) suspension 30 mL  30 mL Enteral Tube QDAY PRN Bishnu Zee M.D.        And   • bisacodyl (DULCOLAX) suppository 10 mg  10 mg Rectal QDAY PRN Bishnu Zee M.D.       • amLODIPine (NORVASC) tablet 5 mg  5 mg Enteral Tube Q DAY Mitchell Huntley M.D.   5 mg at 08/21/19 0501   • HYDROcodone-acetaminophen (NORCO) 5-325 MG per  tablet 1-2 Tab  1-2 Tab Oral Q6HRS PRN Bishnu Zee M.D.   2 Tab at 08/21/19 0136   • ALPRAZolam (XANAX) tablet 0.5 mg  0.5 mg Oral Q6HRS PRN Sudeep Ayala M.D.   0.5 mg at 08/21/19 0342   • labetalol (NORMODYNE,TRANDATE) injection 10-20 mg  10-20 mg Intravenous Q4HRS PRN Sudeep Ayala M.D.   10 mg at 08/20/19 2149   • ondansetron (ZOFRAN) syringe/vial injection 4 mg  4 mg Intravenous Q4HRS PRN Héctor Vallecillo M.D.   4 mg at 08/21/19 0008   • carvedilol (COREG) tablet 3.125 mg  3.125 mg Per NG Tube BID WITH MEALS Karla Zhong D.OYoel   3.125 mg at 08/21/19 0820   • hydrALAZINE (APRESOLINE) injection 10-20 mg  10-20 mg Intravenous Q4HRS PRN Héctor Vallecillo M.D.   20 mg at 08/21/19 0143   • aspirin (ASA) tablet 325 mg  325 mg Per NG Tube DAILY Doron Robles Jr., D.O.   325 mg at 08/21/19 0500   • atorvastatin (LIPITOR) tablet 80 mg  80 mg Per NG Tube Q EVENING Doron Robles Jr., D.O.   80 mg at 08/20/19 1831   • levothyroxine (SYNTHROID) tablet 112 mcg  112 mcg Enteral Tube DAILY Doron Robles Jr., D.O.   112 mcg at 08/21/19 0500   • metoclopramide (REGLAN) tablet 5 mg  5 mg Per NG Tube 4X/DAY ACHS Doron Robles Jr., D.O.   5 mg at 08/21/19 0700   • DULoxetine (CYMBALTA) capsule 60 mg  60 mg Enteral Tube DAILY Doron Robles Jr., D.O.   60 mg at 08/21/19 0500   • Respiratory Care per Protocol   Nebulization Continuous RT Doron Robles Jr., D.O.       • Pharmacy Consult: Enteral tube insertion - review meds/change route/product selection   Other PHARMACY TO DOSE Doron Robles Jr., D.O.       • heparin injection 5,000 Units  5,000 Units Subcutaneous Q8HRS Berna Cavazos M.D.   5,000 Units at 08/21/19 0501   • famotidine (PEPCID) tablet 20 mg  20 mg Enteral Tube Q EVENING Doron Robles Jr., D.O.   20 mg at 08/20/19 1700   • lidocaine (XYLOCAINE) 1 % injection 1 mL  1 mL Intradermal PRN Andrés Salazar M.D.       • heparin injection 2,500 Units  2,500 Units Intravenous DIALYSIS  PRN Andrés Salazar M.D.   2,500 Units at 08/15/19 1047   • heparin injection 3,000 Units  3,000 Units Intracatheter DIALYSIS PRN Juan Diego Langston M.D.   3,000 Units at 08/18/19 1234       Fluids    Intake/Output Summary (Last 24 hours) at 8/21/2019 0858  Last data filed at 8/21/2019 0800  Gross per 24 hour   Intake 320 ml   Output 2035 ml   Net -1715 ml       Laboratory  Recent Labs     08/19/19  0459 08/19/19  0733   ISTATAPH 7.315* 7.343*   ISTATAPCO2 48.9* 39.0*   ISTATAPO2 98* 108*   ISTATATCO2 26 22   CXTITHT0PAY 97 98   ISTATARTHCO3 24.9 21.2   ISTATARTBE -2 -4   ISTATTEMP 99.1 F see below   ISTATFIO2 30 30   ISTATSPEC Arterial Arterial   ISTATAPHTC 7.311*  --    BHJDLJNE0MB 100*  --          Recent Labs     08/19/19  0401 08/20/19  0415 08/21/19  0400   SODIUM 136 136 135   POTASSIUM 4.6 4.5 3.8   CHLORIDE 99 101 99   CO2 23 23 24   BUN 39* 49* 52*   CREATININE 2.79* 3.77* 4.02*   PHOSPHORUS 4.4  --  4.9*   CALCIUM 9.4 9.3 9.3     Recent Labs     08/19/19  0401 08/20/19  0415 08/21/19  0400   ALTSGPT 15  --   --    ASTSGOT 14  --   --    ALKPHOSPHAT 232*  --   --    TBILIRUBIN 0.3  --   --    PREALBUMIN  --  18.0  --    GLUCOSE 90 77 131*     Recent Labs     08/19/19  0401 08/20/19  0415 08/21/19  0400   WBC 12.2* 12.1* 12.0*   NEUTSPOLYS 76.40* 76.70* 73.00*   LYMPHOCYTES 17.30* 12.90* 11.30*   MONOCYTES 4.50 3.40 8.40   EOSINOPHILS 0.90 4.30 2.20   BASOPHILS 0.90 0.90 0.50   ASTSGOT 14  --   --    ALTSGPT 15  --   --    ALKPHOSPHAT 232*  --   --    TBILIRUBIN 0.3  --   --      Recent Labs     08/19/19  0401 08/20/19  0415 08/21/19  0400   RBC 3.31* 3.14* 3.29*   HEMOGLOBIN 9.1* 8.8* 9.1*   HEMATOCRIT 31.8* 30.0* 30.6*   PLATELETCT 306 287 295       Imaging  X-Ray:  I have personally reviewed the images and compared with prior images.    Assessment/Plan  * Respiratory failure (HCC)- (present on admission)  Assessment & Plan  Likely due to pneumonia and ARDS +/- volume overload, plus minus aspiration  Liberated  from mechanical ventilation 8/19  Continue titrated oxygen therapy, RT protocols    Multifocal pneumonia- (present on admission)  Assessment & Plan  Chest x-ray, P:F and history suggestive of ARDS.  Continue to treat for community acquired pneumonia, complete antimicrobial therapy  Likely some component of aspiration, further speech-language pathology evaluation ongoing    Acute on chronic kidney failure (HCC)- (present on admission)  Assessment & Plan  Likely secondary to sepsis and hypoperfusion  Hemodialysis held now, nephrology following  Increased urine output in response to Lasix yesterday, continue to monitor closely    Sepsis (HCC)- (present on admission)  Assessment & Plan  This is severe sepsis with the following associated acute organ dysfunction(s): acute kidney failure, acute respiratory failure.   sepsis protocol  Source likely pulmonary  Continue source targeted antibiotics: Rocephin, azithromycin, Flagyl for 7 days  Completing antibiotic course    Ileus (HCC)  Assessment & Plan  Secondary to narcotic therapy, limit narcotics  Oral Narcan initiated  Subcutaneous methylnaltrexone if no response    ARDS (adult respiratory distress syndrome) (HCC)- (present on admission)  Assessment & Plan  Meets diagnostic requirements by [place] criteria  Clinically improved, continue to maintain net negative fluid balance  Complete antibiotic therapy for pneumonia  Liberated from mechanical ventilation 8/19    HLD (hyperlipidemia)- (present on admission)  Assessment & Plan  Continue statin therapy    HTN (hypertension)- (present on admission)  Assessment & Plan  Blood pressure management as above    Diabetes mellitus type 2, uncontrolled, with complications (HCC)- (present on admission)  Assessment & Plan  Goal blood glucose 120-180  Continue glycemic control    Hypothyroid- (present on admission)  Assessment & Plan  Continue levothyroxine    History of venous thromboembolism- (present on admission)  Assessment &  Plan  Only on aspirin at home  Will need to clarify anticoagulation strategy with family when they are available    Updated plan:  She is continued to improve.  Pulmonary status is near baseline.  She is completing antibiotic therapy.  Ileus has resolved.  Okay to move out of ICU.    VTE:  Heparin  Ulcer: H2 Antagonist  Lines: Central Line  Ongoing indication addressed and Wallace Catheter  Ongoing indication addressed    I have performed a physical exam and reviewed and updated ROS and Plan today (8/21/2019). In review of yesterday's note (8/20/2019), there are no changes except as documented above.       Discussed patient condition and risk of morbidity and/or mortality with Hospitalist, RN, RT, Pharmacy, Dietary, , Code status disscussed, Charge nurse / hot rounds, Patient and nephrology

## 2019-08-22 ENCOUNTER — APPOINTMENT (OUTPATIENT)
Dept: RADIOLOGY | Facility: MEDICAL CENTER | Age: 54
DRG: 870 | End: 2019-08-22
Attending: INTERNAL MEDICINE
Payer: MEDICAID

## 2019-08-22 LAB
ALBUMIN SERPL BCP-MCNC: 3.4 G/DL (ref 3.2–4.9)
ANION GAP SERPL CALC-SCNC: 12 MMOL/L (ref 0–11.9)
BASOPHILS # BLD AUTO: 0.5 % (ref 0–1.8)
BASOPHILS # BLD: 0.05 K/UL (ref 0–0.12)
BUN SERPL-MCNC: 51 MG/DL (ref 8–22)
CALCIUM SERPL-MCNC: 9.3 MG/DL (ref 8.5–10.5)
CHLORIDE SERPL-SCNC: 99 MMOL/L (ref 96–112)
CO2 SERPL-SCNC: 22 MMOL/L (ref 20–33)
CREAT SERPL-MCNC: 4.11 MG/DL (ref 0.5–1.4)
EOSINOPHIL # BLD AUTO: 0.26 K/UL (ref 0–0.51)
EOSINOPHIL NFR BLD: 2.7 % (ref 0–6.9)
ERYTHROCYTE [DISTWIDTH] IN BLOOD BY AUTOMATED COUNT: 47 FL (ref 35.9–50)
GLUCOSE SERPL-MCNC: 217 MG/DL (ref 65–99)
HCT VFR BLD AUTO: 32.5 % (ref 37–47)
HGB BLD-MCNC: 10.1 G/DL (ref 12–16)
IMM GRANULOCYTES # BLD AUTO: 0.14 K/UL (ref 0–0.11)
IMM GRANULOCYTES NFR BLD AUTO: 1.4 % (ref 0–0.9)
LYMPHOCYTES # BLD AUTO: 1.1 K/UL (ref 1–4.8)
LYMPHOCYTES NFR BLD: 11.3 % (ref 22–41)
MCH RBC QN AUTO: 28.9 PG (ref 27–33)
MCHC RBC AUTO-ENTMCNC: 31.1 G/DL (ref 33.6–35)
MCV RBC AUTO: 92.9 FL (ref 81.4–97.8)
MONOCYTES # BLD AUTO: 0.78 K/UL (ref 0–0.85)
MONOCYTES NFR BLD AUTO: 8 % (ref 0–13.4)
NEUTROPHILS # BLD AUTO: 7.44 K/UL (ref 2–7.15)
NEUTROPHILS NFR BLD: 76.1 % (ref 44–72)
NRBC # BLD AUTO: 0 K/UL
NRBC BLD-RTO: 0 /100 WBC
PHOSPHATE SERPL-MCNC: 3.6 MG/DL (ref 2.5–4.5)
PLATELET # BLD AUTO: 298 K/UL (ref 164–446)
PMV BLD AUTO: 10.1 FL (ref 9–12.9)
POTASSIUM SERPL-SCNC: 4.6 MMOL/L (ref 3.6–5.5)
RBC # BLD AUTO: 3.5 M/UL (ref 4.2–5.4)
SODIUM SERPL-SCNC: 133 MMOL/L (ref 135–145)
WBC # BLD AUTO: 9.8 K/UL (ref 4.8–10.8)

## 2019-08-22 PROCEDURE — 700102 HCHG RX REV CODE 250 W/ 637 OVERRIDE(OP): Performed by: HOSPITALIST

## 2019-08-22 PROCEDURE — 85025 COMPLETE CBC W/AUTO DIFF WBC: CPT

## 2019-08-22 PROCEDURE — 700111 HCHG RX REV CODE 636 W/ 250 OVERRIDE (IP): Performed by: HOSPITALIST

## 2019-08-22 PROCEDURE — 97116 GAIT TRAINING THERAPY: CPT

## 2019-08-22 PROCEDURE — 92526 ORAL FUNCTION THERAPY: CPT

## 2019-08-22 PROCEDURE — A9270 NON-COVERED ITEM OR SERVICE: HCPCS | Performed by: INTERNAL MEDICINE

## 2019-08-22 PROCEDURE — 99232 SBSQ HOSP IP/OBS MODERATE 35: CPT | Performed by: INTERNAL MEDICINE

## 2019-08-22 PROCEDURE — 80069 RENAL FUNCTION PANEL: CPT

## 2019-08-22 PROCEDURE — A9270 NON-COVERED ITEM OR SERVICE: HCPCS | Performed by: HOSPITALIST

## 2019-08-22 PROCEDURE — 700102 HCHG RX REV CODE 250 W/ 637 OVERRIDE(OP): Performed by: INTERNAL MEDICINE

## 2019-08-22 PROCEDURE — 97530 THERAPEUTIC ACTIVITIES: CPT

## 2019-08-22 PROCEDURE — 770006 HCHG ROOM/CARE - MED/SURG/GYN SEMI*

## 2019-08-22 PROCEDURE — 36415 COLL VENOUS BLD VENIPUNCTURE: CPT

## 2019-08-22 PROCEDURE — 71045 X-RAY EXAM CHEST 1 VIEW: CPT

## 2019-08-22 PROCEDURE — 700111 HCHG RX REV CODE 636 W/ 250 OVERRIDE (IP): Performed by: INTERNAL MEDICINE

## 2019-08-22 RX ORDER — AMOXICILLIN 250 MG
2 CAPSULE ORAL 2 TIMES DAILY
Status: DISCONTINUED | OUTPATIENT
Start: 2019-08-22 | End: 2019-08-23 | Stop reason: HOSPADM

## 2019-08-22 RX ORDER — METOCLOPRAMIDE 10 MG/1
5 TABLET ORAL
Status: DISCONTINUED | OUTPATIENT
Start: 2019-08-22 | End: 2019-08-23 | Stop reason: HOSPADM

## 2019-08-22 RX ORDER — LEVOTHYROXINE SODIUM 112 UG/1
112 TABLET ORAL DAILY
Status: DISCONTINUED | OUTPATIENT
Start: 2019-08-23 | End: 2019-08-23 | Stop reason: HOSPADM

## 2019-08-22 RX ORDER — AMLODIPINE BESYLATE 10 MG/1
10 TABLET ORAL
Status: DISCONTINUED | OUTPATIENT
Start: 2019-08-23 | End: 2019-08-23 | Stop reason: HOSPADM

## 2019-08-22 RX ORDER — CARVEDILOL 6.25 MG/1
6.25 TABLET ORAL 2 TIMES DAILY WITH MEALS
Status: DISCONTINUED | OUTPATIENT
Start: 2019-08-22 | End: 2019-08-22

## 2019-08-22 RX ORDER — ASPIRIN 325 MG
325 TABLET ORAL DAILY
Status: DISCONTINUED | OUTPATIENT
Start: 2019-08-23 | End: 2019-08-23 | Stop reason: HOSPADM

## 2019-08-22 RX ORDER — FAMOTIDINE 20 MG/1
20 TABLET, FILM COATED ORAL EVERY EVENING
Status: DISCONTINUED | OUTPATIENT
Start: 2019-08-22 | End: 2019-08-23 | Stop reason: HOSPADM

## 2019-08-22 RX ORDER — ZOLPIDEM TARTRATE 5 MG/1
5 TABLET ORAL NIGHTLY PRN
Status: DISCONTINUED | OUTPATIENT
Start: 2019-08-22 | End: 2019-08-23 | Stop reason: HOSPADM

## 2019-08-22 RX ORDER — CARVEDILOL 6.25 MG/1
6.25 TABLET ORAL 2 TIMES DAILY WITH MEALS
Status: DISCONTINUED | OUTPATIENT
Start: 2019-08-22 | End: 2019-08-23 | Stop reason: HOSPADM

## 2019-08-22 RX ORDER — ATORVASTATIN CALCIUM 40 MG/1
80 TABLET, FILM COATED ORAL EVERY EVENING
Status: DISCONTINUED | OUTPATIENT
Start: 2019-08-22 | End: 2019-08-23 | Stop reason: HOSPADM

## 2019-08-22 RX ORDER — BISACODYL 10 MG
10 SUPPOSITORY, RECTAL RECTAL
Status: DISCONTINUED | OUTPATIENT
Start: 2019-08-22 | End: 2019-08-23 | Stop reason: HOSPADM

## 2019-08-22 RX ORDER — DULOXETIN HYDROCHLORIDE 30 MG/1
60 CAPSULE, DELAYED RELEASE ORAL DAILY
Status: DISCONTINUED | OUTPATIENT
Start: 2019-08-22 | End: 2019-08-23

## 2019-08-22 RX ORDER — POLYETHYLENE GLYCOL 3350 17 G/17G
1 POWDER, FOR SOLUTION ORAL 2 TIMES DAILY
Status: DISCONTINUED | OUTPATIENT
Start: 2019-08-22 | End: 2019-08-23 | Stop reason: HOSPADM

## 2019-08-22 RX ADMIN — METOCLOPRAMIDE 5 MG: 10 TABLET ORAL at 06:34

## 2019-08-22 RX ADMIN — HYDROCODONE BITARTRATE AND ACETAMINOPHEN 2 TABLET: 5; 325 TABLET ORAL at 11:54

## 2019-08-22 RX ADMIN — AMLODIPINE BESYLATE 10 MG: 10 TABLET ORAL at 04:40

## 2019-08-22 RX ADMIN — HYDROCODONE BITARTRATE AND ACETAMINOPHEN 2 TABLET: 5; 325 TABLET ORAL at 18:08

## 2019-08-22 RX ADMIN — HYDROCODONE BITARTRATE AND ACETAMINOPHEN 1 TABLET: 5; 325 TABLET ORAL at 04:50

## 2019-08-22 RX ADMIN — FAMOTIDINE 20 MG: 20 TABLET ORAL at 17:10

## 2019-08-22 RX ADMIN — METOCLOPRAMIDE 5 MG: 10 TABLET ORAL at 11:55

## 2019-08-22 RX ADMIN — CARVEDILOL 6.25 MG: 6.25 TABLET, FILM COATED ORAL at 17:13

## 2019-08-22 RX ADMIN — ZOLPIDEM TARTRATE 5 MG: 5 TABLET ORAL at 19:22

## 2019-08-22 RX ADMIN — TRAMADOL HYDROCHLORIDE 50 MG: 50 TABLET, FILM COATED ORAL at 17:09

## 2019-08-22 RX ADMIN — TRAMADOL HYDROCHLORIDE 50 MG: 50 TABLET, FILM COATED ORAL at 10:57

## 2019-08-22 RX ADMIN — ASPIRIN 325 MG: 325 TABLET, FILM COATED ORAL at 04:40

## 2019-08-22 RX ADMIN — TRAMADOL HYDROCHLORIDE 50 MG: 50 TABLET, FILM COATED ORAL at 23:05

## 2019-08-22 RX ADMIN — DULOXETINE HYDROCHLORIDE 60 MG: 60 CAPSULE, DELAYED RELEASE ORAL at 04:39

## 2019-08-22 RX ADMIN — HYDROCODONE BITARTRATE AND ACETAMINOPHEN 1 TABLET: 5; 325 TABLET ORAL at 04:40

## 2019-08-22 RX ADMIN — HEPARIN SODIUM 5000 UNITS: 5000 INJECTION, SOLUTION INTRAVENOUS; SUBCUTANEOUS at 04:40

## 2019-08-22 RX ADMIN — CARVEDILOL 3.12 MG: 3.12 TABLET, FILM COATED ORAL at 08:09

## 2019-08-22 RX ADMIN — ATORVASTATIN CALCIUM 80 MG: 40 TABLET, FILM COATED ORAL at 17:10

## 2019-08-22 RX ADMIN — LEVOTHYROXINE SODIUM 112 MCG: 112 TABLET ORAL at 04:40

## 2019-08-22 RX ADMIN — ALPRAZOLAM 0.5 MG: 0.25 TABLET ORAL at 01:19

## 2019-08-22 RX ADMIN — DULOXETINE HYDROCHLORIDE 60 MG: 30 CAPSULE, DELAYED RELEASE ORAL at 17:08

## 2019-08-22 RX ADMIN — HYDRALAZINE HYDROCHLORIDE 20 MG: 20 INJECTION INTRAMUSCULAR; INTRAVENOUS at 15:56

## 2019-08-22 RX ADMIN — METOCLOPRAMIDE HYDROCHLORIDE 5 MG: 10 TABLET ORAL at 17:07

## 2019-08-22 RX ADMIN — HEPARIN SODIUM 5000 UNITS: 5000 INJECTION, SOLUTION INTRAVENOUS; SUBCUTANEOUS at 14:16

## 2019-08-22 RX ADMIN — TRAMADOL HYDROCHLORIDE 50 MG: 50 TABLET, FILM COATED ORAL at 04:02

## 2019-08-22 ASSESSMENT — ENCOUNTER SYMPTOMS
BACK PAIN: 0
SENSORY CHANGE: 0
PHOTOPHOBIA: 0
HALLUCINATIONS: 0
ABDOMINAL PAIN: 0
NAUSEA: 0
SPEECH CHANGE: 0
SPUTUM PRODUCTION: 0
BLURRED VISION: 0
SHORTNESS OF BREATH: 0
FOCAL WEAKNESS: 0
DIARRHEA: 0
DIZZINESS: 0
HEADACHES: 0
FEVER: 0
COUGH: 0
INSOMNIA: 1
VOMITING: 0
MYALGIAS: 0
PALPITATIONS: 0
DOUBLE VISION: 0
TREMORS: 0
EYE PAIN: 0
NECK PAIN: 0
CHILLS: 0
WEIGHT LOSS: 0
ORTHOPNEA: 0
TINGLING: 0
CONSTIPATION: 0

## 2019-08-22 ASSESSMENT — GAIT ASSESSMENTS
GAIT LEVEL OF ASSIST: SUPERVISED
ASSISTIVE DEVICE: FRONT WHEEL WALKER
DEVIATION: DECREASED HEEL STRIKE;DECREASED TOE OFF;OTHER (COMMENT)
DISTANCE (FEET): 70

## 2019-08-22 ASSESSMENT — LIFESTYLE VARIABLES: SUBSTANCE_ABUSE: 0

## 2019-08-22 ASSESSMENT — COGNITIVE AND FUNCTIONAL STATUS - GENERAL
SUGGESTED CMS G CODE MODIFIER MOBILITY: CK
CLIMB 3 TO 5 STEPS WITH RAILING: A LOT
STANDING UP FROM CHAIR USING ARMS: A LITTLE
MOVING TO AND FROM BED TO CHAIR: A LITTLE
WALKING IN HOSPITAL ROOM: A LITTLE
MOBILITY SCORE: 17
MOVING FROM LYING ON BACK TO SITTING ON SIDE OF FLAT BED: A LITTLE
TURNING FROM BACK TO SIDE WHILE IN FLAT BAD: A LITTLE

## 2019-08-22 NOTE — THERAPY
"Physical Therapy Treatment completed.   Bed Mobility:  Supine to Sit: Supervised (with bed features)  Transfers: Sit to Stand: Supervised (with extra time and effort)  Gait: Level Of Assist: Supervised with Front-Wheel Walker       Plan of Care: Will benefit from Physical Therapy 3 times per week  Discharge Recommendations: Equipment: patient has 4WW. Post-acute therapy: see below.    See \"Rehab Therapy-Acute\" Patient Summary Report for complete documentation.     Patient demonstrating slow progress with functional mobility. She ambulated approximately 70ft with FWW and supervision; she demonstrated decreased krystle and was quick to fatigue. Patient reported she is anxious to return home as she helps care for her grandmother who isn't doing well. Question patient's insight and ability to provide care at this time given limited activity tolerance. Currently recommend post acute placement but with progress in acute setting she may be able to return home. She has 4 steps to enter home and unable to trial stairs this session. If patient returns home, recommend home health PT. Will continue to follow.  "

## 2019-08-22 NOTE — DISCHARGE PLANNING
Patient is eligible for Medicaid Meds to Beds at discharge. Preferred pharmacy changed to [facility]. Please call [phone] prior to discharge.

## 2019-08-22 NOTE — CARE PLAN
Problem: Respiratory:  Goal: Respiratory status will improve  Outcome: PROGRESSING AS EXPECTED  Intervention: Educate and encourage incentive spirometry usage  Note:   Pt verbalized using IS 10 times per hour.     Problem: Urinary Elimination:  Goal: Ability to reestablish a normal urinary elimination pattern will improve  Outcome: PROGRESSING SLOWER THAN EXPECTED

## 2019-08-22 NOTE — PROGRESS NOTES
"Received change of shift report from night-shift RN and assumed care of patient. Assessment performed. Patient is alert and oriented x4. Patient with R IJ, clean, dry. Wallace cath. PIV, Sl. Patient call light within reach, personal possessions nearby, bed in low position and locked, hourly rounding in practice, and non-skid socks in place./86   Pulse 100   Temp 36.2 °C (97.2 °F) (Temporal)   Resp 18   Ht 1.778 m (5' 10\")   Wt 91.2 kg (201 lb 1 oz)   SpO2 93%   BMI 28.85 kg/m²     "

## 2019-08-22 NOTE — PROGRESS NOTES
Pt transferred from [facility] to [place] [address] via transport team on [facility]. Prior to departure left IJ triple lumen removed and R IJ dialysis dressing changed. Pt left with all belongings, chart and meds. Pt's rings in specimen container show to patient and placed in belongings bag.

## 2019-08-22 NOTE — PROGRESS NOTES
[facility] Medicine Daily Progress Note    Date of Service  8/22/2019    Chief Complaint  54 y.o. female admitted 8/14/2019 with shortness of breath    Hospital Course    Ms Garcia has a history of chronic pain and CKD.  She presented to an outside hospital with shortness of breath and was found to have multifactorial pneumonia.  She was admitted and symptoms progressed, she required intubation.  The patient was then transferred to [facility] on 8/14/2019 for specialty coverage and higher level of care.  She was treated for pneumonia and successfully extubated on 8/19/2019.  Patient transferred to medical floor on August 22, 2019 for further management.      Interval Problem Update    I evaluated and examined her at the bedside.  She explains that she is feeling better.  She requested Ambien for sleep as she has not had sleep for the last 3 days and she reported whenever she is in the hospital she requires Ambien.  Wallace's catheter removed.  Blood pressure still on the higher side her current blood pressure is 153/86.  I discussed plan of care with her.    Consultants/Specialty  Critical Care  Nephrology    Code Status  Full Code    Disposition  I requested a skilled nursing facility referral.    Review of Systems  Review of Systems   Constitutional: Negative for chills, fever and weight loss.   HENT: Negative for hearing loss and tinnitus.    Eyes: Negative for blurred vision, double vision, photophobia and pain.   Respiratory: Negative for cough, sputum production and shortness of breath.    Cardiovascular: Negative for chest pain, palpitations, orthopnea and leg swelling.   Gastrointestinal: Negative for abdominal pain, constipation, diarrhea, nausea and vomiting.   Genitourinary: Negative for dysuria, frequency and urgency.   Musculoskeletal: Negative for back pain, joint pain, myalgias and neck pain.   Skin: Negative for rash.   Neurological: Negative for dizziness, tingling, tremors, sensory  change, speech change, focal weakness and headaches.   Psychiatric/Behavioral: Negative for hallucinations and substance abuse. The patient has insomnia.    All other systems reviewed and are negative.      Physical Exam  Temp:  [36.2 °C (97.2 °F)-36.6 °C (97.8 °F)] 36.2 °C (97.2 °F)  Pulse:  [] 100  Resp:  [10-18] 18  BP: (148-175)/(56-86) 153/86  SpO2:  [93 %-98 %] 93 %    Physical Exam   Constitutional: She is oriented to person, place, and time.   She is laying in bed without any acute distress.   HENT:   Head: Normocephalic and atraumatic.   Eyes: Pupils are equal, round, and reactive to light. Right eye exhibits no discharge. Left eye exhibits no discharge.   Neck: Normal range of motion. Neck supple.   Cardiovascular: Normal rate, regular rhythm and normal heart sounds. Exam reveals no friction rub.   No murmur heard.  Pulmonary/Chest: Effort normal and breath sounds normal. No respiratory distress. She has no wheezes. She has no rales.   Abdominal: Soft. Bowel sounds are normal. She exhibits no distension. There is no tenderness. There is no rebound.   Musculoskeletal: Normal range of motion. She exhibits no edema or tenderness.   Neurological: She is alert and oriented to person, place, and time. No cranial nerve deficit.   No focal neurological deficit.   Skin: Skin is warm and dry. No rash noted. She is not diaphoretic. No erythema.   Psychiatric: She has a normal mood and affect. Her behavior is normal.       Fluids    Intake/Output Summary (Last 24 hours) at 8/22/2019 0952  Last data filed at 8/22/2019 0820  Gross per 24 hour   Intake 790 ml   Output 1445 ml   Net -655 ml       Laboratory  Recent Labs     08/20/19  0415 08/21/19  0400   WBC 12.1* 12.0*   RBC 3.14* 3.29*   HEMOGLOBIN 8.8* 9.1*   HEMATOCRIT 30.0* 30.6*   MCV 95.5 93.0   MCH 28.0 27.7   MCHC 29.3* 29.7*   RDW 48.1 46.5   PLATELETCT 287 295   MPV 10.0 10.0     Recent Labs     08/20/19  0415 08/21/19  0400   SODIUM 136 135   POTASSIUM  4.5 3.8   CHLORIDE 101 99   CO2 23 24   GLUCOSE 77 131*   BUN 49* 52*   CREATININE 3.77* 4.02*   CALCIUM 9.3 9.3                   Imaging  DX-CHEST-PORTABLE (1 VIEW)   Final Result      Interval extubation. Exam is otherwise unchanged.      DX-CHEST-PORTABLE (1 VIEW)   Final Result      1.  Stable lines and tubes.   2.  No new consolidation or pleural effusions.      [id number] VIEW   Final Result      1.  There is a nonobstructive bowel gas pattern.   2.  The ET tube tip overlies the distal stomach.      DX-CHEST-PORTABLE (1 VIEW)   Final Result      1.  Stable lines and tubes.   2.  Bilateral pulmonary opacities have resolved. No new consolidation.      DX-CHEST-PORTABLE (1 VIEW)   Final Result      1.  Stable lines and tubes.   2.  Ill-defined perihilar/bibasilar opacities, which may represent edema, atelectasis or pneumonia. They are slightly worse since prior exam.      DX-CHEST-PORTABLE (1 VIEW)   Final Result         1.  Pulmonary edema and/or infiltrates are identified, which are stable since the prior exam.         OUTSIDE IMAGES-DX CHEST   Final Result      OUTSIDE IMAGES-DX CHEST   Final Result      DX-CHEST-PORTABLE (1 VIEW)   Final Result         1.  Pulmonary edema and/or infiltrates are identified, which are somewhat decreased since the prior exam.      DX-ABDOMEN FOR TUBE PLACEMENT   Final Result         1.  Nonspecific bowel gas pattern.   2.  Dobbhoff tube tip terminates overlying the expected location of the gastric antrum.   3.  Bilateral pulmonary edema and/or infiltrates.      DX-CHEST-FOR LINE PLACEMENT Perform procedure in: Patient's Room   Final Result         1. Right IJ dialysis catheter is now well-positioned. No pneumothorax.   2. Unchanged pulmonary findings. Unchanged other lines and tubes.         DX-CHEST-FOR LINE PLACEMENT Perform procedure in: Patient's Room   Final Result   Addendum 1 of 1   Findings were discussed with MARCIAL BEAVER JR on 8/14/2019 12:40 PM.      Final       DX-CHEST-FOR LINE PLACEMENT Perform procedure in: Patient's Room   Final Result      1.  Left IJ central catheter tip is in the superior cavoatrial junction. No pneumothorax.   2.  Well-positioned ETT.   3.  Catheter tubing projecting over the right neck, tip directed cephalad.   4.  Slight interval improvement in bilateral interstitial/alveolar opacities.      DX-CHEST-PORTABLE (1 VIEW)   Final Result         1.  Extensive bilateral pulmonary parenchymal opacities compatible with ARDS, severe pulmonary edema, and/or superimposed pulmonary infiltrates.      DX-CHEST-PORTABLE (1 VIEW)    (Results Pending)        Assessment/Plan  * Respiratory failure (HCC)- (present on admission)  Assessment & Plan  Patient has been extubated  Currently she is on room air and maintaining oxygen saturation.  She completed the course of antibiotic.    Multifocal pneumonia- (present on admission)  Assessment & Plan  Suspect aspiration  She completed course of antibiotic.  Speech therapy evaluated her and made recommendations.  I requested a skilled nursing facility referral as recommended by speech therapy.    Acute on chronic kidney failure (HCC)- (present on admission)  Assessment & Plan  Patient continues to make good urine output  Nephrology is following her    Sepsis ([facility])- (present on admission)  Assessment & Plan  Resolved    HLD (hyperlipidemia)- (present on admission)  Assessment & Plan  Continue atorvastatin    HTN (hypertension)- (present on admission)  Assessment & Plan  Blood pressure still on the higher side.  Continue Norvasc and increase the dose of carvedilol.  IV hydralazine as needed for SBP greater than 160    Diabetes mellitus type 2, uncontrolled, with complications ([facility])- (present on admission)  Assessment & Plan  Hold oral hypoglycemics while inpatient  Insulin sliding scale with hypoglycemia protocol.    Hypothyroid- (present on admission)  Assessment & Plan  Synthroid    History of venous thromboembolism-  (present on admission)  Assessment & Plan  Continue DVT prophylaxis     I discussed plan of care during multidisciplinary rounds.    VTE prophylaxis: heparin

## 2019-08-22 NOTE — THERAPY
"Speech Language Therapy dysphagia treatment completed.   Functional Status:  Patient awake, alert and oriented in all spheres.  She was able to verbalize understanding of previous swallowing recommendations. Patient's vocal quality was characterized as strong and clear. The patient was seen during her NTFL meal tray with PO trials of D2 solids and thin liquids. Patient was able to complete a double swallow 90% of the time with intermittent cues. Thin liquids resulted in delayed cough in 2 of 4 trials. She had no overt S/Sx of aspiration at all on any of the other trials.  Patient completed swallowing exercises with focus on base of tongue, laryngeal elevation and pharyngeal constriction.  She was able to complete all exercises 10 times with fair to good accuracy.   Recommendations: 1) Upgrade to D2/Nectar thick liquids. 2) downgrade level of supervision to monitor. 3) MBS does not appear indicated at this time, however, SLP following closely and will complete MBS with any clinical indication for further diagnostic swallow evaluation.     Plan of Care: Will benefit from Speech Therapy 3 times per week.    Post-Acute Therapy: Recommend outpatient or home health transitional care services for continued speech therapy services    See \"Rehab Therapy-Acute\" Patient Summary Report for complete documentation.     "

## 2019-08-22 NOTE — PROGRESS NOTES
Nephrology Progress Note    Date & Time: 8/22/2019  10:08 AM    HPI:  Patient is intubated and sedated. Unable to reach  via phone. History obtained from chart review.  54 y.o. female transferred from outside facility on 8/14/2019 for respiratory failure, suspected ARDS in the setting of multifocal pneumonia and acute on chronic kidney disease. She has a past medical history of CKD, polycystic kidney disease, DM2, hypothyroidism, hypertension, hyperlipidemia, AIME, and previous PE and DVT.       Per chart review, the patient presented to the outside facility with complaints of several days of shortness of breath and subjective fever. Chest x-ray obtained at the outside facility showed multifocal abnormality consistent with multifocal pneumonia the patient was admitted to the outside facility and started on a course of broad-spectrum antibiotics. Unfortunately, during the course of her hospitalization, the patient decompensated with worsening respiratory status.  At that point, development of ARDS was of concern therefore the patient was emergently intubated by the outside physician and patient transferred.     Additional work-up including WBC 15.7 hemoglobin 9.9 hematocrit 31.1 platelet 287 troponin 0.02, sodium 135 potassium 4.2 chloride 109 CO2 6 BUN 5.8 creatinine 5.1 and glucose 191.   Labs on admission here reflect hgb of 8.7, WBC 20.9, ANC 19.37, Na 132, K 5.2, CO2 8, AG 18, BUN 72, Cr 5.6, Ph 5.7, Ca 8.7, Mg 1.8, and LA 1.7.   On ABG Ph of 7.12  Patient admitted to ICU, intubated, sedated and started on multiple abx.     DAILY NEPHROLOGY SUMMARY:   8/15: patient tolerated HD yesterday with 3.5 liters fluid removed. Patient appears to have slight increase in UOP  to 168cc overnight and 100 cc this am. Otherwise still intubated and sedated. Ph 7.5 in am. Plan for HD   8/16: No acute events overnight. Patient drowsy but arousable during SBT. Moving all extremities and following  commands. UOP 151cc  "overnight. Creatinine stable, not up trending with normal Na and K   8/17: ongoing pulm edema, attempting to wean from vent   8/18: tolerated HD, planning to extubate in next 24 hours    8/19: Patient extubated today. Tolerating well, on NC. Drowsy but responsive and easily arousable. Trial of lasix  80mg I V BID today. Monitor UOP.   8/20: Patient much more alert today, sitting up. Feels much better, feels breathing has improved as well.  Responding to lasix trial with 1.2 liters overnight and total about 2 liters in 24 hours with net negative 1.7 Liters.  Stable vitals, electrolytes and Hco3 of 23. SCr trended up, monitor.    8/21: patient continuing to improve clinically and feels well. However, Cr trending up. UOP increasing to 1.3 L  overnight and 24 hr total of 2 L without lasix use. BP trending up, amlodipine added back to patients coreg.    8/22 : Patient feeling well. UOP stable. 400cc overnight, 1.1L in 24 hours. SCr stabilizing. Phos 3.6.No acute events overnight other than  feeling anxious and not being able to sleep for the past few nights, per patient.       CURRENT MEDICATIONS: Reviewed  IMAGING STUDIES: Reviewed    ROS  General:  Has some weakness and malaise  HEENT:  No vision changes, no hearing changes  CV:  No chest pain, no palpitations  Lungs:  No cough; no PND  GI:  No Nausea; no vomiting  : No dysuria or gross hematuria- Urinary catheter in place  MSK:  No joint pain; no trauma. Has mild back pain   Skin: No rashes; no lesions  Neuro: No tremors; no LOC  Psych: No depression; some anxiety, especially at night    PHYSICAL EXAM  VS:  /86   Pulse 100   Temp 36.2 °C (97.2 °F) (Temporal)   Resp 18   Ht 1.778 m (5' 10\")   Wt 91.2 kg (201 lb 1 oz)   SpO2 93%   BMI 28.85 kg/m²   GENERAL: lying in bed in no acute distress  HEAD: Normocephalic, atraumatic  EYES: no scleral icterus; normal conjunctiva  NECK: Supple; trachea midline  CV: RRR, S1 and S2 present  LUNGS: clear breath sounds " B/L with no wheezing or crackles  ABDOMEN: Soft, non-tender  EXTREMETIES: no lower extremity edema, no clubbing or cyanosis  NEURO: alert and oriented    Fluids:  In: 1030 [P.O.:970]  Out: 1105     LABS:    Lab Results   Component Value Date/Time    SODIUM 135 08/21/2019 04:00 AM    POTASSIUM 3.8 08/21/2019 04:00 AM    CHLORIDE 99 08/21/2019 04:00 AM    CO2 24 08/21/2019 04:00 AM    GLUCOSE 131 (H) 08/21/2019 04:00 AM    BUN 52 (H) 08/21/2019 04:00 AM    CREATININE 4.02 (H) 08/21/2019 04:00 AM      Lab Results   Component Value Date/Time    WBC 12.0 (H) 08/21/2019 04:00 AM    RBC 3.29 (L) 08/21/2019 04:00 AM    HEMOGLOBIN 9.1 (L) 08/21/2019 04:00 AM    HEMATOCRIT 30.6 (L) 08/21/2019 04:00 AM    MCV 93.0 08/21/2019 04:00 AM    MCH 27.7 08/21/2019 04:00 AM    MCHC 29.7 (L) 08/21/2019 04:00 AM    MPV 10.0 08/21/2019 04:00 AM    NEUTSPOLYS 73.00 (H) 08/21/2019 04:00 AM    LYMPHOCYTES 11.30 (L) 08/21/2019 04:00 AM    MONOCYTES 8.40 08/21/2019 04:00 AM    EOSINOPHILS 2.20 08/21/2019 04:00 AM    BASOPHILS 0.50 08/21/2019 04:00 AM    HYPOCHROMIA 1+ 08/20/2019 04:15 AM    ANISOCYTOSIS 1+ 08/20/2019 04:15 AM            ASSESSMENT:  # CAMPOS on CKD: Baseline Cr~2.5 - 3.0   # CKD Stage 4 based on previous labs. Likely secondary to polycystic kidney disease  # HTN - stable, monitor  # Metabolic acidosis   # polycystic kidney disease- [facility] reflecting this date back to 2012 per chart review  # ARDS, due to multifocal PNA - on C3, and flagyl- extubated 8/19/2019  # Pneumonia- on C3, and flagyl, completed on 8/20/2019  # DM2  # hypothyroidism  #anemia - iron deficient - inj replacement  # CKD MBD - phos acceptable, PTH elevated     PLAN:  - RRT not necessary today, daily evaluation for HD consideration. optimistic will not need HD  - Please keep temporary catheter in place for now  - Avoid RAAS blockers at this time  - Strict I/Os  - Renally dose all meds  - Daily electrolytes and renal labs  - Followed by Dr. Kumar as outpatient.        Thank you for this consult. We will follow.   Juan Diego Langston - Internal Medicine Resident.

## 2019-08-22 NOTE — PROGRESS NOTES
· 2 RN skin check complete with [facility]  · Devices in place RIJ, PIV, Wallace.  · Skin assessed under devices yes.  · Confirmed pressure ulcers found on no.  · New potential pressure ulcers noted on NA. Wound consult placed? NA. Photo uploaded? NA. MIDAS completed? NA.  · The following interventions in place self turns, pillows for support and repositioning, barrier cream, Wallace care, OOB.

## 2019-08-22 NOTE — CARE PLAN
Problem: Safety  Goal: Will remain free from injury  Outcome: PROGRESSING AS EXPECTED   Fall precautions in place, bed alarm on, bed locked in lowest position, non-slip socks, call light in reach.     Problem: Mobility  Goal: Risk for activity intolerance will decrease  Outcome: PROGRESSING SLOWER THAN EXPECTED   Pt educated on importance of ambulating as much as tolerable.

## 2019-08-23 ENCOUNTER — PATIENT OUTREACH (OUTPATIENT)
Dept: HEALTH INFORMATION MANAGEMENT | Facility: OTHER | Age: 54
End: 2019-08-23

## 2019-08-23 ENCOUNTER — APPOINTMENT (OUTPATIENT)
Dept: RADIOLOGY | Facility: MEDICAL CENTER | Age: 54
DRG: 870 | End: 2019-08-23
Attending: INTERNAL MEDICINE
Payer: MEDICAID

## 2019-08-23 VITALS
SYSTOLIC BLOOD PRESSURE: 170 MMHG | WEIGHT: 202.82 LBS | DIASTOLIC BLOOD PRESSURE: 85 MMHG | HEART RATE: 85 BPM | HEIGHT: 70 IN | RESPIRATION RATE: 18 BRPM | BODY MASS INDEX: 29.04 KG/M2 | TEMPERATURE: 98.6 F | OXYGEN SATURATION: 95 %

## 2019-08-23 PROBLEM — K56.7 ILEUS (HCC): Status: RESOLVED | Noted: 2019-08-21 | Resolved: 2019-08-23

## 2019-08-23 PROBLEM — J18.9 MULTIFOCAL PNEUMONIA: Status: RESOLVED | Noted: 2019-08-14 | Resolved: 2019-08-23

## 2019-08-23 LAB
ALBUMIN SERPL BCP-MCNC: 3.7 G/DL (ref 3.2–4.9)
ANION GAP SERPL CALC-SCNC: 13 MMOL/L (ref 0–11.9)
BUN SERPL-MCNC: 52 MG/DL (ref 8–22)
CALCIUM SERPL-MCNC: 9.1 MG/DL (ref 8.5–10.5)
CHLORIDE SERPL-SCNC: 99 MMOL/L (ref 96–112)
CO2 SERPL-SCNC: 22 MMOL/L (ref 20–33)
CREAT SERPL-MCNC: 4.16 MG/DL (ref 0.5–1.4)
ERYTHROCYTE [DISTWIDTH] IN BLOOD BY AUTOMATED COUNT: 47.1 FL (ref 35.9–50)
GLUCOSE SERPL-MCNC: 150 MG/DL (ref 65–99)
HCT VFR BLD AUTO: 33.6 % (ref 37–47)
HGB BLD-MCNC: 10.4 G/DL (ref 12–16)
MCH RBC QN AUTO: 28.6 PG (ref 27–33)
MCHC RBC AUTO-ENTMCNC: 31 G/DL (ref 33.6–35)
MCV RBC AUTO: 92.3 FL (ref 81.4–97.8)
PHOSPHATE SERPL-MCNC: 3.9 MG/DL (ref 2.5–4.5)
PLATELET # BLD AUTO: 317 K/UL (ref 164–446)
PMV BLD AUTO: 9.8 FL (ref 9–12.9)
POTASSIUM SERPL-SCNC: 4.8 MMOL/L (ref 3.6–5.5)
RBC # BLD AUTO: 3.64 M/UL (ref 4.2–5.4)
SODIUM SERPL-SCNC: 134 MMOL/L (ref 135–145)
WBC # BLD AUTO: 8.5 K/UL (ref 4.8–10.8)

## 2019-08-23 PROCEDURE — A9270 NON-COVERED ITEM OR SERVICE: HCPCS | Performed by: INTERNAL MEDICINE

## 2019-08-23 PROCEDURE — 80069 RENAL FUNCTION PANEL: CPT

## 2019-08-23 PROCEDURE — A9270 NON-COVERED ITEM OR SERVICE: HCPCS | Performed by: HOSPITALIST

## 2019-08-23 PROCEDURE — 700102 HCHG RX REV CODE 250 W/ 637 OVERRIDE(OP): Performed by: HOSPITALIST

## 2019-08-23 PROCEDURE — 700102 HCHG RX REV CODE 250 W/ 637 OVERRIDE(OP): Performed by: INTERNAL MEDICINE

## 2019-08-23 PROCEDURE — 700111 HCHG RX REV CODE 636 W/ 250 OVERRIDE (IP): Performed by: HOSPITALIST

## 2019-08-23 PROCEDURE — 85027 COMPLETE CBC AUTOMATED: CPT

## 2019-08-23 PROCEDURE — 99239 HOSP IP/OBS DSCHRG MGMT >30: CPT | Performed by: INTERNAL MEDICINE

## 2019-08-23 RX ORDER — ASPIRIN 81 MG/1
81 TABLET ORAL DAILY
Qty: 30 TAB | Refills: 1 | Status: SHIPPED | OUTPATIENT
Start: 2019-08-23

## 2019-08-23 RX ORDER — DULOXETIN HYDROCHLORIDE 30 MG/1
30 CAPSULE, DELAYED RELEASE ORAL DAILY
Qty: 30 CAP | Refills: 0 | Status: SHIPPED | OUTPATIENT
Start: 2019-08-23 | End: 2019-08-23

## 2019-08-23 RX ORDER — HYDRALAZINE HYDROCHLORIDE 25 MG/1
25 TABLET, FILM COATED ORAL EVERY 8 HOURS
Status: DISCONTINUED | OUTPATIENT
Start: 2019-08-23 | End: 2019-08-23 | Stop reason: HOSPADM

## 2019-08-23 RX ORDER — ZOLPIDEM TARTRATE 5 MG/1
5 TABLET ORAL NIGHTLY PRN
Qty: 30 TAB | Refills: 0 | Status: SHIPPED | OUTPATIENT
Start: 2019-08-23 | End: 2019-08-23 | Stop reason: SDUPTHER

## 2019-08-23 RX ORDER — CARVEDILOL 6.25 MG/1
6.25 TABLET ORAL 2 TIMES DAILY WITH MEALS
Qty: 60 TAB | Refills: 0 | Status: SHIPPED | OUTPATIENT
Start: 2019-08-23

## 2019-08-23 RX ORDER — ZOLPIDEM TARTRATE 5 MG/1
5 TABLET ORAL NIGHTLY PRN
Qty: 10 TAB | Refills: 0 | Status: SHIPPED | OUTPATIENT
Start: 2019-08-23 | End: 2019-09-02

## 2019-08-23 RX ORDER — DULOXETIN HYDROCHLORIDE 30 MG/1
30 CAPSULE, DELAYED RELEASE ORAL DAILY
Status: DISCONTINUED | OUTPATIENT
Start: 2019-08-23 | End: 2019-08-23 | Stop reason: HOSPADM

## 2019-08-23 RX ORDER — HYDRALAZINE HYDROCHLORIDE 25 MG/1
25 TABLET, FILM COATED ORAL EVERY 8 HOURS
Qty: 90 TAB | Refills: 0 | Status: SHIPPED | OUTPATIENT
Start: 2019-08-23

## 2019-08-23 RX ADMIN — METOCLOPRAMIDE HYDROCHLORIDE 5 MG: 10 TABLET ORAL at 13:20

## 2019-08-23 RX ADMIN — ALPRAZOLAM 0.5 MG: 0.25 TABLET ORAL at 00:11

## 2019-08-23 RX ADMIN — TRAMADOL HYDROCHLORIDE 50 MG: 50 TABLET, FILM COATED ORAL at 05:11

## 2019-08-23 RX ADMIN — AMLODIPINE BESYLATE 10 MG: 10 TABLET ORAL at 05:11

## 2019-08-23 RX ADMIN — METOCLOPRAMIDE HYDROCHLORIDE 5 MG: 10 TABLET ORAL at 08:38

## 2019-08-23 RX ADMIN — ASPIRIN 325 MG: 325 TABLET, FILM COATED ORAL at 05:12

## 2019-08-23 RX ADMIN — HEPARIN SODIUM 5000 UNITS: 5000 INJECTION, SOLUTION INTRAVENOUS; SUBCUTANEOUS at 05:11

## 2019-08-23 RX ADMIN — LEVOTHYROXINE SODIUM 112 MCG: 112 TABLET ORAL at 05:11

## 2019-08-23 RX ADMIN — HYDROCODONE BITARTRATE AND ACETAMINOPHEN 2 TABLET: 5; 325 TABLET ORAL at 13:21

## 2019-08-23 RX ADMIN — HYDROCODONE BITARTRATE AND ACETAMINOPHEN 2 TABLET: 5; 325 TABLET ORAL at 06:45

## 2019-08-23 RX ADMIN — HYDRALAZINE HYDROCHLORIDE 25 MG: 25 TABLET, FILM COATED ORAL at 13:21

## 2019-08-23 RX ADMIN — CARVEDILOL 6.25 MG: 6.25 TABLET, FILM COATED ORAL at 08:38

## 2019-08-23 RX ADMIN — HEPARIN SODIUM 5000 UNITS: 5000 INJECTION, SOLUTION INTRAVENOUS; SUBCUTANEOUS at 13:22

## 2019-08-23 RX ADMIN — HYDROCODONE BITARTRATE AND ACETAMINOPHEN 2 TABLET: 5; 325 TABLET ORAL at 00:10

## 2019-08-23 NOTE — CARE PLAN
Problem: Nutritional:  Goal: Achieve adequate nutritional intake  Description  Patient will consume 50% of meals   Outcome: MET   Per chart, pt is consuming >50% of meals.  Pt is eating adequately at this time.  Please consult RD as needed.

## 2019-08-23 NOTE — DISCHARGE INSTRUCTIONS
DISCHARGE INSTRUCTIONS PER Reyes Naidu M.D.    Please follow-up with nephrologist within 1 week with blood work-up to evaluate for kidney functions.   Your blood pressure is running high and I started you on a blood pressure medication hydralazine.  Please check your blood pressure twice daily and make a log and bring it to your primary care appointment as well as with kidney doctor appointment  You take duloxetine at home and I decreased the dose of as I am concerned about your kidney functions.  Please talk to your primary care doctor about adjustment of dose of this medication.  As I discussed with you please do not take full aspirin and take baby aspirin daily.  As you are not taking Cymbalta (duloxetine) please do not take it as your kidney functions are abnormal.  Please take Tylenol   If you notice any acute medical problem please go to the nearest emergency room.    Discharge Instructions    Discharged to home by taxi with self. Discharged via wheelchair, hospital escort: Refused.  Special equipment needed: Not Applicable    Be sure to schedule a follow-up appointment with your primary care doctor or any specialists as instructed.     Discharge Plan:   Diet Plan: Discussed  Activity Level: Discussed  Confirmed Follow up Appointment: Appointment Scheduled  Confirmed Symptoms Management: Discussed  Medication Reconciliation Updated: Yes  Influenza Vaccine Indication: Patient Refuses, Indicated: Not available from distributor/    I understand that a diet low in cholesterol, fat, and sodium is recommended for good health. Unless I have been given specific instructions below for another diet, I accept this instruction as my diet prescription.   Other diet: Regular    Special Instructions: None    · Is patient discharged on Warfarin / Coumadin?   No     Depression / Suicide Risk    As you are discharged from this [facility] facility, it is important to learn how to keep safe from harming  yourself.    Recognize the warning signs:  · Abrupt changes in personality, positive or negative- including increase in energy   · Giving away possessions  · Change in eating patterns- significant weight changes-  positive or negative  · Change in sleeping patterns- unable to sleep or sleeping all the time   · Unwillingness or inability to communicate  · Depression  · Unusual sadness, discouragement and loneliness  · Talk of wanting to die  · Neglect of personal appearance   · Rebelliousness- reckless behavior  · Withdrawal from people/activities they love  · Confusion- inability to concentrate     If you or a loved one observes any of these behaviors or has concerns about self-harm, here's what you can do:  · Talk about it- your feelings and reasons for harming yourself  · Remove any means that you might use to hurt yourself (examples: pills, rope, extension cords, firearm)  · Get professional help from the community (Mental Health, Substance Abuse, psychological counseling)  · Do not be alone:Call your Safe Contact- someone whom you trust who will be there for you.  · Call your local CRISIS HOTLINE 157-3682 or 845-037-8100  · Call your local Children's Mobile Crisis Response Team [place] (622) 459-5322 or www.[employer]  · Call the toll free National Suicide Prevention Hotlines   · National Suicide Prevention Lifeline 371-274-IUBS (4209)  · National Hope Line Network 800-SUICIDE (563-6177)

## 2019-08-23 NOTE — FACE TO FACE
Face to Face Supporting Documentation - Home Health    The encounter with this patient was in whole or in part the primary reason for home health admission.    Date of encounter:   Patient:                    MRN:                       YOB: 2020  Bryan Garcia  [id number]  1965     Home health to see patient for:  Skilled Nursing care for assessment, interventions & education, Physical Therapy evaluation and treatment and Occupational therapy evaluation and treatment    Skilled need for:  Exacerbation of Chronic Disease State Acute on chronic kidney disease.    Skilled nursing interventions to include:  Comment: Physical therapy    Homebound status evidenced by:  Need the aid of supportive devices such as crutches, canes, wheelchairs or walkers. Leaving home requires a considerable and taxing effort. There is a normal inability to leave the home.    Community Physician to provide follow up care: ARIANE Melendrez     Optional Interventions? No      I certify the face to face encounter for this home health care referral meets the CMS requirements and the encounter/clinical assessment with the patient was, in whole, or in part, for the medical condition(s) listed above, which is the primary reason for home health care. Based on my clinical findings: the service(s) are medically necessary, support the need for home health care, and the homebound criteria are met.  I certify that this patient has had a face to face encounter by myself.  Reyes Naidu M.D. - NPI: [id number]

## 2019-08-23 NOTE — DISCHARGE SUMMARY
Discharge Summary    CHIEF COMPLAINT ON ADMISSION  No chief complaint on file.      Reason for Admission  Respiratory Failure     Admission Date  8/14/2019    CODE STATUS  Full Code    HPI & HOSPITAL COURSE    Ms Garcia has a history of chronic pain and chronic kidney disease and she follows nephrology as outpatient.  She presented to an outside hospital with shortness of breath and was found to have multifactorial pneumonia.  She was admitted and symptoms progressed, she required intubation.  The patient was then transferred to [facility] on 8/14/2019 for specialty coverage and higher level of care.  She was treated for pneumonia and successfully extubated on 8/19/2019.  Patient transferred to medical floor on August 22, 2019 for further management.  Nephrology was consulted due to acute on chronic kidney disease and her creatinine stabilized with creatinine creatinine level of 4.16 and BUN of 52.  Today on the day of discharge I discussed with nephrologist and he recommended that patient can be discharged from nephrology standpoint and she needs to follow-up with her nephrologist Dr. Kumar.  Today I evaluated and examined her at the bedside and she expressed that she feels ready to be discharged and denies any acute complaints..  I expressed my concern that her blood pressure still running on the higher side and she reported that at home her systolic blood pressure is in 170s.  Today I started on hydralazine 25 mg 3 times daily.  I strongly recommended her to check her blood pressure twice daily and make a blood pressure log and bring it to the primary care provider appointment and with nephrology and she expressed understanding.  Also, she requested Ambien for her insomnia and I discussed with her at the bedside that she should not take Ambien as well as narcotic pain medication at the same time and avoid driving and using heavy equipment and she expressed understanding.  Physical therapy  evaluated her and recommended home health and I discussed with  and patient does not have any specific coverage in her area for home health.  Today patient would like to be discharged home and she would like to follow-up with physician as outpatient.  I called the  to schedule an appointment with primary care provider as well as with patient's nephrologist Dr. Kumar.  Today on the day of discharge I discussed plan of care and I answered all her questions.  She is also on full dose of aspirin and she had a history of stroke last year.  I discussed with her that she had a stroke last year and due to renal function I decreased the dose of aspirin to baby aspirin.  I called the  to schedule an appointment with primary care provider as well as with nephrology Dr. Kumar.    Therefore, she is discharged in fair and stable condition to home with close outpatient follow-up.    The patient met 2-midnight criteria for an inpatient stay at the time of discharge.    Discharge Date  08/23/19      FOLLOW UP ITEMS POST DISCHARGE  Primary care provider  Nephrology    DISCHARGE DIAGNOSES  Principal Problem:    Respiratory failure (HCC) POA: Yes  Active Problems:    Sepsis (HCC) POA: Yes    Acute on chronic kidney failure (HCC) POA: Yes    Hypothyroid POA: Yes    Diabetes mellitus type 2, uncontrolled, with complications (HCC) POA: Yes    HTN (hypertension) POA: Yes    HLD (hyperlipidemia) POA: Yes    ARDS (adult respiratory distress syndrome) (HCC) POA: Yes    History of venous thromboembolism POA: Yes  Resolved Problems:    Multifocal pneumonia POA: Yes    Ileus (HCC) POA: Unknown      FOLLOW UP    No follow-up provider specified.    MEDICATIONS ON DISCHARGE     Medication List      START taking these medications      Instructions   aspirin 81 MG EC tablet  Replaces:  aspirin 325 MG Tabs   Take 1 Tab by mouth every day.  Dose:  81 mg     carvedilol 6.25 MG Tabs  Commonly known as:  COREG   Take 1 Tab  by mouth 2 times a day, with meals.  Dose:  6.25 mg     hydrALAZINE 25 MG Tabs  Commonly known as:  APRESOLINE   Take 1 Tab by mouth every 8 hours.  Dose:  25 mg     zolpidem 5 MG Tabs  Commonly known as:  AMBIEN   Doctor's comments:  Please do not take it with narcotic pain medication.  Avoid driving and use heavy equipment while taking Ambien.  Take 1 Tab by mouth at bedtime as needed for Sleep for up to 10 days. Please do not take it with narcotic pain medication.  Avoid driving and use heavy equipment while taking Ambien.  Dose:  5 mg        CONTINUE taking these medications      Instructions   amLODIPine 10 MG Tabs  Commonly known as:  NORVASC   Take 1 Tab by mouth every day.  Dose:  10 mg     artificial tears 1.4 % Soln   Place 2 Drops in both eyes every 2 hours as needed.  Dose:  2 Drop     ascorbic acid 1000 MG tablet  Commonly known as:  VITAMIN C   Take 1 Tab by mouth every day.  Dose:  1,000 mg     atorvastatin 80 MG tablet  Commonly known as:  LIPITOR   1 Tab by Per NG Tube route every evening.  Dose:  80 mg     ergocalciferol 24361 UNIT capsule  Commonly known as:  DRISDOL   Take 1 Cap by mouth every 7 days.  Dose:  50,000 Units     famotidine 20 MG Tabs  Commonly known as:  PEPCID   1 Tab by Per NG Tube route every day.  Dose:  20 mg     fluticasone 50 MCG/ACT nasal spray  Commonly known as:  FLONASE   Spray 2 Sprays in nose every day.  Dose:  2 Spray     glycopyrrolate 1 MG Tabs  Commonly known as:  ROBINUL   Take 0.5 Tabs by mouth 2 Times a Day.  Dose:  0.5 mg     hydrOXYzine HCl 25 MG Tabs  Commonly known as:  ATARAX   1 Tab by Per NG Tube route 3 times a day as needed for Itching.  Dose:  25 mg     insulin regular 100 Unit/mL Soln  Commonly known as:  HUMULIN R   Inject 1-6 Units as instructed 4 Times a Day,Before Meals and at Bedtime.  Dose:  1-6 Units     levothyroxine 112 MCG Tabs  Commonly known as:  SYNTHROID   Take 1 Tab by mouth every day.  Dose:  112 mcg     lidocaine 5 % Ptch  Commonly  known as:  LIDODERM   Apply 2 Patches to skin as directed every 24 hours.  Dose:  2 Patch     meclizine 25 MG Tabs  Commonly known as:  ANTIVERT   1 Tab by Per NG Tube route every 8 hours as needed for Dizziness or Vertigo.  Dose:  25 mg     metoclopramide 5 MG tablet  Commonly known as:  REGLAN   1 Tab by Per NG Tube route 4 Times a Day,Before Meals and at Bedtime.  Dose:  5 mg     * Misc. Devices Misc   Unilateral diagnostic mammogram right breast with ultrasound if indicated; fax [phone]     * Misc. Devices Misc   Freestyle lite test strips; patient has DM type 2 and tests 3 times per day     ondansetron 4 MG Tbdp  Commonly known as:  ZOFRAN ODT   Take 1 Tab by mouth every four hours as needed (give PO if no IV route available).  Dose:  4 mg     polyethylene glycol 3350 Powd  Commonly known as:  MIRALAX   Take 17 g by mouth 2 times a day.  Dose:  17 g     senna-docusate 8.6-50 MG Tabs  Commonly known as:  PERICOLACE or SENOKOT S   Take 2 Tabs by mouth 2 times a day.  Dose:  2 Tab         * This list has 2 medication(s) that are the same as other medications prescribed for you. Read the directions carefully, and ask your doctor or other care provider to review them with you.            STOP taking these medications    aspirin 325 MG Tabs  Commonly known as:  ASA  Replaced by:  aspirin 81 MG EC tablet     bisacodyl 10 MG Supp  Commonly known as:  DULCOLAX     cloNIDine 0.1 MG/24HR Ptwk  Commonly known as:  CATAPRES     dextrose 50% 50 % Soln  Commonly known as:  D50W     DULoxetine 60 MG Cpep delayed-release capsule  Commonly known as:  CYMBALTA     glucose 4 g 4 g Chew     guaiFENesin 100 MG/5ML Soln  Commonly known as:  ROBITUSSIN     heparin 5000 UNIT/ML Soln     metoprolol 25 MG Tabs  Commonly known as:  LOPRESSOR     temazepam 15 MG Caps  Commonly known as:  RESTORIL     therapeutic multivitamin-minerals Tabs            Allergies  Allergies   Allergen Reactions   • Toradol Hives   • Levaquin      Blood boils     • Lisinopril Unspecified     Caused potassium build up in blood   • Tape        DIET  Orders Placed This Encounter   Procedures   • Diet Order Diabetic     Standing Status:   Standing     Number of Occurrences:   1     Order Specific Question:   Diet:     Answer:   Diabetic [3]     Order Specific Question:   Texture/Fiber modifications:     Answer:   Dysphagia 2(Pureed/Chopped)specify fluid consistency(question 6) [2]     Order Specific Question:   Consistency/Fluid modifications:     Answer:   Nectar Thick [2]     Order Specific Question:   Miscellaneous modifications:     Answer:   SLP - Deliver to Nursing Station [22]       ACTIVITY  As tolerated.  Weight bearing as tolerated    CONSULTATIONS  Nephrology  Critical care    PROCEDURES  Central line placement    LABORATORY  Lab Results   Component Value Date    SODIUM 134 (L) 08/23/2019    POTASSIUM 4.8 08/23/2019    CHLORIDE 99 08/23/2019    CO2 22 08/23/2019    GLUCOSE 150 (H) 08/23/2019    BUN 52 (H) 08/23/2019    CREATININE 4.16 (H) 08/23/2019        Lab Results   Component Value Date    WBC 8.5 08/23/2019    HEMOGLOBIN 10.4 (L) 08/23/2019    HEMATOCRIT 33.6 (L) 08/23/2019    PLATELETCT 317 08/23/2019        Total time of the discharge process exceeds 39 minutes.

## 2019-08-23 NOTE — DISCHARGE PLANNING
Received Choice form at 1026  Agency/Facility Name: [facility]  Referral sent per Choice form @ 1026     @[phone]  Agency/Facility Name: [facility]  Spoke To: Nereida  Outcome: Declined due to not taking Medicaid at this time.

## 2019-08-23 NOTE — PROGRESS NOTES
"Assumed care at 1900. Received report from RN. Patient is a/o x4, vss on ra, up with 1/ FWW.  C/o of left ankle pain- Tramadol and Norco PRN available.  C/o SOB with exertion.  Voiding since simon dc.  Tolerating diet- no signs of aspiration.  No new cocnerns expressed. Assessment complete. Labs reviewed. Patient and RN discussed plan of care. Patient questions answered. Patient needs are met at this time. Bed in lowest and locked position. Call light is within reach. Hourly rounding in place. BP (!) 168/73   Pulse 96   Temp 37.2 °C (99 °F) (Temporal)   Resp 17   Ht 1.778 m (5' 10\")   Wt 91.2 kg (201 lb 1 oz)   SpO2 94%   BMI 28.85 kg/m²   "

## 2019-08-23 NOTE — PROGRESS NOTES
Nephrology Progress Note    Date & Time: 8/23/2019  11:58 AM    HPI:  Patient is intubated and sedated. Unable to reach  via phone. History obtained from chart review.  54 y.o. female transferred from outside facility on 8/14/2019 for respiratory failure, suspected ARDS in the setting of multifocal pneumonia and acute on chronic kidney disease. She has a past medical history of CKD, polycystic kidney disease, DM2, hypothyroidism, hypertension, hyperlipidemia, AIME, and previous PE and DVT.       Per chart review, the patient presented to the outside facility with complaints of several days of shortness of breath and subjective fever. Chest x-ray obtained at the outside facility showed multifocal abnormality consistent with multifocal pneumonia the patient was admitted to the outside facility and started on a course of broad-spectrum antibiotics. Unfortunately, during the course of her hospitalization, the patient decompensated with worsening respiratory status.  At that point, development of ARDS was of concern therefore the patient was emergently intubated by the outside physician and patient transferred.     Additional work-up including WBC 15.7 hemoglobin 9.9 hematocrit 31.1 platelet 287 troponin 0.02, sodium 135 potassium 4.2 chloride 109 CO2 6 BUN 5.8 creatinine 5.1 and glucose 191.   Labs on admission here reflect hgb of 8.7, WBC 20.9, ANC 19.37, Na 132, K 5.2, CO2 8, AG 18, BUN 72, Cr 5.6, Ph 5.7, Ca 8.7, Mg 1.8, and LA 1.7.   On ABG Ph of 7.12  Patient admitted to ICU, intubated, sedated and started on multiple abx.     DAILY NEPHROLOGY SUMMARY:   8/15: patient tolerated HD yesterday with 3.5 liters fluid removed. Patient appears to have slight increase in UOP  to 168cc overnight and 100 cc this am. Otherwise still intubated and sedated. Ph 7.5 in am. Plan for HD   8/16: No acute events overnight. Patient drowsy but arousable during SBT. Moving all extremities and following  commands. UOP 151cc  "overnight. Creatinine stable, not up trending with normal Na and K   8/17: ongoing pulm edema, attempting to wean from vent   8/18: tolerated HD, planning to extubate in next 24 hours    8/19: Patient extubated today. Tolerating well, on NC. Drowsy but responsive and easily arousable. Trial of lasix  80mg I V BID today. Monitor UOP.   8/20: Patient much more alert today, sitting up. Feels much better, feels breathing has improved as well.  Responding to lasix trial with 1.2 liters overnight and total about 2 liters in 24 hours with net negative 1.7 Liters.  Stable vitals, electrolytes and Hco3 of 23. SCr trended up, monitor.    8/21: patient continuing to improve clinically and feels well. However, Cr trending up. UOP increasing to 1.3 L  overnight and 24 hr total of 2 L without lasix use. BP trending up, amlodipine added back to patients coreg.    8/22 : Patient feeling well. UOP stable. 400cc overnight, 1.1L in 24 hours. SCr stabilizing. Phos 3.6.No acute  events overnight other than feeling anxious and not being able to sleep for the past few nights, per patient.    8/23: patient feeling well. UOP stable. 1.4 liters over 24 hours. Labs unremarkable, SCr stablized around 4.       CURRENT MEDICATIONS: Reviewed  IMAGING STUDIES: Reviewed    ROS  General:  Has some weakness and malaise  HEENT:  No vision changes, no hearing changes  CV:  No chest pain, no palpitations  Lungs:  No cough; no PND  GI:  No Nausea; no vomiting  : No dysuria or gross hematuria- Urinary catheter in place  MSK:  No joint pain; no trauma. Has mild back pain   Skin: No rashes; no lesions  Neuro: No tremors; no LOC  Psych: No depression; some anxiety, especially at night    PHYSICAL EXAM  VS:  BP (!) 170/77 Comment: rn notified  Pulse 98   Temp 37 °C (98.6 °F) (Temporal)   Resp 17   Ht 1.778 m (5' 10\")   Wt 92 kg (202 lb 13.2 oz)   SpO2 97%   BMI 29.10 kg/m²   GENERAL: lying in bed in no acute distress  HEAD: Normocephalic, " atraumatic  EYES: no scleral icterus; normal conjunctiva  NECK: Supple; trachea midline  CV: RRR, S1 and S2 present  LUNGS: clear breath sounds B/L with no wheezing or crackles  ABDOMEN: Soft, non-tender  EXTREMETIES: no lower extremity edema, no clubbing or cyanosis  NEURO: alert and oriented    Fluids:  In: 1100 [P.O.:1100]  Out: 1400     LABS:    Lab Results   Component Value Date/Time    SODIUM 134 (L) 08/23/2019 10:38 AM    POTASSIUM 4.8 08/23/2019 10:38 AM    CHLORIDE 99 08/23/2019 10:38 AM    CO2 22 08/23/2019 10:38 AM    GLUCOSE 150 (H) 08/23/2019 10:38 AM    BUN 52 (H) 08/23/2019 10:38 AM    CREATININE 4.16 (H) 08/23/2019 10:38 AM      Lab Results   Component Value Date/Time    WBC 8.5 08/23/2019 10:38 AM    RBC 3.64 (L) 08/23/2019 10:38 AM    HEMOGLOBIN 10.4 (L) 08/23/2019 10:38 AM    HEMATOCRIT 33.6 (L) 08/23/2019 10:38 AM    MCV 92.3 08/23/2019 10:38 AM    MCH 28.6 08/23/2019 10:38 AM    MCHC 31.0 (L) 08/23/2019 10:38 AM    MPV 9.8 08/23/2019 10:38 AM    NEUTSPOLYS 76.10 (H) 08/22/2019 10:52 AM    LYMPHOCYTES 11.30 (L) 08/22/2019 10:52 AM    MONOCYTES 8.00 08/22/2019 10:52 AM    EOSINOPHILS 2.70 08/22/2019 10:52 AM    BASOPHILS 0.50 08/22/2019 10:52 AM    HYPOCHROMIA 1+ 08/20/2019 04:15 AM    ANISOCYTOSIS 1+ 08/20/2019 04:15 AM            ASSESSMENT:  # CAMPOS on CKD: Baseline Cr~2.5 - 3.0   # CKD Stage 4 based on previous labs. Likely secondary to polycystic kidney disease  # HTN - stable, monitor  # Metabolic acidosis   # polycystic kidney disease- [facility] reflecting this date back to 2012 per chart review  # ARDS, due to multifocal PNA - on C3, and flagyl- extubated 8/19/2019  # Pneumonia- on C3, and flagyl, completed on 8/20/2019  # DM2  # hypothyroidism  #anemia - iron deficient - inj replacement  # CKD MBD - phos acceptable, PTH elevated     PLAN:  - Remove vascath  - Okay to discharge from renal standpoint with outpatient neprhology follow up with Dr. Kumar within 1-2 weeks.   - Avoid RAAS blockers at  this time, until seen by her outpatient nephrologist  - Strict I/Os  - Renally dose all meds  - Daily electrolytes and renal labs  - Followed by Dr. Kumar as outpatient.     Thank you for this consult. We will follow.   Juan Diego Langston - Internal Medicine Resident.

## 2019-08-23 NOTE — DISCHARGE PLANNING
Anticipated Discharge Disposition: Home with HH    Action: Met with pt at bedside to discuss discharge. Pt agreeable to HH services and states she has used [facility] in the past. Choice form completed and faxed to [facility].     Barriers to Discharge: None    Plan: F/U with [facility]

## 2019-08-23 NOTE — DISCHARGE PLANNING
Pt declined by [facility] due to insurance. Pt agreeable to sending referral to [facility].   Choice form completed and faxed to [facility]

## 2019-08-23 NOTE — DISCHARGE PLANNING
Received Choice form at 1126  Agency/Facility Name: [facility]  Referral sent per Choice form @ 1126     @[phone]  Agency/Facility Name: [facility]  Spoke To: Admissions  Outcome: Full on Medicaid spots

## 2019-08-24 NOTE — DOCUMENTATION QUERY
[facility]                                                                       Query Response Note      PATIENT:               GEOFF JOSEPH  ACCT #:                  [id number]  MRN:                     [id number]  :                      1965  ADMIT DATE:       2019 5:53 AM  DISCH DATE:        2019 5:07 PM  RESPONDING  PROVIDER #:        [id number]           QUERY TEXT:    CXR - worsening edema is documented in the  PN.  Can the specific type of pulmonary edema be further specified.    NOTE:  If an appropriate response is not listed below, please respond with a new note      The patient's Clinical Indicators include:  Clinical findings - CXR - worsening edema per  PN; fluid overload; sepsis, multifocal PNA; acute respiratory failure;  last ECHO in -  - EF- 75%; normal diastolic function  - CXR-  severe pulmonary edema   CXR-  bibasilar opacities, which sourav represent edema    Treatment -  dialysis; IV Lasix, daily CXR's    Risks;  CAMPOS on CKD 4; sepsis; multifocal PNA, acute respiratory failure  Options provided:   -- Acute pulmonary edema - cardiogenic   -- Acute pulmonary edema- non cardiogenic   -- Chronic pulmonary edema - cardiogenic   -- Chronic pulmonary edema - non cardiogenic   -- Findings of no clinical significance   -- Unable to determine      Query created by: Hilaria Alcaraz on 2019 11:51 AM    RESPONSE TEXT:    Acute pulmonary edema- non cardiogenic          Electronically signed by:  VEL DONG 2019 7:04 AM

## 2019-08-24 NOTE — PROGRESS NOTES
Pt has been given discharge paperwork and prescriptions.  Pt verbalized understanding of paperwork and of additions/changes to medication regimen.  Pt PIV d/c'd, tip intact.  Pt leaving via taxi with self to home.

## 2019-08-31 NOTE — DOCUMENTATION QUERY
[facility]                                                                       Query Response Note      PATIENT:               GEOFF JOSEPH  ACCT #:                  [id number]  MRN:                     [id number]  :                      1965  ADMIT DATE:       2019 5:53 AM  DISCH DATE:        2019 5:07 PM  RESPONDING  PROVIDER #:        [id number]           QUERY TEXT:    Per the microbiology report, there is a culture result of  BAL specimens  from the RLL and LLL .  Per bronchoscopy report, it is not clear of the location of BAL. This procedure cannot be coded from the microbiology  report.  Further clarification is needed on the location of the BAL     NOTE:  If an appropriate response is not listed below, please respond with a new note.        The patient's Clinical Indicators include:  Clinical indicators; multifocal PNA w/sepsis;  acute respiratory failure w/ARDS    Treatment - mechanical ventilation; IV ABX; bronchoscopy    Risks:  multifocal PNA w/sepsis;  ARDS; comorbid conditons  Options provided:   -- Agree w/ mircobiology report of BAL from the RLL and LLL   -- Disagree w/microbiology report of BAL from the RLL and LLL, (please document location of the BAL)   -- Unable to determine      Query created by: Hilaria Alcaraz on 8/15/2019 1:36 PM    RESPONSE TEXT:    Agree w/ mircobiology report of BAL from the RLL and LLL          Electronically signed by:  MARCIAL BEAVER JR 2019 6:19 PM

## 2019-09-11 LAB
FUNGUS SPEC CULT: NORMAL
SIGNIFICANT IND 70042: NORMAL
SITE SITE: NORMAL
SOURCE SOURCE: NORMAL

## 2019-10-09 LAB
MYCOBACTERIUM SPEC CULT: NORMAL
RHODAMINE-AURAMINE STN SPEC: NORMAL
SIGNIFICANT IND 70042: NORMAL
SITE SITE: NORMAL
SOURCE SOURCE: NORMAL

## 2020-01-31 ENCOUNTER — APPOINTMENT (OUTPATIENT)
Dept: RADIOLOGY | Facility: MEDICAL CENTER | Age: 55
DRG: 480 | End: 2020-01-31
Attending: EMERGENCY MEDICINE
Payer: MEDICAID

## 2020-01-31 PROCEDURE — 80053 COMPREHEN METABOLIC PANEL: CPT

## 2020-01-31 PROCEDURE — 96374 THER/PROPH/DIAG INJ IV PUSH: CPT

## 2020-01-31 PROCEDURE — 85610 PROTHROMBIN TIME: CPT

## 2020-01-31 PROCEDURE — 700111 HCHG RX REV CODE 636 W/ 250 OVERRIDE (IP): Performed by: EMERGENCY MEDICINE

## 2020-01-31 PROCEDURE — 85025 COMPLETE CBC W/AUTO DIFF WBC: CPT

## 2020-01-31 PROCEDURE — 85730 THROMBOPLASTIN TIME PARTIAL: CPT

## 2020-01-31 PROCEDURE — 99285 EMERGENCY DEPT VISIT HI MDM: CPT

## 2020-01-31 RX ORDER — MORPHINE SULFATE 4 MG/ML
4 INJECTION, SOLUTION INTRAMUSCULAR; INTRAVENOUS
Status: DISCONTINUED | OUTPATIENT
Start: 2020-01-31 | End: 2020-02-01

## 2020-01-31 RX ADMIN — MORPHINE SULFATE 4 MG: 4 INJECTION INTRAVENOUS at 23:43

## 2020-02-01 ENCOUNTER — APPOINTMENT (OUTPATIENT)
Dept: RADIOLOGY | Facility: MEDICAL CENTER | Age: 55
DRG: 480 | End: 2020-02-01
Attending: ORTHOPAEDIC SURGERY
Payer: MEDICAID

## 2020-02-01 ENCOUNTER — ANESTHESIA (OUTPATIENT)
Dept: SURGERY | Facility: MEDICAL CENTER | Age: 55
DRG: 480 | End: 2020-02-01
Payer: MEDICAID

## 2020-02-01 ENCOUNTER — ANESTHESIA EVENT (OUTPATIENT)
Dept: SURGERY | Facility: MEDICAL CENTER | Age: 55
DRG: 480 | End: 2020-02-01
Payer: MEDICAID

## 2020-02-01 ENCOUNTER — HOSPITAL ENCOUNTER (OUTPATIENT)
Dept: RADIOLOGY | Facility: MEDICAL CENTER | Age: 55
End: 2020-02-01

## 2020-02-01 ENCOUNTER — APPOINTMENT (OUTPATIENT)
Dept: RADIOLOGY | Facility: MEDICAL CENTER | Age: 55
DRG: 480 | End: 2020-02-01
Attending: EMERGENCY MEDICINE
Payer: MEDICAID

## 2020-02-01 ENCOUNTER — HOSPITAL ENCOUNTER (INPATIENT)
Facility: MEDICAL CENTER | Age: 55
LOS: 14 days | DRG: 480 | End: 2020-02-08
Attending: EMERGENCY MEDICINE | Admitting: HOSPITALIST
Payer: MEDICAID

## 2020-02-01 DIAGNOSIS — S72.92XA CLOSED FRACTURE OF LEFT FEMUR, UNSPECIFIED FRACTURE MORPHOLOGY, UNSPECIFIED PORTION OF FEMUR, INITIAL ENCOUNTER (HCC): ICD-10-CM

## 2020-02-01 DIAGNOSIS — S72.92XA CLOSED FRACTURE OF LEFT FEMUR, UNSPECIFIED FRACTURE MORPHOLOGY, INITIAL ENCOUNTER (HCC): ICD-10-CM
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
## 2020-02-01 PROBLEM — N18.6 ESRD (END STAGE RENAL DISEASE) (HCC): Status: ACTIVE | Noted: 2020-02-01

## 2020-02-01 LAB
ALBUMIN SERPL BCP-MCNC: 3.1 G/DL (ref 3.2–4.9)
ALBUMIN/GLOB SERPL: 1.1 G/DL
ALP SERPL-CCNC: 100 U/L (ref 30–99)
ALT SERPL-CCNC: 10 U/L (ref 2–50)
ANION GAP SERPL CALC-SCNC: 10 MMOL/L (ref 0–11.9)
APTT PPP: 26.6 SEC (ref 24.7–36)
AST SERPL-CCNC: 21 U/L (ref 12–45)
BASOPHILS # BLD AUTO: 0.5 % (ref 0–1.8)
BASOPHILS # BLD: 0.05 K/UL (ref 0–0.12)
BILIRUB SERPL-MCNC: 0.3 MG/DL (ref 0.1–1.5)
BUN SERPL-MCNC: 10 MG/DL (ref 8–22)
CALCIUM SERPL-MCNC: 8.6 MG/DL (ref 8.5–10.5)
CHLORIDE SERPL-SCNC: 99 MMOL/L (ref 96–112)
CO2 SERPL-SCNC: 30 MMOL/L (ref 20–33)
CREAT SERPL-MCNC: 2.34 MG/DL (ref 0.5–1.4)
EOSINOPHIL # BLD AUTO: 0.03 K/UL (ref 0–0.51)
EOSINOPHIL NFR BLD: 0.3 % (ref 0–6.9)
ERYTHROCYTE [DISTWIDTH] IN BLOOD BY AUTOMATED COUNT: 43.2 FL (ref 35.9–50)
GLOBULIN SER CALC-MCNC: 2.7 G/DL (ref 1.9–3.5)
GLUCOSE BLD-MCNC: 171 MG/DL (ref 65–99)
GLUCOSE BLD-MCNC: 182 MG/DL (ref 65–99)
GLUCOSE BLD-MCNC: 202 MG/DL (ref 65–99)
GLUCOSE BLD-MCNC: 204 MG/DL (ref 65–99)
GLUCOSE SERPL-MCNC: 186 MG/DL (ref 65–99)
HCT VFR BLD AUTO: 32 % (ref 37–47)
HGB BLD-MCNC: 10.4 G/DL (ref 12–16)
IMM GRANULOCYTES # BLD AUTO: 0.07 K/UL (ref 0–0.11)
IMM GRANULOCYTES NFR BLD AUTO: 0.7 % (ref 0–0.9)
INR PPP: 0.99 (ref 0.87–1.13)
LYMPHOCYTES # BLD AUTO: 0.88 K/UL (ref 1–4.8)
LYMPHOCYTES NFR BLD: 9.1 % (ref 22–41)
MCH RBC QN AUTO: 29 PG (ref 27–33)
MCHC RBC AUTO-ENTMCNC: 32.5 G/DL (ref 33.6–35)
MCV RBC AUTO: 89.1 FL (ref 81.4–97.8)
MONOCYTES # BLD AUTO: 0.72 K/UL (ref 0–0.85)
MONOCYTES NFR BLD AUTO: 7.5 % (ref 0–13.4)
NEUTROPHILS # BLD AUTO: 7.89 K/UL (ref 2–7.15)
NEUTROPHILS NFR BLD: 81.9 % (ref 44–72)
NRBC # BLD AUTO: 0.02 K/UL
NRBC BLD-RTO: 0.2 /100 WBC
PLATELET # BLD AUTO: 217 K/UL (ref 164–446)
PMV BLD AUTO: 10.6 FL (ref 9–12.9)
POTASSIUM SERPL-SCNC: 3.5 MMOL/L (ref 3.6–5.5)
PROT SERPL-MCNC: 5.8 G/DL (ref 6–8.2)
PROTHROMBIN TIME: 13.3 SEC (ref 12–14.6)
RBC # BLD AUTO: 3.59 M/UL (ref 4.2–5.4)
SODIUM SERPL-SCNC: 139 MMOL/L (ref 135–145)
WBC # BLD AUTO: 9.6 K/UL (ref 4.8–10.8)

## 2020-02-01 PROCEDURE — 700111 HCHG RX REV CODE 636 W/ 250 OVERRIDE (IP): Performed by: ANESTHESIOLOGY

## 2020-02-01 PROCEDURE — 73552 X-RAY EXAM OF FEMUR 2/>: CPT | Mod: LT

## 2020-02-01 PROCEDURE — 700105 HCHG RX REV CODE 258: Performed by: HOSPITALIST

## 2020-02-01 PROCEDURE — 160009 HCHG ANES TIME/MIN: Performed by: ORTHOPAEDIC SURGERY

## 2020-02-01 PROCEDURE — 160036 HCHG PACU - EA ADDL 30 MINS PHASE I: Performed by: ORTHOPAEDIC SURGERY

## 2020-02-01 PROCEDURE — A9270 NON-COVERED ITEM OR SERVICE: HCPCS | Performed by: ANESTHESIOLOGY

## 2020-02-01 PROCEDURE — 160029 HCHG SURGERY MINUTES - 1ST 30 MINS LEVEL 4: Performed by: ORTHOPAEDIC SURGERY

## 2020-02-01 PROCEDURE — 160048 HCHG OR STATISTICAL LEVEL 1-5: Performed by: ORTHOPAEDIC SURGERY

## 2020-02-01 PROCEDURE — 110371 HCHG SHELL REV 272: Performed by: ORTHOPAEDIC SURGERY

## 2020-02-01 PROCEDURE — 96376 TX/PRO/DX INJ SAME DRUG ADON: CPT

## 2020-02-01 PROCEDURE — 700111 HCHG RX REV CODE 636 W/ 250 OVERRIDE (IP): Performed by: EMERGENCY MEDICINE

## 2020-02-01 PROCEDURE — 700102 HCHG RX REV CODE 250 W/ 637 OVERRIDE(OP): Performed by: NURSE PRACTITIONER

## 2020-02-01 PROCEDURE — 99358 PROLONG SERVICE W/O CONTACT: CPT | Performed by: HOSPITALIST

## 2020-02-01 PROCEDURE — 770006 HCHG ROOM/CARE - MED/SURG/GYN SEMI*

## 2020-02-01 PROCEDURE — A9270 NON-COVERED ITEM OR SERVICE: HCPCS | Performed by: NURSE PRACTITIONER

## 2020-02-01 PROCEDURE — C1713 ANCHOR/SCREW BN/BN,TIS/BN: HCPCS | Performed by: ORTHOPAEDIC SURGERY

## 2020-02-01 PROCEDURE — 501838 HCHG SUTURE GENERAL: Performed by: ORTHOPAEDIC SURGERY

## 2020-02-01 PROCEDURE — 700101 HCHG RX REV CODE 250: Performed by: ANESTHESIOLOGY

## 2020-02-01 PROCEDURE — A9270 NON-COVERED ITEM OR SERVICE: HCPCS | Performed by: HOSPITALIST

## 2020-02-01 PROCEDURE — A6402 STERILE GAUZE <= 16 SQ IN: HCPCS | Performed by: ORTHOPAEDIC SURGERY

## 2020-02-01 PROCEDURE — 160035 HCHG PACU - 1ST 60 MINS PHASE I: Performed by: ORTHOPAEDIC SURGERY

## 2020-02-01 PROCEDURE — 73030 X-RAY EXAM OF SHOULDER: CPT | Mod: LT

## 2020-02-01 PROCEDURE — 500891 HCHG PACK, ORTHO MAJOR: Performed by: ORTHOPAEDIC SURGERY

## 2020-02-01 PROCEDURE — 0QS736Z REPOSITION LEFT UPPER FEMUR WITH INTRAMEDULLARY INTERNAL FIXATION DEVICE, PERCUTANEOUS APPROACH: ICD-10-PCS | Performed by: ORTHOPAEDIC SURGERY

## 2020-02-01 PROCEDURE — 160041 HCHG SURGERY MINUTES - EA ADDL 1 MIN LEVEL 4: Performed by: ORTHOPAEDIC SURGERY

## 2020-02-01 PROCEDURE — 82962 GLUCOSE BLOOD TEST: CPT

## 2020-02-01 PROCEDURE — 302830 HCHG BUCKS UNIVERSAL TRACTION BOOT

## 2020-02-01 PROCEDURE — 501445 HCHG STAPLER, SKIN DISP: Performed by: ORTHOPAEDIC SURGERY

## 2020-02-01 PROCEDURE — 96375 TX/PRO/DX INJ NEW DRUG ADDON: CPT

## 2020-02-01 PROCEDURE — 160002 HCHG RECOVERY MINUTES (STAT): Performed by: ORTHOPAEDIC SURGERY

## 2020-02-01 PROCEDURE — 700102 HCHG RX REV CODE 250 W/ 637 OVERRIDE(OP): Performed by: ANESTHESIOLOGY

## 2020-02-01 PROCEDURE — 99223 1ST HOSP IP/OBS HIGH 75: CPT | Performed by: HOSPITALIST

## 2020-02-01 PROCEDURE — 700105 HCHG RX REV CODE 258: Performed by: ANESTHESIOLOGY

## 2020-02-01 PROCEDURE — 51798 US URINE CAPACITY MEASURE: CPT

## 2020-02-01 PROCEDURE — 700102 HCHG RX REV CODE 250 W/ 637 OVERRIDE(OP): Performed by: HOSPITALIST

## 2020-02-01 PROCEDURE — 700111 HCHG RX REV CODE 636 W/ 250 OVERRIDE (IP): Performed by: HOSPITALIST

## 2020-02-01 DEVICE — SCREW LAG 10.5MM X 105MM (4TX2=8): Type: IMPLANTABLE DEVICE | Status: FUNCTIONAL

## 2020-02-01 DEVICE — CABLE ACC 2.0MM COCR W/CLAMP: Type: IMPLANTABLE DEVICE | Status: FUNCTIONAL

## 2020-02-01 DEVICE — SCREW CROSS LOCK 5MM X 55MM (4TX5=20): Type: IMPLANTABLE DEVICE | Status: FUNCTIONAL

## 2020-02-01 DEVICE — SCREW CROSS LOCK 5MM X 60MM (4TX5=20): Type: IMPLANTABLE DEVICE | Status: FUNCTIONAL

## 2020-02-01 DEVICE — NAIL HIP 127.5 DEGREE 11MM X 420MM LT (4TX1=4): Type: IMPLANTABLE DEVICE | Status: FUNCTIONAL

## 2020-02-01 RX ORDER — ONDANSETRON 2 MG/ML
INJECTION INTRAMUSCULAR; INTRAVENOUS PRN
Status: DISCONTINUED | OUTPATIENT
Start: 2020-02-01 | End: 2020-02-01 | Stop reason: SURG

## 2020-02-01 RX ORDER — MIDAZOLAM HYDROCHLORIDE 1 MG/ML
1 INJECTION INTRAMUSCULAR; INTRAVENOUS
Status: DISCONTINUED | OUTPATIENT
Start: 2020-02-01 | End: 2020-02-01 | Stop reason: HOSPADM

## 2020-02-01 RX ORDER — PROMETHAZINE HYDROCHLORIDE 25 MG/1
12.5-25 SUPPOSITORY RECTAL EVERY 4 HOURS PRN
Status: DISCONTINUED | OUTPATIENT
Start: 2020-02-01 | End: 2020-02-08 | Stop reason: HOSPADM

## 2020-02-01 RX ORDER — CARVEDILOL 6.25 MG/1
6.25 TABLET ORAL 2 TIMES DAILY WITH MEALS
Status: DISCONTINUED | OUTPATIENT
Start: 2020-02-01 | End: 2020-02-08 | Stop reason: HOSPADM

## 2020-02-01 RX ORDER — BISACODYL 10 MG
10 SUPPOSITORY, RECTAL RECTAL
Status: DISCONTINUED | OUTPATIENT
Start: 2020-02-01 | End: 2020-02-08 | Stop reason: HOSPADM

## 2020-02-01 RX ORDER — HYDROMORPHONE HYDROCHLORIDE 1 MG/ML
0.4 INJECTION, SOLUTION INTRAMUSCULAR; INTRAVENOUS; SUBCUTANEOUS
Status: DISCONTINUED | OUTPATIENT
Start: 2020-02-01 | End: 2020-02-01 | Stop reason: HOSPADM

## 2020-02-01 RX ORDER — ONDANSETRON 2 MG/ML
4 INJECTION INTRAMUSCULAR; INTRAVENOUS EVERY 4 HOURS PRN
Status: DISCONTINUED | OUTPATIENT
Start: 2020-02-01 | End: 2020-02-08 | Stop reason: HOSPADM

## 2020-02-01 RX ORDER — PROMETHAZINE HYDROCHLORIDE 25 MG/1
12.5-25 TABLET ORAL EVERY 4 HOURS PRN
Status: DISCONTINUED | OUTPATIENT
Start: 2020-02-01 | End: 2020-02-08 | Stop reason: HOSPADM

## 2020-02-01 RX ORDER — ONDANSETRON 4 MG/1
4 TABLET, ORALLY DISINTEGRATING ORAL EVERY 4 HOURS PRN
Status: DISCONTINUED | OUTPATIENT
Start: 2020-02-01 | End: 2020-02-08 | Stop reason: HOSPADM

## 2020-02-01 RX ORDER — FAMOTIDINE 20 MG/1
20 TABLET, FILM COATED ORAL DAILY
Status: DISCONTINUED | OUTPATIENT
Start: 2020-02-02 | End: 2020-02-08 | Stop reason: HOSPADM

## 2020-02-01 RX ORDER — HALOPERIDOL 5 MG/ML
1 INJECTION INTRAMUSCULAR
Status: DISCONTINUED | OUTPATIENT
Start: 2020-02-01 | End: 2020-02-01 | Stop reason: HOSPADM

## 2020-02-01 RX ORDER — ATORVASTATIN CALCIUM 40 MG/1
80 TABLET, FILM COATED ORAL EVERY EVENING
Status: DISCONTINUED | OUTPATIENT
Start: 2020-02-01 | End: 2020-02-08 | Stop reason: HOSPADM

## 2020-02-01 RX ORDER — AMLODIPINE BESYLATE 10 MG/1
10 TABLET ORAL DAILY
Status: DISCONTINUED | OUTPATIENT
Start: 2020-02-01 | End: 2020-02-06

## 2020-02-01 RX ORDER — LABETALOL HYDROCHLORIDE 5 MG/ML
5 INJECTION, SOLUTION INTRAVENOUS
Status: DISCONTINUED | OUTPATIENT
Start: 2020-02-01 | End: 2020-02-01 | Stop reason: HOSPADM

## 2020-02-01 RX ORDER — OXYCODONE HCL 5 MG/5 ML
10 SOLUTION, ORAL ORAL
Status: COMPLETED | OUTPATIENT
Start: 2020-02-01 | End: 2020-02-01

## 2020-02-01 RX ORDER — SODIUM CHLORIDE, SODIUM LACTATE, POTASSIUM CHLORIDE, CALCIUM CHLORIDE 600; 310; 30; 20 MG/100ML; MG/100ML; MG/100ML; MG/100ML
1000 INJECTION, SOLUTION INTRAVENOUS ONCE
Status: COMPLETED | OUTPATIENT
Start: 2020-02-01 | End: 2020-02-01

## 2020-02-01 RX ORDER — GLYCOPYRROLATE 1 MG/1
0.5 TABLET ORAL 2 TIMES DAILY
Status: DISCONTINUED | OUTPATIENT
Start: 2020-02-01 | End: 2020-02-08 | Stop reason: HOSPADM

## 2020-02-01 RX ORDER — MORPHINE SULFATE 4 MG/ML
2 INJECTION, SOLUTION INTRAMUSCULAR; INTRAVENOUS
Status: DISCONTINUED | OUTPATIENT
Start: 2020-02-01 | End: 2020-02-02

## 2020-02-01 RX ORDER — CEFAZOLIN SODIUM 1 G/3ML
INJECTION, POWDER, FOR SOLUTION INTRAMUSCULAR; INTRAVENOUS PRN
Status: DISCONTINUED | OUTPATIENT
Start: 2020-02-01 | End: 2020-02-01 | Stop reason: SURG

## 2020-02-01 RX ORDER — OXYCODONE HYDROCHLORIDE 5 MG/1
2.5 TABLET ORAL
Status: DISCONTINUED | OUTPATIENT
Start: 2020-02-01 | End: 2020-02-02

## 2020-02-01 RX ORDER — AMOXICILLIN 250 MG
2 CAPSULE ORAL 2 TIMES DAILY
Status: DISCONTINUED | OUTPATIENT
Start: 2020-02-01 | End: 2020-02-08 | Stop reason: HOSPADM

## 2020-02-01 RX ORDER — FAMOTIDINE 20 MG/1
20 TABLET, FILM COATED ORAL DAILY
Status: DISCONTINUED | OUTPATIENT
Start: 2020-02-01 | End: 2020-02-01

## 2020-02-01 RX ORDER — OXYCODONE HYDROCHLORIDE 5 MG/1
5 TABLET ORAL
Status: DISCONTINUED | OUTPATIENT
Start: 2020-02-01 | End: 2020-02-02

## 2020-02-01 RX ORDER — PROCHLORPERAZINE EDISYLATE 5 MG/ML
5-10 INJECTION INTRAMUSCULAR; INTRAVENOUS EVERY 4 HOURS PRN
Status: DISCONTINUED | OUTPATIENT
Start: 2020-02-01 | End: 2020-02-08 | Stop reason: HOSPADM

## 2020-02-01 RX ORDER — DIPHENHYDRAMINE HYDROCHLORIDE 50 MG/ML
12.5 INJECTION INTRAMUSCULAR; INTRAVENOUS
Status: DISCONTINUED | OUTPATIENT
Start: 2020-02-01 | End: 2020-02-01 | Stop reason: HOSPADM

## 2020-02-01 RX ORDER — PHENYLEPHRINE HCL IN 0.9% NACL 0.5 MG/5ML
SYRINGE (ML) INTRAVENOUS PRN
Status: DISCONTINUED | OUTPATIENT
Start: 2020-02-01 | End: 2020-02-01 | Stop reason: SURG

## 2020-02-01 RX ORDER — HEPARIN SODIUM 5000 [USP'U]/ML
5000 INJECTION, SOLUTION INTRAVENOUS; SUBCUTANEOUS EVERY 8 HOURS
Status: DISCONTINUED | OUTPATIENT
Start: 2020-02-01 | End: 2020-02-02

## 2020-02-01 RX ORDER — SODIUM CHLORIDE 9 MG/ML
INJECTION, SOLUTION INTRAVENOUS CONTINUOUS
Status: DISCONTINUED | OUTPATIENT
Start: 2020-02-01 | End: 2020-02-01 | Stop reason: HOSPADM

## 2020-02-01 RX ORDER — HYDROMORPHONE HYDROCHLORIDE 1 MG/ML
0.1 INJECTION, SOLUTION INTRAMUSCULAR; INTRAVENOUS; SUBCUTANEOUS
Status: DISCONTINUED | OUTPATIENT
Start: 2020-02-01 | End: 2020-02-01 | Stop reason: HOSPADM

## 2020-02-01 RX ORDER — HYDRALAZINE HYDROCHLORIDE 50 MG/1
25 TABLET, FILM COATED ORAL EVERY 8 HOURS
Status: DISCONTINUED | OUTPATIENT
Start: 2020-02-01 | End: 2020-02-04

## 2020-02-01 RX ORDER — LEVOTHYROXINE SODIUM 112 UG/1
112 TABLET ORAL DAILY
Status: DISCONTINUED | OUTPATIENT
Start: 2020-02-01 | End: 2020-02-08 | Stop reason: HOSPADM

## 2020-02-01 RX ORDER — DEXAMETHASONE SODIUM PHOSPHATE 4 MG/ML
INJECTION, SOLUTION INTRA-ARTICULAR; INTRALESIONAL; INTRAMUSCULAR; INTRAVENOUS; SOFT TISSUE PRN
Status: DISCONTINUED | OUTPATIENT
Start: 2020-02-01 | End: 2020-02-01 | Stop reason: SURG

## 2020-02-01 RX ORDER — HYDROMORPHONE HYDROCHLORIDE 2 MG/ML
INJECTION, SOLUTION INTRAMUSCULAR; INTRAVENOUS; SUBCUTANEOUS PRN
Status: DISCONTINUED | OUTPATIENT
Start: 2020-02-01 | End: 2020-02-01 | Stop reason: SURG

## 2020-02-01 RX ORDER — HYDROMORPHONE HYDROCHLORIDE 1 MG/ML
0.2 INJECTION, SOLUTION INTRAMUSCULAR; INTRAVENOUS; SUBCUTANEOUS
Status: DISCONTINUED | OUTPATIENT
Start: 2020-02-01 | End: 2020-02-01 | Stop reason: HOSPADM

## 2020-02-01 RX ORDER — IPRATROPIUM BROMIDE AND ALBUTEROL SULFATE 2.5; .5 MG/3ML; MG/3ML
3 SOLUTION RESPIRATORY (INHALATION)
Status: DISCONTINUED | OUTPATIENT
Start: 2020-02-01 | End: 2020-02-01 | Stop reason: HOSPADM

## 2020-02-01 RX ORDER — HYDROMORPHONE HYDROCHLORIDE 1 MG/ML
INJECTION, SOLUTION INTRAMUSCULAR; INTRAVENOUS; SUBCUTANEOUS
Status: COMPLETED
Start: 2020-02-01 | End: 2020-02-01

## 2020-02-01 RX ORDER — OXYCODONE HCL 5 MG/5 ML
SOLUTION, ORAL ORAL
Status: COMPLETED
Start: 2020-02-01 | End: 2020-02-01

## 2020-02-01 RX ORDER — OXYCODONE HCL 5 MG/5 ML
5 SOLUTION, ORAL ORAL
Status: COMPLETED | OUTPATIENT
Start: 2020-02-01 | End: 2020-02-01

## 2020-02-01 RX ORDER — SODIUM CHLORIDE 9 MG/ML
INJECTION, SOLUTION INTRAVENOUS
Status: DISCONTINUED | OUTPATIENT
Start: 2020-02-01 | End: 2020-02-01 | Stop reason: SURG

## 2020-02-01 RX ORDER — POLYETHYLENE GLYCOL 3350 17 G/17G
1 POWDER, FOR SOLUTION ORAL
Status: DISCONTINUED | OUTPATIENT
Start: 2020-02-01 | End: 2020-02-08 | Stop reason: HOSPADM

## 2020-02-01 RX ORDER — ONDANSETRON 2 MG/ML
4 INJECTION INTRAMUSCULAR; INTRAVENOUS
Status: DISCONTINUED | OUTPATIENT
Start: 2020-02-01 | End: 2020-02-01 | Stop reason: HOSPADM

## 2020-02-01 RX ORDER — ATORVASTATIN CALCIUM 40 MG/1
80 TABLET, FILM COATED ORAL EVERY EVENING
Status: DISCONTINUED | OUTPATIENT
Start: 2020-02-01 | End: 2020-02-01

## 2020-02-01 RX ORDER — LIDOCAINE HYDROCHLORIDE 20 MG/ML
INJECTION, SOLUTION EPIDURAL; INFILTRATION; INTRACAUDAL; PERINEURAL PRN
Status: DISCONTINUED | OUTPATIENT
Start: 2020-02-01 | End: 2020-02-01 | Stop reason: SURG

## 2020-02-01 RX ORDER — FLUTICASONE PROPIONATE 50 MCG
2 SPRAY, SUSPENSION (ML) NASAL DAILY
Status: DISCONTINUED | OUTPATIENT
Start: 2020-02-01 | End: 2020-02-08 | Stop reason: HOSPADM

## 2020-02-01 RX ADMIN — HYDROMORPHONE HYDROCHLORIDE 0.4 MG: 1 INJECTION, SOLUTION INTRAMUSCULAR; INTRAVENOUS; SUBCUTANEOUS at 09:48

## 2020-02-01 RX ADMIN — SODIUM CHLORIDE: 9 INJECTION, SOLUTION INTRAVENOUS at 07:20

## 2020-02-01 RX ADMIN — FENTANYL CITRATE 100 MCG: 0.05 INJECTION, SOLUTION INTRAMUSCULAR; INTRAVENOUS at 00:26

## 2020-02-01 RX ADMIN — HYDROMORPHONE HYDROCHLORIDE 0.5 MG: 2 INJECTION, SOLUTION INTRAMUSCULAR; INTRAVENOUS; SUBCUTANEOUS at 07:57

## 2020-02-01 RX ADMIN — INSULIN HUMAN 3 UNITS: 100 INJECTION, SOLUTION PARENTERAL at 18:07

## 2020-02-01 RX ADMIN — OXYCODONE HYDROCHLORIDE 5 MG: 5 TABLET ORAL at 23:10

## 2020-02-01 RX ADMIN — OXYCODONE HYDROCHLORIDE 5 MG: 5 TABLET ORAL at 17:48

## 2020-02-01 RX ADMIN — MORPHINE SULFATE 2 MG: 4 INJECTION INTRAVENOUS at 16:11

## 2020-02-01 RX ADMIN — MORPHINE SULFATE 2 MG: 4 INJECTION INTRAVENOUS at 04:39

## 2020-02-01 RX ADMIN — INSULIN HUMAN 2 UNITS: 100 INJECTION, SOLUTION PARENTERAL at 23:46

## 2020-02-01 RX ADMIN — ATORVASTATIN CALCIUM 80 MG: 40 TABLET, FILM COATED ORAL at 17:49

## 2020-02-01 RX ADMIN — INSULIN HUMAN 3 UNITS: 100 INJECTION, SOLUTION PARENTERAL at 12:00

## 2020-02-01 RX ADMIN — PROPOFOL 150 MG: 10 INJECTION, EMULSION INTRAVENOUS at 07:23

## 2020-02-01 RX ADMIN — OXYCODONE HYDROCHLORIDE 5 MG: 5 TABLET ORAL at 14:17

## 2020-02-01 RX ADMIN — Medication 100 MCG: at 07:49

## 2020-02-01 RX ADMIN — LIDOCAINE HYDROCHLORIDE 5 ML: 20 INJECTION, SOLUTION EPIDURAL; INFILTRATION; INTRACAUDAL at 07:23

## 2020-02-01 RX ADMIN — HYDROMORPHONE HYDROCHLORIDE 0.4 MG: 1 INJECTION, SOLUTION INTRAMUSCULAR; INTRAVENOUS; SUBCUTANEOUS at 09:32

## 2020-02-01 RX ADMIN — SENNOSIDES AND DOCUSATE SODIUM 2 TABLET: 8.6; 5 TABLET ORAL at 17:48

## 2020-02-01 RX ADMIN — SODIUM CHLORIDE, POTASSIUM CHLORIDE, SODIUM LACTATE AND CALCIUM CHLORIDE 1000 ML: 600; 310; 30; 20 INJECTION, SOLUTION INTRAVENOUS at 02:31

## 2020-02-01 RX ADMIN — CEFAZOLIN 2 G: 330 INJECTION, POWDER, FOR SOLUTION INTRAMUSCULAR; INTRAVENOUS at 07:25

## 2020-02-01 RX ADMIN — GLYCOPYRROLATE 0.5 MG: 1 TABLET ORAL at 17:48

## 2020-02-01 RX ADMIN — Medication 100 MCG: at 08:14

## 2020-02-01 RX ADMIN — MORPHINE SULFATE 2 MG: 4 INJECTION INTRAVENOUS at 01:32

## 2020-02-01 RX ADMIN — HYDROMORPHONE HYDROCHLORIDE 0.2 MG: 1 INJECTION, SOLUTION INTRAMUSCULAR; INTRAVENOUS; SUBCUTANEOUS at 10:13

## 2020-02-01 RX ADMIN — Medication 100 MCG: at 07:45

## 2020-02-01 RX ADMIN — ONDANSETRON 4 MG: 2 INJECTION INTRAMUSCULAR; INTRAVENOUS at 07:23

## 2020-02-01 RX ADMIN — DEXAMETHASONE SODIUM PHOSPHATE 4 MG: 4 INJECTION, SOLUTION INTRA-ARTICULAR; INTRALESIONAL; INTRAMUSCULAR; INTRAVENOUS; SOFT TISSUE at 07:23

## 2020-02-01 RX ADMIN — MORPHINE SULFATE 2 MG: 4 INJECTION INTRAVENOUS at 11:54

## 2020-02-01 RX ADMIN — HYDRALAZINE HYDROCHLORIDE 25 MG: 50 TABLET, FILM COATED ORAL at 14:17

## 2020-02-01 RX ADMIN — MORPHINE SULFATE 2 MG: 4 INJECTION INTRAVENOUS at 19:42

## 2020-02-01 RX ADMIN — Medication 100 MCG: at 08:36

## 2020-02-01 RX ADMIN — OXYCODONE HYDROCHLORIDE 10 MG: 5 SOLUTION ORAL at 09:25

## 2020-02-01 ASSESSMENT — COGNITIVE AND FUNCTIONAL STATUS - GENERAL
TURNING FROM BACK TO SIDE WHILE IN FLAT BAD: A LITTLE
DAILY ACTIVITIY SCORE: 21
TOILETING: A LITTLE
MOBILITY SCORE: 18
CLIMB 3 TO 5 STEPS WITH RAILING: A LITTLE
SUGGESTED CMS G CODE MODIFIER MOBILITY: CK
STANDING UP FROM CHAIR USING ARMS: A LITTLE
WALKING IN HOSPITAL ROOM: A LITTLE
MOVING FROM LYING ON BACK TO SITTING ON SIDE OF FLAT BED: A LITTLE
MOVING TO AND FROM BED TO CHAIR: A LITTLE
HELP NEEDED FOR BATHING: A LITTLE
SUGGESTED CMS G CODE MODIFIER DAILY ACTIVITY: CJ
DRESSING REGULAR LOWER BODY CLOTHING: A LITTLE

## 2020-02-01 ASSESSMENT — LIFESTYLE VARIABLES
TOTAL SCORE: 0
CONSUMPTION TOTAL: NEGATIVE
DOES PATIENT WANT TO STOP DRINKING: NO
EVER FELT BAD OR GUILTY ABOUT YOUR DRINKING: NO
ALCOHOL_USE: NO
EVER_SMOKED: NEVER
HAVE PEOPLE ANNOYED YOU BY CRITICIZING YOUR DRINKING: NO
TOTAL SCORE: 0
AVERAGE NUMBER OF DAYS PER WEEK YOU HAVE A DRINK CONTAINING ALCOHOL: 0
ON A TYPICAL DAY WHEN YOU DRINK ALCOHOL HOW MANY DRINKS DO YOU HAVE: 0
HOW MANY TIMES IN THE PAST YEAR HAVE YOU HAD 5 OR MORE DRINKS IN A DAY: 0
TOTAL SCORE: 0
EVER HAD A DRINK FIRST THING IN THE MORNING TO STEADY YOUR NERVES TO GET RID OF A HANGOVER: NO
HAVE YOU EVER FELT YOU SHOULD CUT DOWN ON YOUR DRINKING: NO

## 2020-02-01 ASSESSMENT — ENCOUNTER SYMPTOMS
SHORTNESS OF BREATH: 0
WHEEZING: 0
DIZZINESS: 0
NAUSEA: 0
CHILLS: 0
SORE THROAT: 0
FOCAL WEAKNESS: 0
DEPRESSION: 0
COUGH: 0
ABDOMINAL PAIN: 0
PALPITATIONS: 0
HEADACHES: 0
VOMITING: 0
FEVER: 0
TINGLING: 0
FALLS: 1
MYALGIAS: 0
DIARRHEA: 0
PHOTOPHOBIA: 0

## 2020-02-01 ASSESSMENT — PATIENT HEALTH QUESTIONNAIRE - PHQ9
SUM OF ALL RESPONSES TO PHQ9 QUESTIONS 1 AND 2: 0
1. LITTLE INTEREST OR PLEASURE IN DOING THINGS: NOT AT ALL
2. FEELING DOWN, DEPRESSED, IRRITABLE, OR HOPELESS: NOT AT ALL

## 2020-02-01 ASSESSMENT — PAIN SCALES - GENERAL: PAIN_LEVEL: 2

## 2020-02-01 NOTE — ED TRIAGE NOTES
Pt BIB EMS from [place] for left femur fracture after slipping while leaving dialysis. EMS gave 200mg ketamine 250mcg fentanyl and 2mg versed en route. Pt A&Ox4.

## 2020-02-01 NOTE — ANESTHESIA TIME REPORT
Anesthesia Start and Stop Event Times     Date Time Event    2/1/2020 0645 Ready for Procedure     0720 Anesthesia Start     0912 Anesthesia Stop        Responsible Staff  02/01/20    Name Role Begin End    Donta Boudreaux M.D. Anesth 0720 0912        Preop Diagnosis (Free Text):  Pre-op Diagnosis      left femur fracture         Preop Diagnosis (Codes):    Post op Diagnosis  Femur fracture (HCC)      Premium Reason  E. Weekend    Comments:

## 2020-02-01 NOTE — CONSULTS
2/1/2020    Reason for consultation: Left subtrochanteric femur fracture    Consultation on Darc[facility] June Thomas at the request of Dr. Guzman for a left femur fracture.  The patient is a 54 y.o. female who presents with a left subtrochanteric femur fracture due to mechanical ground level fall outside her dialysis facility.  The patient noted immediate pain and inability to move the affected extremity due to pain.  They were evaluated in the ER, and Orthopedics was consulted. Patient denies numbness, paresthesias, loss of consciousness or other symptoms.  She denies antecedent pain.    Past Medical History:   Diagnosis Date   • Arthritis    • Cataract    • DVT (deep venous thrombosis) ([facility])    • HTN (hypertension) 10/25/2011   • Neuropathy in diabetes ([facility]) 10/25/2011   • Other specified symptom associated with female genital organs    • PE (pulmonary embolism)    • Pneumonia    • Presence of IVC filter    • Renal disorder    • Type II or unspecified type diabetes mellitus without mention of complication, not stated as uncontrolled 10/25/2011   • Unspecified disorder of thyroid        Past Surgical History:   Procedure Laterality Date   • GASTROSCOPY-ENDO N/A 7/3/2018    Procedure: GASTROSCOPY-ENDO;  Surgeon: Juan Miguel Soares M.D.;  Location: [facility];  Service: Gastroenterology   • PEG PLACEMENT N/A 7/3/2018    Procedure: PEG PLACEMENT;  Surgeon: Juan Miguel Soares M.D.;  Location: [facility];  Service: Gastroenterology   • GASTROSCOPY  2/22/2013    Performed by Reid Yi D.O. at SURGERY [facility]   • COLONOSCOPY  2/22/2013    Performed by Reid Yi D.O. at SURGERY [facility]   • ABDOMINAL HYSTERECTOMY TOTAL      Right ovary remains   • CHOLECYSTECTOMY     • KNEE ARTHROSCOPY      3 surgeries right knee       Medications  No current facility-administered medications on file prior to encounter.      Current Outpatient Medications on File Prior to Encounter    Medication Sig Dispense Refill   • carvedilol (COREG) 6.25 MG Tab Take 1 Tab by mouth 2 times a day, with meals. 60 Tab 0   • hydrALAZINE (APRESOLINE) 25 MG Tab Take 1 Tab by mouth every 8 hours. 90 Tab 0   • aspirin 81 MG EC tablet Take 1 Tab by mouth every day. 30 Tab 1   • insulin regular (HUMULIN R) 100 Unit/mL Solution Inject 1-6 Units as instructed 4 Times a Day,Before Meals and at Bedtime. 10 mL    • meclizine (ANTIVERT) 25 MG Tab 1 Tab by Per NG Tube route every 8 hours as needed for Dizziness or Vertigo. 30 Tab 0   • ondansetron (ZOFRAN ODT) 4 MG TABLET DISPERSIBLE Take 1 Tab by mouth every four hours as needed (give PO if no IV route available). 10 Tab 0   • atorvastatin (LIPITOR) 80 MG tablet 1 Tab by Per NG Tube route every evening. 30 Tab    • senna-docusate (PERICOLACE OR SENOKOT S) 8.6-50 MG Tab Take 2 Tabs by mouth 2 times a day.     • metoclopramide (REGLAN) 5 MG tablet 1 Tab by Per NG Tube route 4 Times a Day,Before Meals and at Bedtime. 120 Tab    • artificial tears 1.4 % Solution Place 2 Drops in both eyes every 2 hours as needed. 1 Bottle 3   • famotidine (PEPCID) 20 MG Tab 1 Tab by Per NG Tube route every day. 60 Tab    • glycopyrrolate (ROBINUL) 1 MG Tab Take 0.5 Tabs by mouth 2 Times a Day. 90 Tab    • ascorbic acid (VITAMIN C) 1000 MG tablet Take 1 Tab by mouth every day.     • hydrOXYzine HCl (ATARAX) 25 MG Tab 1 Tab by Per NG Tube route 3 times a day as needed for Itching. 30 Tab 0   • levothyroxine (SYNTHROID) 112 MCG Tab Take 1 Tab by mouth every day. 90 Tab 3   • lidocaine (LIDODERM) 5 % Patch Apply 2 Patches to skin as directed every 24 hours. 20 Patch 3   • amlodipine (NORVASC) 10 MG Tab Take 1 Tab by mouth every day. 90 Tab 3   • Misc. Devices Misc Freestyle lite test strips; patient has DM type 2 and tests 3 times per day 100 Strip 3   • Misc. Devices Misc Unilateral diagnostic mammogram right breast with ultrasound if indicated; fax [phone] 1 Device 0   • ergocalciferol  (DRISDOL) 31448 UNIT capsule Take 1 Cap by mouth every 7 days. 12 Cap 0   • polyethylene glycol 3350 (MIRALAX) Powder Take 17 g by mouth 2 times a day. 3 Bottle 3   • fluticasone (FLONASE) 50 MCG/ACT nasal spray Spray 2 Sprays in nose every day. 16 g 11       Allergies  Toradol; Levaquin; Lisinopril; and Tape    ROS  Per HPI. All other systems were reviewed and found to be negative    Family History   Problem Relation Age of Onset   • Cancer Mother         Bone   • Heart Disease Father    • Cancer Father         Bladder, Prostate       Social History     Socioeconomic History   • Marital status:      Spouse name: Not on file   • Number of children: Not on file   • Years of education: Not on file   • Highest education level: Not on file   Occupational History   • Not on file   Social Needs   • Financial resource strain: Not on file   • Food insecurity:     Worry: Not on file     Inability: Not on file   • Transportation needs:     Medical: Not on file     Non-medical: Not on file   Tobacco Use   • Smoking status: Never Smoker   • Smokeless tobacco: Never Used   Substance and Sexual Activity   • Alcohol use: No     Alcohol/week: 0.0 oz   • Drug use: No   • Sexual activity: Yes     Partners: Male     Birth control/protection: Post-Menopausal   Lifestyle   • Physical activity:     Days per week: Not on file     Minutes per session: Not on file   • Stress: Not on file   Relationships   • Social connections:     Talks on phone: Not on file     Gets together: Not on file     Attends Shinto service: Not on file     Active member of club or organization: Not on file     Attends meetings of clubs or organizations: Not on file     Relationship status: Not on file   • Intimate partner violence:     Fear of current or ex partner: Not on file     Emotionally abused: Not on file     Physically abused: Not on file     Forced sexual activity: Not on file   Other Topics Concern   • Not on file   Social History Narrative   •  "Not on file       Physical Exam  Vitals  /61   Pulse 81   Temp 36.9 °C (98.5 °F) (Temporal)   Resp 20   Ht 1.778 m (5' 10\")   Wt 85.7 kg (189 lb)   SpO2 100%   General: Well Developed, Well Nourished, no acute distress  Psychiatric: Alert and oriented x3, appropriate responses to questions, pleasant mood and affect.  HEENT: Normocephalic, atraumatic  Eyes: Anicteric, PERRLA, EOMI  Neck: Supple, nontender, no masses  Chest: Symmetric expansion of the chest wall, non-tender to palpation, no distress.  Heart: RRR, palpable peripheral pulses  Abdomen: Soft, NT, ND  Skin: Intact, no open wounds  Extremities: Shortened left lower extremity, pain with any movement of left leg  Neuro: Intact light touch sensation in the S/S/SP/DP/T, intact motors TA/GS/EHL/P  Vascular: 2+ DP on left, Capillary refill <2 seconds    Radiographs:  DX-SHOULDER 2+ LEFT   Final Result      1.  No acute left shoulder fracture or dislocation.      2.  Mild degenerative change of the left AC joint.      [id number] FILM X-RAY   Final Result      [id number] FILM X-RAY   Final Result      OUTSIDE IMAGES-DX PELVIS   Final Result      DX-FEMUR-2+ LEFT    (Results Pending)   DX-PORTABLE FLUORO > 1 HOUR    (Results Pending)       Laboratory Values  Recent Labs     01/31/20  2348   WBC 9.6   RBC 3.59*   HEMOGLOBIN 10.4*   HEMATOCRIT 32.0*   MCV 89.1   MCH 29.0   MCHC 32.5*   RDW 43.2   PLATELETCT 217   MPV 10.6     Recent Labs     01/31/20  2348   SODIUM 139   POTASSIUM 3.5*   CHLORIDE 99   CO2 30   GLUCOSE 186*   BUN 10     Recent Labs     01/31/20  2348   APTT 26.6   INR 0.99         Impression:    #1 Left subtrochanteric femur fracture  #2 Type 2 diabetes  #3 End stage renal disease, dialysis dependent  #4 Hypertension  #5  Hypothyroidism    Plan:    I recommended operative treatment of her femur fracture. Risks and benefits of surgery were discussed which include, but are not limited to bleeding, infection, neurovascular " damage, malunion, nonunion, persistent hip pain, symptomatic hardware, DVT, PE, MI, Stroke and death.  Benefits of surgery discussed included improved chance of union in acceptable alignment and reduction in the medical risks of her fracture.  We also discussed therapeutic alternatives to surgery, including non-operative management, which I did not recommend.    They understand these risks and benefits and wish to proceed.      Please keep NPO pending surgery.  Non-weightbearing affected extremity pending surgery.    Please see operative note for detailed post-operative plan, including post-op weightbearing status.

## 2020-02-01 NOTE — ANESTHESIA POSTPROCEDURE EVALUATION
Patient: Bryan Garcia    Procedure Summary     Date:  02/01/20 Room / Location:  [place] [address] / SURGERY [facility]    Anesthesia Start:  0720 Anesthesia Stop:  0912    Procedure:  INSERTION, INTRAMEDULLARY VAUGHN, FEMUR, OIC (Left Leg Upper) Diagnosis:  ( left femur fracture )    Surgeon:  Douglas Vazquez M.D. Responsible Provider:  Donta Boudreaux M.D.    Anesthesia Type:  general ASA Status:  4          Final Anesthesia Type: general  Last vitals  BP   Blood Pressure: 154/65, NIBP: 117/58    Temp   37.7 °C (99.8 °F)    Pulse   Pulse: 75   Resp   19    SpO2   100 %      Anesthesia Post Evaluation    Patient location during evaluation: PACU  Patient participation: complete - patient participated  Level of consciousness: awake and alert  Pain score: 2    Airway patency: patent  Anesthetic complications: no  Cardiovascular status: adequate  Respiratory status: acceptable and nasal cannula  Hydration status: acceptable    PONV: none           Nurse Pain Score: 3 (NPRS)

## 2020-02-01 NOTE — PROGRESS NOTES
Patient admitted after midnight by my colleague. Patient was seen and evaluated. Patient is a 54 year old female with known medical history of ESRD with dialysis M/W/F,  Diabetes, arthritis, previous DVT, PE with IVC filter in place, and recent CVA with chronic debilitation. Patient was in ususal state of health at dialysis on day before admission and was leaving her dialysis session when she experienced a ground level fall causing inability to ambulate and sever pain. Patient was transported to outside facility where it was noted on imaging that patient had proximal left femur fracture. Patient was then transferred to [facility] for Orthopedic consultation and surgical intervention. Orthopedics was consulted and patient was taken to OR on 2/1/2020 for Surgical treatment of left  subtrochanteric  femur fracture with intramedullary device. Patient then transferred to orthopedic medical floor for recovery. Patient at this time is post surgery and awake and alert. Patient is resting in bed eating lunch with her modified diet. Will continue with Pain control and symptom management. PT/OT to evaluate patient for recommendations for home. Repeat labs in am, check Vit D.

## 2020-02-01 NOTE — ASSESSMENT & PLAN NOTE
-MWF Dialysis  -avoid nephrotoxins  -Renally dose medications as indicated  -Nephrology following  -Daily BMP

## 2020-02-01 NOTE — ANESTHESIA PREPROCEDURE EVALUATION
54yoF with multiple comorbidities presenting for femur fx    Allergies to levaquin, lisinopril, tape    K 3.5 this am dialysis yesterday  NPO  No ac    Relevant Problems   NEURO   (+) History of DVT (deep vein thrombosis)   (+) History of GI bleed   (+) History of venous thromboembolism   (+) Stroke (HCC)      CARDIAC   (+) Essential hypertension   (+) HTN (hypertension)      GI   (+) GERD (gastroesophageal reflux disease)         (+) Acute on chronic kidney failure (HCC)   (+) CKD stage 4 due to type 2 diabetes mellitus   (+) ESRD (end stage renal disease) ([facility])   (+) Polycystic kidney disease      ENDO   (+) Diabetes mellitus type 2, uncontrolled, with complications ([facility])   (+) Hypothyroidism   (+) Type II diabetes mellitus ([facility])       Physical Exam    Airway   Mallampati: III       Cardiovascular - normal exam     Dental - normal exam         Pulmonary - normal exam     Abdominal - normal exam     Neurological - normal exam                 Anesthesia Plan    ASA 4   ASA physical status 4 criteria: other (comment)    Plan - general       Airway plan will be LMA        Induction: intravenous    Postoperative Plan: Postoperative administration of opioids is intended.    Pertinent diagnostic labs and testing reviewed    Informed Consent:    Anesthetic plan and risks discussed with patient.

## 2020-02-01 NOTE — ANESTHESIA PROCEDURE NOTES
Airway  Date/Time: 2/1/2020 7:25 AM  Performed by: Donta Boudreaux M.D.  Authorized by: Donta Boudreaux M.D.     Location:  OR  Urgency:  Elective  Difficult Airway: No    Indications for Airway Management:  Anesthesia  Spontaneous Ventilation: absent    Sedation Level:  Deep  Preoxygenated: Yes    Patient Position:  Sniffing  Mask Difficulty Assessment:  1 - vent by mask  Final Airway Type:  Supraglottic airway  Final Supraglottic Airway:  Standard LMA  SGA Size:  4  Number of Attempts at Approach:  1

## 2020-02-01 NOTE — ANESTHESIA QCDR
2019 [facility] Clinical Data Registry (for Quality Improvement)     Postoperative nausea/vomiting risk protocol (Adult = 18 yrs and Pediatric 3-17 yrs)- (430 and 463)  General inhalation anesthetic (NOT TIVA) with PONV risk factors: Yes  Provision of anti-emetic therapy with at least 2 different classes of agents: Yes   Patient DID NOT receive anti-emetic therapy and reason is documented in Medical Record:  N/A    Multimodal Pain Management- (477)  Non-emergent surgery AND patient age >= 18: No  Use of Multimodal Pain Management, two or more drugs and/or interventions, NOT including systemic opioids:   Exception: Documented allergy to multiple classes of analgesics:     Smoking Abstinence (404)  Patient is current smoker (cigarette, pipe, e-cig, marijuanna): No  Elective Surgery:   Abstinence instructions provided prior to day of surgery:   Patient abstained from smoking on day of surgery:     Pre-Op Beta-Blocker in Isolated CABG (44)  Isolated CABG AND patient age >= 18: No  Beta-blocker admin within 24 hours of surgical incision:   Exception:of medical reason(s) for not administering beta blocker within 24 hours prior to surgical incision (e.g., not  indicated,other medical reason):     PACU assessment of acute postoperative pain prior to Anesthesia Care End- Applies to Patients Age = 18- (ABG7)  Initial PACU pain score is which of the following: < 7/10  Patient unable to report pain score: N/A    Post-anesthetic transfer of care checklist/protocol to PACU/ICU- (426 and 427)  Upon conclusion of case, patient transferred to which of the following locations: PACU/Non-ICU  Use of transfer checklist/protocol: Yes  Exclusion: Service Performed in Patient Hospital Room (and thus did not require transfer): N/A  Unplanned admission to ICU related to anesthesia service up through end of PACU care- (MD51)  Unplanned admission to ICU (not initially anticipated at anesthesia start time): No

## 2020-02-01 NOTE — PROGRESS NOTES
Pt arrived to floor via transport, VSS, pain 9/10, received medication in ED. Pt unable to turn due to bucks traction, cannot assess posterior skin, pt also refusing due to pain. Anterior skin assessed, clean, dry, intact, no redness, no breakdown noted. L leg in bucks traction 5lbs.

## 2020-02-01 NOTE — CARE PLAN
Problem: Infection  Goal: Will remain free from infection  Outcome: PROGRESSING AS EXPECTED  Pt educated on s/s of infection, verbalized understanding, and will call if s/s appear. Pt VSS, no complaints of fever.       Problem: Discharge Barriers/Planning  Goal: Patient's continuum of care needs will be met  Outcome: PROGRESSING AS EXPECTED  Pt updated on POC, surgery scheduled for 0645, educated on CHG bath, PT/OT after surgery, pt denies further questions/concerns.

## 2020-02-01 NOTE — ASSESSMENT & PLAN NOTE
-POD #6 left subtrochanteric IMN  -Nonpharmacologic and pharmacologic pain management  -Fall Precautions

## 2020-02-01 NOTE — PROGRESS NOTES
Ground level fall leaving dialysis, resulting in left femur fracture by report from outside hospital.    Plan:  - Admit to medicine  - NPO p MN  - Ortho consult to follow when imaging available  - To OR in AM for IM nail

## 2020-02-01 NOTE — OP REPORT
DATE OF OPERATION: 2/1/2020     PREOPERATIVE DIAGNOSIS: left subtrochanteric femur fracture    POSTOPERATIVE DIAGNOSIS: Same    PROCEDURE PERFORMED: Surgical treatment of left subtrochanteric femur fracture with intramedullary device    SURGEON: Douglas Vazquez M.D.     ANESTHESIOLOGIST: Donta Boudreaux MD    ANESTHESIA: General    ASA CLASSIFICATION: IV    INDICATIONS: The patient is a 54 y.o. female with a left subtrochanteric femur fracture resulting from a ground level fall.  The patient denies antecedent pain, and was found to have a normal neurovascular exam and skin envelope.  Radiographs demonstrated the subtrochanteric femur fracture.  Given these findings, the patient is an appropriately indicated candidate for surgical treatment of the subtrochanteric fracture with an intramedullary device.  I discussed the risks and benefits of the procedure, including the risks of infection, wound healing complication, neurovascular injury, persistent hip pain, malunion, non-union, malrotation, and the medical risks of anesthesia including MI, stroke, and death.  Benefits include early mobilization, improved chance of union, and reduction in the medical risks of hip fractures.  Alternatives to surgery were also discussed, including non-operative management, which I did not recommend.  The patient was in agreement with the plan to proceed, and the informed consent was signed and documented.  I met with the patient pre-operatively and marked the operative extremity with their agreement.  We proceeded to the operating room.     WOUND CLASSIFICATION: Class I    SPECIMEN: None    ESTIMATED BLOOD LOSS: 100 mL    PROCEDURE:  The patient underwent anesthesia, and was positioned supine on the fracture table with all bony prominences were well padded.  Sequential compression devices were employed.  Preoperative imaging was obtained, demonstrated partial reduction of the fracture using the table. The correct operative site was  prepped and draped into a sterile field, and the surgical team scrubbed in.  A procedural pause was conducted to verify correct patient, correct extremity, presence of the surgeons initials on the operative extremity, and administration of IV antibiotics, in this case, Ancef.    We localized the lateral thigh incision using fluoroscopy and a guidepin incised the skin and subcutaneous tissue.  The fascia was divided in line with the incision using cautery.  We worked through a rent in the vastus musculature caused by the fracture.  The fracture was identified we took care not to strip the fracture to avoid devascularizing the fracture site.  We improve the reduction of the comminuted fracture using a lobster claw reduction forcep.  When that was complete the starting guide pin for the OIC hip nail was advanced down to a start point on the greater trochanter, and its position was confirmed on fluoroscopy.  This was advanced into the femur on power.  An incision was made around the pin, and the entry reamer was then used to gain access to the femoral canal.  The guide pin was then removed.    A long ball-tip guide wire was advanced down the femur to the knee, its position confirmed on fluoroscopy.  We then measured for, and selected a 11 mm x 42 cm x 127.5 degree OIC hip nail.  We then sequentially reamed to a size 12.5 mm reamer, and advanced the nail over the guide pin to an appropriate depth, confirmed by fluoroscopy.    The guide for the lag screw was then advanced down to bone through a lateral thigh incision.  The starting guide pin was advanced to a central position within the femoral neck/head, confirmed by fluoroscopy.  We measured our lag screw, and reamed for our lag screw.  We then placed the lag screw by hand.  The fracture was compressed, and the set screw was locked proximally.  All instruments were removed from the proximal femur, and final fluoroscopic images obtained.    We then obtained perfect  [place] imaging distally, and placed two statically interlocked distal screw, obtaining good bicortical purchase.    When the clamp was removed from the femur slight varus was noted despite appropriate position of the start point in the nail.  This appeared secondary to the large zone of medial comminution.  We did have good cortical contact of the medial piece of comminution in the lateral spike of the distal fragment and we had good cortical read ensuring a reduction and rotation.  We then carefully passed 1 cable from the [employer] cable system around this fracture site in this location tightened it tensioned it and secured it with the locking screw cable was then cut.  Clamp was then removed and improved alignment of the fracture site was noted.    We then irrigated all wounds with normal saline, and closed in layers, with 0 Vicryl in the fascia, 2-0 Vicryl in the subcutaneous tissue, and staples in the skin.  Sterile dressings were applied.       The patient tolerated the procedure well. There were no apparent complications. All sponge, needle, and instrument counts were correct on two separate occasions. She was awakened, extubated, and transferred to the recovery room in satisfactory condition.     Post-Operative Plan:    1.  The patient should bear weight as tolerated on their operative extremity.  Gait aids (crutch or crutches, cane, walker) may be used as needed, and may be discontinued when no longer required.  2.  IV antibiotics - may be continued for 24 hours, but are not required.  3.  DVT prophylaxis - SCD's and Lovenox 40 mg SQ daily while inpatient.  The patient may transition to Aspirin 325 mg PO BID as an outpatient  4.  Discharge planning  ____________________________________   Douglas Vazquez M.D.   DD: 2/1/2020  9:35 AM

## 2020-02-01 NOTE — H&P
Hospital Medicine History & Physical Note    Date of Service  2/1/2020    Primary Care Physician  Wiley Acevedo M.D.    Consultants  Dr. Vazquez, orthopedic surgery    Code Status  Full    Chief Complaint  Chief Complaint   Patient presents with   • T-5000 GLF   • Leg Injury       History of Presenting Illness  54 y.o. female who presented on 1/31/2020 with left leg pain after a fall.  This is a pleasant 54-year-old female who carries a history of end-stage renal disease on hemodialysis Monday, Wednesday, and Friday.  She also has a history of CVA and is chronically debilitated requiring front wheel walker for ambulation.  The patient states that she went to her hemodialysis session as usual yesterday.  Afterwards she was attempting to get in her car when her father-in-law apparently asked to use her walker, as he pulled the walker away from her, the patient lost her balance, fell to her left onto a concrete surface.  She had immediate pain and has not been able to ambulate or bear weight since.  She denies any preceding headache, chest pain, shortness of breath, or lightheadedness.  She denies striking her head or losing consciousness.  Otherwise prior to this she was in her usual state of health.    Patient presented to an outside facility where work-up including WBC 7.3 hemoglobin 11.2 hematocrit 34 platelet 226 sodium 140 potassium 3.6 chloride 100 CO2 31 BUN 7 creatinine 2.12 and glucose 174.  Plain film showed a proximal left femur fracture.  She was transferred to our facility for higher level of care and specialist consultation.    Review of Systems  Review of Systems   Constitutional: Negative for chills and fever.   HENT: Negative for congestion and sore throat.    Eyes: Negative for photophobia.   Respiratory: Negative for cough, shortness of breath and wheezing.    Cardiovascular: Negative for chest pain and palpitations.   Gastrointestinal: Negative for abdominal pain, diarrhea, nausea and vomiting.    Genitourinary: Negative for dysuria.   Musculoskeletal: Positive for falls. Negative for myalgias.        Left leg pain   Skin: Negative.    Neurological: Negative for dizziness, tingling, focal weakness and headaches.   Psychiatric/Behavioral: Negative for depression and suicidal ideas.       Past Medical History  Past Medical History:   Diagnosis Date   • Type II or unspecified type diabetes mellitus without mention of complication, not stated as uncontrolled 10/25/2011   • HTN (hypertension) 10/25/2011   • Neuropathy in diabetes (HCC) 10/25/2011   • Arthritis    • Cataract    • DVT (deep venous thrombosis) ([facility])    • Other specified symptom associated with female genital organs    • PE (pulmonary embolism)    • Pneumonia    • Presence of IVC filter    • Renal disorder    • Unspecified disorder of thyroid        Surgical History  Past Surgical History:   Procedure Laterality Date   • GASTROSCOPY-ENDO N/A 7/3/2018    Procedure: GASTROSCOPY-ENDO;  Surgeon: Juan Miguel Soares M.D.;  Location: [facility];  Service: Gastroenterology   • PEG PLACEMENT N/A 7/3/2018    Procedure: PEG PLACEMENT;  Surgeon: Juan Miguel Soares M.D.;  Location: ENDOSCOPY [facility];  Service: Gastroenterology   • GASTROSCOPY  2/22/2013    Performed by Reid Yi D.O. at SURGERY [facility]   • COLONOSCOPY  2/22/2013    Performed by Reid Yi D.O. at SURGERY [facility]   • ABDOMINAL HYSTERECTOMY TOTAL      Right ovary remains   • CHOLECYSTECTOMY     • KNEE ARTHROSCOPY      3 surgeries right knee       Family History  Family History   Problem Relation Age of Onset   • Cancer Mother         Bone   • Heart Disease Father    • Cancer Father         Bladder, Prostate       Social History  Social History     Tobacco Use   • Smoking status: Never Smoker   • Smokeless tobacco: Never Used   Substance Use Topics   • Alcohol use: No     Alcohol/week: 0.0 oz   • Drug use: No       Allergies  Allergies    Allergen Reactions   • Toradol Hives   • Levaquin      Blood boils   • Lisinopril Unspecified     Caused potassium build up in blood   • Tape        Medications  No current facility-administered medications on file prior to encounter.      Current Outpatient Medications on File Prior to Encounter   Medication Sig Dispense Refill   • carvedilol (COREG) 6.25 MG Tab Take 1 Tab by mouth 2 times a day, with meals. 60 Tab 0   • hydrALAZINE (APRESOLINE) 25 MG Tab Take 1 Tab by mouth every 8 hours. 90 Tab 0   • aspirin 81 MG EC tablet Take 1 Tab by mouth every day. 30 Tab 1   • insulin regular (HUMULIN R) 100 Unit/mL Solution Inject 1-6 Units as instructed 4 Times a Day,Before Meals and at Bedtime. 10 mL    • meclizine (ANTIVERT) 25 MG Tab 1 Tab by Per NG Tube route every 8 hours as needed for Dizziness or Vertigo. 30 Tab 0   • ondansetron (ZOFRAN ODT) 4 MG TABLET DISPERSIBLE Take 1 Tab by mouth every four hours as needed (give PO if no IV route available). 10 Tab 0   • atorvastatin (LIPITOR) 80 MG tablet 1 Tab by Per NG Tube route every evening. 30 Tab    • senna-docusate (PERICOLACE OR SENOKOT S) 8.6-50 MG Tab Take 2 Tabs by mouth 2 times a day.     • metoclopramide (REGLAN) 5 MG tablet 1 Tab by Per NG Tube route 4 Times a Day,Before Meals and at Bedtime. 120 Tab    • artificial tears 1.4 % Solution Place 2 Drops in both eyes every 2 hours as needed. 1 Bottle 3   • famotidine (PEPCID) 20 MG Tab 1 Tab by Per NG Tube route every day. 60 Tab    • glycopyrrolate (ROBINUL) 1 MG Tab Take 0.5 Tabs by mouth 2 Times a Day. 90 Tab    • ascorbic acid (VITAMIN C) 1000 MG tablet Take 1 Tab by mouth every day.     • hydrOXYzine HCl (ATARAX) 25 MG Tab 1 Tab by Per NG Tube route 3 times a day as needed for Itching. 30 Tab 0   • levothyroxine (SYNTHROID) 112 MCG Tab Take 1 Tab by mouth every day. 90 Tab 3   • lidocaine (LIDODERM) 5 % Patch Apply 2 Patches to skin as directed every 24 hours. 20 Patch 3   • amlodipine (NORVASC) 10 MG Tab  Take 1 Tab by mouth every day. 90 Tab 3   • Misc. Devices Misc Freestyle lite test strips; patient has DM type 2 and tests 3 times per day 100 Strip 3   • Misc. Devices Misc Unilateral diagnostic mammogram right breast with ultrasound if indicated; fax [phone] 1 Device 0   • ergocalciferol (DRISDOL) 28261 UNIT capsule Take 1 Cap by mouth every 7 days. 12 Cap 0   • polyethylene glycol 3350 (MIRALAX) Powder Take 17 g by mouth 2 times a day. 3 Bottle 3   • fluticasone (FLONASE) 50 MCG/ACT nasal spray Spray 2 Sprays in nose every day. 16 g 11       Physical Exam  Hemodynamics  Temp (24hrs), Av.9 °C (98.5 °F), Min:36.9 °C (98.5 °F), Max:36.9 °C (98.5 °F)   Temperature: 36.9 °C (98.5 °F)  Pulse  Av.5  Min: 71  Max: 88    Blood Pressure: 153/71      Respiratory      Respiration: 18, Pulse Oximetry: 99 %             Physical Exam   Constitutional: She is oriented to person, place, and time. No distress.   Obese, chronically ill-appearing   HENT:   Head: Normocephalic and atraumatic.   Right Ear: External ear normal.   Left Ear: External ear normal.   Eyes: EOM are normal. Right eye exhibits no discharge. Left eye exhibits no discharge.   Neck: Neck supple. No JVD present.   Cardiovascular: Normal rate, regular rhythm and normal heart sounds.   Pulmonary/Chest: Effort normal and breath sounds normal. No respiratory distress. She exhibits no tenderness.   Abdominal: Soft. Bowel sounds are normal. She exhibits no distension. There is no tenderness.   Musculoskeletal: Normal range of motion.         General: No edema.   Neurological: She is alert and oriented to person, place, and time. No cranial nerve deficit.   Skin: Skin is warm and dry. She is not diaphoretic. No erythema. There is pallor.   Psychiatric: She has a normal mood and affect. Her behavior is normal.   Nursing note and vitals reviewed.    Capillary refill less than 3 seconds, distal pulses intact    Laboratory:  Recent Labs     20  2348   WBC 9.6    RBC 3.59*   HEMOGLOBIN 10.4*   HEMATOCRIT 32.0*   MCV 89.1   MCH 29.0   MCHC 32.5*   RDW 43.2   PLATELETCT 217   MPV 10.6         No results for input(s): ALTSGPT, ASTSGOT, ALKPHOSPHAT, TBILIRUBIN, DBILIRUBIN, GAMMAGT, AMYLASE, LIPASE, ALB, PREALBUMIN, GLUCOSE in the last 72 hours.  Recent Labs     01/31/20  2348   APTT 26.6   INR 0.99             Lab Results   Component Value Date    TROPONINI 0.01 03/20/2012       Imaging  No results found.      Assessment/Plan:  Anticipate that patient will need greater than 2 midnights for management of the discussed medical issues.    * Closed fracture of left femur, unspecified fracture morphology, initial encounter ([facility])  Assessment & Plan  Secondary to mechanical ground-level fall.  Plain images from the outside facility shows a proximal left femur fracture.  Patient has been placed in traction and will now be admitted to the orthopedic floor for close monitoring and symptomatic management of her pain.  We will start her on DVT prophylaxis, keep her n.p.o. while we await consultation from Dr. Vazquez of orthopedic surgery in anticipation of surgical correction in the morning.  Patient states that she is quite debilitated at baseline with overall weakness from previous stroke with need for front wheel walker at baseline, she will certainly need PT and OT both of which have been ordered.    ESRD (end stage renal disease) ([facility])  Assessment & Plan  Patient on hemodialysis Monday, Wednesday, Friday.  She had a full session yesterday.  If she is in the hospital beyond Monday or requires any contrast studies, we will plan to consult nephrology for inpatient dialysis.    HLD (hyperlipidemia)- (present on admission)  Assessment & Plan  This is chronic and stable, okay to resume home Lipitor and aspirin.    Type II diabetes mellitus (HCC)- (present on admission)  Assessment & Plan  Hold long-acting hypoglycemics while the patient is n.p.o. for anticipated orthopedic intervention.   Monitor with Accu-Cheks and cover with insulin sliding scale in the meantime.    Essential hypertension- (present on admission)  Assessment & Plan  Blood pressure controlled on home hydralazine, Coreg, and Norvasc, resume home medications and monitor.    Hypothyroidism  Assessment & Plan  This is chronic, continue home Synthroid.      Prophylaxis: Heparin for DVT prophylaxis, no PPI indicated, bowel protocol as needed    I spent a total of 35 minutes of non face to face time performing additional research, reviewing medical records from transferring facility, discussing plan of care with other healthcare providers. Start time: 11:55PM. End time: 12:30AM.

## 2020-02-01 NOTE — ED PROVIDER NOTES
ED Provider Note    Scribed for Chavo Guzman M.D. by Jose Mcqueen. 1/31/2020  11:33 PM    Primary care provider: Wiley Acevedo M.D.  Means of arrival: EMS  History obtained from: patient  History limited by: none    CHIEF COMPLAINT  Chief Complaint   Patient presents with   • T-5000 GLF   • Leg Injury       HPI  Bryan Kristine Garcia is a 54 y.o. female who presents to the Emergency Department as a transfer with complaints of a left femur fracture. She states that she recently had a fall while leaving dialysis. This resulted in acute, severe pain of her left upper leg. Patient was found to have a femur fracture on xray at [facility]. Patient was given Ketamine 200 mg, Fentanyl 250 mcg, and Versed 2 mg by EMS. She has continued to have pain. She typically uses a walker, but she was not using at the time. She also has complaints of left shoulder pain from the fall. She denies any abdominal pain. She denies any head trauma. She denies any prior history of femur or ankle fractures.    REVIEW OF SYSTEMS  Pertinent positives include: severe left leg pain and left shoulder pain . Pertinent negatives include: loss of consciousness or abdominal pain. See history of present illness. All other systems are negative.     PAST MEDICAL HISTORY   has a past medical history of Arthritis, Cataract, DVT (deep venous thrombosis) ([facility]), HTN (hypertension) (10/25/2011), Neuropathy in diabetes ([facility]) (10/25/2011), Other specified symptom associated with female genital organs, PE (pulmonary embolism), Pneumonia, Presence of IVC filter, Renal disorder, Type II or unspecified type diabetes mellitus without mention of complication, not stated as uncontrolled (10/25/2011), and Unspecified disorder of thyroid.    SURGICAL HISTORY   has a past surgical history that includes abdominal hysterectomy total; knee arthroscopy; cholecystectomy; gastroscopy (2/22/2013); colonoscopy (2/22/2013); gastroscopy-endo (N/A, 7/3/2018); and peg  placement (N/A, 7/3/2018).    SOCIAL HISTORY  Social History     Tobacco Use   • Smoking status: Never Smoker   • Smokeless tobacco: Never Used   Substance Use Topics   • Alcohol use: No     Alcohol/week: 0.0 oz   • Drug use: No      Social History     Substance and Sexual Activity   Drug Use No       FAMILY HISTORY  Family History   Problem Relation Age of Onset   • Cancer Mother         Bone   • Heart Disease Father    • Cancer Father         Bladder, Prostate       CURRENT MEDICATIONS  Current Outpatient Medications:   •  carvedilol (COREG) 6.25 MG Tab, Take 1 Tab by mouth 2 times a day, with meals., Disp: 60 Tab, Rfl: 0  •  hydrALAZINE (APRESOLINE) 25 MG Tab, Take 1 Tab by mouth every 8 hours., Disp: 90 Tab, Rfl: 0  •  aspirin 81 MG EC tablet, Take 1 Tab by mouth every day., Disp: 30 Tab, Rfl: 1  •  insulin regular (HUMULIN R) 100 Unit/mL Solution, Inject 1-6 Units as instructed 4 Times a Day,Before Meals and at Bedtime., Disp: 10 mL, Rfl:   •  meclizine (ANTIVERT) 25 MG Tab, 1 Tab by Per NG Tube route every 8 hours as needed for Dizziness or Vertigo., Disp: 30 Tab, Rfl: 0  •  ondansetron (ZOFRAN ODT) 4 MG TABLET DISPERSIBLE, Take 1 Tab by mouth every four hours as needed (give PO if no IV route available)., Disp: 10 Tab, Rfl: 0  •  atorvastatin (LIPITOR) 80 MG tablet, 1 Tab by Per NG Tube route every evening., Disp: 30 Tab, Rfl:   •  senna-docusate (PERICOLACE OR SENOKOT S) 8.6-50 MG Tab, Take 2 Tabs by mouth 2 times a day., Disp: , Rfl:   •  metoclopramide (REGLAN) 5 MG tablet, 1 Tab by Per NG Tube route 4 Times a Day,Before Meals and at Bedtime., Disp: 120 Tab, Rfl:   •  artificial tears 1.4 % Solution, Place 2 Drops in both eyes every 2 hours as needed., Disp: 1 Bottle, Rfl: 3  •  famotidine (PEPCID) 20 MG Tab, 1 Tab by Per NG Tube route every day., Disp: 60 Tab, Rfl:   •  glycopyrrolate (ROBINUL) 1 MG Tab, Take 0.5 Tabs by mouth 2 Times a Day., Disp: 90 Tab, Rfl:   •  ascorbic acid (VITAMIN C) 1000 MG  "tablet, Take 1 Tab by mouth every day., Disp: , Rfl:   •  hydrOXYzine HCl (ATARAX) 25 MG Tab, 1 Tab by Per NG Tube route 3 times a day as needed for Itching., Disp: 30 Tab, Rfl: 0  •  levothyroxine (SYNTHROID) 112 MCG Tab, Take 1 Tab by mouth every day., Disp: 90 Tab, Rfl: 3  •  lidocaine (LIDODERM) 5 % Patch, Apply 2 Patches to skin as directed every 24 hours., Disp: 20 Patch, Rfl: 3  •  amlodipine (NORVASC) 10 MG Tab, Take 1 Tab by mouth every day., Disp: 90 Tab, Rfl: 3  •  Misc. Devices Misc, Freestyle lite test strips; patient has DM type 2 and tests 3 times per day, Disp: 100 Strip, Rfl: 3  •  Misc. Devices Misc, Unilateral diagnostic mammogram right breast with ultrasound if indicated; fax [phone], Disp: 1 Device, Rfl: 0  •  ergocalciferol (DRISDOL) 25805 UNIT capsule, Take 1 Cap by mouth every 7 days., Disp: 12 Cap, Rfl: 0  •  polyethylene glycol 3350 (MIRALAX) Powder, Take 17 g by mouth 2 times a day., Disp: 3 Bottle, Rfl: 3  •  fluticasone (FLONASE) 50 MCG/ACT nasal spray, Spray 2 Sprays in nose every day., Disp: 16 g, Rfl: 11     ALLERGIES  Allergies   Allergen Reactions   • Toradol Hives   • Levaquin      Blood boils   • Lisinopril Unspecified     Caused potassium build up in blood   • Tape        PHYSICAL EXAM  VITAL SIGNS: /68   Pulse 88   Temp 36.9 °C (98.5 °F) (Temporal)   Resp 18   Ht 1.778 m (5' 10\")   Wt 90.7 kg (200 lb)   SpO2 100%   BMI 28.70 kg/m²     Constitutional: Alert, uncomfortable appearing.   HENT: No signs of trauma, Bilateral external ears normal, Nose normal. Uvula midline.   Eyes: Pupils are equal and reactive, Conjunctiva normal, Non-icteric.   Neck: Normal range of motion, No tenderness, Supple, No stridor.   Lymphatic: No lymphadenopathy noted.   Cardiovascular: Regular rate and rhythm, no murmurs.   Thorax & Lungs: Normal breath sounds, No respiratory distress, No wheezing, No chest tenderness. Right sided anterior chest port  Abdomen:  Soft, No tenderness, No " peritoneal signs, No masses, No pulsatile masses.   Skin: Warm, Dry, No erythema, No rash.   Back: No bony tenderness, No CVA tenderness.   Extremities: tenderness to palpation of left shoulder.  2+ peripheral pulses in the left lower extremity and shortened with tenderness to palpation of femur.  Musculoskeletal: Good range of motion in all major joints. No tenderness to palpation or major deformities noted.   Neurologic: Alert , Normal motor function, Normal sensory function, No focal deficits noted.   Psychiatric: Affect normal, Judgment normal, Mood normal.       DIAGNOSTIC STUDIES / PROCEDURES    LABS  Labs Reviewed   CBC WITH DIFFERENTIAL - Abnormal; Notable for the following components:       Result Value    RBC 3.59 (*)     Hemoglobin 10.4 (*)     Hematocrit 32.0 (*)     MCHC 32.5 (*)     Neutrophils-Polys 81.90 (*)     Lymphocytes 9.10 (*)     Neutrophils (Absolute) 7.89 (*)     Lymphs (Absolute) 0.88 (*)     All other components within normal limits    Narrative:     Indicate which anticoagulants the patient is on:->UNKNOWN   COMP METABOLIC PANEL - Abnormal; Notable for the following components:    Potassium 3.5 (*)     Glucose 186 (*)     Creatinine 2.34 (*)     Alkaline Phosphatase 100 (*)     Albumin 3.1 (*)     Total Protein 5.8 (*)     All other components within normal limits    Narrative:     Indicate which anticoagulants the patient is on:->UNKNOWN   ESTIMATED GFR - Abnormal; Notable for the following components:    GFR If  26 (*)     GFR If Non  22 (*)     All other components within normal limits    Narrative:     Indicate which anticoagulants the patient is on:->UNKNOWN   PROTHROMBIN TIME    Narrative:     Indicate which anticoagulants the patient is on:->UNKNOWN   APTT    Narrative:     Indicate which anticoagulants the patient is on:->UNKNOWN      All labs reviewed by me.      RADIOLOGY  DX-SHOULDER 2+ LEFT   Final Result      1.  No acute left shoulder fracture  or dislocation.      2.  Mild degenerative change of the left AC joint.      [id number] FILM X-RAY   Final Result      [id number] FILM X-RAY   Final Result      OUTSIDE IMAGES-DX PELVIS   Final Result        The radiologist's interpretation of all radiological studies have been reviewed by me.    COURSE & MEDICAL DECISION MAKING  Nursing notes, VS, PMSFHx reviewed in chart.    Review of past medical records shows the patient S/p reduciton at [facility]. Her H&H was 11.2 and 34. PLT 22.6. INR 0.9. Creatinine 2.1. Imaging showed a spiral oblique mildly comminuted proximal left femoral diaphyseal fracture with apex lateral angulation and mild impaction.     54 y.o. female p/w chief complaint of severe left leg pain. Outside facility records show a fracture of her left femur.     11:33 PM Patient seen and examined at bedside.      The differential diagnoses include but are not limited to:     pt w/ fx of left femur  pt placed in traction   Further evaluate patient with basic labs PT/APTT to plan for admission.   Patient denies any lower back pain or hip pain or head strike.    Patient treated with Morphine 4mg.    11:42 PM I discussed the patient's case and the above findings with Dr. Vazquez (Orthopedics) who agreed with the plan to hospitalize the patient.     11:46 PM - I spoke to Dr. Montalvo about the patient's case and findings. He has asked for admission to medicine.     12:22 AM I discussed the patient's case and the above findings with Dr. Cavazos (Hospitalist) who agrees to hospitalize the patient.     #Left shoulder pain  Acute left shoulder pain secondary to recent ground level fall.  Evaluate for fracture with xray imaging.   No fx  No abd TTP to suggest referred pain.   Would consider ortho f/u as outpt.       DISPOSITION:  Patient will be hospitalized by Dr. Cavazos in guarded condition.      FINAL IMPRESSION  1. Fall, subsequent encounter    2. Closed fracture of left femur, unspecified  fracture morphology, unspecified portion of femur, initial encounter ([facility])          IJose (Scribe), am scribing for, and in the presence of, Chavo Guzman M.D..    Electronically signed by: Jose Mcqueen (Scribe), 1/31/2020    Chavo ALBA M.D. personally performed the services described in this documentation, as scribed by Jose Mcqueen in my presence, and it is both accurate and complete. C    The note accurately reflects work and decisions made by me.  Chavo Guzman M.D.  2/1/2020  2:32 AM

## 2020-02-02 PROBLEM — E55.9 VITAMIN D INSUFFICIENCY: Status: ACTIVE | Noted: 2020-02-02

## 2020-02-02 PROBLEM — D62 ANEMIA ASSOCIATED WITH ACUTE BLOOD LOSS: Status: ACTIVE | Noted: 2020-02-02

## 2020-02-02 LAB
25(OH)D3 SERPL-MCNC: 29 NG/ML (ref 30–100)
ANION GAP SERPL CALC-SCNC: 9 MMOL/L (ref 0–11.9)
BASOPHILS # BLD AUTO: 0.3 % (ref 0–1.8)
BASOPHILS # BLD: 0.02 K/UL (ref 0–0.12)
BUN SERPL-MCNC: 22 MG/DL (ref 8–22)
CALCIUM SERPL-MCNC: 8.6 MG/DL (ref 8.5–10.5)
CHLORIDE SERPL-SCNC: 100 MMOL/L (ref 96–112)
CO2 SERPL-SCNC: 30 MMOL/L (ref 20–33)
CREAT SERPL-MCNC: 3.87 MG/DL (ref 0.5–1.4)
EOSINOPHIL # BLD AUTO: 0 K/UL (ref 0–0.51)
EOSINOPHIL NFR BLD: 0 % (ref 0–6.9)
ERYTHROCYTE [DISTWIDTH] IN BLOOD BY AUTOMATED COUNT: 46.5 FL (ref 35.9–50)
FERRITIN SERPL-MCNC: 410 NG/ML (ref 10–291)
GLUCOSE BLD-MCNC: 138 MG/DL (ref 65–99)
GLUCOSE BLD-MCNC: 165 MG/DL (ref 65–99)
GLUCOSE BLD-MCNC: 172 MG/DL (ref 65–99)
GLUCOSE BLD-MCNC: 182 MG/DL (ref 65–99)
GLUCOSE SERPL-MCNC: 206 MG/DL (ref 65–99)
HCT VFR BLD AUTO: 24.6 % (ref 37–47)
HGB BLD-MCNC: 7.3 G/DL (ref 12–16)
HGB BLD-MCNC: 7.7 G/DL (ref 12–16)
HGB BLD-MCNC: 7.9 G/DL (ref 12–16)
IMM GRANULOCYTES # BLD AUTO: 0.03 K/UL (ref 0–0.11)
IMM GRANULOCYTES NFR BLD AUTO: 0.4 % (ref 0–0.9)
IRON SATN MFR SERPL: 8 % (ref 15–55)
IRON SERPL-MCNC: 12 UG/DL (ref 40–170)
LYMPHOCYTES # BLD AUTO: 0.72 K/UL (ref 1–4.8)
LYMPHOCYTES NFR BLD: 10.3 % (ref 22–41)
MCH RBC QN AUTO: 28.8 PG (ref 27–33)
MCHC RBC AUTO-ENTMCNC: 31.5 G/DL (ref 33.6–35)
MCV RBC AUTO: 91.5 FL (ref 81.4–97.8)
MONOCYTES # BLD AUTO: 0.69 K/UL (ref 0–0.85)
MONOCYTES NFR BLD AUTO: 9.9 % (ref 0–13.4)
NEUTROPHILS # BLD AUTO: 5.53 K/UL (ref 2–7.15)
NEUTROPHILS NFR BLD: 79.1 % (ref 44–72)
NRBC # BLD AUTO: 0 K/UL
NRBC BLD-RTO: 0 /100 WBC
PLATELET # BLD AUTO: 178 K/UL (ref 164–446)
PMV BLD AUTO: 10.9 FL (ref 9–12.9)
POTASSIUM SERPL-SCNC: 3.9 MMOL/L (ref 3.6–5.5)
RBC # BLD AUTO: 2.71 M/UL (ref 4.2–5.4)
SODIUM SERPL-SCNC: 139 MMOL/L (ref 135–145)
TIBC SERPL-MCNC: 151 UG/DL (ref 250–450)
WBC # BLD AUTO: 7 K/UL (ref 4.8–10.8)

## 2020-02-02 PROCEDURE — A9270 NON-COVERED ITEM OR SERVICE: HCPCS | Performed by: INTERNAL MEDICINE

## 2020-02-02 PROCEDURE — 700102 HCHG RX REV CODE 250 W/ 637 OVERRIDE(OP): Performed by: INTERNAL MEDICINE

## 2020-02-02 PROCEDURE — 97162 PT EVAL MOD COMPLEX 30 MIN: CPT

## 2020-02-02 PROCEDURE — 82728 ASSAY OF FERRITIN: CPT

## 2020-02-02 PROCEDURE — 99233 SBSQ HOSP IP/OBS HIGH 50: CPT | Performed by: INTERNAL MEDICINE

## 2020-02-02 PROCEDURE — 36415 COLL VENOUS BLD VENIPUNCTURE: CPT

## 2020-02-02 PROCEDURE — 97166 OT EVAL MOD COMPLEX 45 MIN: CPT

## 2020-02-02 PROCEDURE — 51798 US URINE CAPACITY MEASURE: CPT

## 2020-02-02 PROCEDURE — 700102 HCHG RX REV CODE 250 W/ 637 OVERRIDE(OP): Performed by: HOSPITALIST

## 2020-02-02 PROCEDURE — 82962 GLUCOSE BLOOD TEST: CPT | Mod: 91

## 2020-02-02 PROCEDURE — A9270 NON-COVERED ITEM OR SERVICE: HCPCS | Performed by: HOSPITALIST

## 2020-02-02 PROCEDURE — 700111 HCHG RX REV CODE 636 W/ 250 OVERRIDE (IP): Performed by: NURSE PRACTITIONER

## 2020-02-02 PROCEDURE — 700111 HCHG RX REV CODE 636 W/ 250 OVERRIDE (IP): Performed by: HOSPITALIST

## 2020-02-02 PROCEDURE — A9270 NON-COVERED ITEM OR SERVICE: HCPCS | Performed by: NURSE PRACTITIONER

## 2020-02-02 PROCEDURE — 85018 HEMOGLOBIN: CPT | Mod: 91

## 2020-02-02 PROCEDURE — 85025 COMPLETE CBC W/AUTO DIFF WBC: CPT

## 2020-02-02 PROCEDURE — 770006 HCHG ROOM/CARE - MED/SURG/GYN SEMI*

## 2020-02-02 PROCEDURE — 83540 ASSAY OF IRON: CPT

## 2020-02-02 PROCEDURE — 80048 BASIC METABOLIC PNL TOTAL CA: CPT

## 2020-02-02 PROCEDURE — 83550 IRON BINDING TEST: CPT

## 2020-02-02 PROCEDURE — 700102 HCHG RX REV CODE 250 W/ 637 OVERRIDE(OP): Performed by: NURSE PRACTITIONER

## 2020-02-02 PROCEDURE — 82306 VITAMIN D 25 HYDROXY: CPT

## 2020-02-02 RX ORDER — MORPHINE SULFATE 4 MG/ML
2 INJECTION, SOLUTION INTRAMUSCULAR; INTRAVENOUS
Status: DISCONTINUED | OUTPATIENT
Start: 2020-02-02 | End: 2020-02-05

## 2020-02-02 RX ORDER — ACETAMINOPHEN 325 MG/1
650 TABLET ORAL EVERY 4 HOURS PRN
Status: DISCONTINUED | OUTPATIENT
Start: 2020-02-02 | End: 2020-02-08 | Stop reason: HOSPADM

## 2020-02-02 RX ORDER — CYCLOBENZAPRINE HCL 10 MG
10 TABLET ORAL 3 TIMES DAILY PRN
Status: DISCONTINUED | OUTPATIENT
Start: 2020-02-02 | End: 2020-02-08 | Stop reason: HOSPADM

## 2020-02-02 RX ORDER — VITAMIN B COMPLEX
1000 TABLET ORAL DAILY
Status: DISCONTINUED | OUTPATIENT
Start: 2020-02-02 | End: 2020-02-08 | Stop reason: HOSPADM

## 2020-02-02 RX ORDER — OXYCODONE HYDROCHLORIDE 5 MG/1
5 TABLET ORAL
Status: DISCONTINUED | OUTPATIENT
Start: 2020-02-02 | End: 2020-02-08 | Stop reason: HOSPADM

## 2020-02-02 RX ORDER — OXYCODONE HYDROCHLORIDE 10 MG/1
10 TABLET ORAL
Status: DISCONTINUED | OUTPATIENT
Start: 2020-02-02 | End: 2020-02-08 | Stop reason: HOSPADM

## 2020-02-02 RX ADMIN — MELATONIN 1000 UNITS: at 06:39

## 2020-02-02 RX ADMIN — INSULIN HUMAN 2 UNITS: 100 INJECTION, SOLUTION PARENTERAL at 17:52

## 2020-02-02 RX ADMIN — HEPARIN SODIUM 5000 UNITS: 5000 INJECTION, SOLUTION INTRAVENOUS; SUBCUTANEOUS at 05:12

## 2020-02-02 RX ADMIN — OXYCODONE HYDROCHLORIDE 10 MG: 10 TABLET ORAL at 17:44

## 2020-02-02 RX ADMIN — CARVEDILOL 6.25 MG: 6.25 TABLET, FILM COATED ORAL at 17:43

## 2020-02-02 RX ADMIN — INSULIN HUMAN 2 UNITS: 100 INJECTION, SOLUTION PARENTERAL at 12:30

## 2020-02-02 RX ADMIN — GLYCOPYRROLATE 0.5 MG: 1 TABLET ORAL at 17:43

## 2020-02-02 RX ADMIN — OXYCODONE HYDROCHLORIDE 5 MG: 5 TABLET ORAL at 05:20

## 2020-02-02 RX ADMIN — CARVEDILOL 6.25 MG: 6.25 TABLET, FILM COATED ORAL at 08:31

## 2020-02-02 RX ADMIN — OXYCODONE HYDROCHLORIDE 10 MG: 10 TABLET ORAL at 08:28

## 2020-02-02 RX ADMIN — OXYCODONE HYDROCHLORIDE 10 MG: 10 TABLET ORAL at 11:43

## 2020-02-02 RX ADMIN — OXYCODONE HYDROCHLORIDE 10 MG: 10 TABLET ORAL at 20:51

## 2020-02-02 RX ADMIN — CYCLOBENZAPRINE 10 MG: 10 TABLET, FILM COATED ORAL at 12:28

## 2020-02-02 RX ADMIN — MORPHINE SULFATE 2 MG: 4 INJECTION INTRAVENOUS at 06:26

## 2020-02-02 RX ADMIN — FAMOTIDINE 20 MG: 20 TABLET ORAL at 05:19

## 2020-02-02 RX ADMIN — ACETAMINOPHEN 650 MG: 325 TABLET, FILM COATED ORAL at 20:20

## 2020-02-02 RX ADMIN — GLYCOPYRROLATE 0.5 MG: 1 TABLET ORAL at 05:18

## 2020-02-02 RX ADMIN — ASPIRIN 81 MG: 81 TABLET, COATED ORAL at 05:18

## 2020-02-02 RX ADMIN — MORPHINE SULFATE 2 MG: 4 INJECTION INTRAVENOUS at 09:59

## 2020-02-02 RX ADMIN — MORPHINE SULFATE 2 MG: 4 INJECTION INTRAVENOUS at 00:02

## 2020-02-02 RX ADMIN — LEVOTHYROXINE SODIUM 112 MCG: 0.11 TABLET ORAL at 05:13

## 2020-02-02 RX ADMIN — OXYCODONE HYDROCHLORIDE 10 MG: 10 TABLET ORAL at 14:44

## 2020-02-02 RX ADMIN — FLUTICASONE PROPIONATE 100 MCG: 50 SPRAY, METERED NASAL at 05:18

## 2020-02-02 RX ADMIN — ATORVASTATIN CALCIUM 80 MG: 40 TABLET, FILM COATED ORAL at 17:43

## 2020-02-02 ASSESSMENT — COGNITIVE AND FUNCTIONAL STATUS - GENERAL
DRESSING REGULAR LOWER BODY CLOTHING: A LOT
HELP NEEDED FOR BATHING: A LITTLE
SUGGESTED CMS G CODE MODIFIER DAILY ACTIVITY: CK
STANDING UP FROM CHAIR USING ARMS: A LOT
DAILY ACTIVITIY SCORE: 17
PERSONAL GROOMING: A LITTLE
TURNING FROM BACK TO SIDE WHILE IN FLAT BAD: UNABLE
MOVING TO AND FROM BED TO CHAIR: UNABLE
TOILETING: A LOT
SUGGESTED CMS G CODE MODIFIER MOBILITY: CM
WALKING IN HOSPITAL ROOM: A LOT
MOVING FROM LYING ON BACK TO SITTING ON SIDE OF FLAT BED: UNABLE
DRESSING REGULAR UPPER BODY CLOTHING: A LITTLE
CLIMB 3 TO 5 STEPS WITH RAILING: TOTAL
MOBILITY SCORE: 8

## 2020-02-02 ASSESSMENT — ENCOUNTER SYMPTOMS
EYE PAIN: 0
MEMORY LOSS: 0
SORE THROAT: 0
VOMITING: 0
EYE DISCHARGE: 0
FALLS: 1
BACK PAIN: 1
COUGH: 0
HEADACHES: 0
DEPRESSION: 0
MYALGIAS: 1
EYE REDNESS: 0
MYALGIAS: 0
DIZZINESS: 0
HALLUCINATIONS: 0
FEVER: 0
NAUSEA: 0
BRUISES/BLEEDS EASILY: 0
ABDOMINAL PAIN: 0
CHILLS: 0
POLYDIPSIA: 0
SHORTNESS OF BREATH: 0
HEMOPTYSIS: 0

## 2020-02-02 ASSESSMENT — GAIT ASSESSMENTS: GAIT LEVEL OF ASSIST: UNABLE TO PARTICIPATE

## 2020-02-02 ASSESSMENT — ACTIVITIES OF DAILY LIVING (ADL): TOILETING: INDEPENDENT

## 2020-02-02 NOTE — CARE PLAN
Problem: Communication  Goal: The ability to communicate needs accurately and effectively will improve  Outcome: PROGRESSING AS EXPECTED  Note:   Pt expresses needs appropriately. White board updated at the beginning of shift. Call light within patient reach.     Problem: Safety  Goal: Will remain free from injury  Outcome: PROGRESSING AS EXPECTED  Note:    Pt abides by safety precautions. Hourly rounding in progress.

## 2020-02-02 NOTE — DISCHARGE PLANNING
Anticipated Discharge Disposition: skilled    Action: Reviewed with [facility] plan is for skilled. Met at bedside with pt to review choice. Pt has Medicaid for insurance. Pt’s first choice for skilled is [facility] since she is from [place], #2 [place] referrals to be sent to all skilled in the [facility] area (Not [facility]). Choice form completed and faxed to [facility] to send.     Barriers to Discharge: Pt has Medicaid for insurance.     Plan: Referrals in progress to skilled facilities, await an accepting facility.

## 2020-02-02 NOTE — THERAPY
"Occupational Therapy Evaluation completed.   Functional Status:  Pt mod A supine <>EOB sit transfer using bed rail and with HOB elevated, significantly increased time needed for transfer d/t pt c/o severe pain RLE. Pt required max VCs for transfer technique, also required mod VCs for slow/deep breathing to decrease anxiety. Pt reporting \"I'm going to faint\" and reported lightheadedness during supine to sit transfer, after 10 slow deep breaths pt reported lightheadedness resolved. Pt tolerated sitting EOB 1-2 mins then requesting to lay back down. Reported she was unable to brush teeth seated EOB. Pt completed oral cares and hair cares supine in bed. Max A to don socks supine in bed. Pt reporting at baseline putting on socks has been difficult.  Plan of Care: Will benefit from Occupational Therapy 3 times per week  Discharge Recommendations:  Equipment: Will Continue to Assess for Equipment Needs. Recommend post-acute placement for additional occupational therapy services prior to discharge home.  See \"Rehab Therapy-Acute\" Patient Summary Report for complete documentation.    Patient is a 55 y/o F that presented to acute following GLF. Imaging revealed proximal L femur fracture. Patient is s/p L subtrochanteric IM nailing and WBAT LLE. PMH includes ESRD on HD MWF, DM, arthritis, DVT, PE, with IVC filter, Hx CVA. Pt demo decreased safety/indep with ADLs, IADLs, functional transfers/mobility, and is limited by significant pain and anxiety with functional movement. Pt lives with her 94 y/o grandmother and reports she does not have other friends/family who can assist with ADLs/IADLs upon return home. Pt would benefit from continued OT services in the acute care setting to address these deficits, and would benefit from post acute rehab to increase indep with ADLs/IADLs/functional mobility to ensure a safe return home.  "

## 2020-02-02 NOTE — CONSULTS
"[facility] -  CONSULTATION NOTE               Author: Alexandria Sullivan M.D. Date & Time: 2/2/2020  2:31 PM       REASON FOR CONSULTATION:   - Inpatient hemodialysis management.    CHIEF COMPLAINT:   -  \"I fell\"    HISTORY OF PRESENT ILLNESS:    50 yo female PMH ESRD MWF iHD, HTN, type 2 DM, anemia of CKD, and CKD-MBD who presents to ED with CC as above.  She was recently started on RRT for new onset ESRD last week and went to her usual treatmetn on 1/31.  She is walker dependent for gait and mabulation since a CVA in her past.  While walking towards her car, her MAYNOR asked to use her walker and when he took it away from her and in the process she lost her balance and sustained a fall onto the concrete sidewalk.  She had immediate pain and was not able to bear weight or ambulate as a result.  No alleviating factors.  Any movement aggravated the pain. Otherwise felt well and denies any recent F/C/N/V/CP/SOB.  No melena, hematochezia, hematemesis.  No HA, visual changes, or abdominal pain.    REVIEW OF SYSTEMS:    Review of Systems   Constitutional: Negative for fever and malaise/fatigue.   HENT: Negative for ear pain and sore throat.    Eyes: Negative for pain and redness.   Respiratory: Negative for cough and hemoptysis.    Cardiovascular: Negative for chest pain and leg swelling.   Gastrointestinal: Negative for abdominal pain and nausea.   Musculoskeletal: Positive for falls. Negative for joint pain and myalgias.   Skin: Negative for itching and rash.   Neurological: Negative for dizziness and headaches.   Endo/Heme/Allergies: Negative for polydipsia. Does not bruise/bleed easily.   Psychiatric/Behavioral: Negative for depression and hallucinations.   All other systems reviewed and are negative.    PAST MEDICAL HISTORY:   - ESRD MWF iHD  - HTN  - type 2 DM  - Anemia of CKD  - CKD-MBD  - HLD  - CAD  - CVA  - Chronic debility and walker dependence  - DVT  - Arthritis  - Bilateral cataracts  - " "PNA   - Hypothyroidism    PAST SURGICAL HISTORY:   - Cholecystectomy  - Right knee TKA x 3  - Colonoscopy  - LISSETH with LSO  - PEG placement  - IVC filter placement    FAMILY HISTORY:   - Reviewed and non contributory to current illness    SOCIAL HISTORY:   - No tobacco  - No EtOH  - No illicits    HOME MEDICATIONS:   - Reviewed and documented in chart    LABORATORY STUDIES:   - Reviewed and documented in chart    ALLERGIES:  Toradol; Levaquin; Lisinopril; and Tape    VS:  /68   Pulse 69   Temp 36.6 °C (97.8 °F) (Temporal)   Resp 16   Ht 1.778 m (5' 10\")   Wt 85.7 kg (189 lb)   SpO2 97%   BMI 27.12 kg/m²   Physical Exam  Vitals signs and nursing note reviewed.   Constitutional:       General: She is not in acute distress.     Appearance: Normal appearance. She is not ill-appearing.   HENT:      Head: Normocephalic and atraumatic.      Right Ear: External ear normal.      Left Ear: External ear normal.      Nose: Nose normal. No congestion.      Mouth/Throat:      Mouth: Mucous membranes are moist.      Pharynx: No oropharyngeal exudate.   Eyes:      General:         Right eye: No discharge.         Left eye: No discharge.      Extraocular Movements: Extraocular movements intact.   Neck:      Musculoskeletal: Neck supple. No muscular tenderness.   Cardiovascular:      Rate and Rhythm: Normal rate and regular rhythm.      Heart sounds: Normal heart sounds.   Pulmonary:      Effort: Pulmonary effort is normal.      Breath sounds: Normal breath sounds.   Chest:      Chest wall: No tenderness.   Abdominal:      General: Bowel sounds are normal. There is no distension.      Palpations: Abdomen is soft.   Musculoskeletal:         General: Signs of injury present. No swelling or tenderness.      Right lower leg: No edema.      Left lower leg: No edema.   Skin:     General: Skin is warm and dry.      Findings: No rash.   Neurological:      General: No focal deficit present.      Mental Status: Mental status is at " baseline.   Psychiatric:         Mood and Affect: Mood normal.         Behavior: Behavior normal.         LABS:  Recent Results (from the past 24 hour(s))   ACCU-CHEK GLUCOSE    Collection Time: 02/01/20  6:04 PM   Result Value Ref Range    Glucose - Accu-Ck 202 (H) 65 - 99 mg/dL   ACCU-CHEK GLUCOSE    Collection Time: 02/01/20 11:44 PM   Result Value Ref Range    Glucose - Accu-Ck 172 (H) 65 - 99 mg/dL   Basic Metabolic Panel (BMP)    Collection Time: 02/02/20 12:57 AM   Result Value Ref Range    Sodium 139 135 - 145 mmol/L    Potassium 3.9 3.6 - 5.5 mmol/L    Chloride 100 96 - 112 mmol/L    Co2 30 20 - 33 mmol/L    Glucose 206 (H) 65 - 99 mg/dL    Bun 22 8 - 22 mg/dL    Creatinine 3.87 (H) 0.50 - 1.40 mg/dL    Calcium 8.6 8.5 - 10.5 mg/dL    Anion Gap 9.0 0.0 - 11.9   CBC WITH DIFFERENTIAL    Collection Time: 02/02/20 12:57 AM   Result Value Ref Range    WBC 7.0 4.8 - 10.8 K/uL    RBC 2.71 (L) 4.20 - 5.40 M/uL    Hemoglobin 7.9 (L) 12.0 - 16.0 g/dL    Hematocrit 24.6 (L) 37.0 - 47.0 %    MCV 91.5 81.4 - 97.8 fL    MCH 28.8 27.0 - 33.0 pg    MCHC 31.5 (L) 33.6 - 35.0 g/dL    RDW 46.5 35.9 - 50.0 fL    Platelet Count 178 164 - 446 K/uL    MPV 10.9 9.0 - 12.9 fL    Neutrophils-Polys 79.10 (H) 44.00 - 72.00 %    Lymphocytes 10.30 (L) 22.00 - 41.00 %    Monocytes 9.90 0.00 - 13.40 %    Eosinophils 0.00 0.00 - 6.90 %    Basophils 0.30 0.00 - 1.80 %    Immature Granulocytes 0.40 0.00 - 0.90 %    Nucleated RBC 0.00 /100 WBC    Neutrophils (Absolute) 5.53 2.00 - 7.15 K/uL    Lymphs (Absolute) 0.72 (L) 1.00 - 4.80 K/uL    Monos (Absolute) 0.69 0.00 - 0.85 K/uL    Eos (Absolute) 0.00 0.00 - 0.51 K/uL    Baso (Absolute) 0.02 0.00 - 0.12 K/uL    Immature Granulocytes (abs) 0.03 0.00 - 0.11 K/uL    NRBC (Absolute) 0.00 K/uL   VITAMIN D,25 HYDROXY    Collection Time: 02/02/20 12:57 AM   Result Value Ref Range    25-Hydroxy   Vitamin D 25 29 (L) 30 - 100 ng/mL   ESTIMATED GFR    Collection Time: 02/02/20 12:57 AM   Result Value  Ref Range    GFR If  15 (A) >60 mL/min/1.73 m 2    GFR If Non  12 (A) >60 mL/min/1.73 m 2   IRON/TOTAL IRON BIND    Collection Time: 02/02/20 12:57 AM   Result Value Ref Range    Iron 12 (L) 40 - 170 ug/dL    Total Iron Binding 151 (L) 250 - 450 ug/dL    % Saturation 8 (L) 15 - 55 %   FERRITIN    Collection Time: 02/02/20 12:57 AM   Result Value Ref Range    Ferritin 410.0 (H) 10.0 - 291.0 ng/mL   ACCU-CHEK GLUCOSE    Collection Time: 02/02/20  5:00 AM   Result Value Ref Range    Glucose - Accu-Ck 138 (H) 65 - 99 mg/dL   HGB    Collection Time: 02/02/20  8:16 AM   Result Value Ref Range    Hemoglobin 7.7 (L) 12.0 - 16.0 g/dL   ACCU-CHEK GLUCOSE    Collection Time: 02/02/20 12:12 PM   Result Value Ref Range    Glucose - Accu-Ck 182 (H) 65 - 99 mg/dL       (click the triangle to expand results)    IMAGING:  DX-FEMUR-2+ LEFT   Final Result      Digitized intraoperative radiograph is submitted for review.  This examination is not for diagnostic purpose but for guidance during a surgical procedure.      DX-SHOULDER 2+ LEFT   Final Result      1.  No acute left shoulder fracture or dislocation.      2.  Mild degenerative change of the left AC joint.      [id number] FILM X-RAY   Final Result      [id number] FILM X-RAY   Final Result      OUTSIDE IMAGES-DX PELVIS   Final Result      DX-PORTABLE FLUORO > 1 HOUR    (Results Pending)       IMPRESSION:  - ESRD    * Etiology likely 2/2 HTN/DM    * MWF iHD @ [facility]  - Left subtrochanteric femur fracture    * S/P surgical repair  - HTN    * Goal BP < 140/90    * Stable  - Anemia of CKD    * Goal Hgb 10-11    * Stable  - CKD-MBD    * Managed at HD unit    * Stable  - HLD  - CAD  - CVA    PLAN:  - MWF iHD  - UF as tolerated  - PT/OT as tolerated  - No dietary protein restrictions  - Dose all meds per ESRD    All prior notes form other doctors and RN staff were reviewed from admission to current day to help me make my clinical  decisions    Thank you for the consultation!

## 2020-02-02 NOTE — PROGRESS NOTES
"S:  Seen and examined.  POD #1 s/p L femur IM nail.  Doing well this morning.  No new complaints, pain controlled.    O: /69   Pulse 68   Temp 36.4 °C (97.5 °F) (Temporal)   Resp 16   Ht 1.778 m (5' 10\")   Wt 85.7 kg (189 lb)   SpO2 97% .      Intake/Output Summary (Last 24 hours) at 2/2/2020 0929  Last data filed at 2/1/2020 2310  Gross per 24 hour   Intake 940 ml   Output 400 ml   Net 540 ml   .    Operative/injured extremity examined.  Compartments soft, distal light touch sensation intact, firing EHL/TA/GS/P.  Toes warm, well-perfused.  Wound dressing c/d/i    Recent Labs     01/31/20  2348 02/02/20  0057 02/02/20  0816   WBC 9.6 7.0  --    RBC 3.59* 2.71*  --    HEMOGLOBIN 10.4* 7.9* 7.7*   HEMATOCRIT 32.0* 24.6*  --    MCV 89.1 91.5  --    MCH 29.0 28.8  --    MCHC 32.5* 31.5*  --    RDW 43.2 46.5  --    PLATELETCT 217 178  --    MPV 10.6 10.9  --        A/P:    POD #1 s/p L femur IM nail    Antibiotics: None required  Activity: WBAT operative extremity.  PT today.  Diet: General  DVT: Mechanical (SCDs) + Pharmacologic (Lovenox or per medicine given renal function)  Dispo: D/C planning    "

## 2020-02-02 NOTE — PROGRESS NOTES
Hospital Medicine Daily Progress Note    Date of Service  2/2/2020    Chief Complaint  54 y.o. 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 Patient was in ususal state of health at dialysis on day before admission and was leaving her dialysis session when she experienced a ground level fall causing inability to ambulate and sever pain. Patient was transported to outside facility where it was noted on imaging that patient had proximal left femur fracture. Patient was then transferred to [facility] for Orthopedic consultation and surgical intervention. Orthopedics was consulted and patient was taken to OR on 2/1/2020 for Surgical treatment of left  subtrochanteric  femur fracture with intramedullary device. Patient then transferred to orthopedic medical floor for recovery.       Interval Problem Update  2/2- Patient states pain is uncontrolled at this time, have adjusted pain medications and added muscle relaxer. Will consult nephrology today to make aware of dialysis needs while inpatient. Therapy to evaluate. SNF referral placed, will ask SW/CM to obtain choice. Hgb noted to drop significantly, repeat HGB ordered showed continued slight drop, will hold heparin DVT prophylaxis at this time and continue to monitor Hgb, will transfuse if level less than 7.  Vit D levels slightly low, have initiated replacement therapy.     Consultants/Specialty  Orthopedics   Nephrology- Dr. Kumar is who she sees outpatient - Dr. Sullivan on call     Code Status  Full     Disposition  TBD- Therapy to evaluate, likely SNF placement     Review of Systems  Review of Systems   Constitutional: Positive for malaise/fatigue. Negative for chills and fever.   HENT: Negative for congestion and sore throat.    Eyes: Negative for  pain and discharge.   Respiratory: Negative for cough and shortness of breath.    Cardiovascular: Negative for chest pain and leg swelling.   Gastrointestinal: Negative for abdominal pain, nausea and vomiting.   Genitourinary: Negative for dysuria and urgency.        Retention    Musculoskeletal: Positive for back pain, falls and myalgias.   Skin: Negative for rash.   Neurological: Negative for dizziness and headaches.   Psychiatric/Behavioral: Negative for memory loss and suicidal ideas.        Physical Exam  Temp:  [36.2 °C (97.1 °F)-37.7 °C (99.8 °F)] 36.9 °C (98.4 °F)  Pulse:  [69-81] 72  Resp:  [13-19] 17  BP: (103-168)/() 103/68  SpO2:  [91 %-100 %] 99 %    Physical Exam  Vitals signs and nursing note reviewed.   Constitutional:       General: She is awake.      Appearance: She is overweight. She is not ill-appearing.   HENT:      Head: Normocephalic.      Mouth/Throat:      Lips: Pink.      Mouth: Mucous membranes are moist.   Eyes:      General: Lids are normal.      Conjunctiva/sclera: Conjunctivae normal.   Cardiovascular:      Rate and Rhythm: Normal rate.      Heart sounds: Normal heart sounds. No friction rub.   Pulmonary:      Effort: Pulmonary effort is normal.      Breath sounds: Normal breath sounds. No decreased breath sounds or wheezing.   Abdominal:      General: Bowel sounds are normal.      Palpations: Abdomen is soft.      Tenderness: There is no tenderness.   Musculoskeletal:      Comments: Unable to move left leg secondary to pain, all other extremities intact    Skin:     General: Skin is warm and dry.      Comments: Dressing in place to left hip, clean, dry and intact    Neurological:      Mental Status: She is alert and oriented to person, place, and time.   Psychiatric:         Attention and Perception: Attention normal.         Mood and Affect: Mood normal.         Speech: Speech normal.         Behavior: Behavior is cooperative.         Fluids    Intake/Output Summary (Last 24  hours) at 2/2/2020 0710  Last data filed at 2/1/2020 2310  Gross per 24 hour   Intake 940 ml   Output 400 ml   Net 540 ml       Laboratory  Recent Labs     01/31/20 2348 02/02/20  0057   WBC 9.6 7.0   RBC 3.59* 2.71*   HEMOGLOBIN 10.4* 7.9*   HEMATOCRIT 32.0* 24.6*   MCV 89.1 91.5   MCH 29.0 28.8   MCHC 32.5* 31.5*   RDW 43.2 46.5   PLATELETCT 217 178   MPV 10.6 10.9     Recent Labs     01/31/20 2348 02/02/20  0057   SODIUM 139 139   POTASSIUM 3.5* 3.9   CHLORIDE 99 100   CO2 30 30   GLUCOSE 186* 206*   BUN 10 22   CREATININE 2.34* 3.87*   CALCIUM 8.6 8.6     Recent Labs     01/31/20 2348   APTT 26.6   INR 0.99               Imaging  DX-FEMUR-2+ LEFT   Final Result      Digitized intraoperative radiograph is submitted for review.  This examination is not for diagnostic purpose but for guidance during a surgical procedure.      DX-SHOULDER 2+ LEFT   Final Result      1.  No acute left shoulder fracture or dislocation.      2.  Mild degenerative change of the left AC joint.      JA-XAMNZOE-SMBILAL FILM X-RAY   Final Result      PU-TBIZIHE-COKBIYI FILM X-RAY   Final Result      OUTSIDE IMAGES-DX PELVIS   Final Result      DX-PORTABLE FLUORO > 1 HOUR    (Results Pending)        Assessment/Plan  * Closed fracture of left femur, unspecified fracture morphology, initial encounter ([facility])  Assessment & Plan  Secondary to mechanical ground-level fall  Images from the outside facility shows a proximal left femur fracture  Orthopedics consulted and patient underwent:  Surgical treatment of left subtrochanteric femur fracture with intramedullary device on 2/1  PT/OT following and recommending placement  Pain control      Anemia associated with acute blood loss  Assessment & Plan  Hgb dropped post surgical intervention from 10.4 to 7.9  Heparin held  Repeat Hgb shows small continued drop- will continue to trend   Transfuse if Hgb <7     ESRD (end stage renal disease) ([facility])  Assessment & Plan  Patient on hemodialysis Monday,  Wednesday, Friday.    Consult to [facility] to manage dialysis needs - Dr. Sullivan   Renally dose medications and avoid nephrotoxic     Vitamin D insufficiency  Assessment & Plan  Noted to have slightly low levels of Vit D, in the setting of bone fracture  Have initiated low dose replacement     HLD (hyperlipidemia)- (present on admission)  Assessment & Plan  This is chronic and stable, okay to resume home Lipitor and aspirin.    Type II diabetes mellitus (HCC)- (present on admission)  Assessment & Plan  Monitor with Accu-Cheks   ISS   Diabetic/ renal/ cardiac diet   Hypoglycemic protocol     Essential hypertension- (present on admission)  Assessment & Plan  Blood pressure controlled on home hydralazine, Coreg, and Norvasc, resume home medications and monitor.    Hypothyroidism  Assessment & Plan  This is chronic, continue home Synthroid.       VTE prophylaxis: SCD

## 2020-02-02 NOTE — THERAPY
"Physical Therapy Evaluation completed.   Bed Mobility:  Supine to Sit: Moderate Assist  Transfers: Sit to Stand: Unable to Participate  Gait: Level Of Assist: Unable to Participate       Plan of Care: Will benefit from Physical Therapy 3 times per week  Discharge Recommendations: Equipment: Will Continue to Assess for Equipment Needs. Post-acute therapy: Recommend post-acute placement for continued physical therapy services prior to discharge home.       See \"Rehab Therapy-Acute\" Patient Summary Report for complete documentation.    Patient is a 55 YO female that presented to acute following GLF. Imaging revealed proximal L femur fracture. Patient is s/p L subtrochanteric IM nailing and WBAT LLE. PMHx significant for ESRD on HD MWF, DM, arthritis, DVT, PE, with IVC filter, CVA. She presented to PT with pain, impaired balance and coordination, decreased ROM, functional weakness, and decreased activity tolerance which are limiting her ability to safely perform mobility at PLOF. She required mod A to move supine to sitting EOB and reported severe pain throughout though appeared to have improved tolerance with distraction. She required max verbal cueing for technique and for breathing throughout session. She refused attempt at standing. Recommend post acute placement prior to return home given current findings, PLOF, and limited ability of social supports to assist. Will follow while in house.  "

## 2020-02-02 NOTE — DISCHARGE PLANNING
Received Choice form at [phone]  Agency/Facility Name: 1. [facility] 2. [place] local SNFs  Referral sent per Choice form @ [phone]

## 2020-02-02 NOTE — CARE PLAN
Problem: Venous Thromboembolism (VTW)/Deep Vein Thrombosis (DVT) Prevention:  Goal: Patient will participate in Venous Thrombosis (VTE)/Deep Vein Thrombosis (DVT)Prevention Measures  Outcome: PROGRESSING AS EXPECTED   SCDs in use.    Problem: Pain Management  Goal: Pain level will decrease to patient's comfort goal  Outcome: PROGRESSING AS EXPECTED   Pain control improving.

## 2020-02-02 NOTE — PROGRESS NOTES
2 RN Skin Check    2 RN skin check complete with Yuridia RN  Devices in place: SCDs.  Skin assessed under devices: yes.  Confirmed pressure ulcers found on: none.  New potential pressure ulcers noted on none. Wound consult placed N/A.  The following interventions in place Pillows.    Surgical incision to left hip. Skin otherwise intact.

## 2020-02-02 NOTE — ASSESSMENT & PLAN NOTE
-Hgb after 1 unit this hospitalization  -Follow CBC  -Nephrology following -dialysis today  -Transfuse for hemoglobin less than 7

## 2020-02-02 NOTE — PROGRESS NOTES
Spoke with Michael GRAHAM regarding subQ heparin post op, instructed to hold heparin until tomorrow AM.

## 2020-02-03 LAB
ALBUMIN SERPL BCP-MCNC: 2.7 G/DL (ref 3.2–4.9)
ALBUMIN/GLOB SERPL: 1 G/DL
ALP SERPL-CCNC: 75 U/L (ref 30–99)
ALT SERPL-CCNC: <5 U/L (ref 2–50)
ANION GAP SERPL CALC-SCNC: 8 MMOL/L (ref 0–11.9)
AST SERPL-CCNC: 16 U/L (ref 12–45)
BILIRUB SERPL-MCNC: 0.3 MG/DL (ref 0.1–1.5)
BUN SERPL-MCNC: 33 MG/DL (ref 8–22)
CALCIUM SERPL-MCNC: 8.3 MG/DL (ref 8.5–10.5)
CHLORIDE SERPL-SCNC: 96 MMOL/L (ref 96–112)
CO2 SERPL-SCNC: 30 MMOL/L (ref 20–33)
CREAT SERPL-MCNC: 4.88 MG/DL (ref 0.5–1.4)
ERYTHROCYTE [DISTWIDTH] IN BLOOD BY AUTOMATED COUNT: 49.5 FL (ref 35.9–50)
GLOBULIN SER CALC-MCNC: 2.6 G/DL (ref 1.9–3.5)
GLUCOSE BLD-MCNC: 118 MG/DL (ref 65–99)
GLUCOSE BLD-MCNC: 155 MG/DL (ref 65–99)
GLUCOSE BLD-MCNC: 163 MG/DL (ref 65–99)
GLUCOSE BLD-MCNC: 183 MG/DL (ref 65–99)
GLUCOSE SERPL-MCNC: 129 MG/DL (ref 65–99)
HAV IGM SERPL QL IA: NEGATIVE
HBV CORE IGM SER QL: NEGATIVE
HBV SURFACE AB SERPL IA-ACNC: <3.1 MIU/ML (ref 0–10)
HBV SURFACE AG SER QL: NEGATIVE
HCT VFR BLD AUTO: 25.3 % (ref 37–47)
HCV AB SER QL: NEGATIVE
HGB BLD-MCNC: 7.4 G/DL (ref 12–16)
MAGNESIUM SERPL-MCNC: 2.1 MG/DL (ref 1.5–2.5)
MCH RBC QN AUTO: 28.6 PG (ref 27–33)
MCHC RBC AUTO-ENTMCNC: 29.2 G/DL (ref 33.6–35)
MCV RBC AUTO: 97.7 FL (ref 81.4–97.8)
MORPHOLOGY BLD-IMP: NORMAL
PLATELET # BLD AUTO: 162 K/UL (ref 164–446)
PLATELET BLD QL SMEAR: NORMAL
PMV BLD AUTO: 10.8 FL (ref 9–12.9)
POTASSIUM SERPL-SCNC: 4.1 MMOL/L (ref 3.6–5.5)
PROT SERPL-MCNC: 5.3 G/DL (ref 6–8.2)
RBC # BLD AUTO: 2.59 M/UL (ref 4.2–5.4)
SODIUM SERPL-SCNC: 134 MMOL/L (ref 135–145)
WBC # BLD AUTO: 5.5 K/UL (ref 4.8–10.8)

## 2020-02-03 PROCEDURE — A9270 NON-COVERED ITEM OR SERVICE: HCPCS | Performed by: HOSPITALIST

## 2020-02-03 PROCEDURE — 700102 HCHG RX REV CODE 250 W/ 637 OVERRIDE(OP): Performed by: HOSPITALIST

## 2020-02-03 PROCEDURE — 700111 HCHG RX REV CODE 636 W/ 250 OVERRIDE (IP): Performed by: NURSE PRACTITIONER

## 2020-02-03 PROCEDURE — 36415 COLL VENOUS BLD VENIPUNCTURE: CPT

## 2020-02-03 PROCEDURE — 700111 HCHG RX REV CODE 636 W/ 250 OVERRIDE (IP)

## 2020-02-03 PROCEDURE — 700105 HCHG RX REV CODE 258: Performed by: INTERNAL MEDICINE

## 2020-02-03 PROCEDURE — 770006 HCHG ROOM/CARE - MED/SURG/GYN SEMI*

## 2020-02-03 PROCEDURE — 82962 GLUCOSE BLOOD TEST: CPT | Mod: 91

## 2020-02-03 PROCEDURE — 80053 COMPREHEN METABOLIC PANEL: CPT

## 2020-02-03 PROCEDURE — A9270 NON-COVERED ITEM OR SERVICE: HCPCS | Performed by: NURSE PRACTITIONER

## 2020-02-03 PROCEDURE — 85027 COMPLETE CBC AUTOMATED: CPT

## 2020-02-03 PROCEDURE — 83735 ASSAY OF MAGNESIUM: CPT

## 2020-02-03 PROCEDURE — 700102 HCHG RX REV CODE 250 W/ 637 OVERRIDE(OP): Performed by: NURSE PRACTITIONER

## 2020-02-03 PROCEDURE — 700111 HCHG RX REV CODE 636 W/ 250 OVERRIDE (IP): Performed by: INTERNAL MEDICINE

## 2020-02-03 PROCEDURE — 5A1D70Z PERFORMANCE OF URINARY FILTRATION, INTERMITTENT, LESS THAN 6 HOURS PER DAY: ICD-10-PCS | Performed by: INTERNAL MEDICINE

## 2020-02-03 PROCEDURE — 86706 HEP B SURFACE ANTIBODY: CPT

## 2020-02-03 PROCEDURE — 80074 ACUTE HEPATITIS PANEL: CPT

## 2020-02-03 PROCEDURE — 90935 HEMODIALYSIS ONE EVALUATION: CPT

## 2020-02-03 PROCEDURE — 99232 SBSQ HOSP IP/OBS MODERATE 35: CPT | Performed by: INTERNAL MEDICINE

## 2020-02-03 RX ORDER — HEPARIN SODIUM 5000 [USP'U]/ML
5000 INJECTION, SOLUTION INTRAVENOUS; SUBCUTANEOUS EVERY 8 HOURS
Status: DISCONTINUED | OUTPATIENT
Start: 2020-02-03 | End: 2020-02-08 | Stop reason: HOSPADM

## 2020-02-03 RX ORDER — HEPARIN SODIUM 1000 [USP'U]/ML
3200 INJECTION, SOLUTION INTRAVENOUS; SUBCUTANEOUS
Status: DISCONTINUED | OUTPATIENT
Start: 2020-02-03 | End: 2020-02-08 | Stop reason: HOSPADM

## 2020-02-03 RX ORDER — HEPARIN SODIUM 1000 [USP'U]/ML
INJECTION, SOLUTION INTRAVENOUS; SUBCUTANEOUS
Status: COMPLETED
Start: 2020-02-03 | End: 2020-02-03

## 2020-02-03 RX ADMIN — FAMOTIDINE 20 MG: 20 TABLET ORAL at 04:44

## 2020-02-03 RX ADMIN — INSULIN HUMAN 2 UNITS: 100 INJECTION, SOLUTION PARENTERAL at 18:43

## 2020-02-03 RX ADMIN — LEVOTHYROXINE SODIUM 112 MCG: 0.11 TABLET ORAL at 04:45

## 2020-02-03 RX ADMIN — HEPARIN SODIUM 3200 UNITS: 1000 INJECTION, SOLUTION INTRAVENOUS; SUBCUTANEOUS at 11:33

## 2020-02-03 RX ADMIN — MELATONIN 1000 UNITS: at 04:44

## 2020-02-03 RX ADMIN — MAGNESIUM HYDROXIDE 30 ML: 400 SUSPENSION ORAL at 18:42

## 2020-02-03 RX ADMIN — ERYTHROPOIETIN 10000 UNITS: 10000 INJECTION, SOLUTION INTRAVENOUS; SUBCUTANEOUS at 11:05

## 2020-02-03 RX ADMIN — FLUTICASONE PROPIONATE 100 MCG: 50 SPRAY, METERED NASAL at 04:45

## 2020-02-03 RX ADMIN — ATORVASTATIN CALCIUM 80 MG: 40 TABLET, FILM COATED ORAL at 21:28

## 2020-02-03 RX ADMIN — SENNOSIDES AND DOCUSATE SODIUM 2 TABLET: 8.6; 5 TABLET ORAL at 04:44

## 2020-02-03 RX ADMIN — OXYCODONE HYDROCHLORIDE 10 MG: 10 TABLET ORAL at 21:19

## 2020-02-03 RX ADMIN — OXYCODONE HYDROCHLORIDE 10 MG: 10 TABLET ORAL at 07:45

## 2020-02-03 RX ADMIN — POLYETHYLENE GLYCOL 3350 1 PACKET: 17 POWDER, FOR SOLUTION ORAL at 18:42

## 2020-02-03 RX ADMIN — SODIUM CHLORIDE 125 MG: 9 INJECTION, SOLUTION INTRAVENOUS at 18:41

## 2020-02-03 RX ADMIN — GLYCOPYRROLATE 0.5 MG: 1 TABLET ORAL at 18:42

## 2020-02-03 RX ADMIN — SENNOSIDES AND DOCUSATE SODIUM 2 TABLET: 8.6; 5 TABLET ORAL at 18:42

## 2020-02-03 RX ADMIN — INSULIN HUMAN 2 UNITS: 100 INJECTION, SOLUTION PARENTERAL at 00:41

## 2020-02-03 RX ADMIN — OXYCODONE HYDROCHLORIDE 10 MG: 10 TABLET ORAL at 12:50

## 2020-02-03 RX ADMIN — OXYCODONE HYDROCHLORIDE 10 MG: 10 TABLET ORAL at 00:12

## 2020-02-03 RX ADMIN — ASPIRIN 81 MG: 81 TABLET, COATED ORAL at 04:44

## 2020-02-03 RX ADMIN — OXYCODONE HYDROCHLORIDE 10 MG: 10 TABLET ORAL at 04:43

## 2020-02-03 RX ADMIN — INSULIN HUMAN 2 UNITS: 100 INJECTION, SOLUTION PARENTERAL at 12:54

## 2020-02-03 RX ADMIN — GLYCOPYRROLATE 0.5 MG: 1 TABLET ORAL at 04:44

## 2020-02-03 RX ADMIN — HEPARIN SODIUM 5000 UNITS: 5000 INJECTION, SOLUTION INTRAVENOUS; SUBCUTANEOUS at 21:19

## 2020-02-03 RX ADMIN — CARVEDILOL 6.25 MG: 6.25 TABLET, FILM COATED ORAL at 07:45

## 2020-02-03 ASSESSMENT — ENCOUNTER SYMPTOMS
FALLS: 1
SHORTNESS OF BREATH: 0
CHILLS: 0
VOMITING: 0
NEUROLOGICAL NEGATIVE: 1
GASTROINTESTINAL NEGATIVE: 1
CARDIOVASCULAR NEGATIVE: 1
NECK PAIN: 0
MYALGIAS: 1
NAUSEA: 0
DIZZINESS: 0
HEADACHES: 0
MYALGIAS: 0
EYE PAIN: 0
BACK PAIN: 1
PSYCHIATRIC NEGATIVE: 1
RESPIRATORY NEGATIVE: 1
FEVER: 0
SORE THROAT: 0
EYES NEGATIVE: 1
COUGH: 0
CONSTITUTIONAL NEGATIVE: 1
ABDOMINAL PAIN: 0
MEMORY LOSS: 0
EYE DISCHARGE: 0

## 2020-02-03 NOTE — DISCHARGE PLANNING
Agency/Facility Name: [facility] [place] [facility]  Spoke To: fax  Outcome: Pt declined. No Medicaid beds.    RN JOSR informed

## 2020-02-03 NOTE — PROGRESS NOTES
Hospital Medicine Daily Progress Note    Date of Service  2/3/2020    Chief Complaint  54 y.o. 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 Patient was in ususal state of health at dialysis on day before admission and was leaving her dialysis session when she experienced a ground level fall causing inability to ambulate and sever pain. Patient was transported to outside facility where it was noted on imaging that patient had proximal left femur fracture. Patient was then transferred to [facility] for Orthopedic consultation and surgical intervention. Orthopedics was consulted and patient was taken to OR on 2/1/2020 for Surgical treatment of left  subtrochanteric  femur fracture with intramedullary device. Patient then transferred to orthopedic medical floor for recovery.       Interval Problem Update  2/2- Patient states pain is uncontrolled at this time, have adjusted pain medications and added muscle relaxer. Will consult nephrology today to make aware of dialysis needs while inpatient. Therapy to evaluate. SNF referral placed, will ask SW/CM to obtain choice. Hgb noted to drop significantly, repeat HGB ordered showed continued slight drop, will hold heparin DVT prophylaxis at this time and continue to monitor Hgb, will transfuse if level less than 7.  Vit D levels slightly low, have initiated replacement therapy.   2/3- Patient laying in bed returned from dialysis treatment. Patient appears pale today but states pain is controlled. Patient concerned that she has had no urinary output. Bladder scans completed showing minimal in bladder. Explained this is likely secondary to initiation of dialysis, as she only recently started treatments this last week. Dressing to left hip clean, dry and  intact, no erythema or bruising noted. Hgb levels stable, will resume heparin. Patient will need skilled nursing facility placement. Continue PT/OT while awaiting acceptance.     Consultants/Specialty  Orthopedics   Nephrology- Dr. Kumar is who she sees outpatient - Dr. Sullivan on call     Code Status  Full     Disposition  TBD- Therapy to evaluate, likely SNF placement     Review of Systems  Review of Systems   Constitutional: Negative for chills, fever and malaise/fatigue.   HENT: Negative for congestion and sore throat.    Eyes: Negative for pain and discharge.   Respiratory: Negative for cough and shortness of breath.    Cardiovascular: Negative for chest pain and leg swelling.   Gastrointestinal: Negative for abdominal pain, nausea and vomiting.   Genitourinary: Negative for dysuria and urgency.        Retention- patient states she has not gone, however patient recently started dialysis and bladder scans show very little in bladder.    Musculoskeletal: Positive for back pain, falls and myalgias.   Skin: Negative for rash.   Neurological: Negative for dizziness and headaches.   Psychiatric/Behavioral: Negative for memory loss and suicidal ideas.        Physical Exam  Temp:  [36.4 °C (97.5 °F)-37.3 °C (99.1 °F)] 36.5 °C (97.7 °F)  Pulse:  [66-69] 66  Resp:  [16-18] 16  BP: (104-124)/(52-74) 109/52  SpO2:  [95 %-98 %] 98 %    Physical Exam  Vitals signs and nursing note reviewed.   Constitutional:       General: She is awake.      Appearance: She is overweight. She is ill-appearing (Patient very pale ).      Interventions: Nasal cannula in place.   HENT:      Head: Normocephalic.      Mouth/Throat:      Mouth: Mucous membranes are moist. Mucous membranes are pale.   Eyes:      General: Lids are normal.      Conjunctiva/sclera: Conjunctivae normal.   Cardiovascular:      Rate and Rhythm: Normal rate.      Heart sounds: Normal heart sounds. No friction rub.   Pulmonary:      Effort: Pulmonary effort is normal.       Breath sounds: Normal breath sounds. No decreased breath sounds, wheezing or rhonchi.   Abdominal:      General: Bowel sounds are normal.      Palpations: Abdomen is soft.      Tenderness: There is no tenderness.   Musculoskeletal:      Comments: Small movements to left leg secondary to pain, all other extremities intact    Skin:     General: Skin is warm and dry.      Coloration: Skin is pale.      Comments: Dressing in place to left hip, clean, dry and intact x2    Neurological:      Mental Status: She is alert and oriented to person, place, and time.   Psychiatric:         Attention and Perception: Attention normal.         Mood and Affect: Mood normal.         Speech: Speech normal.         Behavior: Behavior is cooperative.         Fluids    Intake/Output Summary (Last 24 hours) at 2/3/2020 0705  Last data filed at 2/3/2020 0012  Gross per 24 hour   Intake 610 ml   Output 250 ml   Net 360 ml       Laboratory  Recent Labs     01/31/20 2348 02/02/20 0057 02/02/20  0816 02/02/20 2054 02/03/20  0445   WBC 9.6 7.0  --   --  5.5   RBC 3.59* 2.71*  --   --  2.59*   HEMOGLOBIN 10.4* 7.9* 7.7* 7.3* 7.4*   HEMATOCRIT 32.0* 24.6*  --   --  25.3*   MCV 89.1 91.5  --   --  97.7   MCH 29.0 28.8  --   --  28.6   MCHC 32.5* 31.5*  --   --  29.2*   RDW 43.2 46.5  --   --  49.5   PLATELETCT 217 178  --   --  162*   MPV 10.6 10.9  --   --  10.8     Recent Labs     01/31/20 2348 02/02/20 0057 02/03/20  0445   SODIUM 139 139 134*   POTASSIUM 3.5* 3.9 4.1   CHLORIDE 99 100 96   CO2 30 30 30   GLUCOSE 186* 206* 129*   BUN 10 22 33*   CREATININE 2.34* 3.87* 4.88*   CALCIUM 8.6 8.6 8.3*     Recent Labs     01/31/20 2348   APTT 26.6   INR 0.99               Imaging  DX-FEMUR-2+ LEFT   Final Result      Digitized intraoperative radiograph is submitted for review.  This examination is not for diagnostic purpose but for guidance during a surgical procedure.      DX-SHOULDER 2+ LEFT   Final Result      1.  No acute left shoulder  fracture or dislocation.      2.  Mild degenerative change of the left AC joint.      MP-ZOWTIXT-UHRGATI FILM X-RAY   Final Result      OY-[id number]-[id number] FILM X-RAY   Final Result      OUTSIDE IMAGES-DX PELVIS   Final Result      DX-PORTABLE FLUORO > 1 HOUR    (Results Pending)        Assessment/Plan  * Closed fracture of left femur, unspecified fracture morphology, initial encounter ([facility])  Assessment & Plan  Secondary to mechanical ground-level fall  Images from the outside facility shows a proximal left femur fracture  Orthopedics consulted and patient underwent:  Surgical treatment of left subtrochanteric femur fracture with intramedullary device on 2/1  PT/OT following and recommending placement  Pain control      Anemia associated with acute blood loss  Assessment & Plan  Hgb dropped post surgical intervention from 10.4 to 7.9  Heparin re-started, will need to watch closely   Hgb stable at this time no obvious bleeding   Transfuse if Hgb <7     ESRD (end stage renal disease) ([facility])  Assessment & Plan  Patient on hemodialysis Monday, Wednesday, Friday.    Consult to [facility] to manage dialysis needs - Dr. Sullivan   Renally dose medications and avoid nephrotoxic   Patient tolerated dialysis well today     Vitamin D insufficiency  Assessment & Plan  Noted to have slightly low levels of Vit D, in the setting of bone fracture  Have initiated low dose replacement     HLD (hyperlipidemia)- (present on admission)  Assessment & Plan  This is chronic and stable, okay to resume home Lipitor and aspirin.    Type II diabetes mellitus (HCC)- (present on admission)  Assessment & Plan  Monitor with Accu-Cheks   ISS   Diabetic/ renal/ cardiac diet   Hypoglycemic protocol     Essential hypertension- (present on admission)  Assessment & Plan  Blood pressure controlled on home hydralazine, Coreg, and Norvasc, resume home medications and monitor.    Hypothyroidism  Assessment & Plan  This is chronic, continue home  Synthroid.       VTE prophylaxis: Heparin

## 2020-02-03 NOTE — PROGRESS NOTES
[facility] Progress Note       HD ordered by Dr. aVn. Treatment started at 0801 and ended at 1131.    Net UF removed: 2100mL.     Pt tolerated treatment well with no issues. See paper flow sheet for details. CVC patent, locked with Heparin 1,000 units. No s/s of infection present. Dressing in place, changed this AM, CDI.

## 2020-02-03 NOTE — PROGRESS NOTES
Away from room on rounds (dialysis).    #1 Left subtroch femur fracture s/p IM nail    Plan:  - WBAT LLE, continue PT  - SCD's, Lovenox or per medicine  - D/C Planning

## 2020-02-03 NOTE — DISCHARGE PLANNING
Patient is eligible for Medicaid Meds to Beds at discharge if they have coverage with [place] Medicaid, Medicaid FFS, Medicaid HMO ([facility] [place]), or [place]. This service is provided through the [facility] if orders are received prior to 4 p.m. Monday through Friday, excluding holidays. Please call [phone] prior to discharge.

## 2020-02-03 NOTE — DISCHARGE PLANNING
Outpatient Dialysis Note    Confirmed patient is active at:    [facility]  [address] [place] [address]    Schedule: Monday, Wednesday, Friday  Time: 3:00pm    Spoke with Deborah at facility who confirmed.    Forwarded records for review.    Susy Sebastian- Dialysis Coordinator  [facility] [employer] # [phone]

## 2020-02-03 NOTE — DISCHARGE PLANNING
Agency/Facility Name: [facility]  Spoke To: [place]  Outcome: Referral still being reviewed.    RN CM informed

## 2020-02-03 NOTE — PROGRESS NOTES
Call from dialysis they are putting transport in for pt to come down. Food tray can be sent when arrives.

## 2020-02-03 NOTE — PROGRESS NOTES
[facility] Nephrology Daily Progress Note    Date of Service  2/3/2020    Chief Complaint  Follow up ESRD    Interval Problem Update  52 yo female PMH ESRD MWF iHD, HTN, type 2 DM, anemia of CKD, and CKD-MBD who presents to ED with CC as above.  She was recently started on RRT for new onset ESRD last week and went to her usual treatmetn on 1/31.  She is walker dependent for gait and mabulation since a CVA in her past.  While walking towards her car, her MAYNOR asked to use her walker and when he took it away from her and in the process she lost her balance and sustained a fall onto the concrete sidewalk.  She had immediate pain and was not able to bear weight or ambulate as a result.  No alleviating factors.  Any movement aggravated the pain. Otherwise felt well and denies any recent F/C/N/V/CP/SOB.  No melena, hematochezia, hematemesis.  No HA, visual changes, or abdominal pain.    DAILY NEPHROLOGY SUMMARY  2/03/20: No events, seen on dialysis this am, BP stable, reports left leg pain controlled    Review of Systems  Review of Systems   Constitutional: Negative.    HENT: Negative.    Eyes: Negative.    Respiratory: Negative.    Cardiovascular: Negative.    Gastrointestinal: Negative.    Musculoskeletal: Positive for falls and joint pain. Negative for myalgias and neck pain.   Skin: Negative.    Neurological: Negative.    Endo/Heme/Allergies: Negative.    Psychiatric/Behavioral: Negative.         Physical Exam  Temp:  [36.5 °C (97.7 °F)-37.3 °C (99.1 °F)] 36.6 °C (97.9 °F)  Pulse:  [66-77] 77  Resp:  [16-18] 18  BP: (104-124)/(52-74) 120/70  SpO2:  [95 %-98 %] 96 %    Physical Exam  Constitutional:       General: She is not in acute distress.     Appearance: Normal appearance.   HENT:      Head: Normocephalic and atraumatic.      Right Ear: External ear normal.      Left Ear: External ear normal.      Nose: Nose normal. No congestion.      Mouth/Throat:      Mouth: Mucous membranes are moist.      Pharynx: No  oropharyngeal exudate.   Eyes:      General: No scleral icterus.     Extraocular Movements: Extraocular movements intact.      Conjunctiva/sclera: Conjunctivae normal.   Neck:      Musculoskeletal: Normal range of motion and neck supple.   Cardiovascular:      Rate and Rhythm: Normal rate and regular rhythm.      Heart sounds: Normal heart sounds. No murmur.   Pulmonary:      Effort: Pulmonary effort is normal. No respiratory distress.      Breath sounds: Normal breath sounds.   Abdominal:      General: Abdomen is flat. Bowel sounds are normal. There is no distension.      Palpations: Abdomen is soft.   Musculoskeletal:         General: No tenderness.      Right lower leg: No edema.      Left lower leg: Edema present.      Comments: +Decreased ROM left leg   Lymphadenopathy:      Cervical: No cervical adenopathy.   Skin:     General: Skin is warm and dry.      Findings: No erythema.   Neurological:      Mental Status: She is alert and oriented to person, place, and time. Mental status is at baseline.      Motor: No weakness.   Psychiatric:         Mood and Affect: Mood normal.         Behavior: Behavior normal.         Fluids    Intake/Output Summary (Last 24 hours) at 2/3/2020 1017  Last data filed at 2/3/2020 0012  Gross per 24 hour   Intake 490 ml   Output 250 ml   Net 240 ml       Laboratory  Recent Labs     01/31/20 2348 02/02/20 0057 02/02/20  0816 02/02/20 2054 02/03/20  0445   WBC 9.6 7.0  --   --  5.5   RBC 3.59* 2.71*  --   --  2.59*   HEMOGLOBIN 10.4* 7.9* 7.7* 7.3* 7.4*   HEMATOCRIT 32.0* 24.6*  --   --  25.3*   MCV 89.1 91.5  --   --  97.7   MCH 29.0 28.8  --   --  28.6   MCHC 32.5* 31.5*  --   --  29.2*   RDW 43.2 46.5  --   --  49.5   PLATELETCT 217 178  --   --  162*   MPV 10.6 10.9  --   --  10.8     Recent Labs     01/31/20 2348 02/02/20 0057 02/03/20  0445   SODIUM 139 139 134*   POTASSIUM 3.5* 3.9 4.1   CHLORIDE 99 100 96   CO2 30 30 30   GLUCOSE 186* 206* 129*   BUN 10 22 33*   CREATININE  2.34* 3.87* 4.88*   CALCIUM 8.6 8.6 8.3*     Recent Labs     01/31/20  2348   APTT 26.6   INR 0.99     No results for input(s): NTPROBNP in the last 72 hours.          IMPRESSION:  - ESRD    * Etiology likely 2/2 HTN/DM    * MWF iHD @ [facility]  - Left subtrochanteric femur fracture    * S/P surgical repair  - HTN    * Goal BP < 140/90    * Stable  - Anemia of CKD    * Goal Hgb 10-11    * Stable  - CKD-MBD    * Managed at HD unit    * Stable  - HLD  - CAD  - CVA  -Moderate Protein-Calorie Malnutrition     PLAN:  - HD today and qMWF iHD  - UF with HD as tolerated  - PT/OT as tolerated  - No dietary protein restrictions  - Dose all meds per ESRD  -Epogen with HD  -Transfuse PRN for Hgb less than 7     All prior notes form other doctors and RN staff were reviewed from admission to current day to help me make my clinical decisions

## 2020-02-03 NOTE — CARE PLAN
Problem: Safety  Goal: Will remain free from falls  Outcome: PROGRESSING AS EXPECTED   Bed alarm in use.    Problem: Pain Management  Goal: Pain level will decrease to patient's comfort goal  Outcome: PROGRESSING AS EXPECTED   Pain control improving, patient still with complaints of 10/10 pain with movement however.    Problem: Mobility  Goal: Risk for activity intolerance will decrease  Outcome: PROGRESSING AS EXPECTED   Patient reluctant to turn r/t pain. Educated repeatedly regarding risks for skin breakdown.

## 2020-02-03 NOTE — PROGRESS NOTES
Spiritual Care Note    Patient Information     Patient's Name: Bryan Garcia   MRN: [id number]    YOB: 1965   Age and Gender: 54 y.o. female   Service Area: Dialysis   Room (and Bed): [place] [address]   Ethnicity or Nationality:     Primary Language: English   Jew/Spiritual preference: Nondenominational   Place of Residence: [place], [place]   Family/Friends/Others Present: No   Clinical Team Present: No   Medical Diagnosis(-es)/Procedure(s): Closed left femoral fracture   Code Status: Full Code    Date of Admission: 1/31/2020   Length of Stay: 2 days        Spiritual Care Provider Information:  Name of Spiritual Care Provider: Berna Gamino  Title of Spiritual Care Provider: Associate   Phone Number: [phone]  E-mail: [email or web address]  Total time : 15 minutes    Spiritual Screen Results:    Gen Nursing  Spiritual Screen  Is your spiritual health or inner well-being important to you as you cope with your medical condition?: Yes  Would you like to receive a visit from our Spiritual Care team or your own Tenriism or spiritual leader?: Yes  Was spiritual care education provided to the patient?: Yes  Sources of Hope/Melodie: Prayer, Family, Jew/Spiritual community, Higher power     Palliative Care  PC Jew/Spiritual Screening  Was spiritual care education provided to the patient?: Yes      Encounter/Request Information  Encounter/Request Type   Visited With: Patient  Nature of the Visit: Initial  General Visit: Yes  Referral From/ Origin of Request: [place] rounds, Verbal patient  Referral To: Community clergy(REFERRED TO [facility]. [place] TODAY @ 1000)    Religous Needs/Values  Jew Needs Visit  Jew Needs: Prayer    Spiritual Assessment     Spiritual Care Encounters    Observations/Symptoms: Accepting, Pain, Thankfulness    Interaction/Conversation: Pt requested prayer during 's patient rounds of dialysis; pt in pain from broken femur.    Assessment: Need,  Distress    Need: Seeking Spiritual Assistance and Support    Distress: Coping    Interventions: Compassionate presence, active listening, prayer.    Outcomes: Spiritual Comfort    Plan: Visit Upon Request    Notes:

## 2020-02-03 NOTE — CARE PLAN
Problem: Safety  Goal: Will remain free from injury  Outcome: PROGRESSING AS EXPECTED    -Pt instructed to use call bell when needing to get oob or for any assistance.     Problem: Infection  Goal: Will remain free from infection  Outcome: PROGRESSING AS EXPECTED    -Closely monitoring surgical site, labs and vital signs for any signs of infection.     Problem: Pain Management  Goal: Pain level will decrease to patient's comfort goal  Outcome: PROGRESSING AS EXPECTED    -Pain controlled with scheduled and PRN medications. Pt reports pain is under control but with movement it increases very much.

## 2020-02-03 NOTE — RESPIRATORY CARE
COPD EDUCATION by COPD CLINICAL EDUCATOR  2/3/2020 at 8:33 AM by Lulu St, RRT     Patient reviewed by COPD education team. Patient does not have a history or diagnosis of COPD and is a non-smoker, therefore does not qualify for the COPD program.

## 2020-02-04 PROBLEM — Z99.2 ANEMIA DUE TO CHRONIC KIDNEY DISEASE, ON CHRONIC DIALYSIS (HCC): Status: ACTIVE | Noted: 2020-02-02

## 2020-02-04 PROBLEM — N18.6 ANEMIA DUE TO CHRONIC KIDNEY DISEASE, ON CHRONIC DIALYSIS (HCC): Status: ACTIVE | Noted: 2020-02-02

## 2020-02-04 PROBLEM — E78.5 HLD (HYPERLIPIDEMIA): Chronic | Status: ACTIVE | Noted: 2019-08-14

## 2020-02-04 PROBLEM — D63.1 ANEMIA DUE TO CHRONIC KIDNEY DISEASE, ON CHRONIC DIALYSIS (HCC): Status: ACTIVE | Noted: 2020-02-02

## 2020-02-04 LAB
ANION GAP SERPL CALC-SCNC: 6 MMOL/L (ref 0–11.9)
BASOPHILS # BLD AUTO: 0.8 % (ref 0–1.8)
BASOPHILS # BLD: 0.06 K/UL (ref 0–0.12)
BUN SERPL-MCNC: 20 MG/DL (ref 8–22)
CALCIUM SERPL-MCNC: 8.2 MG/DL (ref 8.5–10.5)
CHLORIDE SERPL-SCNC: 96 MMOL/L (ref 96–112)
CO2 SERPL-SCNC: 30 MMOL/L (ref 20–33)
CREAT SERPL-MCNC: 3.34 MG/DL (ref 0.5–1.4)
EOSINOPHIL # BLD AUTO: 0.26 K/UL (ref 0–0.51)
EOSINOPHIL NFR BLD: 3.7 % (ref 0–6.9)
ERYTHROCYTE [DISTWIDTH] IN BLOOD BY AUTOMATED COUNT: 47.9 FL (ref 35.9–50)
GLUCOSE BLD-MCNC: 145 MG/DL (ref 65–99)
GLUCOSE BLD-MCNC: 147 MG/DL (ref 65–99)
GLUCOSE BLD-MCNC: 152 MG/DL (ref 65–99)
GLUCOSE BLD-MCNC: 172 MG/DL (ref 65–99)
GLUCOSE BLD-MCNC: 180 MG/DL (ref 65–99)
GLUCOSE SERPL-MCNC: 136 MG/DL (ref 65–99)
HCT VFR BLD AUTO: 24.3 % (ref 37–47)
HGB BLD-MCNC: 7.3 G/DL (ref 12–16)
IMM GRANULOCYTES # BLD AUTO: 0.03 K/UL (ref 0–0.11)
IMM GRANULOCYTES NFR BLD AUTO: 0.4 % (ref 0–0.9)
LYMPHOCYTES # BLD AUTO: 1.41 K/UL (ref 1–4.8)
LYMPHOCYTES NFR BLD: 19.9 % (ref 22–41)
MCH RBC QN AUTO: 29.1 PG (ref 27–33)
MCHC RBC AUTO-ENTMCNC: 30 G/DL (ref 33.6–35)
MCV RBC AUTO: 96.8 FL (ref 81.4–97.8)
MONOCYTES # BLD AUTO: 0.65 K/UL (ref 0–0.85)
MONOCYTES NFR BLD AUTO: 9.2 % (ref 0–13.4)
NEUTROPHILS # BLD AUTO: 4.69 K/UL (ref 2–7.15)
NEUTROPHILS NFR BLD: 66 % (ref 44–72)
NRBC # BLD AUTO: 0 K/UL
NRBC BLD-RTO: 0 /100 WBC
PLATELET # BLD AUTO: 157 K/UL (ref 164–446)
PMV BLD AUTO: 10.8 FL (ref 9–12.9)
POTASSIUM SERPL-SCNC: 3.9 MMOL/L (ref 3.6–5.5)
RBC # BLD AUTO: 2.51 M/UL (ref 4.2–5.4)
SODIUM SERPL-SCNC: 132 MMOL/L (ref 135–145)
WBC # BLD AUTO: 7.1 K/UL (ref 4.8–10.8)

## 2020-02-04 PROCEDURE — 700111 HCHG RX REV CODE 636 W/ 250 OVERRIDE (IP): Performed by: INTERNAL MEDICINE

## 2020-02-04 PROCEDURE — A9270 NON-COVERED ITEM OR SERVICE: HCPCS | Performed by: NURSE PRACTITIONER

## 2020-02-04 PROCEDURE — 85025 COMPLETE CBC W/AUTO DIFF WBC: CPT

## 2020-02-04 PROCEDURE — 700111 HCHG RX REV CODE 636 W/ 250 OVERRIDE (IP): Performed by: NURSE PRACTITIONER

## 2020-02-04 PROCEDURE — 97530 THERAPEUTIC ACTIVITIES: CPT | Mod: CQ

## 2020-02-04 PROCEDURE — 51798 US URINE CAPACITY MEASURE: CPT

## 2020-02-04 PROCEDURE — 99232 SBSQ HOSP IP/OBS MODERATE 35: CPT | Performed by: FAMILY MEDICINE

## 2020-02-04 PROCEDURE — A9270 NON-COVERED ITEM OR SERVICE: HCPCS | Performed by: HOSPITALIST

## 2020-02-04 PROCEDURE — 700102 HCHG RX REV CODE 250 W/ 637 OVERRIDE(OP): Performed by: NURSE PRACTITIONER

## 2020-02-04 PROCEDURE — 700102 HCHG RX REV CODE 250 W/ 637 OVERRIDE(OP): Performed by: HOSPITALIST

## 2020-02-04 PROCEDURE — 92610 EVALUATE SWALLOWING FUNCTION: CPT

## 2020-02-04 PROCEDURE — 80048 BASIC METABOLIC PNL TOTAL CA: CPT

## 2020-02-04 PROCEDURE — 36415 COLL VENOUS BLD VENIPUNCTURE: CPT

## 2020-02-04 PROCEDURE — 700105 HCHG RX REV CODE 258: Performed by: INTERNAL MEDICINE

## 2020-02-04 PROCEDURE — 97535 SELF CARE MNGMENT TRAINING: CPT

## 2020-02-04 PROCEDURE — 82962 GLUCOSE BLOOD TEST: CPT | Mod: 91

## 2020-02-04 PROCEDURE — 770006 HCHG ROOM/CARE - MED/SURG/GYN SEMI*

## 2020-02-04 RX ORDER — ALPRAZOLAM 0.25 MG/1
0.25 TABLET ORAL EVERY 6 HOURS PRN
Status: DISCONTINUED | OUTPATIENT
Start: 2020-02-04 | End: 2020-02-08 | Stop reason: HOSPADM

## 2020-02-04 RX ADMIN — GLYCOPYRROLATE 0.5 MG: 1 TABLET ORAL at 04:55

## 2020-02-04 RX ADMIN — INSULIN HUMAN 2 UNITS: 100 INJECTION, SOLUTION PARENTERAL at 19:59

## 2020-02-04 RX ADMIN — HEPARIN SODIUM 5000 UNITS: 5000 INJECTION, SOLUTION INTRAVENOUS; SUBCUTANEOUS at 13:16

## 2020-02-04 RX ADMIN — SENNOSIDES AND DOCUSATE SODIUM 2 TABLET: 8.6; 5 TABLET ORAL at 04:55

## 2020-02-04 RX ADMIN — OXYCODONE HYDROCHLORIDE 10 MG: 10 TABLET ORAL at 00:20

## 2020-02-04 RX ADMIN — OXYCODONE HYDROCHLORIDE 10 MG: 10 TABLET ORAL at 03:33

## 2020-02-04 RX ADMIN — INSULIN HUMAN 2 UNITS: 100 INJECTION, SOLUTION PARENTERAL at 18:40

## 2020-02-04 RX ADMIN — MELATONIN 1000 UNITS: at 04:56

## 2020-02-04 RX ADMIN — POLYETHYLENE GLYCOL 3350 1 PACKET: 17 POWDER, FOR SOLUTION ORAL at 18:36

## 2020-02-04 RX ADMIN — INSULIN HUMAN 2 UNITS: 100 INJECTION, SOLUTION PARENTERAL at 13:13

## 2020-02-04 RX ADMIN — OXYCODONE HYDROCHLORIDE 10 MG: 10 TABLET ORAL at 12:33

## 2020-02-04 RX ADMIN — HEPARIN SODIUM 5000 UNITS: 5000 INJECTION, SOLUTION INTRAVENOUS; SUBCUTANEOUS at 20:04

## 2020-02-04 RX ADMIN — SODIUM CHLORIDE 125 MG: 9 INJECTION, SOLUTION INTRAVENOUS at 06:44

## 2020-02-04 RX ADMIN — MAGNESIUM HYDROXIDE 30 ML: 400 SUSPENSION ORAL at 18:35

## 2020-02-04 RX ADMIN — ATORVASTATIN CALCIUM 80 MG: 40 TABLET, FILM COATED ORAL at 18:36

## 2020-02-04 RX ADMIN — LEVOTHYROXINE SODIUM 112 MCG: 0.11 TABLET ORAL at 04:55

## 2020-02-04 RX ADMIN — OXYCODONE HYDROCHLORIDE 10 MG: 10 TABLET ORAL at 19:54

## 2020-02-04 RX ADMIN — HEPARIN SODIUM 5000 UNITS: 5000 INJECTION, SOLUTION INTRAVENOUS; SUBCUTANEOUS at 04:56

## 2020-02-04 RX ADMIN — FLUTICASONE PROPIONATE 100 MCG: 50 SPRAY, METERED NASAL at 05:00

## 2020-02-04 RX ADMIN — SENNOSIDES AND DOCUSATE SODIUM 2 TABLET: 8.6; 5 TABLET ORAL at 18:35

## 2020-02-04 RX ADMIN — POLYETHYLENE GLYCOL 3350 1 PACKET: 17 POWDER, FOR SOLUTION ORAL at 04:55

## 2020-02-04 RX ADMIN — CARVEDILOL 6.25 MG: 6.25 TABLET, FILM COATED ORAL at 18:35

## 2020-02-04 RX ADMIN — FAMOTIDINE 20 MG: 20 TABLET ORAL at 06:06

## 2020-02-04 RX ADMIN — ASPIRIN 81 MG: 81 TABLET, COATED ORAL at 04:56

## 2020-02-04 RX ADMIN — GLYCOPYRROLATE 0.5 MG: 1 TABLET ORAL at 18:36

## 2020-02-04 RX ADMIN — OXYCODONE HYDROCHLORIDE 10 MG: 10 TABLET ORAL at 23:01

## 2020-02-04 RX ADMIN — ALPRAZOLAM 0.25 MG: 0.25 TABLET ORAL at 21:52

## 2020-02-04 RX ADMIN — OXYCODONE HYDROCHLORIDE 10 MG: 10 TABLET ORAL at 09:29

## 2020-02-04 RX ADMIN — CYCLOBENZAPRINE 10 MG: 10 TABLET, FILM COATED ORAL at 19:54

## 2020-02-04 ASSESSMENT — COGNITIVE AND FUNCTIONAL STATUS - GENERAL
CLIMB 3 TO 5 STEPS WITH RAILING: TOTAL
PERSONAL GROOMING: A LITTLE
HELP NEEDED FOR BATHING: A LOT
WALKING IN HOSPITAL ROOM: A LOT
MOVING TO AND FROM BED TO CHAIR: UNABLE
DRESSING REGULAR LOWER BODY CLOTHING: A LOT
DAILY ACTIVITIY SCORE: 16
STANDING UP FROM CHAIR USING ARMS: A LOT
TURNING FROM BACK TO SIDE WHILE IN FLAT BAD: UNABLE
TOILETING: A LOT
MOVING FROM LYING ON BACK TO SITTING ON SIDE OF FLAT BED: UNABLE
SUGGESTED CMS G CODE MODIFIER DAILY ACTIVITY: CK
MOBILITY SCORE: 8
SUGGESTED CMS G CODE MODIFIER MOBILITY: CM
DRESSING REGULAR UPPER BODY CLOTHING: A LITTLE

## 2020-02-04 ASSESSMENT — ENCOUNTER SYMPTOMS
HALLUCINATIONS: 0
NEUROLOGICAL NEGATIVE: 1
DIZZINESS: 0
NERVOUS/ANXIOUS: 1
CONSTITUTIONAL NEGATIVE: 1
FOCAL WEAKNESS: 1
CARDIOVASCULAR NEGATIVE: 1
HEADACHES: 0
DEPRESSION: 0
WHEEZING: 0
RESPIRATORY NEGATIVE: 1
PALPITATIONS: 0
MYALGIAS: 0
EYES NEGATIVE: 1
GASTROINTESTINAL NEGATIVE: 1
FALLS: 1
CONSTIPATION: 1
SHORTNESS OF BREATH: 0
NECK PAIN: 0

## 2020-02-04 ASSESSMENT — GAIT ASSESSMENTS: GAIT LEVEL OF ASSIST: UNABLE TO PARTICIPATE

## 2020-02-04 NOTE — CARE PLAN
Problem: Safety  Goal: Will remain free from injury  Outcome: PROGRESSING AS EXPECTED  Note:   Safety precautions in place. Bed in lowest and locked position. Call light and personal belongings within reach. Bed alarm in place     Problem: Bowel/Gastric:  Goal: Normal bowel function is maintained or improved  Outcome: PROGRESSING SLOWER THAN EXPECTED  Note:   Pt has not had BM since 1/31. Bowel protocol in place

## 2020-02-04 NOTE — THERAPY
"Speech Language Therapy Clinical Swallow Evaluation completed.  Functional Status: Patient presents with unremarkable oral motor function. Laryngeal elevation palpated as complete. She consumed trials of ice chips, thin water by tsp, cup and straw, and puree. She had delayed change in vocal quality after consuming thins by cup and straw and puree. It was a gurgling sound and then she would start coughing. She denied having the feeling of food/liquid come back up after she swallowed but did say she has reflux. RN reported that last night the patient said \"I have a shelf in my throat.\"     She apparently had a modified barium swallow study done at [facility] just prior to this admit. I spoke with the SLP there who reported the patient had no descending aspiration but was at risk for ascending aspiration due to a \"large cricopharyngeal bar with residue and ascending movement of the bolus\" and \"recommendation made for GI consult.\" The patient reported she was told to turn her head to the left but this was not the recommendation per MBS results.     Discussed with MD. Orders received for NPO except for ice chips. MD consulting GI.       Recommendations - Diet: Diet / Liquid Recommendation: NPO(ok for ice chips with nursing staff only)                          Strategies: Strict 1:1 feeding  and Head of Bed at 90 Degrees                          Medication Administration: Medication Administration : (to be determined)  Plan of Care: Will benefit from Speech Therapy 3 times per week  Post-Acute Therapy: Recommend inpatient transitional care services for continued speech therapy services.        See \"Rehab Therapy-Acute\" Patient Summary Report for complete documentation.   "

## 2020-02-04 NOTE — DISCHARGE PLANNING
Agency/Facility Name: [facility]  Spoke To: [place]  Outcome: Advised that hemoglobin needs to be addressed and discharge plan.      LSW informed

## 2020-02-04 NOTE — DISCHARGE PLANNING
Agency/Facility Name: [facility]  Spoke To: admissions  Outcome: Pt declined. Not taking Medicaid    LSW informed

## 2020-02-04 NOTE — PROGRESS NOTES
"Hospital Medicine Daily Progress Note    Date of Service  2/4/2020    Chief Complaint  Left Hip Pain    Hospital Course   Ms. 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 Patient was in ususal state of health at dialysis on day before admission and was leaving her dialysis session when she experienced a ground level fall causing inability to ambulate and sever pain. Patient was transported to outside facility where it was noted on imaging that patient had proximal left femur fracture. Patient was then transferred to [facility] for Orthopedic consultation and surgical intervention. Orthopedics was consulted and patient underwent surgical treatment of left subtrochanteric femur fracture on 2/2/20 with intramedullary device.      Interval Problem Update  -patient crying during rounds \"I'm in a lot of pain. I need to get out of here and get to the Bank\". Bedside RN also reports intermittent disoriented comments  -Bedside RN noted patient having difficulties swallowing.  Questionable aspiration.  SLP consult recommends full liquid diet and GI consult.  Discussed with Dr. Cueto who recommends OP FU.    Consultants/Specialty  -Orthopedics    Code Status  FULL    Disposition  SNF    Review of Systems  Review of Systems   Constitutional: Positive for malaise/fatigue.   Eyes: Negative.    Respiratory: Negative for shortness of breath and wheezing.    Cardiovascular: Negative for chest pain and palpitations.   Gastrointestinal: Positive for constipation (Last BM 5 days ago).   Genitourinary: Negative for dysuria and urgency.   Musculoskeletal: Positive for falls and joint pain.   Neurological: Positive for focal weakness. Negative for dizziness and headaches.   Psychiatric/Behavioral: Negative for hallucinations. The patient is nervous/anxious.    All other systems reviewed and are " negative.       Physical Exam  Temp:  [36.2 °C (97.2 °F)-36.7 °C (98.1 °F)] 36.4 °C (97.6 °F)  Pulse:  [72-80] 72  Resp:  [16-18] 18  BP: ()/(65-75) 120/75  SpO2:  [93 %-96 %] 94 %    Physical Exam  Vitals signs and nursing note reviewed.   Constitutional:       General: She is awake.      Appearance: She is overweight. She is ill-appearing (chronically).      Interventions: Nasal cannula in place.   HENT:      Head: Normocephalic.      Mouth/Throat:      Mouth: Mucous membranes are moist. Mucous membranes are pale.   Eyes:      General: Lids are normal.      Conjunctiva/sclera: Conjunctivae normal.   Cardiovascular:      Rate and Rhythm: Normal rate.      Heart sounds: Normal heart sounds. No friction rub.   Pulmonary:      Effort: Pulmonary effort is normal.      Breath sounds: Normal breath sounds. No decreased breath sounds.   Abdominal:      General: Bowel sounds are normal. There is distension.      Palpations: Abdomen is soft.      Tenderness: There is no tenderness.   Musculoskeletal:      Left hip: She exhibits decreased range of motion, decreased strength and bony tenderness.      Comments: CMS intact   Skin:     General: Skin is warm and dry.      Coloration: Skin is pale.      Comments: Dressing in place to left hip, clean, dry and intact x2    Neurological:      Mental Status: She is alert and oriented to person, place, and time.      Sensory: Sensation is intact.   Psychiatric:         Attention and Perception: Attention normal.         Mood and Affect: Affect is labile and tearful.         Speech: Speech normal.         Behavior: Behavior is cooperative.         Cognition and Memory: Cognition normal.         Fluids    Intake/Output Summary (Last 24 hours) at 2/4/2020 1450  Last data filed at 2/4/2020 0353  Gross per 24 hour   Intake --   Output 500 ml   Net -500 ml       Laboratory  Recent Labs     02/02/20  0057  02/02/20 2054 02/03/20  0445 02/04/20  0549   WBC 7.0  --   --  5.5 7.1   RBC  2.71*  --   --  2.59* 2.51*   HEMOGLOBIN 7.9*   < > 7.3* 7.4* 7.3*   HEMATOCRIT 24.6*  --   --  25.3* 24.3*   MCV 91.5  --   --  97.7 96.8   MCH 28.8  --   --  28.6 29.1   MCHC 31.5*  --   --  29.2* 30.0*   RDW 46.5  --   --  49.5 47.9   PLATELETCT 178  --   --  162* 157*   MPV 10.9  --   --  10.8 10.8    < > = values in this interval not displayed.     Recent Labs     02/02/20  0057 02/03/20  0445 02/04/20  0549   SODIUM 139 134* 132*   POTASSIUM 3.9 4.1 3.9   CHLORIDE 100 96 96   CO2 30 30 30   GLUCOSE 206* 129* 136*   BUN 22 33* 20   CREATININE 3.87* 4.88* 3.34*   CALCIUM 8.6 8.3* 8.2*                   Imaging  DX-FEMUR-2+ LEFT   Final Result      Digitized intraoperative radiograph is submitted for review.  This examination is not for diagnostic purpose but for guidance during a surgical procedure.      DX-PORTABLE FLUORO > 1 HOUR   Final Result      Portable fluoroscopy utilized for 2 minutes 48 seconds.         INTERPRETING LOCATION: [address] [place] [address], [facility]      DX-SHOULDER 2+ LEFT   Final Result      1.  No acute left shoulder fracture or dislocation.      2.  Mild degenerative change of the left AC joint.      HV-[id number]-DIEGUND FILM X-RAY   Final Result      HA-[id number]-JPMEIPJ FILM X-RAY   Final Result      OUTSIDE IMAGES-DX PELVIS   Final Result           Assessment/Plan  * Closed fracture of left femur, unspecified fracture morphology, initial encounter ([facility])- (present on admission)  Assessment & Plan  -POD #3 left subtrochanteric IMN  -Nonpharmacologic and pharmacologic pain management  -Fall Precautions    Anemia due to chronic kidney disease, on chronic dialysis ([facility])- (present on admission)  Assessment & Plan  -Hgb stable  -Continue trending CBC  -Nephrology following  -Transfuse for hemoglobin less than 7    ESRD (end stage renal disease) ([facility])- (present on admission)  Assessment & Plan  -dialysis MWF  -avoid nephrotoxins  -Renally dose medications as indicated  -Nephrology following  -Daily  BMP      Vitamin D insufficiency- (present on admission)  Assessment & Plan  -continue daily supplementation  -OP FU for further surveillance and management      HLD (hyperlipidemia)- (present on admission)  Assessment & Plan  -continue home Lipitor and aspirin.    Type II diabetes mellitus (HCC)- (present on admission)  Assessment & Plan  -continue accu-checks ACHS with SSI  -Diabetic Renal Cardiac Diet  -hypoglycemia protocol      Essential hypertension- (present on admission)  Assessment & Plan  -continue Norvasc & Coreg        Hypothyroidism- (present on admission)  Assessment & Plan  -Chronic  -Continue home levothyroxine       VTE prophylaxis: SCD and Ambulation    Please note that this dictation was created using voice recognition software. I have made every reasonable attempt to correct obvious errors, but there may be errors of grammar and possibly content that I did not discover before finalizing the note.    ORA Scott.

## 2020-02-04 NOTE — DISCHARGE PLANNING
"Care Transition Team Assessment    LSW met w/ pt at bedside and verified facesheet demographics.  Pt lives w/ her 95 year old grandmother and says that \"they take care of each other\".  Pt has a son that lives in [place], [place].  Pt reports having a walker and a shower chair and says that she is mostly independent with (I)ADLs.      Pt is current w/ her PCP, Wiley Acevedo, with her last visit being in December.  Her preferred pharmacy is [facility] in [place].  Pt says that she has [employer] transportation benefits and will use those for medical appts.    Information Source  Orientation : Oriented x 4  Information Given By: Patient  Informant's Name: Bryan  Who is responsible for making decisions for patient? : Patient         Elopement Risk  Legal Hold: No  Ambulatory or Self Mobile in Wheelchair: No-Not an Elopement Risk  Elopement Risk: Not at Risk for Elopement    Interdisciplinary Discharge Planning  Lives with - Patient's Self Care Capacity: Other (Comments)(Pt lives with her 94 y/o grandmother, share household IADLs)  Patient or legal guardian wants to designate a caregiver (see row info): No  Housing / Facility: [address] [facility]  Prior Services: None    Discharge Preparedness  What is your plan after discharge?: Skilled nursing facility  What are your discharge supports?: Grandparent  Prior Functional Level: Independent with Activities of Daily Living  Difficulity with ADLs: None  Difficulity with IADLs: None    Functional Assesment  Prior Functional Level: Independent with Activities of Daily Living    Finances  Financial Barriers to Discharge: No  Prescription Coverage: Yes    Vision / Hearing Impairment  Vision Impairment : Yes  Right Eye Vision: Impaired, Wears Glasses  Left Eye Vision: Impaired, Wears Glasses  Hearing Impairment : No              Domestic Abuse  Have you ever been the victim of abuse or violence?: No  Physical Abuse or Sexual Abuse: No  Verbal Abuse or Emotional Abuse: No  Possible Abuse Reported to:: " Not Applicable    Psychological Assessment  History of Substance Abuse: None  History of Psychiatric Problems: Yes  Non-compliant with Treatment: No  Newly Diagnosed Illness: No    Discharge Risks or Barriers  Discharge risks or barriers?: Other (comment)(Pt's only support is her 96 yo Grandmother)  Patient risk factors: Lack of outside supports    Anticipated Discharge Information  Anticipated discharge disposition: SNF

## 2020-02-04 NOTE — DISCHARGE PLANNING
Patient is eligible for Medicaid Meds to Beds at discharge if they have coverage with [place] Medicaid, Medicaid FFS, Medicaid HMO ([facility]), or [place]. This service is provided through the [facility] if orders are received by the pharmacy prior to 4pm Monday through Friday excluding holidays. Preferred pharmacy has been changed to [facility]. Please call x [phone] prior to discharge.

## 2020-02-04 NOTE — DISCHARGE PLANNING
Received Choice form at [id number]  Agency/Facility Name: [facility]  Referral sent per Choice form @ [id number]

## 2020-02-05 ENCOUNTER — APPOINTMENT (OUTPATIENT)
Dept: RADIOLOGY | Facility: MEDICAL CENTER | Age: 55
DRG: 480 | End: 2020-02-05
Attending: NURSE PRACTITIONER
Payer: MEDICAID

## 2020-02-05 PROBLEM — K59.00 CONSTIPATION: Status: ACTIVE | Noted: 2020-02-05

## 2020-02-05 LAB
ABO GROUP BLD: NORMAL
ANION GAP SERPL CALC-SCNC: 10 MMOL/L (ref 0–11.9)
BARCODED ABORH UBTYP: 6200
BARCODED PRD CODE UBPRD: NORMAL
BARCODED UNIT NUM UBUNT: NORMAL
BASOPHILS # BLD AUTO: 0.3 % (ref 0–1.8)
BASOPHILS # BLD: 0.03 K/UL (ref 0–0.12)
BLD GP AB SCN SERPL QL: NORMAL
BUN SERPL-MCNC: 27 MG/DL (ref 8–22)
CALCIUM SERPL-MCNC: 8.5 MG/DL (ref 8.5–10.5)
CHLORIDE SERPL-SCNC: 96 MMOL/L (ref 96–112)
CO2 SERPL-SCNC: 28 MMOL/L (ref 20–33)
COMPONENT R 8504R: NORMAL
CREAT SERPL-MCNC: 4.4 MG/DL (ref 0.5–1.4)
EOSINOPHIL # BLD AUTO: 0.15 K/UL (ref 0–0.51)
EOSINOPHIL NFR BLD: 1.6 % (ref 0–6.9)
ERYTHROCYTE [DISTWIDTH] IN BLOOD BY AUTOMATED COUNT: 45.2 FL (ref 35.9–50)
GLUCOSE BLD-MCNC: 117 MG/DL (ref 65–99)
GLUCOSE BLD-MCNC: 140 MG/DL (ref 65–99)
GLUCOSE BLD-MCNC: 142 MG/DL (ref 65–99)
GLUCOSE BLD-MCNC: 173 MG/DL (ref 65–99)
GLUCOSE SERPL-MCNC: 167 MG/DL (ref 65–99)
HCT VFR BLD AUTO: 22.7 % (ref 37–47)
HGB BLD-MCNC: 6.9 G/DL (ref 12–16)
IMM GRANULOCYTES # BLD AUTO: 0.03 K/UL (ref 0–0.11)
IMM GRANULOCYTES NFR BLD AUTO: 0.3 % (ref 0–0.9)
LYMPHOCYTES # BLD AUTO: 1.05 K/UL (ref 1–4.8)
LYMPHOCYTES NFR BLD: 11.5 % (ref 22–41)
MCH RBC QN AUTO: 28.8 PG (ref 27–33)
MCHC RBC AUTO-ENTMCNC: 30.4 G/DL (ref 33.6–35)
MCV RBC AUTO: 94.6 FL (ref 81.4–97.8)
MONOCYTES # BLD AUTO: 0.7 K/UL (ref 0–0.85)
MONOCYTES NFR BLD AUTO: 7.7 % (ref 0–13.4)
NEUTROPHILS # BLD AUTO: 7.18 K/UL (ref 2–7.15)
NEUTROPHILS NFR BLD: 78.6 % (ref 44–72)
NRBC # BLD AUTO: 0 K/UL
NRBC BLD-RTO: 0 /100 WBC
PLATELET # BLD AUTO: 177 K/UL (ref 164–446)
PMV BLD AUTO: 10.3 FL (ref 9–12.9)
POTASSIUM SERPL-SCNC: 4.1 MMOL/L (ref 3.6–5.5)
PRODUCT TYPE UPROD: NORMAL
RBC # BLD AUTO: 2.4 M/UL (ref 4.2–5.4)
RH BLD: NORMAL
SODIUM SERPL-SCNC: 134 MMOL/L (ref 135–145)
UNIT STATUS USTAT: NORMAL
WBC # BLD AUTO: 9.1 K/UL (ref 4.8–10.8)

## 2020-02-05 PROCEDURE — A9270 NON-COVERED ITEM OR SERVICE: HCPCS | Performed by: INTERNAL MEDICINE

## 2020-02-05 PROCEDURE — 36430 TRANSFUSION BLD/BLD COMPNT: CPT

## 2020-02-05 PROCEDURE — 700102 HCHG RX REV CODE 250 W/ 637 OVERRIDE(OP): Performed by: NURSE PRACTITIONER

## 2020-02-05 PROCEDURE — 700102 HCHG RX REV CODE 250 W/ 637 OVERRIDE(OP): Performed by: INTERNAL MEDICINE

## 2020-02-05 PROCEDURE — 700102 HCHG RX REV CODE 250 W/ 637 OVERRIDE(OP): Performed by: HOSPITALIST

## 2020-02-05 PROCEDURE — 74210 X-RAY XM PHRNX&/CRV ESOPH C+: CPT

## 2020-02-05 PROCEDURE — 85025 COMPLETE CBC W/AUTO DIFF WBC: CPT

## 2020-02-05 PROCEDURE — 86850 RBC ANTIBODY SCREEN: CPT

## 2020-02-05 PROCEDURE — A9270 NON-COVERED ITEM OR SERVICE: HCPCS | Performed by: NURSE PRACTITIONER

## 2020-02-05 PROCEDURE — 5A1D70Z PERFORMANCE OF URINARY FILTRATION, INTERMITTENT, LESS THAN 6 HOURS PER DAY: ICD-10-PCS | Performed by: INTERNAL MEDICINE

## 2020-02-05 PROCEDURE — 82962 GLUCOSE BLOOD TEST: CPT | Mod: 91

## 2020-02-05 PROCEDURE — 86900 BLOOD TYPING SEROLOGIC ABO: CPT

## 2020-02-05 PROCEDURE — P9016 RBC LEUKOCYTES REDUCED: HCPCS

## 2020-02-05 PROCEDURE — 92526 ORAL FUNCTION THERAPY: CPT

## 2020-02-05 PROCEDURE — A9270 NON-COVERED ITEM OR SERVICE: HCPCS | Performed by: HOSPITALIST

## 2020-02-05 PROCEDURE — 770006 HCHG ROOM/CARE - MED/SURG/GYN SEMI*

## 2020-02-05 PROCEDURE — 86901 BLOOD TYPING SEROLOGIC RH(D): CPT

## 2020-02-05 PROCEDURE — 700111 HCHG RX REV CODE 636 W/ 250 OVERRIDE (IP): Performed by: INTERNAL MEDICINE

## 2020-02-05 PROCEDURE — 700111 HCHG RX REV CODE 636 W/ 250 OVERRIDE (IP): Performed by: NURSE PRACTITIONER

## 2020-02-05 PROCEDURE — 36415 COLL VENOUS BLD VENIPUNCTURE: CPT

## 2020-02-05 PROCEDURE — 86923 COMPATIBILITY TEST ELECTRIC: CPT

## 2020-02-05 PROCEDURE — 700111 HCHG RX REV CODE 636 W/ 250 OVERRIDE (IP)

## 2020-02-05 PROCEDURE — 99232 SBSQ HOSP IP/OBS MODERATE 35: CPT | Performed by: FAMILY MEDICINE

## 2020-02-05 PROCEDURE — 90935 HEMODIALYSIS ONE EVALUATION: CPT

## 2020-02-05 PROCEDURE — 700105 HCHG RX REV CODE 258

## 2020-02-05 PROCEDURE — 30230N1 TRANSFUSION OF NONAUTOLOGOUS RED BLOOD CELLS INTO PERIPHERAL VEIN, OPEN APPROACH: ICD-10-PCS | Performed by: INTERNAL MEDICINE

## 2020-02-05 PROCEDURE — 80048 BASIC METABOLIC PNL TOTAL CA: CPT

## 2020-02-05 PROCEDURE — 51798 US URINE CAPACITY MEASURE: CPT

## 2020-02-05 PROCEDURE — 700105 HCHG RX REV CODE 258: Performed by: INTERNAL MEDICINE

## 2020-02-05 RX ORDER — MORPHINE SULFATE 4 MG/ML
2 INJECTION, SOLUTION INTRAMUSCULAR; INTRAVENOUS
Status: DISCONTINUED | OUTPATIENT
Start: 2020-02-05 | End: 2020-02-08 | Stop reason: HOSPADM

## 2020-02-05 RX ORDER — SODIUM CHLORIDE 9 MG/ML
INJECTION, SOLUTION INTRAVENOUS
Status: COMPLETED
Start: 2020-02-05 | End: 2020-02-05

## 2020-02-05 RX ORDER — HEPARIN SODIUM 1000 [USP'U]/ML
INJECTION, SOLUTION INTRAVENOUS; SUBCUTANEOUS
Status: COMPLETED
Start: 2020-02-05 | End: 2020-02-05

## 2020-02-05 RX ADMIN — GLYCOPYRROLATE 0.5 MG: 1 TABLET ORAL at 04:23

## 2020-02-05 RX ADMIN — MORPHINE SULFATE 2 MG: 4 INJECTION INTRAVENOUS at 08:32

## 2020-02-05 RX ADMIN — SODIUM CHLORIDE 250 MG: 9 INJECTION, SOLUTION INTRAVENOUS at 11:20

## 2020-02-05 RX ADMIN — HEPARIN SODIUM 3200 UNITS: 1000 INJECTION, SOLUTION INTRAVENOUS; SUBCUTANEOUS at 11:05

## 2020-02-05 RX ADMIN — HEPARIN SODIUM 5000 UNITS: 5000 INJECTION, SOLUTION INTRAVENOUS; SUBCUTANEOUS at 16:13

## 2020-02-05 RX ADMIN — ACETAMINOPHEN 650 MG: 325 TABLET, FILM COATED ORAL at 13:13

## 2020-02-05 RX ADMIN — MORPHINE SULFATE 2 MG: 4 INJECTION INTRAVENOUS at 00:00

## 2020-02-05 RX ADMIN — ERYTHROPOIETIN 10000 UNITS: 10000 INJECTION, SOLUTION INTRAVENOUS; SUBCUTANEOUS at 10:25

## 2020-02-05 RX ADMIN — LEVOTHYROXINE SODIUM 112 MCG: 0.11 TABLET ORAL at 04:26

## 2020-02-05 RX ADMIN — INSULIN HUMAN 2 UNITS: 100 INJECTION, SOLUTION PARENTERAL at 13:02

## 2020-02-05 RX ADMIN — ASPIRIN 81 MG: 81 TABLET, COATED ORAL at 04:23

## 2020-02-05 RX ADMIN — FAMOTIDINE 20 MG: 20 TABLET ORAL at 04:24

## 2020-02-05 RX ADMIN — BISACODYL 10 MG: 10 SUPPOSITORY RECTAL at 21:15

## 2020-02-05 RX ADMIN — MELATONIN 1000 UNITS: at 04:23

## 2020-02-05 RX ADMIN — FLUTICASONE PROPIONATE 100 MCG: 50 SPRAY, METERED NASAL at 04:26

## 2020-02-05 RX ADMIN — OXYCODONE HYDROCHLORIDE 10 MG: 10 TABLET ORAL at 04:23

## 2020-02-05 RX ADMIN — HEPARIN SODIUM 5000 UNITS: 5000 INJECTION, SOLUTION INTRAVENOUS; SUBCUTANEOUS at 04:24

## 2020-02-05 RX ADMIN — CARVEDILOL 6.25 MG: 6.25 TABLET, FILM COATED ORAL at 11:19

## 2020-02-05 RX ADMIN — CYCLOBENZAPRINE 10 MG: 10 TABLET, FILM COATED ORAL at 13:13

## 2020-02-05 RX ADMIN — SENNOSIDES AND DOCUSATE SODIUM 2 TABLET: 8.6; 5 TABLET ORAL at 04:23

## 2020-02-05 RX ADMIN — OXYCODONE HYDROCHLORIDE 10 MG: 10 TABLET ORAL at 11:34

## 2020-02-05 RX ADMIN — SODIUM CHLORIDE: 9 INJECTION, SOLUTION INTRAVENOUS at 05:00

## 2020-02-05 RX ADMIN — HEPARIN SODIUM 5000 UNITS: 5000 INJECTION, SOLUTION INTRAVENOUS; SUBCUTANEOUS at 21:16

## 2020-02-05 RX ADMIN — MORPHINE SULFATE 2 MG: 4 INJECTION INTRAVENOUS at 22:28

## 2020-02-05 ASSESSMENT — ENCOUNTER SYMPTOMS
FALLS: 1
DIZZINESS: 0
NEUROLOGICAL NEGATIVE: 1
MYALGIAS: 0
CONSTIPATION: 1
DEPRESSION: 0
NECK PAIN: 0
WHEEZING: 0
PALPITATIONS: 0
SHORTNESS OF BREATH: 0
RESPIRATORY NEGATIVE: 1
EYES NEGATIVE: 1
GASTROINTESTINAL NEGATIVE: 1
NERVOUS/ANXIOUS: 1
FOCAL WEAKNESS: 1
HEADACHES: 0
CARDIOVASCULAR NEGATIVE: 1
NAUSEA: 0
HALLUCINATIONS: 0
VOMITING: 0
CONSTITUTIONAL NEGATIVE: 1

## 2020-02-05 NOTE — THERAPY
"Occupational Therapy Treatment completed with focus on ADLs and ADL transfers.  Functional Status:    Pt seen for OT treatment this afternoon. She continues to be limited by pain has decreased participation. She was able to mobilize to EOB today with Mod A for sequencing and cuing. She becomes anxious easily and is quick to resign I.e. \"no, no, that's all I can do, I need to get back to bed now!\" However, with some encouragement and cuing for breathing techniques, she is able to prolong her participation. Today she got almost all the way to EOB, but she is unable to get her LLE to touch the ground, likely stemming from fear and pain more than physical inability. She tolerated that position for ~6-7 minutes before getting back to bed. She agreed to groom once back to long sitting. Educated patient on importance of mobility for overall function.     Plan of Care: Will benefit from Occupational Therapy 3 times per week  Discharge Recommendations:  Equipment Will Continue to Assess for Equipment Needs. Post-acute therapy Recommend post-acute placement for additional occupational therapy services prior to discharge home.      See \"Rehab Therapy-Acute\" Patient Summary Report for complete documentation.   "

## 2020-02-05 NOTE — ASSESSMENT & PLAN NOTE
-ongoing and chronic  -SLP following - diet per their rec's  -Dr. Culver from GI recommended outpatient follow-up at this time  -Aspiration precaution

## 2020-02-05 NOTE — PROGRESS NOTES
Notified Landen ESPITIA of hemoglobin of 6.9. New orders received for transfusion of 1 unit of PRBC. Pt AO x 4. Consent signed and placed in chart.

## 2020-02-05 NOTE — PROGRESS NOTES
"   Orthopaedic Progress Note    Interval changes:  Patient doing well  Dressing CDI  Cleared for DC to SNF by ortho pending medicine clearance    ROS - Patient denies any new issues.  Pain well controlled.    BP (!) 97/65   Pulse 81   Temp 36.5 °C (97.7 °F) (Temporal)   Resp 20   Ht 1.778 m (5' 10\")   Wt 85.7 kg (189 lb)   SpO2 92%       Patient seen and examined  No acute distress  Breathing non labored  RRR  Left femur surgical dressings are clean, dry, and intact. Patient clearly fires tibialis anterior, EHL, and gastrocnemius/soleus. Sensation is intact to light touch throughout superficial peroneal, deep peroneal, tibial, saphenous, and sural nerve distributions. Strong and palpable 2+ dorsalis pedis and posterior tibial pulses with capillary refill less than 2 seconds. No lower leg tenderness or discomfort.       Recent Labs     02/02/20  0057  02/02/20  2054 02/03/20  0445 02/04/20  0549   WBC 7.0  --   --  5.5 7.1   RBC 2.71*  --   --  2.59* 2.51*   HEMOGLOBIN 7.9*   < > 7.3* 7.4* 7.3*   HEMATOCRIT 24.6*  --   --  25.3* 24.3*   MCV 91.5  --   --  97.7 96.8   MCH 28.8  --   --  28.6 29.1   MCHC 31.5*  --   --  29.2* 30.0*   RDW 46.5  --   --  49.5 47.9   PLATELETCT 178  --   --  162* 157*   MPV 10.9  --   --  10.8 10.8    < > = values in this interval not displayed.       Active Hospital Problems    Diagnosis   • Anemia due to chronic kidney disease, on chronic dialysis ([facility]) [N18.6, D63.1, Z99.2]     Priority: High   • Closed fracture of left femur, unspecified fracture morphology, initial encounter ([facility]) [S72.92XA]     Priority: High   • ESRD (end stage renal disease) ([facility]) [N18.6]     Priority: High   • Vitamin D insufficiency [E55.9]   • HLD (hyperlipidemia) [E78.5]   • Type II diabetes mellitus ([facility]) [E11.9]   • Essential hypertension [I10]   • Hypothyroidism [E03.9]       Assessment/Plan:  Cleared for DC to SNF by ortho pending medicine clearance  POD#3 S/P Surgical treatment of left " subtrochanteric femur fracture with intramedullary device  Wt bearing status - WBAT  Wound care/Drains - dressings changed every other day by nursing  Future Procedures - none planned   Sutures/Staples out- 10-14 days post operatively  PT/OT-initiated  Antibiotics: completed  DVT Prophylaxis- TEDS/SCDs/Foot pumps/heparin  Wallace-none  Case Coordination for Discharge Planning - Disposition SNF

## 2020-02-05 NOTE — PROGRESS NOTES
Pt returned from X-ray and back in room. Pt alert and calm. Call light within reach, bed locked & in lowest position, both upper bed rails up, tray within reach.

## 2020-02-05 NOTE — PROGRESS NOTES
Received report from night shift RN that patient has been having strong hallucinations with unknown etiology.the past couple of days. No note made in MD or APN's note, notified MELVIN Hook. MELVIN Hook is aware of patient's mentation, received no new orders.     Clarified diet order with MELVIN Hook, reported speech therapist's note recommends NPO, ice chips ok. Received verbal order to change diet order to NPO with ice chips. Will notify speech therapist regarding medication administration recommendations.

## 2020-02-05 NOTE — PROGRESS NOTES
Spiritual Care Note    Patient Information     Patient's Name: Bryan Garcia   MRN: [id number]    YOB: 1965   Age and Gender: 54 y.o. female   Service Area: Othopedics   Room (and Bed): [place] [address]   Ethnicity or Nationality:     Primary Language: English   Congregational/Spiritual preference: Sikh   Place of Residence: [place], [place]   Family/Friends/Others Present: No   Clinical Team Present: No   Medical Diagnosis(-es)/Procedure(s): Closed left femoral fracture   Code Status: Full Code    Date of Admission: 1/31/2020   Length of Stay: 4 days        Spiritual Care Provider Information:  Name of Spiritual Care Provider: Berna Gamino  Title of Spiritual Care Provider: Associate   Phone Number: [phone]  E-mail: [email or web address]  Total time : 15 minutes    Spiritual Screen Results:    Gen Nursing  Spiritual Screen  Is your spiritual health or inner well-being important to you as you cope with your medical condition?: Yes  Would you like to receive a visit from our Spiritual Care team or your own Mu-ism or spiritual leader?: Yes  Was spiritual care education provided to the patient?: Yes  Sources of Hope/Melodie: Prayer, Family, Congregational/Spiritual community, Higher power     Palliative Care  PC Congregational/Spiritual Screening  Was spiritual care education provided to the patient?: Yes      Encounter/Request Information  Encounter/Request Type   Visited With: Patient  Nature of the Visit: Follow-up  General Visit: Yes  Referral From/ Origin of Request: [place] rounds, Verbal patient  Referral To: Community clergy(REFERRED TO FR. [place] TODAY @ 1000)    Religous Needs/Values  Congregational Needs Visit  Congregational Needs: Prayer    Spiritual Assessment     Spiritual Care Encounters    Observations/Symptoms: Accepting, Pain, Thankfulness    Interaction/Conversation: Pt requested prayer during 's rounds in dialysis;  visited later in the day back in Orthopedics.  Pt once again  reported a great deal of pain and requested prayer. Also asked  to visit her grandmother in the next bed, but that pt was asleep.    Assessment: Need, Distress    Need: Seeking Spiritual Assistance and Support    Distress: Coping    Interventions: Compassionate presence, prayer.    Outcomes: Spiritual Comfort    Plan: Daily Visits    Notes:

## 2020-02-05 NOTE — PROGRESS NOTES
[facility] Nephrology Daily Progress Note    Date of Service  2/5/2020    Chief Complaint  Follow up ESRD    Interval Problem Update  52 yo female PMH ESRD MWF iHD, HTN, type 2 DM, anemia of CKD, and CKD-MBD who presents to ED with CC as above.  She was recently started on RRT for new onset ESRD last week and went to her usual treatmetn on 1/31.  She is walker dependent for gait and mabulation since a CVA in her past.  While walking towards her car, her MAYNOR asked to use her walker and when he took it away from her and in the process she lost her balance and sustained a fall onto the concrete sidewalk.  She had immediate pain and was not able to bear weight or ambulate as a result.  No alleviating factors.  Any movement aggravated the pain. Otherwise felt well and denies any recent F/C/N/V/CP/SOB.  No melena, hematochezia, hematemesis.  No HA, visual changes, or abdominal pain.    DAILY NEPHROLOGY SUMMARY  2/03/20: No events, seen on dialysis this am, BP stable, reports left leg pain controlled  2/04/20: No events, pt in tears and complaining of left leg pain, tolerated HD yest with 2.1L UF, BP borderline low this am and BP meds held  2/05/20: No events, noted to have intermittent confusion yest and hallucinations overnight, seen on dialysis this am and she is calm, BP stable, amlodipine held again this am per holding parameters    Review of Systems  Review of Systems   Constitutional: Negative.    HENT: Negative.    Eyes: Negative.    Respiratory: Negative.    Cardiovascular: Negative.    Gastrointestinal: Negative.    Musculoskeletal: Positive for falls and joint pain. Negative for myalgias and neck pain.   Skin: Negative.    Neurological: Negative.    Endo/Heme/Allergies: Negative.    Psychiatric/Behavioral: Negative for depression. The patient is nervous/anxious.         Physical Exam  Temp:  [36.1 °C (97 °F)-37.2 °C (99 °F)] 36.1 °C (97 °F)  Pulse:  [71-82] 71  Resp:  [16-20] 18  BP: ()/(50-82)  121/71  SpO2:  [92 %-97 %] 95 %    Physical Exam  Vitals signs and nursing note reviewed.   Constitutional:       General: She is not in acute distress.     Appearance: Normal appearance.   HENT:      Head: Normocephalic and atraumatic.      Right Ear: External ear normal.      Left Ear: External ear normal.      Nose: Nose normal. No congestion.      Mouth/Throat:      Mouth: Mucous membranes are moist.      Pharynx: No oropharyngeal exudate.   Eyes:      General: No scleral icterus.     Extraocular Movements: Extraocular movements intact.      Conjunctiva/sclera: Conjunctivae normal.   Neck:      Musculoskeletal: Normal range of motion and neck supple.   Cardiovascular:      Rate and Rhythm: Normal rate and regular rhythm.      Heart sounds: Normal heart sounds. No murmur.   Pulmonary:      Effort: Pulmonary effort is normal. No respiratory distress.      Breath sounds: Normal breath sounds.   Abdominal:      General: Abdomen is flat. Bowel sounds are normal. There is no distension.      Palpations: Abdomen is soft.   Musculoskeletal:         General: No tenderness.      Right lower leg: No edema.      Left lower leg: No edema.      Comments: +Decreased ROM left leg   Lymphadenopathy:      Cervical: No cervical adenopathy.   Skin:     General: Skin is warm and dry.      Findings: No erythema.   Neurological:      Mental Status: She is alert and oriented to person, place, and time. Mental status is at baseline.      Motor: No weakness.   Psychiatric:         Mood and Affect: Mood normal.         Behavior: Behavior normal.         Fluids    Intake/Output Summary (Last 24 hours) at 2/5/2020 1102  Last data filed at 2/5/2020 0800  Gross per 24 hour   Intake 590 ml   Output 500 ml   Net 90 ml       Laboratory  Recent Labs     02/03/20  0445 02/04/20  0549 02/05/20  0233   WBC 5.5 7.1 9.1   RBC 2.59* 2.51* 2.40*   HEMOGLOBIN 7.4* 7.3* 6.9*   HEMATOCRIT 25.3* 24.3* 22.7*   MCV 97.7 96.8 94.6   MCH 28.6 29.1 28.8   MCHC  29.2* 30.0* 30.4*   RDW 49.5 47.9 45.2   PLATELETCT 162* 157* 177   MPV 10.8 10.8 10.3     Recent Labs     02/03/20  0445 02/04/20  0549 02/05/20  0233   SODIUM 134* 132* 134*   POTASSIUM 4.1 3.9 4.1   CHLORIDE 96 96 96   CO2 30 30 28   GLUCOSE 129* 136* 167*   BUN 33* 20 27*   CREATININE 4.88* 3.34* 4.40*   CALCIUM 8.3* 8.2* 8.5         No results for input(s): NTPROBNP in the last 72 hours.          IMPRESSION:  - ESRD    * Etiology likely 2/2 HTN/DM    * MWF iHD @ [facility]  - Left subtrochanteric femur fracture    * S/P surgical repair  - HTN    * Goal BP < 140/90    * Stable  - Anemia of CKD    * Goal Hgb 10-11    * Iron sat low    * Stable  - CKD-MBD    * Managed at HD unit    * Stable  - HLD  - CAD  - CVA  - Altered Mental Status--intermittent confusion and hallucinations over past 24hrs    * Eval per primary svc  -Moderate Protein-Calorie Malnutrition     PLAN:  - HD today and qMWF iHD  - UF with HD as tolerated  - PT/OT as tolerated  - No dietary protein restrictions  - Dose all meds per ESRD  - Epogen with HD  -Transfuse PRN for Hgb less than 7  - Continue IV Iron  - Pain management per primary svc  - Stop amlodipine for now  - Holding parameters in place for BP meds     All prior notes form other doctors and RN staff were reviewed from admission to current day to help me make my clinical decisions

## 2020-02-05 NOTE — PROGRESS NOTES
"Hospital Medicine Daily Progress Note    Date of Service  2/5/2020    Chief Complaint  Left hip pain    Hospital Course   Ms. 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 Patient was in ususal state of health at dialysis on day before admission and was leaving her dialysis session when she experienced a ground level fall causing inability to ambulate and severe pain. Patient was transported to outside facility where it was noted on imaging that patient had proximal left femur fracture. Patient was then transferred to [facility] for Orthopedic consultation and surgical intervention. Orthopedics was consulted and patient underwent surgical treatment of left subtrochanteric femur fracture on 2/2/20 with intramedullary device.      Interval Problem Update  2/5 -POD #3  -Dialysis today  -1 unit RBC for Hgb 6.9  -Barium swallow study pending    2/4 -patient crying during rounds \"I'm in a lot of pain. I need to get out of here and get to the Bank\". Bedside RN also reports intermittent disoriented comments  -Bedside RN noted patient having difficulties swallowing.  Questionable aspiration.  SLP consult recommends full liquid diet and GI consult.  Discussed with Dr. Cueto who recommends OP FU.    Consultants/Specialty  -Orthopedics     Code Status  FULL     Disposition  SNF     Review of Systems  Review of Systems   Constitutional: Positive for malaise/fatigue.   Eyes: Negative.    Respiratory: Negative for shortness of breath and wheezing.    Cardiovascular: Negative for chest pain and palpitations.   Gastrointestinal: Positive for constipation (Last BM 6 days ago. \"I feel like I can go today\"). Negative for nausea and vomiting.   Genitourinary: Negative for dysuria and urgency.   Musculoskeletal: Positive for falls and joint pain.   Neurological: Positive for focal weakness. " Negative for dizziness and headaches.   Psychiatric/Behavioral: Negative for hallucinations. The patient is nervous/anxious.    All other systems reviewed and are negative.       Physical Exam  Temp:  [36.1 °C (97 °F)-37.2 °C (99 °F)] 36.4 °C (97.5 °F)  Pulse:  [71-82] 80  Resp:  [16-20] 17  BP: ()/(50-82) 117/54  SpO2:  [92 %-97 %] 92 %    Physical Exam  Vitals signs and nursing note reviewed.   Constitutional:       General: She is sleeping. She is not in acute distress.     Appearance: She is overweight. She is ill-appearing (chronically).      Interventions: Nasal cannula in place.   HENT:      Head: Normocephalic.      Mouth/Throat:      Mouth: Mucous membranes are moist. Mucous membranes are pale.   Eyes:      General: Lids are normal.      Conjunctiva/sclera: Conjunctivae normal.   Cardiovascular:      Rate and Rhythm: Normal rate.      Heart sounds: Normal heart sounds. No friction rub.   Pulmonary:      Effort: Pulmonary effort is normal.      Breath sounds: Normal breath sounds. No decreased breath sounds.   Abdominal:      General: Bowel sounds are normal. There is distension.      Palpations: Abdomen is soft.      Tenderness: There is no tenderness.   Musculoskeletal:      Left hip: She exhibits decreased range of motion, decreased strength and bony tenderness.      Comments: CMS intact   Skin:     General: Skin is warm and dry.      Coloration: Skin is pale.      Comments: Dressing in place to left hip, clean, dry and intact x2    Neurological:      Mental Status: She is oriented to person, place, and time and easily aroused. She is lethargic.      Sensory: Sensation is intact.   Psychiatric:         Attention and Perception: Attention normal.         Mood and Affect: Affect is labile and tearful.         Speech: Speech normal.         Behavior: Behavior is cooperative.         Cognition and Memory: Cognition is impaired.         Fluids    Intake/Output Summary (Last 24 hours) at 2/5/2020  1346  Last data filed at 2/5/2020 0800  Gross per 24 hour   Intake 470 ml   Output 500 ml   Net -30 ml       Laboratory  Recent Labs     02/03/20 0445 02/04/20 0549 02/05/20  0233   WBC 5.5 7.1 9.1   RBC 2.59* 2.51* 2.40*   HEMOGLOBIN 7.4* 7.3* 6.9*   HEMATOCRIT 25.3* 24.3* 22.7*   MCV 97.7 96.8 94.6   MCH 28.6 29.1 28.8   MCHC 29.2* 30.0* 30.4*   RDW 49.5 47.9 45.2   PLATELETCT 162* 157* 177   MPV 10.8 10.8 10.3     Recent Labs     02/03/20 0445 02/04/20 0549 02/05/20  0233   SODIUM 134* 132* 134*   POTASSIUM 4.1 3.9 4.1   CHLORIDE 96 96 96   CO2 30 30 28   GLUCOSE 129* 136* 167*   BUN 33* 20 27*   CREATININE 4.88* 3.34* 4.40*   CALCIUM 8.3* 8.2* 8.5                   Imaging  DX-FEMUR-2+ LEFT   Final Result      Digitized intraoperative radiograph is submitted for review.  This examination is not for diagnostic purpose but for guidance during a surgical procedure.      DX-PORTABLE FLUORO > 1 HOUR   Final Result      Portable fluoroscopy utilized for 2 minutes 48 seconds.         INTERPRETING LOCATION: [address] [place] [address], [facility]      DX-SHOULDER 2+ LEFT   Final Result      1.  No acute left shoulder fracture or dislocation.      2.  Mild degenerative change of the left AC joint.      [id number] FILM X-RAY   Final Result      [id number]-[id number] FILM X-RAY   Final Result      OUTSIDE IMAGES-DX PELVIS   Final Result      DX-ESOPH. FLUORO (BARIUM SWALLOW)    (Results Pending)        Assessment/Plan  * Closed fracture of left femur, unspecified fracture morphology, initial encounter ([facility])- (present on admission)  Assessment & Plan  -POD #4 left subtrochanteric IMN  -Nonpharmacologic and pharmacologic pain management  -Fall Precautions    Anemia due to chronic kidney disease, on chronic dialysis ([facility])- (present on admission)  Assessment & Plan  -Hgb 6.9 this morning.  She has received 1 unit RBC  -Continue trending CBC  -Nephrology following  -Transfuse for hemoglobin less than 7    ESRD (end stage renal  disease) ([facility])- (present on admission)  Assessment & Plan  -dialysis today  -avoid nephrotoxins  -Renally dose medications as indicated  -Nephrology following  -Daily BMP      Dysphagia- (present on admission)  Assessment & Plan  -ongoing and chronic  -Patient is poor historian but makes comments about having issues with swallowing in the past.  Per SLP, patient recently had work-up done in [place] and was told to follow-up with outpatient GI, however this has not happened  -SLP arthur yesterday recommends n.p.o. with ice chips  -I discussed case with Dr. Culver from GI yesterday who recommended outpatient follow-up at this time  -Patient having ongoing issues today with swallow.  I have ordered barium swallow study  -Aspiration precaution    Constipation- (present on admission)  Assessment & Plan  -Acute on chronic  -Last BM was 1/31/2020  -Bowel protocol    Vitamin D insufficiency- (present on admission)  Assessment & Plan  -continue daily supplementation  -OP FU for further surveillance and management      HLD (hyperlipidemia)- (present on admission)  Assessment & Plan  -continue home Lipitor and aspirin.    Type II diabetes mellitus ([facility])- (present on admission)  Assessment & Plan  -continue accu-checks ACHS with SSI  -Diabetic Renal Cardiac Diet  -hypoglycemia protocol      Essential hypertension- (present on admission)  Assessment & Plan  -continue Norvasc & Coreg        Hypothyroidism- (present on admission)  Assessment & Plan  -Chronic  -Continue home levothyroxine       VTE prophylaxis: SCD and Ambulation     Please note that this dictation was created using voice recognition software. I have made every reasonable attempt to correct obvious errors, but there may be errors of grammar and possibly content that I did not discover before finalizing the note.     ORA Scott.

## 2020-02-05 NOTE — THERAPY
"Physical Therapy Treatment completed.   Bed Mobility:  Supine to Sit: Moderate Assist  Transfers: Sit to Stand: Moderate Assist(from EOB. Max assist from chair height)  Gait: Level Of Assist: Unable to Participate       Plan of Care: Will benefit from Physical Therapy 3 times per week  Discharge Recommendations: Equipment: Will Continue to Assess for Equipment Needs. Post-acute therapy Discharge to a transitional care facility for continued skilled therapy services.     See \"Rehab Therapy-Acute\" Patient Summary Report for complete documentation.       "

## 2020-02-05 NOTE — PROGRESS NOTES
"Pt anxious and crying, tossing and turning in bed. Pt asks, \"is somebody eating my leg?\" and states she sees \"little yellow people\" on the floor that scare her. Tried reorienting pt to reality. Pt agrees with reorientation to reality but continues to talk to herself, cries and is restless, continuously states \"they're around me\" and yells for help. Pt complains that her \"leg really hurts.\" Medicated per MAR. Notified Ca ESPITIA of pt's behavior. New orders for antianxiety medication given.      "

## 2020-02-06 LAB
ANION GAP SERPL CALC-SCNC: 9 MMOL/L (ref 0–11.9)
BASOPHILS # BLD AUTO: 0.3 % (ref 0–1.8)
BASOPHILS # BLD: 0.03 K/UL (ref 0–0.12)
BUN SERPL-MCNC: 20 MG/DL (ref 8–22)
CALCIUM SERPL-MCNC: 8.4 MG/DL (ref 8.5–10.5)
CHLORIDE SERPL-SCNC: 96 MMOL/L (ref 96–112)
CO2 SERPL-SCNC: 29 MMOL/L (ref 20–33)
CREAT SERPL-MCNC: 2.97 MG/DL (ref 0.5–1.4)
EOSINOPHIL # BLD AUTO: 0.22 K/UL (ref 0–0.51)
EOSINOPHIL NFR BLD: 2.3 % (ref 0–6.9)
ERYTHROCYTE [DISTWIDTH] IN BLOOD BY AUTOMATED COUNT: 47 FL (ref 35.9–50)
GLUCOSE BLD-MCNC: 145 MG/DL (ref 65–99)
GLUCOSE BLD-MCNC: 149 MG/DL (ref 65–99)
GLUCOSE BLD-MCNC: 157 MG/DL (ref 65–99)
GLUCOSE BLD-MCNC: 87 MG/DL (ref 65–99)
GLUCOSE SERPL-MCNC: 118 MG/DL (ref 65–99)
HCT VFR BLD AUTO: 28.3 % (ref 37–47)
HGB BLD-MCNC: 8.9 G/DL (ref 12–16)
IMM GRANULOCYTES # BLD AUTO: 0.06 K/UL (ref 0–0.11)
IMM GRANULOCYTES NFR BLD AUTO: 0.6 % (ref 0–0.9)
LYMPHOCYTES # BLD AUTO: 1.01 K/UL (ref 1–4.8)
LYMPHOCYTES NFR BLD: 10.8 % (ref 22–41)
MCH RBC QN AUTO: 29.3 PG (ref 27–33)
MCHC RBC AUTO-ENTMCNC: 31.4 G/DL (ref 33.6–35)
MCV RBC AUTO: 93.1 FL (ref 81.4–97.8)
MONOCYTES # BLD AUTO: 0.79 K/UL (ref 0–0.85)
MONOCYTES NFR BLD AUTO: 8.4 % (ref 0–13.4)
NEUTROPHILS # BLD AUTO: 7.27 K/UL (ref 2–7.15)
NEUTROPHILS NFR BLD: 77.6 % (ref 44–72)
NRBC # BLD AUTO: 0.02 K/UL
NRBC BLD-RTO: 0.2 /100 WBC
PLATELET # BLD AUTO: 188 K/UL (ref 164–446)
PMV BLD AUTO: 10.5 FL (ref 9–12.9)
POTASSIUM SERPL-SCNC: 4.3 MMOL/L (ref 3.6–5.5)
RBC # BLD AUTO: 3.04 M/UL (ref 4.2–5.4)
SODIUM SERPL-SCNC: 134 MMOL/L (ref 135–145)
WBC # BLD AUTO: 9.4 K/UL (ref 4.8–10.8)

## 2020-02-06 PROCEDURE — 97530 THERAPEUTIC ACTIVITIES: CPT | Mod: CQ

## 2020-02-06 PROCEDURE — 97530 THERAPEUTIC ACTIVITIES: CPT

## 2020-02-06 PROCEDURE — A9270 NON-COVERED ITEM OR SERVICE: HCPCS | Performed by: NURSE PRACTITIONER

## 2020-02-06 PROCEDURE — 700111 HCHG RX REV CODE 636 W/ 250 OVERRIDE (IP): Performed by: INTERNAL MEDICINE

## 2020-02-06 PROCEDURE — 82962 GLUCOSE BLOOD TEST: CPT | Mod: 91

## 2020-02-06 PROCEDURE — 700102 HCHG RX REV CODE 250 W/ 637 OVERRIDE(OP): Performed by: NURSE PRACTITIONER

## 2020-02-06 PROCEDURE — 92612 ENDOSCOPY SWALLOW (FEES) VID: CPT

## 2020-02-06 PROCEDURE — 51798 US URINE CAPACITY MEASURE: CPT

## 2020-02-06 PROCEDURE — A9270 NON-COVERED ITEM OR SERVICE: HCPCS | Performed by: INTERNAL MEDICINE

## 2020-02-06 PROCEDURE — 700102 HCHG RX REV CODE 250 W/ 637 OVERRIDE(OP): Performed by: HOSPITALIST

## 2020-02-06 PROCEDURE — 700111 HCHG RX REV CODE 636 W/ 250 OVERRIDE (IP): Performed by: NURSE PRACTITIONER

## 2020-02-06 PROCEDURE — 770006 HCHG ROOM/CARE - MED/SURG/GYN SEMI*

## 2020-02-06 PROCEDURE — 85025 COMPLETE CBC W/AUTO DIFF WBC: CPT

## 2020-02-06 PROCEDURE — 80048 BASIC METABOLIC PNL TOTAL CA: CPT

## 2020-02-06 PROCEDURE — 99232 SBSQ HOSP IP/OBS MODERATE 35: CPT | Performed by: FAMILY MEDICINE

## 2020-02-06 PROCEDURE — 700105 HCHG RX REV CODE 258: Performed by: INTERNAL MEDICINE

## 2020-02-06 PROCEDURE — 97110 THERAPEUTIC EXERCISES: CPT | Mod: CQ

## 2020-02-06 PROCEDURE — 700102 HCHG RX REV CODE 250 W/ 637 OVERRIDE(OP): Performed by: INTERNAL MEDICINE

## 2020-02-06 PROCEDURE — 97535 SELF CARE MNGMENT TRAINING: CPT

## 2020-02-06 PROCEDURE — 36415 COLL VENOUS BLD VENIPUNCTURE: CPT

## 2020-02-06 PROCEDURE — A9270 NON-COVERED ITEM OR SERVICE: HCPCS | Performed by: HOSPITALIST

## 2020-02-06 RX ADMIN — SODIUM CHLORIDE 250 MG: 9 INJECTION, SOLUTION INTRAVENOUS at 06:15

## 2020-02-06 RX ADMIN — OXYCODONE HYDROCHLORIDE 10 MG: 10 TABLET ORAL at 21:52

## 2020-02-06 RX ADMIN — CARVEDILOL 6.25 MG: 6.25 TABLET, FILM COATED ORAL at 18:15

## 2020-02-06 RX ADMIN — HEPARIN SODIUM 5000 UNITS: 5000 INJECTION, SOLUTION INTRAVENOUS; SUBCUTANEOUS at 04:43

## 2020-02-06 RX ADMIN — GLYCOPYRROLATE 0.5 MG: 1 TABLET ORAL at 18:00

## 2020-02-06 RX ADMIN — HEPARIN SODIUM 5000 UNITS: 5000 INJECTION, SOLUTION INTRAVENOUS; SUBCUTANEOUS at 14:18

## 2020-02-06 RX ADMIN — MORPHINE SULFATE 2 MG: 4 INJECTION INTRAVENOUS at 10:22

## 2020-02-06 RX ADMIN — MORPHINE SULFATE 2 MG: 4 INJECTION INTRAVENOUS at 02:10

## 2020-02-06 RX ADMIN — OXYCODONE HYDROCHLORIDE 5 MG: 5 TABLET ORAL at 14:23

## 2020-02-06 RX ADMIN — HEPARIN SODIUM 5000 UNITS: 5000 INJECTION, SOLUTION INTRAVENOUS; SUBCUTANEOUS at 21:49

## 2020-02-06 RX ADMIN — SENNOSIDES AND DOCUSATE SODIUM 2 TABLET: 8.6; 5 TABLET ORAL at 18:16

## 2020-02-06 RX ADMIN — FLUTICASONE PROPIONATE 100 MCG: 50 SPRAY, METERED NASAL at 04:42

## 2020-02-06 RX ADMIN — ACETAMINOPHEN 650 MG: 325 TABLET, FILM COATED ORAL at 21:52

## 2020-02-06 RX ADMIN — ATORVASTATIN CALCIUM 80 MG: 40 TABLET, FILM COATED ORAL at 18:15

## 2020-02-06 RX ADMIN — MORPHINE SULFATE 2 MG: 4 INJECTION INTRAVENOUS at 06:14

## 2020-02-06 ASSESSMENT — COGNITIVE AND FUNCTIONAL STATUS - GENERAL
WALKING IN HOSPITAL ROOM: A LOT
MOVING FROM LYING ON BACK TO SITTING ON SIDE OF FLAT BED: UNABLE
CLIMB 3 TO 5 STEPS WITH RAILING: TOTAL
HELP NEEDED FOR BATHING: A LOT
SUGGESTED CMS G CODE MODIFIER DAILY ACTIVITY: CK
SUGGESTED CMS G CODE MODIFIER MOBILITY: CM
TOILETING: A LOT
DRESSING REGULAR LOWER BODY CLOTHING: A LOT
MOVING TO AND FROM BED TO CHAIR: UNABLE
MOBILITY SCORE: 8
STANDING UP FROM CHAIR USING ARMS: A LOT
TURNING FROM BACK TO SIDE WHILE IN FLAT BAD: UNABLE
DRESSING REGULAR UPPER BODY CLOTHING: A LITTLE
DAILY ACTIVITIY SCORE: 16
PERSONAL GROOMING: A LITTLE

## 2020-02-06 ASSESSMENT — ENCOUNTER SYMPTOMS
DEPRESSION: 0
WHEEZING: 0
EYES NEGATIVE: 1
HEADACHES: 0
DIZZINESS: 0
FALLS: 1
CONSTIPATION: 1
FOCAL WEAKNESS: 1
CARDIOVASCULAR NEGATIVE: 1
HALLUCINATIONS: 0
NERVOUS/ANXIOUS: 1
NAUSEA: 0
VOMITING: 0
NEUROLOGICAL NEGATIVE: 1
NECK PAIN: 0
PALPITATIONS: 0
GASTROINTESTINAL NEGATIVE: 1
SHORTNESS OF BREATH: 0
RESPIRATORY NEGATIVE: 1
CONSTITUTIONAL NEGATIVE: 1
MYALGIAS: 0

## 2020-02-06 ASSESSMENT — GAIT ASSESSMENTS: GAIT LEVEL OF ASSIST: UNABLE TO PARTICIPATE

## 2020-02-06 NOTE — DIETARY
"Nutrition Services: Day 5 of admit.  Bryan Garcia is a 54 y.o. female with admitting DX of Closed left femoral fracture  Consult received for Poor PO \"SLP recommending full liquid thickened diet. Patient requires Renal/Diabetic Diet.   Would definitely like TID supplements in addition to any other nutrition you deem appropriate\"    Pt states her appetite is okay and it was the same prior to admit. Pt denies any changes to her appetite. Pt denies wt changes and reports her UBW at 200 lbs (90.9 kg). Discussed adding supplements, added Boost Glucose Control TID with meals.     Assessment:  Ht: 177.8 cm, Wt 2/1: 85.73 kg via stated, BMI: 27.12 - Overweight  Diet/Intake: Diabetic, Renal, Full Liquid, Nectar Thick - Per chart pt PO <25-75%     Evaluation:   1. Pt appears adequately nourished.   2. Only stated wt in chart. Unable to determine if wt loss is present.   3. Labs: Na 134, Glucose 118, Creatinine 2.97, POC glucose 157, 87, 117  4. Meds: norvasc, coreg, epogen, pepcid, ferrlecit, robinul, humulin R, synthroid, pericolace, vitamin D, morphine (prn)  5. Last BM: 2/6    Malnutrition Risk: No criteria noted at this time.     Recommendations/Plan:  1. Diet upgrades per SLP as feasible.    2. Encourage intake of meals  3. Document intake of all meals as % taken in ADL's to provide interdisciplinary communication across all shifts.   4. Please obtain a measured weight. Monitor weight.  5. Nutrition rep will continue to see patient for ongoing meal and snack preferences.    RD following    "

## 2020-02-06 NOTE — PROGRESS NOTES
"Hospital Medicine Daily Progress Note    Date of Service  2/6/2020    Chief Complaint  Left Hip Pain    Hospital Course   Ms. Garcia is a 54 year old female with known medical history of ESRD on dialysis M/W/F,  Diabetes, arthritis, previous DVT, PE with IVC filter in place, and recent CVA (2018) with chronic debilitation and swallowing difficulties. Patient was in ususal state of health at dialysis on day before admission and was leaving her dialysis session when she had a ground level fall resulting in inability to ambulate and severe pain. Patient was transported to outside facility where it was noted on imaging that patient had proximal left femur fracture. Patient was then transferred to [facility] for Orthopedic consultation and surgical intervention. Orthopedics was consulted and patient underwent surgical treatment of left subtrochanteric femur fracture on 2/2/20 with intramedullary device.      Interval Problem Update  2/6 - Hgb 8.9 after 1 unit yesterday  -remains tearful at times. She is upset we are not allowing her to eat \"real food. I've had aspiration pneumonia before and I'll risk having it again. Just let me eat\". SLP following. Full liquid, thickened diet - NO yogurt, NO pudding    2/5 -POD #3  -Dialysis today  -1 unit RBC for Hgb 6.9  -Barium swallow study pending     2/4 -patient crying during rounds \"I'm in a lot of pain. I need to get out of here and get to the [facility]\". Bedside RN also reports intermittent disoriented comments  -Bedside RN noted patient having difficulties swallowing.  Questionable aspiration.  SLP consult recommends full liquid diet and GI consult.  Discussed with Dr. Cueto who recommends OP FU.    Consultants/Specialty  -Orthopedics  -Palliative Care    Code Status  FULL    Disposition  TBD    Review of Systems  Review of Systems   Constitutional: Positive for malaise/fatigue.   Eyes: Negative.    Respiratory: Negative for shortness of breath and wheezing.    Cardiovascular: " Negative for chest pain and palpitations.   Gastrointestinal: Positive for constipation (small formed BM today). Negative for nausea and vomiting.   Genitourinary: Negative for dysuria and urgency.   Musculoskeletal: Positive for falls and joint pain.   Neurological: Positive for focal weakness. Negative for dizziness and headaches.   Psychiatric/Behavioral: Negative for hallucinations. The patient is nervous/anxious.    All other systems reviewed and are negative.       Physical Exam  Temp:  [36.3 °C (97.3 °F)-36.6 °C (97.8 °F)] 36.3 °C (97.3 °F)  Pulse:  [63-73] 69  Resp:  [16-18] 16  BP: (100-117)/(56-87) 115/56  SpO2:  [93 %-96 %] 95 %    Physical Exam  Vitals signs and nursing note reviewed.   Constitutional:       General: She is sleeping. She is not in acute distress.     Appearance: She is overweight. She is ill-appearing (chronically).      Interventions: Nasal cannula in place.   HENT:      Head: Normocephalic.      Mouth/Throat:      Mouth: Mucous membranes are moist. Mucous membranes are pale.   Eyes:      General: Lids are normal.      Conjunctiva/sclera: Conjunctivae normal.   Cardiovascular:      Rate and Rhythm: Normal rate.      Heart sounds: Normal heart sounds. No friction rub.      Comments: Right Chest Temporary Dialysis cath CDI  Pulmonary:      Effort: Pulmonary effort is normal.      Breath sounds: Normal breath sounds. No decreased breath sounds.   Abdominal:      General: Bowel sounds are normal. There is distension.      Palpations: Abdomen is soft.      Tenderness: There is no tenderness.   Musculoskeletal:      Left hip: She exhibits decreased range of motion, decreased strength and bony tenderness.      Comments: CMS intact   Skin:     General: Skin is warm and dry.      Comments: Dressing in place to left hip, clean, dry and intact    Neurological:      Mental Status: She is oriented to person, place, and time and easily aroused. She is lethargic.      Sensory: Sensation is intact.    Psychiatric:         Attention and Perception: Attention normal.         Mood and Affect: Affect is labile and tearful.         Speech: Speech normal.         Behavior: Behavior is cooperative.         Thought Content: Thought content is not paranoid. Thought content does not include homicidal ideation.         Cognition and Memory: Cognition is impaired.         Fluids  No intake or output data in the 24 hours ending 02/06/20 1444    Laboratory  Recent Labs     02/04/20  0549 02/05/20  0233 02/06/20  0114   WBC 7.1 9.1 9.4   RBC 2.51* 2.40* 3.04*   HEMOGLOBIN 7.3* 6.9* 8.9*   HEMATOCRIT 24.3* 22.7* 28.3*   MCV 96.8 94.6 93.1   MCH 29.1 28.8 29.3   MCHC 30.0* 30.4* 31.4*   RDW 47.9 45.2 47.0   PLATELETCT 157* 177 188   MPV 10.8 10.3 10.5     Recent Labs     02/04/20 0549 02/05/20 0233 02/06/20  0114   SODIUM 132* 134* 134*   POTASSIUM 3.9 4.1 4.3   CHLORIDE 96 96 96   CO2 30 28 29   GLUCOSE 136* 167* 118*   BUN 20 27* 20   CREATININE 3.34* 4.40* 2.97*   CALCIUM 8.2* 8.5 8.4*                   Imaging  DX-ESOPH. FLUORO (BARIUM SWALLOW)   Final Result      Limited single contrast barium esophagram secondary to rapid aspiration      Moderately prominent cricopharyngeus impression. No diverticulum is seen. This is of doubtful significance      Contrast still present at the end of the procedure in the distal esophagus indicates dysmotility. There is no abnormal dilatation to suggest obstruction but low-grade obstruction cannot be excluded.      These findings and their importance were discussed with the patient      DX-FEMUR-2+ LEFT   Final Result      Digitized intraoperative radiograph is submitted for review.  This examination is not for diagnostic purpose but for guidance during a surgical procedure.      DX-PORTABLE FLUORO > 1 HOUR   Final Result      Portable fluoroscopy utilized for 2 minutes 48 seconds.         INTERPRETING LOCATION: [address] [place] [address], [id number]      DX-SHOULDER 2+ LEFT   Final Result      1.   No acute left shoulder fracture or dislocation.      2.  Mild degenerative change of the left AC joint.      GY-EXOPJXQ-XVLHKOY FILM X-RAY   Final Result      FZ-[id number]-ONWYNFM FILM X-RAY   Final Result      OUTSIDE IMAGES-DX PELVIS   Final Result           Assessment/Plan  * Closed fracture of left femur, unspecified fracture morphology, initial encounter ([facility])- (present on admission)  Assessment & Plan  -POD #5 left subtrochanteric IMN  -Nonpharmacologic and pharmacologic pain management  -Fall Precautions    Anemia due to chronic kidney disease, on chronic dialysis ([facility])- (present on admission)  Assessment & Plan  -Hgb 8.9 this morning after 1 unit yesterday. Much less pallor today  -Continue trending CBC  -Nephrology following  -Transfuse for hemoglobin less than 7    ESRD (end stage renal disease) ([facility])- (present on admission)  Assessment & Plan  -MWF Dialysis  -avoid nephrotoxins  -Renally dose medications as indicated  -Nephrology following  -Daily BMP      Dysphagia- (present on admission)  Assessment & Plan  -ongoing and chronic  -Patient is poor historian but makes comments about having issues with swallowing in the past.  Per SLP, patient recently had work-up done in [place] and was told to follow-up with outpatient GI, however this has not happened  -SLP following - diet per their rec's  -I discussed case with Dr. Culver from GI yesterday who recommended outpatient follow-up at this time  -Aspiration precaution    Constipation- (present on admission)  Assessment & Plan  -Acute on chronic  -Last BM was 1/31/2020 - she had small formed stool today  -Bowel protocol    Vitamin D insufficiency- (present on admission)  Assessment & Plan  -continue daily supplementation  -OP FU for further surveillance and management      HLD (hyperlipidemia)- (present on admission)  Assessment & Plan  -continue home Lipitor and aspirin.    Type II diabetes mellitus (HCC)- (present on admission)  Assessment &  Plan  -continue accu-checks ACHS with SSI  -Diabetic Renal Cardiac Diet  -hypoglycemia protocol      Essential hypertension- (present on admission)  Assessment & Plan  -continue Norvasc & Coreg        Hypothyroidism- (present on admission)  Assessment & Plan  -Chronic  -Continue home levothyroxine     VTE prophylaxis: Heparin     Please note that this dictation was created using voice recognition software. I have made every reasonable attempt to correct obvious errors, but there may be errors of grammar and possibly content that I did not discover before finalizing the note.     ORA Scott.

## 2020-02-06 NOTE — DISCHARGE PLANNING
Agency/Facility Name: [facility]  Spoke To: Deborah   Outcome: No answer. Left message.    LSW informed

## 2020-02-06 NOTE — PROGRESS NOTES
"      Spiritual Care Note    Patient Information     Patient's Name: Bryan Garcia   MRN: [id number]    YOB: 1965   Age and Gender: 54 y.o. female   Service Area: Orthopedics   Room (and Bed): [place] [address]   Ethnicity or Nationality:     Primary Language: English   Christian/Spiritual preference: Baptist   Place of Residence: [place], [place]   Family/Friends/Others Present: No   Clinical Team Present: No   Medical Diagnosis(-es)/Procedure(s): Closed left femoral fracture   Code Status: Full Code    Date of Admission: 1/31/2020   Length of Stay: 5 days        Spiritual Care Provider Information:    Name of Spiritual Care Provider: Berna Gamino  Title of Spiritual Care Provider: Associate   Phone Number: [phone]  E-mail: [email or web address]  Total time : 15 minutes    Spiritual Screen Results:    Gen Nursing  Spiritual Screen  Is your spiritual health or inner well-being important to you as you cope with your medical condition?: Yes  Would you like to receive a visit from our Spiritual Care team or your own Confucianist or spiritual leader?: Yes  Was spiritual care education provided to the patient?: Yes  Sources of Hope/Melodie: Prayer, Family, Christian/Spiritual community, Higher power     Palliative Care  PC Christian/Spiritual Screening  Was spiritual care education provided to the patient?: Yes      Encounter/Request Information  Encounter/Request Type   Visited With: Patient  Nature of the Visit: Follow-up  General Visit: Yes  Referral From/ Origin of Request: Verbal patient  Referral To: Community clergy(REFERRED TO FR. [place]LLINGTON TODAY @ 1000)    Religous Needs/Values  Christian Needs Visit  Christian Needs: Prayer    Spiritual Assessment     Spiritual Care Encounters    Observations/Symptoms: Anxious, Pain, Thankfulness    Interaction/Conversation: Pt was sitting up next to bed; reported that nurses had \"made\" her sit up and that it had been very difficult.  The  congratulated " and affirmed her for managing this.  Pt states that she is aspirating food and may need a feeding tube again; she is anxious abour this.  She was grateful for prayer.  The  will continue to visit.    Assessment: Need, Distress    Need: Seeking Spiritual Assistance and Support    Distress: Anxiety about the Future    Interventions: Compassionate presence, active listenign, affirmation, prayer.    Outcomes: Spiritual Comfort, Value/Dignity/Respect    Plan: Daily Visits    Notes:

## 2020-02-06 NOTE — PROGRESS NOTES
[facility] Nephrology Daily Progress Note    Date of Service  2/6/2020    Chief Complaint  Follow up ESRD    Interval Problem Update  50 yo female PMH ESRD MWF iHD, HTN, type 2 DM, anemia of CKD, and CKD-MBD who presents to ED with CC as above.  She was recently started on RRT for new onset ESRD last week and went to her usual treatmetn on 1/31.  She is walker dependent for gait and mabulation since a CVA in her past.  While walking towards her car, her MAYNOR asked to use her walker and when he took it away from her and in the process she lost her balance and sustained a fall onto the concrete sidewalk.  She had immediate pain and was not able to bear weight or ambulate as a result.  No alleviating factors.  Any movement aggravated the pain. Otherwise felt well and denies any recent F/C/N/V/CP/SOB.  No melena, hematochezia, hematemesis.  No HA, visual changes, or abdominal pain.    DAILY NEPHROLOGY SUMMARY  2/03/20: No events, seen on dialysis this am, BP stable, reports left leg pain controlled  2/04/20: No events, pt in tears and complaining of left leg pain, tolerated HD yest with 2.1L UF, BP borderline low this am and BP meds held  2/05/20: No events, noted to have intermittent confusion yest and hallucinations overnight, seen on dialysis this am and she is calm, BP stable, amlodipine held again this am per holding parameters  2/06/20: No events, tolerated HD yesterday, BP stable today and BP meds per holding parameters, feels ok, no new complaints, still having some pain in left leg    Review of Systems  Review of Systems   Constitutional: Negative.    HENT: Negative.    Eyes: Negative.    Respiratory: Negative.    Cardiovascular: Negative.    Gastrointestinal: Negative.    Musculoskeletal: Positive for falls and joint pain. Negative for myalgias and neck pain.   Skin: Negative.    Neurological: Negative.    Endo/Heme/Allergies: Negative.    Psychiatric/Behavioral: Negative for depression. The patient is  nervous/anxious.         Physical Exam  Temp:  [36.3 °C (97.3 °F)-36.6 °C (97.8 °F)] 36.3 °C (97.3 °F)  Pulse:  [63-73] 69  Resp:  [16-18] 16  BP: (100-117)/(56-87) 115/56  SpO2:  [93 %-96 %] 95 %    Physical Exam  Vitals signs and nursing note reviewed.   Constitutional:       General: She is not in acute distress.     Appearance: Normal appearance.   HENT:      Head: Normocephalic and atraumatic.      Right Ear: External ear normal.      Left Ear: External ear normal.      Nose: Nose normal. No congestion.      Mouth/Throat:      Mouth: Mucous membranes are moist.      Pharynx: No oropharyngeal exudate.   Eyes:      General: No scleral icterus.     Extraocular Movements: Extraocular movements intact.      Conjunctiva/sclera: Conjunctivae normal.   Neck:      Musculoskeletal: Normal range of motion and neck supple.   Cardiovascular:      Rate and Rhythm: Normal rate and regular rhythm.      Heart sounds: Normal heart sounds. No murmur.   Pulmonary:      Effort: Pulmonary effort is normal. No respiratory distress.      Breath sounds: Normal breath sounds.   Abdominal:      General: Abdomen is flat. Bowel sounds are normal. There is no distension.      Palpations: Abdomen is soft.   Musculoskeletal:         General: No tenderness.      Right lower leg: No edema.      Left lower leg: No edema.      Comments: +Decreased ROM left leg   Lymphadenopathy:      Cervical: No cervical adenopathy.   Skin:     General: Skin is warm and dry.      Findings: No erythema.   Neurological:      Mental Status: She is alert and oriented to person, place, and time. Mental status is at baseline.      Motor: No weakness.   Psychiatric:         Mood and Affect: Mood normal.         Behavior: Behavior normal.         Fluids  No intake or output data in the 24 hours ending 02/06/20 1346    Laboratory  Recent Labs     02/04/20  0549 02/05/20  0233 02/06/20  0114   WBC 7.1 9.1 9.4   RBC 2.51* 2.40* 3.04*   HEMOGLOBIN 7.3* 6.9* 8.9*    HEMATOCRIT 24.3* 22.7* 28.3*   MCV 96.8 94.6 93.1   MCH 29.1 28.8 29.3   MCHC 30.0* 30.4* 31.4*   RDW 47.9 45.2 47.0   PLATELETCT 157* 177 188   MPV 10.8 10.3 10.5     Recent Labs     02/04/20  0549 02/05/20  0233 02/06/20  0114   SODIUM 132* 134* 134*   POTASSIUM 3.9 4.1 4.3   CHLORIDE 96 96 96   CO2 30 28 29   GLUCOSE 136* 167* 118*   BUN 20 27* 20   CREATININE 3.34* 4.40* 2.97*   CALCIUM 8.2* 8.5 8.4*         No results for input(s): NTPROBNP in the last 72 hours.          IMPRESSION:  - ESRD    * Etiology likely 2/2 HTN/DM    * MWF iHD @ [facility]  - Left subtrochanteric femur fracture    * S/P surgical repair  - HTN    * Goal BP < 140/90    * Stable  - Anemia of CKD    * Goal Hgb 10-11    * Iron sat low    * Stable  - CKD-MBD    * Managed at HD unit    * Stable  - HLD  - CAD  - CVA  - Altered Mental Status--intermittent confusion and hallucinations over past 24hrs    * Eval per primary svc  -Moderate Protein-Calorie Malnutrition     PLAN:  - HD tomorrow and qMWF iHD  - UF with HD as tolerated  - PT/OT as tolerated  - No dietary protein restrictions  - Dose all meds per ESRD  - Epogen with HD  -Transfuse PRN for Hgb less than 7  - Continue IV Iron  - Pain management per primary svc  - Stop amlodipine for now  - Holding parameters in place for BP meds     All prior notes form other doctors and RN staff were reviewed from admission to current day to help me make my clinical decisions

## 2020-02-06 NOTE — DISCHARGE PLANNING
Received Choice form at 0940  Agency/Facility Name: [facility]  Referral sent per Choice form @ 0945     Sent comm management

## 2020-02-06 NOTE — CARE PLAN
Problem: Safety  Goal: Will remain free from falls  Outcome: PROGRESSING AS EXPECTED   Fall precautions in place. Call light and personal belongings within reach. Patient encouraged to use call light to alert staff of needs and before getting out of bed. Patient verbalized understanding. Hourly rounding in place.     Problem: Pain Management  Goal: Pain level will decrease to patient's comfort goal  Outcome: PROGRESSING AS EXPECTED   IV pain medication administered. Ice packs in use to left hip.

## 2020-02-06 NOTE — THERAPY
"Speech Language Therapy dysphagia treatment completed.   Functional Status:  Barium swallow attempted today to further assess upper esophageal function. Per report she was unable to complete the exam due to immediate aspiration. (See radiologist's report). I saw her bedside with thin and nectar thick liquids and ice. She independently took small sips. No immediate coughing observed. However, she did start coughing approximately 7 seconds after he last sip of either the thins or the thick. She was also seen swallowing multiple times after coughing but denied feeling like anything was in her throat. Discussed with NP and RN.     Recommendations: Patient made NPO except for ice chips with nursing staff. NP to reassess tomorrow. SLP available to complete FEES if needed to further assess swallow function if needed.   Plan of Care: Will benefit from Speech Therapy 3 times per week  Post-Acute Therapy: Recommend inpatient transitional care services for continued speech therapy services.        See \"Rehab Therapy-Acute\" Patient Summary Report for complete documentation.     "

## 2020-02-06 NOTE — THERAPY
"Occupational Therapy Treatment completed   Functional Status: Pt seen for OT tx today, making progress with set POC but limited by anxiety during session; requires constant vc's for anxiety management. Pt performed bed mobility with min a from a raised hob and use of bed rail, assist required to offload weight for LLE, F+ sitting eob w/o UE support, completed oral care and grooming task with supervision after s/u, STS eob with mod a to achieve full extension and to wb through LLE using FWW, t/f to BSC with min to mod a for eccentric control during descent, toileting max a does initiate assist with pericare, STS from BSC with mod a, BSC to chair t/f with mod to max a 2/2 increased anxiety and demonstrate decreased insight into her own functional capabilities. Pt requires step by step sequencing, frequent cues for restbreaks and proper breathing techniques to facilitate safe t/f and attention. Pt will complete from acute skilled OT services while in house and post acute recommended prior to d/c home.   Plan of Care: Will benefit from Occupational Therapy 3 times per week  Discharge Recommendations:  Equipment Will Continue to Assess for Equipment Needs. Post-acute therapy Recommend post-acute placement for additional occupational therapy services prior to discharge home.      See \"Rehab Therapy-Acute\" Patient Summary Report for complete documentation.   "

## 2020-02-06 NOTE — PROGRESS NOTES
Hemodialysis ordered by Dr. Van. Treatment started at 0721 and ended at 1051. Pt stable, vss, no c/o post tx. See flow sheets for details. Net UF 2.5 L. Reported to TROY Cano RN.

## 2020-02-06 NOTE — PROGRESS NOTES
Patient is currently NPO due to concerns of aspiration and reporting 10/10 pain in the left hip. Patient requesting pain medication. Patient's previous PRN IV pain medication was discontinued. PRN suppository was administered earlier this shift. Notified Dr. Schuster and received order for Morphine, 2 mg IV, every 3 hours PRN for severe pain.

## 2020-02-06 NOTE — THERAPY
Speech Language Therapy FEES completed.  Functional Status: The patient was seen for FEES while she was sitting upright at 90 degrees in the bedside chair. She tolerated passing of the scope through her left nostril. Orientation of the larynx appeared tilted to the right. Her left vocal fold/arytenoid was sluggish in movement compared to the right. She consumed trials of ice chips, thin liquid by tsp and cup, nectar thick liquids by tsp, cup and straw, puree, pudding, and soft chopped solids. Pharyngeal squeeze was significantly reduced in strength and she had severe diffuse residue with pudding and soft solids. Penetration of puree noted at the interarytenoid space. When she talked or coughed it looked like residue from the cricopharyngeal space was squeezed back into the larynx and trace aspiration occurred on this residue. Only a liquid wash could clear the residue. Applesauce was consumed easier but she still needed a liquid wash to clear the residue. Soft solids were deeply penetrated at the ventricular level. She did not sense the food so close to her airway and needed verbal cues to cough. She attempted to swallow the bolus again and it again landed just on top of her airway, without sensation. She cleared the bolus with a thick liquid wash. Large sips of thin liquid presented a waterfall affect with deep penetration. Currently, nectar thick full liquid diet (no pudding, no yogurt) appears to be the safest PO texture for this patient. Discussed with RN and NP. Patient to start the modified diet with use of strategies. Recommend to monitor for signs of aspiration including temp and follow up CxR. Recommend GI consult.     Recommendations - Diet: Diet / Liquid Recommendation: Nectar Thick Full Liquid(no pudding, no yogurt)                          Strategies: Monitor during meals, Assistance needed for meal tray set-up and Head of Bed at 90 Degrees                          Medication Administration: Medication  "Administration : Crush all Medications in Puree(in applesauce. then provide a thick liquid wash)    Plan of Care: Will benefit from Speech Therapy 3 times per week  Post-Acute Therapy: Recommend inpatient transitional care services for continued speech therapy services.        See \"Rehab Therapy-Acute\" Patient Summary Report for complete documentation.   "

## 2020-02-07 LAB
ANION GAP SERPL CALC-SCNC: 9 MMOL/L (ref 0–11.9)
BASOPHILS # BLD AUTO: 0.5 % (ref 0–1.8)
BASOPHILS # BLD: 0.04 K/UL (ref 0–0.12)
BUN SERPL-MCNC: 30 MG/DL (ref 8–22)
CALCIUM SERPL-MCNC: 8.4 MG/DL (ref 8.5–10.5)
CHLORIDE SERPL-SCNC: 96 MMOL/L (ref 96–112)
CO2 SERPL-SCNC: 29 MMOL/L (ref 20–33)
CREAT SERPL-MCNC: 4.15 MG/DL (ref 0.5–1.4)
EOSINOPHIL # BLD AUTO: 0.3 K/UL (ref 0–0.51)
EOSINOPHIL NFR BLD: 3.8 % (ref 0–6.9)
ERYTHROCYTE [DISTWIDTH] IN BLOOD BY AUTOMATED COUNT: 46.6 FL (ref 35.9–50)
GLUCOSE BLD-MCNC: 125 MG/DL (ref 65–99)
GLUCOSE BLD-MCNC: 130 MG/DL (ref 65–99)
GLUCOSE BLD-MCNC: 130 MG/DL (ref 65–99)
GLUCOSE SERPL-MCNC: 122 MG/DL (ref 65–99)
HCT VFR BLD AUTO: 27.5 % (ref 37–47)
HGB BLD-MCNC: 8.4 G/DL (ref 12–16)
IMM GRANULOCYTES # BLD AUTO: 0.05 K/UL (ref 0–0.11)
IMM GRANULOCYTES NFR BLD AUTO: 0.6 % (ref 0–0.9)
LYMPHOCYTES # BLD AUTO: 1.39 K/UL (ref 1–4.8)
LYMPHOCYTES NFR BLD: 17.6 % (ref 22–41)
MCH RBC QN AUTO: 28.5 PG (ref 27–33)
MCHC RBC AUTO-ENTMCNC: 30.5 G/DL (ref 33.6–35)
MCV RBC AUTO: 93.2 FL (ref 81.4–97.8)
MONOCYTES # BLD AUTO: 0.73 K/UL (ref 0–0.85)
MONOCYTES NFR BLD AUTO: 9.2 % (ref 0–13.4)
NEUTROPHILS # BLD AUTO: 5.39 K/UL (ref 2–7.15)
NEUTROPHILS NFR BLD: 68.3 % (ref 44–72)
NRBC # BLD AUTO: 0 K/UL
NRBC BLD-RTO: 0 /100 WBC
PLATELET # BLD AUTO: 229 K/UL (ref 164–446)
PMV BLD AUTO: 10.5 FL (ref 9–12.9)
POTASSIUM SERPL-SCNC: 4 MMOL/L (ref 3.6–5.5)
RBC # BLD AUTO: 2.95 M/UL (ref 4.2–5.4)
SODIUM SERPL-SCNC: 134 MMOL/L (ref 135–145)
WBC # BLD AUTO: 7.9 K/UL (ref 4.8–10.8)

## 2020-02-07 PROCEDURE — 80048 BASIC METABOLIC PNL TOTAL CA: CPT

## 2020-02-07 PROCEDURE — 700105 HCHG RX REV CODE 258: Performed by: INTERNAL MEDICINE

## 2020-02-07 PROCEDURE — 700111 HCHG RX REV CODE 636 W/ 250 OVERRIDE (IP): Performed by: INTERNAL MEDICINE

## 2020-02-07 PROCEDURE — A9270 NON-COVERED ITEM OR SERVICE: HCPCS | Performed by: NURSE PRACTITIONER

## 2020-02-07 PROCEDURE — 90935 HEMODIALYSIS ONE EVALUATION: CPT

## 2020-02-07 PROCEDURE — 700102 HCHG RX REV CODE 250 W/ 637 OVERRIDE(OP): Performed by: NURSE PRACTITIONER

## 2020-02-07 PROCEDURE — 700102 HCHG RX REV CODE 250 W/ 637 OVERRIDE(OP): Performed by: HOSPITALIST

## 2020-02-07 PROCEDURE — A9270 NON-COVERED ITEM OR SERVICE: HCPCS | Performed by: HOSPITALIST

## 2020-02-07 PROCEDURE — 770006 HCHG ROOM/CARE - MED/SURG/GYN SEMI*

## 2020-02-07 PROCEDURE — 700111 HCHG RX REV CODE 636 W/ 250 OVERRIDE (IP): Performed by: NURSE PRACTITIONER

## 2020-02-07 PROCEDURE — 85025 COMPLETE CBC W/AUTO DIFF WBC: CPT

## 2020-02-07 PROCEDURE — 99232 SBSQ HOSP IP/OBS MODERATE 35: CPT | Performed by: FAMILY MEDICINE

## 2020-02-07 PROCEDURE — 82962 GLUCOSE BLOOD TEST: CPT

## 2020-02-07 PROCEDURE — 36415 COLL VENOUS BLD VENIPUNCTURE: CPT

## 2020-02-07 PROCEDURE — 5A1D70Z PERFORMANCE OF URINARY FILTRATION, INTERMITTENT, LESS THAN 6 HOURS PER DAY: ICD-10-PCS | Performed by: INTERNAL MEDICINE

## 2020-02-07 RX ORDER — OXYCODONE HYDROCHLORIDE 5 MG/1
5 TABLET ORAL EVERY 6 HOURS PRN
Qty: 10 TAB | Refills: 0 | Status: SHIPPED | OUTPATIENT
Start: 2020-02-07 | End: 2020-02-08

## 2020-02-07 RX ADMIN — ERYTHROPOIETIN 10000 UNITS: 10000 INJECTION, SOLUTION INTRAVENOUS; SUBCUTANEOUS at 13:20

## 2020-02-07 RX ADMIN — SENNOSIDES AND DOCUSATE SODIUM 2 TABLET: 8.6; 5 TABLET ORAL at 04:27

## 2020-02-07 RX ADMIN — OXYCODONE HYDROCHLORIDE 10 MG: 10 TABLET ORAL at 04:26

## 2020-02-07 RX ADMIN — ASPIRIN 81 MG: 81 TABLET, COATED ORAL at 04:27

## 2020-02-07 RX ADMIN — ATORVASTATIN CALCIUM 80 MG: 40 TABLET, FILM COATED ORAL at 17:11

## 2020-02-07 RX ADMIN — SODIUM CHLORIDE 250 MG: 9 INJECTION, SOLUTION INTRAVENOUS at 06:00

## 2020-02-07 RX ADMIN — MELATONIN 1000 UNITS: at 04:27

## 2020-02-07 RX ADMIN — HEPARIN SODIUM 5000 UNITS: 5000 INJECTION, SOLUTION INTRAVENOUS; SUBCUTANEOUS at 18:00

## 2020-02-07 RX ADMIN — OXYCODONE HYDROCHLORIDE 10 MG: 10 TABLET ORAL at 12:03

## 2020-02-07 RX ADMIN — HEPARIN SODIUM 5000 UNITS: 5000 INJECTION, SOLUTION INTRAVENOUS; SUBCUTANEOUS at 04:28

## 2020-02-07 RX ADMIN — HEPARIN SODIUM 3200 UNITS: 1000 INJECTION, SOLUTION INTRAVENOUS; SUBCUTANEOUS at 13:21

## 2020-02-07 RX ADMIN — FAMOTIDINE 20 MG: 20 TABLET ORAL at 04:26

## 2020-02-07 RX ADMIN — GLYCOPYRROLATE 0.5 MG: 1 TABLET ORAL at 17:12

## 2020-02-07 RX ADMIN — LEVOTHYROXINE SODIUM 112 MCG: 0.11 TABLET ORAL at 04:28

## 2020-02-07 RX ADMIN — CARVEDILOL 6.25 MG: 6.25 TABLET, FILM COATED ORAL at 07:48

## 2020-02-07 RX ADMIN — OXYCODONE HYDROCHLORIDE 10 MG: 10 TABLET ORAL at 07:57

## 2020-02-07 RX ADMIN — OXYCODONE HYDROCHLORIDE 10 MG: 10 TABLET ORAL at 17:14

## 2020-02-07 RX ADMIN — OXYCODONE HYDROCHLORIDE 10 MG: 10 TABLET ORAL at 01:03

## 2020-02-07 RX ADMIN — GLYCOPYRROLATE 0.5 MG: 1 TABLET ORAL at 04:27

## 2020-02-07 ASSESSMENT — ENCOUNTER SYMPTOMS
VOMITING: 0
NEUROLOGICAL NEGATIVE: 1
HEADACHES: 0
SHORTNESS OF BREATH: 0
PALPITATIONS: 0
WHEEZING: 0
DEPRESSION: 0
NECK PAIN: 0
NERVOUS/ANXIOUS: 1
CONSTITUTIONAL NEGATIVE: 1
FOCAL WEAKNESS: 1
DIZZINESS: 0
GASTROINTESTINAL NEGATIVE: 1
MYALGIAS: 0
NAUSEA: 0
CARDIOVASCULAR NEGATIVE: 1
FALLS: 1
HALLUCINATIONS: 0
RESPIRATORY NEGATIVE: 1
EYES NEGATIVE: 1

## 2020-02-07 NOTE — PALLIATIVE CARE
Palliative Care  PC RN attempted visit, pt down for dialysis and not available to discuss GOC. Will return later.     Updated:   Dr. Jay    Plan:   Return to discuss GOC with pt when she is available.       Thank you for allowing Palliative Care to participate in this patient's care. Please feel free to call [phone] with any questions or concerns.

## 2020-02-07 NOTE — DISCHARGE PLANNING
Outpatient Dialysis Note    I was notified by Raven OVALLES and advised that pt. will be going to [facility] in [place] and if we can transfer pt. to [facility]. I contacted [facility] and spoke with Ver[facility] and [facility] is willing to take patient as a visitor, as long as patient has transportation set up.     Pt. will need to be there by 1:30pm to sign paperwork on Monday 2/10/2020.     Susy Sebastian- Dialysis Coordinator  [facility] # [phone]

## 2020-02-07 NOTE — DISCHARGE PLANNING
Agency/Facility Name: [facility]  Spoke To: Deborah  Outcome: Pt accepted. Must set up dialysis in [place] prior to discharge from [facility].    LSW informed

## 2020-02-07 NOTE — DISCHARGE PLANNING
Agency/Facility Name: [facility]  Spoke To: Susy  Outcome: Pt records will not be processed from [place] to [place] until Monday. Dialysis chair won't be able to started until 2/12 in [place]. Cannot discharge pt until dialysis chair is fully transferred.      LSW informed

## 2020-02-07 NOTE — DISCHARGE SUMMARY
"Discharge Summary    CHIEF COMPLAINT ON ADMISSION  Chief Complaint   Patient presents with   • T-5000 GLF   • Leg Injury       Reason for Admission  EMS     CODE STATUS  Full Code    HPI & HOSPITAL COURSE  Ms. Garcia is a 54 year old female with known medical history of ESRD on dialysis M/W/F,  Diabetes, arthritis, previous DVT, PE with IVC filter in place, and recent CVA (2018) with chronic debilitation and swallowing difficulties. Patient was in ususal state of health at dialysis on day before admission and was leaving her dialysis session when she had a ground level fall resulting in inability to ambulate and severe pain. Patient was transported to outside facility where it was noted on imaging that patient had proximal left femur fracture. Patient was then transferred to [facility] for Orthopedic consultation and surgical intervention. Orthopedics was consulted and patient underwent surgical treatment of left subtrochanteric femur fracture on 2/2/20 with intramedullary device.      2/4 -patient crying during rounds \"I'm in a lot of pain. I need to get out of here and get to the [facility]\". Bedside RN also reports intermittent disoriented comments  -Bedside RN noted patient having difficulties swallowing.  Questionable aspiration.  SLP consult recommends full liquid diet and GI consult. Discussed with Dr. Cueto who recommends OP FU.    2/5 -POD #3  -Dialysis today  -1 unit RBC for Hgb 6.9  -Barium swallow study pending    2/6 - Hgb 8.9 after 1 unit yesterday  -remains tearful at times. She is upset we are not allowing her to eat \"real food. I've had aspiration pneumonia before and I'll risk having it again. Just let me eat\". SLP following. Full liquid, thickened diet - NO yogurt, NO pudding  -small, formed BM today    2/7 - dialysis today  -moods less labile/emotional/tearful  -pain controlled with oxycodone    Seen and examined this morning prior to discharge.  She is sitting up in bed eating her full liquid/thickened " diet without any/choking or signs of aspiration.  She is aware of and eager for her transfer to [facility] today.    Therefore, she is discharged in good and stable condition to skilled nursing facility.    The patient met 2-midnight criteria for an inpatient stay at the time of discharge.      FOLLOW UP ITEMS POST DISCHARGE  -OP dialysis  -Per SNF    DISCHARGE DIAGNOSES  Principal Problem:    Closed fracture of left femur, unspecified fracture morphology, initial encounter ([facility]) POA: Yes  Active Problems:    ESRD (end stage renal disease) ([facility]) POA: Yes    Anemia due to chronic kidney disease, on chronic dialysis ([facility]) POA: Yes    Dysphagia POA: Yes    Hypothyroidism (Chronic) POA: Yes    Essential hypertension (Chronic) POA: Yes    Type II diabetes mellitus ([facility]) (Chronic) POA: Yes    HLD (hyperlipidemia) (Chronic) POA: Yes    Vitamin D insufficiency POA: Yes    Constipation POA: Yes  Resolved Problems:    * No resolved hospital problems. *      FOLLOW UP  Douglas Vazquez M.D.  [address] [facility] [address]  [phone]    Go in 2 weeks  in 10-14 days for surgical follow up      MEDICATIONS ON DISCHARGE     Medication List      START taking these medications      Instructions   Cholecalciferol 1000 UNIT Tabs  Commonly known as:  VITAMIND D3   Take 1 Tab by mouth every day.  Dose:  1,000 Units     oxyCODONE immediate-release 5 MG Tabs  Commonly known as:  ROXICODONE   Take 1 Tab by mouth every 6 hours as needed for up to 3 days.  Dose:  5 mg        CONTINUE taking these medications      Instructions   amLODIPine 10 MG Tabs  Commonly known as:  NORVASC   Take 1 Tab by mouth every day.  Dose:  10 mg     artificial tears 1.4 % Soln   Place 2 Drops in both eyes every 2 hours as needed.  Dose:  2 Drop     ascorbic acid 1000 MG tablet  Commonly known as:  VITAMIN C   Take 1 Tab by mouth every day.  Dose:  1,000 mg     aspirin 81 MG EC tablet   Take 1 Tab by mouth every day.  Dose:  81 mg     atorvastatin 80  MG tablet  Commonly known as:  LIPITOR   1 Tab by Per NG Tube route every evening.  Dose:  80 mg     carvedilol 6.25 MG Tabs  Commonly known as:  COREG   Take 1 Tab by mouth 2 times a day, with meals.  Dose:  6.25 mg     ergocalciferol 40517 UNIT capsule  Commonly known as:  DRISDOL   Take 1 Cap by mouth every 7 days.  Dose:  50,000 Units     famotidine 20 MG Tabs  Commonly known as:  PEPCID   1 Tab by Per NG Tube route every day.  Dose:  20 mg     fluticasone 50 MCG/ACT nasal spray  Commonly known as:  FLONASE   Spray 2 Sprays in nose every day.  Dose:  2 Spray     glycopyrrolate 1 MG Tabs  Commonly known as:  ROBINUL   Take 0.5 Tabs by mouth 2 Times a Day.  Dose:  0.5 mg     hydrALAZINE 25 MG Tabs  Commonly known as:  APRESOLINE   Take 1 Tab by mouth every 8 hours.  Dose:  25 mg     hydrOXYzine HCl 25 MG Tabs  Commonly known as:  ATARAX   1 Tab by Per NG Tube route 3 times a day as needed for Itching.  Dose:  25 mg     insulin regular 100 Unit/mL Soln  Commonly known as:  HUMULIN R   Inject 1-6 Units as instructed 4 Times a Day,Before Meals and at Bedtime.  Dose:  1-6 Units     levothyroxine 112 MCG Tabs  Commonly known as:  SYNTHROID   Take 1 Tab by mouth every day.  Dose:  112 mcg     lidocaine 5 % Ptch  Commonly known as:  LIDODERM   Apply 2 Patches to skin as directed every 24 hours.  Dose:  2 Patch     meclizine 25 MG Tabs  Commonly known as:  ANTIVERT   1 Tab by Per NG Tube route every 8 hours as needed for Dizziness or Vertigo.  Dose:  25 mg     metoclopramide 5 MG tablet  Commonly known as:  REGLAN   1 Tab by Per NG Tube route 4 Times a Day,Before Meals and at Bedtime.  Dose:  5 mg     * Misc. Devices Misc   Unilateral diagnostic mammogram right breast with ultrasound if indicated; fax [phone]     * Misc. Devices Misc   Freestyle lite test strips; patient has DM type 2 and tests 3 times per day     ondansetron 4 MG Tbdp  Commonly known as:  ZOFRAN ODT   Take 1 Tab by mouth every four hours as needed (give  PO if no IV route available).  Dose:  4 mg     polyethylene glycol 3350 Powd  Commonly known as:  MIRALAX   Take 17 g by mouth 2 times a day.  Dose:  17 g     senna-docusate 8.6-50 MG Tabs  Commonly known as:  PERICOLACE or SENOKOT S   Take 2 Tabs by mouth 2 times a day.  Dose:  2 Tab         * This list has 2 medication(s) that are the same as other medications prescribed for you. Read the directions carefully, and ask your doctor or other care provider to review them with you.                Allergies  Allergies   Allergen Reactions   • Toradol Hives   • Levaquin      Blood boils   • Lisinopril Unspecified     Caused potassium build up in blood   • Tape        DIET  Orders Placed This Encounter   Procedures   • Diet Order Diabetic (no pudding, no yogurt), Renal, Full Liquid     Standing Status:   Standing     Number of Occurrences:   1     Order Specific Question:   Diet:     Answer:   Diabetic [3]     Comments:   no pudding, no yogurt     Order Specific Question:   Diet:     Answer:   Renal [8]     Order Specific Question:   Diet:     Answer:   Full Liquid [11]     Order Specific Question:   Consistency/Fluid modifications:     Answer:   Nectar Thick [2]     Order Specific Question:   Miscellaneous modifications:     Answer:   SLP - Deliver to Nursing Station [22]     Comments:   please make sure all liquids are thickened and pt is upright or in chair for meals       ACTIVITY  As tolerated and directed by skilled nursing.  Weight bearing as tolerated    LINES, DRAINS, AND WOUNDS  This is an automated list. Peripheral IVs will be removed prior to discharge.  Peripheral IV 02/06/20 20 G Left Wrist (Active)   Site Assessment Clean;Dry;Intact 2/7/2020  8:00 AM   Dressing Type Transparent 2/7/2020  8:00 AM   Line Status Saline locked 2/7/2020  8:00 AM   Dressing Status Clean;Dry;Intact 2/7/2020  8:00 AM   Dressing Intervention N/A 2/7/2020  8:00 AM   Date Primary Tubing Changed 02/06/20 2/7/2020  8:00 AM   Date  Secondary Tubing Changed 02/06/20 2/6/2020  9:47 PM   NEXT Primary Tubing Change  02/08/20 2/7/2020  8:00 AM   NEXT Secondary Tubing Change  02/07/20 2/6/2020  9:47 PM   [facility]fil[facility] Grading ([facility]) 0 2/7/2020  8:00 AM   Phlebitis Scale ([facility] Only) 0 2/7/2020  8:00 AM       Wound 02/01/20 Other (comment) Leg LLE fracture (Active)   Site Assessment [facility] 2/7/2020  8:00 AM   Jacqui-wound Assessment [facility] 2/7/2020  8:00 AM   Margins [facility] 2/7/2020  8:00 AM   Closure PHUC 2/7/2020  8:00 AM   Drainage Amount None 2/7/2020  8:00 AM   Drainage Description [facility] 2/4/2020  9:00 AM   Dressing Options Dry Gauze;Transparent Film 2/7/2020  8:00 AM   Dressing Changed Other (Comment) 2/4/2020  8:00 PM   Dressing Status Clean;Dry;Intact 2/7/2020  8:00 AM   Dressing Change Frequency Other (Comments) 2/4/2020  9:00 AM     HD Catheter Double Lumen Tunneled (Active)   Site Assessment Clean;Dry;Intact 2/5/2020 10:51 AM   Red Lumen Status Capped;Flushed;Heparin locked 2/5/2020 10:51 AM   Blue Lumen Status Capped;Flushed;Heparin locked 2/5/2020 10:51 AM   Site Prep Alcohol 2/5/2020 10:51 AM   Treatment Performed Dialysis 2/5/2020 10:51 AM   Line Care Cap replaced;Connections replaced and tightened 2/5/2020 10:51 AM   Dressing Type Biopatch;Transparent 2/5/2020 10:51 AM   Dressing Status Clean;Dry;Intact 2/5/2020 10:51 AM   Dressing Intervention N/A 2/5/2020 10:51 AM   Dressing Change Due 02/08/20 2/5/2020 10:51 AM      Peripheral IV 02/06/20 20 G Left Wrist (Active)   Site Assessment Clean;Dry;Intact 2/7/2020  8:00 AM   Dressing Type Transparent 2/7/2020  8:00 AM   Line Status Saline locked 2/7/2020  8:00 AM   Dressing Status Clean;Dry;Intact 2/7/2020  8:00 AM   Dressing Intervention N/A 2/7/2020  8:00 AM   Date Primary Tubing Changed 02/06/20 2/7/2020  8:00 AM   Date Secondary Tubing Changed 02/06/20 2/6/2020  9:47 PM   NEXT Primary Tubing Change  02/08/20 2/7/2020  8:00 AM   NEXT Secondary Tubing Change  02/07/20 2/6/2020  9:47 PM    Infiltration Grading ([facility] 0 2/7/2020  8:00 AM   Phlebitis Scale ([facility] Only) 0 2/7/2020  8:00 AM               MENTAL STATUS ON TRANSFER  Level of Consciousness: Alert  Orientation : Oriented x 4  Speech: Speech Clear    CONSULTATIONS  -Orthopedics    PROCEDURES  2/1/20 Surgical treatment of left subtrochanteric femur fracture with intramedullary device    LABORATORY  Lab Results   Component Value Date    SODIUM 134 (L) 02/07/2020    POTASSIUM 4.0 02/07/2020    CHLORIDE 96 02/07/2020    CO2 29 02/07/2020    GLUCOSE 122 (H) 02/07/2020    BUN 30 (H) 02/07/2020    CREATININE 4.15 (H) 02/07/2020        Lab Results   Component Value Date    WBC 7.9 02/07/2020    HEMOGLOBIN 8.4 (L) 02/07/2020    HEMATOCRIT 27.5 (L) 02/07/2020    PLATELETCT 229 02/07/2020        Total time of the discharge process exceeds 42 minutes.    Please note that this dictation was created using voice recognition software. I have made every reasonable attempt to correct obvious errors, but there may be errors of grammar and possibly content that I did not discover before finalizing the note.    ORA Scott.

## 2020-02-07 NOTE — THERAPY
Pt unavailable to participate, currently in dialysis. PT will attempt in afternoon if time permits and if pt is able to participate.

## 2020-02-07 NOTE — CARE PLAN
Problem: Safety  Goal: Will remain free from falls  Outcome: PROGRESSING AS EXPECTED   Fall precautions in place. Call light and personal belongings within reach. Hourly rounding in place.     Problem: Venous Thromboembolism (VTW)/Deep Vein Thrombosis (DVT) Prevention:  Goal: Patient will participate in Venous Thrombosis (VTE)/Deep Vein Thrombosis (DVT)Prevention Measures  Outcome: PROGRESSING AS EXPECTED   SCDs in use while patient resting in bed.     Problem: Pain Management  Goal: Pain level will decrease to patient's comfort goal  Outcome: PROGRESSING AS EXPECTED   PRN pain medication administered per MAR.

## 2020-02-07 NOTE — DISCHARGE PLANNING
Ag[facility]ility Name: [facility]  Spoke To: Susy(coordinator)  Outcome: Dialysis chair available in [facility] @ 5999. Advised Deborah @ [place].        [facility] informned

## 2020-02-07 NOTE — PROGRESS NOTES
"Hospital Medicine Daily Progress Note    Date of Service  2/8/2020    Chief Complaint  Left Hip Pain    Hospital Course   Ms. Garcia is a 54 year old female with known medical history of ESRD on dialysis M/W/F,  Diabetes, arthritis, previous DVT, PE with IVC filter in place, and recent CVA (2018) with chronic debilitation and swallowing difficulties. Patient was in ususal state of health at dialysis on day before admission and was leaving her dialysis session when she had a ground level fall resulting in inability to ambulate and severe pain. Patient was transported to outside facility where it was noted on imaging that patient had proximal left femur fracture. Patient was then transferred to [facility] for Orthopedic consultation and surgical intervention. Orthopedics was consulted and patient underwent surgical treatment of left subtrochanteric femur fracture on 2/2/20 with intramedullary device.      Interval Problem Update  2/7 - dialysis today  -moods less labile/emotional/tearful  -pain controlled with oxycodone    2/6 - Hgb 8.9 after 1 unit yesterday  -remains tearful at times. She is upset we are not allowing her to eat \"real food. I've had aspiration pneumonia before and I'll risk having it again. Just let me eat\". SLP following. Full liquid, thickened diet - NO yogurt, NO pudding    2/5 -POD #3  -Dialysis today  -1 unit RBC for Hgb 6.9  -Barium swallow study pending     2/4 -patient crying during rounds \"I'm in a lot of pain. I need to get out of here and get to the [facility]\". Bedside RN also reports intermittent disoriented comments  -Bedside RN noted patient having difficulties swallowing.  Questionable aspiration.  SLP consult recommends full liquid diet and GI consult.  Discussed with Dr. Cueto who recommends OP FU.    Consultants/Specialty  -Orthopedics  -Palliative Care    Code Status  FULL    Disposition  TBD    Review of Systems  Review of Systems   Constitutional: Positive for malaise/fatigue.   Eyes: " Negative.    Respiratory: Negative for shortness of breath and wheezing.    Cardiovascular: Negative for chest pain and palpitations.   Gastrointestinal: Negative for constipation, nausea and vomiting.   Genitourinary: Negative for dysuria and urgency.   Musculoskeletal: Positive for falls and joint pain.   Neurological: Positive for focal weakness. Negative for dizziness and headaches.   Psychiatric/Behavioral: Negative for hallucinations. The patient is not nervous/anxious.    All other systems reviewed and are negative.       Physical Exam  Temp:  [36.4 °C (97.5 °F)-36.9 °C (98.4 °F)] 36.6 °C (97.8 °F)  Pulse:  [68-90] 73  Resp:  [14-18] 16  BP: (117-147)/(59-90) 140/69  SpO2:  [94 %-96 %] 94 %    Physical Exam  Vitals signs and nursing note reviewed.   Constitutional:       General: She is sleeping. She is not in acute distress.     Appearance: She is overweight. She is ill-appearing (chronically).      Interventions: Nasal cannula in place.   HENT:      Head: Normocephalic.      Mouth/Throat:      Mouth: Mucous membranes are moist. Mucous membranes are pale.   Eyes:      General: Lids are normal.      Conjunctiva/sclera: Conjunctivae normal.   Cardiovascular:      Rate and Rhythm: Normal rate.      Heart sounds: Normal heart sounds. No friction rub.      Comments: Right Chest Temporary Dialysis cath CDI  Pulmonary:      Effort: Pulmonary effort is normal.      Breath sounds: Normal breath sounds. No decreased breath sounds.   Abdominal:      General: Bowel sounds are normal. There is distension.      Palpations: Abdomen is soft.      Tenderness: There is no tenderness.   Musculoskeletal:      Left hip: She exhibits decreased range of motion, decreased strength and bony tenderness.      Comments: CMS intact   Skin:     General: Skin is warm and dry.      Comments: Dressing in place to left hip, clean, dry and intact    Neurological:      Mental Status: She is oriented to person, place, and time and easily  aroused. She is lethargic.      Sensory: Sensation is intact.   Psychiatric:         Attention and Perception: Attention normal.         Mood and Affect: Affect is labile and tearful.         Speech: Speech normal.         Behavior: Behavior is cooperative.         Thought Content: Thought content is not paranoid. Thought content does not include homicidal ideation.         Cognition and Memory: Cognition is impaired.         Fluids    Intake/Output Summary (Last 24 hours) at 2/8/2020 0933  Last data filed at 2/7/2020 2033  Gross per 24 hour   Intake 120 ml   Output --   Net 120 ml       Laboratory  Recent Labs     02/06/20 0114 02/07/20 0223   WBC 9.4 7.9   RBC 3.04* 2.95*   HEMOGLOBIN 8.9* 8.4*   HEMATOCRIT 28.3* 27.5*   MCV 93.1 93.2   MCH 29.3 28.5   MCHC 31.4* 30.5*   RDW 47.0 46.6   PLATELETCT 188 229   MPV 10.5 10.5     Recent Labs     02/06/20 0114 02/07/20 0223   SODIUM 134* 134*   POTASSIUM 4.3 4.0   CHLORIDE 96 96   CO2 29 29   GLUCOSE 118* 122*   BUN 20 30*   CREATININE 2.97* 4.15*   CALCIUM 8.4* 8.4*                   Imaging  DX-ESOPH. FLUORO (BARIUM SWALLOW)   Final Result      Limited single contrast barium esophagram secondary to rapid aspiration      Moderately prominent cricopharyngeus impression. No diverticulum is seen. This is of doubtful significance      Contrast still present at the end of the procedure in the distal esophagus indicates dysmotility. There is no abnormal dilatation to suggest obstruction but low-grade obstruction cannot be excluded.      These findings and their importance were discussed with the patient      DX-FEMUR-2+ LEFT   Final Result      Digitized intraoperative radiograph is submitted for review.  This examination is not for diagnostic purpose but for guidance during a surgical procedure.      DX-PORTABLE FLUORO > 1 HOUR   Final Result      Portable fluoroscopy utilized for 2 minutes 48 seconds.         INTERPRETING LOCATION: [address] [place] [address], [address]       DX-SHOULDER 2+ LEFT   Final Result      1.  No acute left shoulder fracture or dislocation.      2.  Mild degenerative change of the left AC joint.      CL-[id number] FILM X-RAY   Final Result      US-GBAEOOG-NOJNNUP FILM X-RAY   Final Result      OUTSIDE IMAGES-DX PELVIS   Final Result           Assessment/Plan  * Closed fracture of left femur, unspecified fracture morphology, initial encounter ([facility])- (present on admission)  Assessment & Plan  -POD #6 left subtrochanteric IMN  -Nonpharmacologic and pharmacologic pain management  -Fall Precautions    Anemia due to chronic kidney disease, on chronic dialysis ([facility])- (present on admission)  Assessment & Plan  -Hgb after 1 unit this hospitalization  -Follow CBC  -Nephrology following -dialysis today  -Transfuse for hemoglobin less than 7    ESRD (end stage renal disease) ([facility])- (present on admission)  Assessment & Plan  -MWF Dialysis  -avoid nephrotoxins  -Renally dose medications as indicated  -Nephrology following  -Daily BMP      Dysphagia- (present on admission)  Assessment & Plan  -ongoing and chronic  -SLP following - diet per their rec's  -Dr. Culver from GI recommended outpatient follow-up at this time  -Aspiration precaution    Constipation- (present on admission)  Assessment & Plan  -Acute on chronic  -Last BM was 1/31/2020 - she had small formed stool today  -Bowel protocol    Vitamin D insufficiency- (present on admission)  Assessment & Plan  -continue daily supplementation  -OP FU for further surveillance and management      HLD (hyperlipidemia)- (present on admission)  Assessment & Plan  -continue home Lipitor and aspirin.    Type II diabetes mellitus ([facility])- (present on admission)  Assessment & Plan  -continue accu-checks ACHS with SSI  -Diabetic Renal Cardiac Diet  -hypoglycemia protocol      Essential hypertension- (present on admission)  Assessment & Plan  -continue Norvasc & Coreg        Hypothyroidism- (present on admission)  Assessment &  Plan  -Chronic  -Continue home levothyroxine     VTE prophylaxis: Heparin     Please note that this dictation was created using voice recognition software. I have made every reasonable attempt to correct obvious errors, but there may be errors of grammar and possibly content that I did not discover before finalizing the note.     ORA Scott.

## 2020-02-07 NOTE — PROGRESS NOTES
[facility] Nephrology Daily Progress Note    Date of Service  2/7/2020     Author: Alpa MANN, CNN-NP  Collaborating Physician: Dr. Van    Chief Complaint  Follow up ESRD    Interval Problem Update  52 yo female PMH ESRD MWF iHD, HTN, type 2 DM, anemia of CKD, and CKD-MBD who presents to ED with CC as above.  She was recently started on RRT for new onset ESRD last week and went to her usual treatmetn on 1/31.  She is walker dependent for gait and mabulation since a CVA in her past.  While walking towards her car, her MAYNOR asked to use her walker and when he took it away from her and in the process she lost her balance and sustained a fall onto the concrete sidewalk.  She had immediate pain and was not able to bear weight or ambulate as a result.  No alleviating factors.  Any movement aggravated the pain. Otherwise felt well and denies any recent F/C/N/V/CP/SOB.  No melena, hematochezia, hematemesis.  No HA, visual changes, or abdominal pain.    DAILY NEPHROLOGY SUMMARY  2/03/20: No events, seen on dialysis this am, BP stable, reports left leg pain controlled  2/04/20: No events, pt in tears and complaining of left leg pain, tolerated HD yest with 2.1L UF, BP borderline low this am and BP meds held  2/05/20: No events, noted to have intermittent confusion yest and hallucinations overnight, seen on dialysis this am and she is calm, BP stable, amlodipine held again this am per holding parameters  2/06/20: No events, tolerated HD yesterday, BP stable today and BP meds per holding parameters, feels ok, no new complaints, still having some pain in left leg  2/07/20: sitting up in bed, denies any complaints, BP stable, for HD today, awaiting outpt placement for rehab    Review of Systems  Review of Systems   Constitutional: Negative.    HENT: Negative.    Eyes: Negative.    Respiratory: Negative.    Cardiovascular: Negative.    Gastrointestinal: Negative.    Musculoskeletal: Positive for falls and joint  pain. Negative for myalgias and neck pain.   Skin: Negative.    Neurological: Negative.    Endo/Heme/Allergies: Negative.    Psychiatric/Behavioral: Negative for depression. The patient is nervous/anxious.         Physical Exam  Temp:  [36.1 °C (97 °F)-37.1 °C (98.8 °F)] 37.1 °C (98.8 °F)  Pulse:  [64-68] 64  Resp:  [14-16] 15  BP: (110-121)/(56-71) 115/56  SpO2:  [94 %-98 %] 94 %    Physical Exam  Vitals signs and nursing note reviewed.   Constitutional:       General: She is not in acute distress.     Appearance: Normal appearance.   HENT:      Head: Normocephalic and atraumatic.      Right Ear: External ear normal.      Left Ear: External ear normal.      Nose: Nose normal. No congestion.      Mouth/Throat:      Mouth: Mucous membranes are moist.      Pharynx: No oropharyngeal exudate.   Eyes:      General: No scleral icterus.     Extraocular Movements: Extraocular movements intact.      Conjunctiva/sclera: Conjunctivae normal.   Neck:      Musculoskeletal: Normal range of motion and neck supple.   Cardiovascular:      Rate and Rhythm: Normal rate and regular rhythm.      Heart sounds: Normal heart sounds. No murmur.   Pulmonary:      Effort: Pulmonary effort is normal. No respiratory distress.      Breath sounds: Normal breath sounds.   Abdominal:      General: Abdomen is flat. Bowel sounds are normal. There is no distension.      Palpations: Abdomen is soft.   Musculoskeletal:         General: No tenderness.      Right lower leg: No edema.      Left lower leg: No edema.      Comments: +Decreased ROM left leg   Lymphadenopathy:      Cervical: No cervical adenopathy.   Skin:     General: Skin is warm and dry.      Findings: No erythema.   Neurological:      Mental Status: She is alert and oriented to person, place, and time. Mental status is at baseline.      Motor: No weakness.   Psychiatric:         Mood and Affect: Mood normal.         Behavior: Behavior normal.         Fluids  No intake or output data in the  24 hours ending 02/07/20 1046    Laboratory  Recent Labs     02/05/20  0233 02/06/20  0114 02/07/20  0223   WBC 9.1 9.4 7.9   RBC 2.40* 3.04* 2.95*   HEMOGLOBIN 6.9* 8.9* 8.4*   HEMATOCRIT 22.7* 28.3* 27.5*   MCV 94.6 93.1 93.2   MCH 28.8 29.3 28.5   MCHC 30.4* 31.4* 30.5*   RDW 45.2 47.0 46.6   PLATELETCT 177 188 229   MPV 10.3 10.5 10.5     Recent Labs     02/05/20  0233 02/06/20  0114 02/07/20  0223   SODIUM 134* 134* 134*   POTASSIUM 4.1 4.3 4.0   CHLORIDE 96 96 96   CO2 28 29 29   GLUCOSE 167* 118* 122*   BUN 27* 20 30*   CREATININE 4.40* 2.97* 4.15*   CALCIUM 8.5 8.4* 8.4*         No results for input(s): NTPROBNP in the last 72 hours.          IMPRESSION:  - ESRD    * Etiology likely 2/2 HTN/DM    * MWF iHD @ [facility]    * via R CVC  - Left subtrochanteric femur fracture    * S/P surgical repair  - HTN, controlled     * Goal BP < 140/90    * Stable  - Anemia of CKD    * Goal Hgb 10-11    * Iron sat low    * Stable    * on JACK with HD   - CKD-MBD    * Managed at HD unit    * Stable    * continue cholecalciferol  - HLD  - CAD  - CVA  - Altered Mental Status--intermittent confusion and hallucinations over past 24hrs, improved     * Eval per primary svc  -Moderate Protein-Calorie Malnutrition    * no dietary protein restrictions      PLAN:  - HD today and qMWF iHD  - UF with HD as tolerated  - PT/OT as tolerated  - No dietary protein restrictions  - Dose all meds per ESRD  - Epogen with HD  -Transfuse PRN for Hgb less than 7  - Continue IV Iron  - Pain management per primary svc  - Stop amlodipine for now  - Holding parameters in place for BP meds  - Okay to transition to outpt hemodialysis when medically stable        All prior notes form other doctors and RN staff were reviewed from admission to current day to help me make my clinical decisions

## 2020-02-07 NOTE — DISCHARGE PLANNING
Received Transport Form @ 4066    Spoke to Hardeep @ [facility]    Transport is scheduled for 2/8 @1030 going to [facility].      LUISW informed  [place] informed

## 2020-02-07 NOTE — THERAPY
"Physical Therapy Treatment completed.   Bed Mobility:  Supine to Sit: (pt found seated in chair)  Transfers: Sit to Stand: Maximal Assist(from chair height)  Gait: Level Of Assist: Unable to Participate       Plan of Care: Will benefit from Physical Therapy 3 times per week  Discharge Recommendations: Equipment: Will Continue to Assess for Equipment Needs. Post-acute therapy Discharge to a transitional care facility for continued skilled therapy services.     See \"Rehab Therapy-Acute\" Patient Summary Report for complete documentation.       "

## 2020-02-07 NOTE — DISCHARGE PLANNING
Agency/Facility Name: [facility]  Spoke To: Deborah  Outcome: Looking  Good for acceptance. Need to review financial and call me back 2/7 with final answer.    LSW informed

## 2020-02-08 VITALS
TEMPERATURE: 97.8 F | DIASTOLIC BLOOD PRESSURE: 69 MMHG | WEIGHT: 184.08 LBS | SYSTOLIC BLOOD PRESSURE: 140 MMHG | OXYGEN SATURATION: 94 % | BODY MASS INDEX: 26.35 KG/M2 | HEART RATE: 73 BPM | HEIGHT: 70 IN | RESPIRATION RATE: 16 BRPM

## 2020-02-08 LAB — GLUCOSE BLD-MCNC: 167 MG/DL (ref 65–99)

## 2020-02-08 PROCEDURE — 99239 HOSP IP/OBS DSCHRG MGMT >30: CPT | Performed by: FAMILY MEDICINE

## 2020-02-08 PROCEDURE — 700102 HCHG RX REV CODE 250 W/ 637 OVERRIDE(OP): Performed by: NURSE PRACTITIONER

## 2020-02-08 PROCEDURE — 700102 HCHG RX REV CODE 250 W/ 637 OVERRIDE(OP): Performed by: HOSPITALIST

## 2020-02-08 PROCEDURE — 700111 HCHG RX REV CODE 636 W/ 250 OVERRIDE (IP): Performed by: NURSE PRACTITIONER

## 2020-02-08 PROCEDURE — A9270 NON-COVERED ITEM OR SERVICE: HCPCS | Performed by: NURSE PRACTITIONER

## 2020-02-08 PROCEDURE — A9270 NON-COVERED ITEM OR SERVICE: HCPCS | Performed by: HOSPITALIST

## 2020-02-08 PROCEDURE — 82962 GLUCOSE BLOOD TEST: CPT

## 2020-02-08 RX ORDER — OXYCODONE HYDROCHLORIDE 5 MG/1
5 TABLET ORAL EVERY 6 HOURS PRN
Qty: 10 TAB | Refills: 0 | Status: SHIPPED | OUTPATIENT
Start: 2020-02-08 | End: 2020-02-08

## 2020-02-08 RX ORDER — OXYCODONE HYDROCHLORIDE 5 MG/1
5 TABLET ORAL EVERY 6 HOURS PRN
Qty: 10 TAB | Refills: 0 | Status: SHIPPED | OUTPATIENT
Start: 2020-02-08 | End: 2020-02-11

## 2020-02-08 RX ADMIN — MELATONIN 1000 UNITS: at 04:53

## 2020-02-08 RX ADMIN — GLYCOPYRROLATE 0.5 MG: 1 TABLET ORAL at 04:53

## 2020-02-08 RX ADMIN — OXYCODONE HYDROCHLORIDE 10 MG: 10 TABLET ORAL at 00:43

## 2020-02-08 RX ADMIN — ASPIRIN 81 MG: 81 TABLET, COATED ORAL at 04:53

## 2020-02-08 RX ADMIN — FLUTICASONE PROPIONATE 100 MCG: 50 SPRAY, METERED NASAL at 05:48

## 2020-02-08 RX ADMIN — FAMOTIDINE 20 MG: 20 TABLET ORAL at 04:53

## 2020-02-08 RX ADMIN — HEPARIN SODIUM 5000 UNITS: 5000 INJECTION, SOLUTION INTRAVENOUS; SUBCUTANEOUS at 00:51

## 2020-02-08 RX ADMIN — OXYCODONE HYDROCHLORIDE 10 MG: 10 TABLET ORAL at 04:53

## 2020-02-08 RX ADMIN — OXYCODONE HYDROCHLORIDE 5 MG: 5 TABLET ORAL at 10:19

## 2020-02-08 RX ADMIN — LEVOTHYROXINE SODIUM 112 MCG: 0.11 TABLET ORAL at 04:53

## 2020-02-08 RX ADMIN — CARVEDILOL 6.25 MG: 6.25 TABLET, FILM COATED ORAL at 08:47

## 2020-02-08 ASSESSMENT — ENCOUNTER SYMPTOMS
FALLS: 1
CARDIOVASCULAR NEGATIVE: 1
GASTROINTESTINAL NEGATIVE: 1
CONSTIPATION: 0
EYES NEGATIVE: 1
MYALGIAS: 0
NECK PAIN: 0
NERVOUS/ANXIOUS: 1
CONSTITUTIONAL NEGATIVE: 1
NERVOUS/ANXIOUS: 0
NEUROLOGICAL NEGATIVE: 1
RESPIRATORY NEGATIVE: 1
DEPRESSION: 0

## 2020-02-08 NOTE — CARE PLAN
Non-skid socks on and belongings within reach. Call light within reach. Hourly rounding in place.    SCDs to BLE. Aspirin/Heparin for DVT prophylaxis per MAR. Mobilization at least three times a day with staff and PT/OT.     Pain adequately controlled per MAR PRN.     BM 2/6.

## 2020-02-08 NOTE — PROGRESS NOTES
[facility] Nephrology Daily Progress Note    Date of Service  2/8/2020     Author: Michael Carlisle D.O.      Chief Complaint  Follow up ESRD    Interval Problem Update  50 yo female PMH ESRD MWF iHD, HTN, type 2 DM, anemia of CKD, and CKD-MBD who presents to ED with CC as above.  She was recently started on RRT for new onset ESRD last week and went to her usual treatmetn on 1/31.  She is walker dependent for gait and mabulation since a CVA in her past.  While walking towards her car, her MAYNOR asked to use her walker and when he took it away from her and in the process she lost her balance and sustained a fall onto the concrete sidewalk.  She had immediate pain and was not able to bear weight or ambulate as a result.  No alleviating factors.  Any movement aggravated the pain. Otherwise felt well and denies any recent F/C/N/V/CP/SOB.  No melena, hematochezia, hematemesis.  No HA, visual changes, or abdominal pain.    DAILY NEPHROLOGY SUMMARY  2/03/20: No events, seen on dialysis this am, BP stable, reports left leg pain controlled  2/04/20: No events, pt in tears and complaining of left leg pain, tolerated HD yest with 2.1L UF, BP borderline low this am and BP meds held  2/05/20: No events, noted to have intermittent confusion yest and hallucinations overnight, seen on dialysis this am and she is calm, BP stable, amlodipine held again this am per holding parameters  2/06/20: No events, tolerated HD yesterday, BP stable today and BP meds per holding parameters, feels ok, no new complaints, still having some pain in left leg  2/07/20: sitting up in bed, denies any complaints, BP stable, for HD today, awaiting outpt placement for rehab  2/08/20: HD yesterday without incident.  Pain improved.  Anxious to go to rehab.  Will be going to [place] [facility], they will transport to HD qMWF.      Review of Systems  Review of Systems   Constitutional: Negative.    HENT: Negative.    Eyes: Negative.    Respiratory: Negative.     Cardiovascular: Negative.    Gastrointestinal: Negative.    Musculoskeletal: Positive for falls and joint pain. Negative for myalgias and neck pain.   Skin: Negative.    Neurological: Negative.    Endo/Heme/Allergies: Negative.    Psychiatric/Behavioral: Negative for depression. The patient is nervous/anxious.         Physical Exam  Temp:  [36.4 °C (97.5 °F)-36.9 °C (98.4 °F)] 36.6 °C (97.8 °F)  Pulse:  [68-90] 73  Resp:  [14-18] 16  BP: (117-147)/(59-90) 140/69  SpO2:  [94 %-96 %] 94 %    Physical Exam  Vitals signs and nursing note reviewed.   Constitutional:       General: She is not in acute distress.     Appearance: Normal appearance.   HENT:      Head: Normocephalic and atraumatic.      Right Ear: External ear normal.      Left Ear: External ear normal.      Nose: Nose normal. No congestion.      Mouth/Throat:      Mouth: Mucous membranes are moist.      Pharynx: No oropharyngeal exudate.   Eyes:      General: No scleral icterus.     Extraocular Movements: Extraocular movements intact.      Conjunctiva/sclera: Conjunctivae normal.   Neck:      Musculoskeletal: Normal range of motion and neck supple.   Cardiovascular:      Rate and Rhythm: Normal rate and regular rhythm.      Heart sounds: Normal heart sounds. No murmur.   Pulmonary:      Effort: Pulmonary effort is normal. No respiratory distress.      Breath sounds: Normal breath sounds.   Abdominal:      General: Abdomen is flat. Bowel sounds are normal. There is no distension.      Palpations: Abdomen is soft.   Musculoskeletal:         General: No tenderness.      Right lower leg: No edema.      Left lower leg: No edema.      Comments: +Decreased ROM left leg   Lymphadenopathy:      Cervical: No cervical adenopathy.   Skin:     General: Skin is warm and dry.      Findings: No erythema.   Neurological:      Mental Status: She is alert and oriented to person, place, and time. Mental status is at baseline.      Motor: No weakness.   Psychiatric:          Mood and Affect: Mood normal.         Behavior: Behavior normal.         Fluids    Intake/Output Summary (Last 24 hours) at 2/8/2020 0959  Last data filed at 2/7/2020 2033  Gross per 24 hour   Intake 120 ml   Output --   Net 120 ml       Laboratory  Recent Labs     02/06/20 0114 02/07/20 0223   WBC 9.4 7.9   RBC 3.04* 2.95*   HEMOGLOBIN 8.9* 8.4*   HEMATOCRIT 28.3* 27.5*   MCV 93.1 93.2   MCH 29.3 28.5   MCHC 31.4* 30.5*   RDW 47.0 46.6   PLATELETCT 188 229   MPV 10.5 10.5     Recent Labs     02/06/20 0114 02/07/20 0223   SODIUM 134* 134*   POTASSIUM 4.3 4.0   CHLORIDE 96 96   CO2 29 29   GLUCOSE 118* 122*   BUN 20 30*   CREATININE 2.97* 4.15*   CALCIUM 8.4* 8.4*         No results for input(s): NTPROBNP in the last 72 hours.          IMPRESSION:  - ESRD    * Etiology likely 2/2 HTN/DM    * MWF iHD @ [facility]    * via R CVC    * No need for dialysis today (SAT)    * Continue maintenance dialysis qMWF and PRN  - Left subtrochanteric femur fracture    * S/P surgical repair  - HTN, controlled     * Goal BP < 140/90    * Stable  - Anemia of CKD    * Goal Hgb 10-11    * Iron sat low    * Stable    * on JACK with HD   - CKD-MBD    * Managed at HD unit    * Stable    * continue cholecalciferol  - HLD  - CAD  - CVA  - Altered Mental Status--intermittent confusion and hallucinations over past 24hrs, improved     * Eval per primary svc  -Moderate Protein-Calorie Malnutrition    * no dietary protein restrictions      PLAN:  - No need for dialysis today (SAT)  - Maintenance dialysis qMWF and PRN  - UF with HD as tolerated  - PT/OT as tolerated  - No dietary protein restrictions  - Dose all meds per ESRD  - Epogen with HD  -Transfuse PRN for Hgb less than 7  - Continue IV Iron  - Pain management per primary svc  - Stop amlodipine for now  - Holding parameters in place for BP meds  - Okay to transition to outpt hemodialysis from renal standpoint    Thank you,       All prior notes form other doctors and RN staff were  reviewed from admission to current day to help me make my clinical decisions

## 2020-02-08 NOTE — PROGRESS NOTES
Patient discharging this morning at 1030 and patient wanted her emergency contacts updated but multiple numbers called and no answer, will update patient.

## 2020-02-08 NOTE — DISCHARGE INSTRUCTIONS
Discharge Instructions    Discharged to other by medical transportation with escort. Discharged via wheelchair, hospital escort: Yes.  Special equipment needed: Not Applicable    Be sure to schedule a follow-up appointment with your primary care doctor or any specialists as instructed.     Discharge Plan:   Influenza Vaccine Indication: Not indicated: Previously immunized this influenza season and > 8 years of age    I understand that a diet low in cholesterol, fat, and sodium is recommended for good health. Unless I have been given specific instructions below for another diet, I accept this instruction as my diet prescription.   Other diet: Diabetic, Full liquid, nectar thick liquids    Special Instructions: Discharge instructions for the Orthopedic Patient    Follow up with Primary Care Physician within 2 weeks of discharge to home, regarding:  Review of medications and diagnostic testing.  Surveillance for medical complications.  Workup and treatment of osteoporosis, if appropriate.     -Is this a Joint Replacement patient? No    -Is this patient being discharged with medication to prevent blood clots?  Yes, Aspirin Aspirin, ASA oral tablets  What is this medicine?  ASPIRIN (AS pir in) is a pain reliever. It is used to treat mild pain and fever. This medicine is also used as directed by a doctor to prevent and to treat heart attacks, to prevent strokes, and to treat arthritis or inflammation.  This medicine may be used for other purposes; ask your health care provider or pharmacist if you have questions.  COMMON BRAND NAME(S): Aspir-Low, Aspir-Bobbi, Aspirtab, Lyudmila Advanced Aspirin, Lyudmila Aspirin, Lyudmila Aspirin Extra Strength, Lyudmila Aspirin Plus, Lyudmila Extra Strength, Lyudmila Extra Strength Plus, Lyudmila Genuine Aspirin, Lyudmila Womens Aspirin, Bufferin, Bufferin Extra Strength, Bufferin Low Dose  What should I tell my health care provider before I take this medicine?  They need to know if you have any of these  conditions:  -anemia  -asthma  -bleeding problems  -child with chickenpox, the flu, or other viral infection  -diabetes  -gout  -if you frequently drink alcohol containing drinks  -kidney disease  -liver disease  -low level of vitamin K  -lupus  -smoke tobacco  -stomach ulcers or other problems  -an unusual or allergic reaction to aspirin, tartrazine dye, other medicines, dyes, or preservatives  -pregnant or trying to get pregnant  -breast-feeding  How should I use this medicine?  Take this medicine by mouth with a glass of water. Follow the directions on the package or prescription label. You can take this medicine with or without food. If it upsets your stomach, take it with food. Do not take your medicine more often than directed.  Talk to your pediatrician regarding the use of this medicine in children. While this drug may be prescribed for children as young as 12 years of age for selected conditions, precautions do apply. Children and teenagers should not use this medicine to treat chicken pox or flu symptoms unless directed by a doctor.  Patients over 65 years old may have a stronger reaction and need a smaller dose.  Overdosage: If you think you have taken too much of this medicine contact a poison control center or emergency room at once.  NOTE: This medicine is only for you. Do not share this medicine with others.  What if I miss a dose?  If you are taking this medicine on a regular schedule and miss a dose, take it as soon as you can. If it is almost time for your next dose, take only that dose. Do not take double or extra doses.  What may interact with this medicine?  Do not take this medicine with any of the following medications:  -cidofovir  -ketorolac  -probenecid  This medicine may also interact with the following medications:  -alcohol  -alendronate  -bismuth subsalicylate  -flavocoxid  -herbal supplements like feverfew, garlic, sam, ginkgo biloba, horse chestnut  -medicines for diabetes or  glaucoma like acetazolamide, methazolamide  -medicines for gout  -medicines that treat or prevent blood clots like enoxaparin, heparin, ticlopidine, warfarin  -other aspirin and aspirin-like medicines  -NSAIDs, medicines for pain and inflammation, like ibuprofen or naproxen  -pemetrexed  -sulfinpyrazone  -varicella live vaccine  This list may not describe all possible interactions. Give your health care provider a list of all the medicines, herbs, non-prescription drugs, or dietary supplements you use. Also tell them if you smoke, drink alcohol, or use illegal drugs. Some items may interact with your medicine.  What should I watch for while using this medicine?  If you are treating yourself for pain, tell your doctor or health care professional if the pain lasts more than 10 days, if it gets worse, or if there is a new or different kind of pain. Tell your doctor if you see redness or swelling. Also, check with your doctor if you have a fever that lasts for more than 3 days. Only take this medicine to prevent heart attacks or blood clotting if prescribed by your doctor or health care professional.  Do not take aspirin or aspirin-like medicines with this medicine. Too much aspirin can be dangerous. Always read the labels carefully.  This medicine can irritate your stomach or cause bleeding problems. Do not smoke cigarettes or drink alcohol while taking this medicine. Do not lie down for 30 minutes after taking this medicine to prevent irritation to your throat.  If you are scheduled for any medical or dental procedure, tell your healthcare provider that you are taking this medicine. You may need to stop taking this medicine before the procedure.  This medicine may be used to treat migraines. If you take migraine medicines for 10 or more days a month, your migraines may get worse. Keep a diary of headache days and medicine use. Contact your healthcare professional if your migraine attacks occur more frequently.  What  side effects may I notice from receiving this medicine?  Side effects that you should report to your doctor or health care professional as soon as possible:  -allergic reactions like skin rash, itching or hives, swelling of the face, lips, or tongue  -breathing problems  -changes in hearing, ringing in the ears  -confusion  -general ill feeling or flu-like symptoms  -pain on swallowing  -redness, blistering, peeling or loosening of the skin, including inside the mouth or nose  -signs and symptoms of bleeding such as bloody or black, tarry stools; red or dark-brown urine; spitting up blood or brown material that looks like coffee grounds; red spots on the skin; unusual bruising or bleeding from the eye, gums, or nose  -trouble passing urine or change in the amount of urine  -unusually weak or tired  -yellowing of the eyes or skin  Side effects that usually do not require medical attention (report to your doctor or health care professional if they continue or are bothersome):  -diarrhea or constipation  -headache  -nausea, vomiting  -stomach gas, heartburn  This list may not describe all possible side effects. Call your doctor for medical advice about side effects. You may report side effects to FDA at 1-151-FDA-4514.  Where should I keep my medicine?  Keep out of the reach of children.  Store at room temperature between 15 and 30 degrees C (59 and 86 degrees F). Protect from heat and moisture. Do not use this medicine if it has a strong vinegar smell. Throw away any unused medicine after the expiration date.  NOTE: This sheet is a summary. It may not cover all possible information. If you have questions about this medicine, talk to your doctor, pharmacist, or health care provider.  © 2018 Elsevier/Gold Standard (2014-08-19 11:30:31)      · Is patient discharged on Warfarin / Coumadin?   No       Femoral Shaft Fracture  Introduction  A femoral shaft fracture is a break (fracture) in the shaft of the thigh bone  (femur). The femur is the long bone that connects the hip joint to the knee joint. Most femoral shaft fractures are closed fractures. A closed fracture is a break in a bone that happens without any cuts (lacerations) through the skin that is near the fracture site. Some femoral shaft fractures are open fractures. An open fracture is a break in a bone that happens along with lacerations through the skin that is near the fracture site.  What are the causes?  A healthy femur may break from a forceful impact, such as from:  · A fall, especially from a great height.  · A high-impact sports injury.  · A car or motorcycle accident.  A weakened femur may break from minimal impact or force due to:  · Certain medical conditions.  · Age.  What increases the risk?  This condition is more likely to develop in:  · Older people.  · People who have certain medical conditions that cause bones to become weak or thin, such as:  ¨ Osteoporosis.  ¨ Cancer.  ¨ Osteogenesis imperfecta. This is a condition that involves bone weakness that is due to abnormal bone development.  · People who take certain medicines (bisphosphonates) that are used to treat osteoporosis.  · People who participate in high-risk sports or impact sports.  What are the signs or symptoms?  Symptoms of this condition include:  · Severe pain.  · Inability to walk.  · Bruising.  · Swelling or visible deformity of the leg.  · Substantial bleeding, if it is an open fracture.  How is this diagnosed?  This condition is diagnosed based on your symptoms and a physical exam. You may also have other tests, including:  · X-rays of the femur. Since the force required to break a healthy femur can break other bones, X-rays are often taken of the hip, knee, and pelvis as well.  · Evaluation of the blood vessels with specialized X-rays (arteriogram).  · Evaluation of the nerves in the area of the break.  · CT scan or MRI.  How is this treated?  A femoral shaft fracture usually  requires surgery. You may have one or more of the following surgical treatments:  · External fixation. This involves using pins and screws to hold the bones in place if there is extensive soft tissue injury. After some time, you may need an additional surgical treatment, such as intramedullary nailing.  · Intramedullary nailing. This involves inserting a stephanie (intramedullary nail) through an incision. The intramedullary nail goes down the center of the shaft of the femur. It may be inserted in the knee joint or from the top of the femur near the hip. Generally, screws are placed through the stephanie at both ends to prevent shortening or rotation of the femur as it heals.  · Plates to stabilize the fracture. These may be used, especially when the fracture is at either end of the bone, near the hip or the knee.  In rare cases for which surgery is not an option, a cast or a splint may be used to hold (immobilize) the bone while it heals.  How is this prevented?  If factors such as certain medical conditions or age increase your risk for another femoral shaft fracture, you may be able to prevent one if you follow these instructions:  · Use aids for walking, such as a walker or cane, as directed by your health care provider.  · Follow instructions from your health care provider about how to strengthen your bones if you have osteoporosis.  This information is not intended to replace advice given to you by your health care provider. Make sure you discuss any questions you have with your health care provider.  Document Released: 09/27/2006 Document Revised: 05/25/2017 Document Reviewed: 08/03/2015  © 2017 Elsevier      Depression / Suicide Risk    As you are discharged from this [facility] facility, it is important to learn how to keep safe from harming yourself.    Recognize the warning signs:  · Abrupt changes in personality, positive or negative- including increase in energy   · Giving away possessions  · Change in eating  patterns- significant weight changes-  positive or negative  · Change in sleeping patterns- unable to sleep or sleeping all the time   · Unwillingness or inability to communicate  · Depression  · Unusual sadness, discouragement and loneliness  · Talk of wanting to die  · Neglect of personal appearance   · Rebelliousness- reckless behavior  · Withdrawal from people/activities they love  · Confusion- inability to concentrate     If you or a loved one observes any of these behaviors or has concerns about self-harm, here's what you can do:  · Talk about it- your feelings and reasons for harming yourself  · Remove any means that you might use to hurt yourself (examples: pills, rope, extension cords, firearm)  · Get professional help from the community (Mental Health, Substance Abuse, psychological counseling)  · Do not be alone:Call your Safe Contact- someone whom you trust who will be there for you.  · Call your local CRISIS HOTLINE 630-8661 or 097-635-8403  · Call your local Children's Mobile Crisis Response Team [place] (939) 056-2606 or www.WooWho  · Call the toll free National Suicide Prevention Hotlines   · National Suicide Prevention Lifeline 535-314-URWO (3049)  · National Hope Line Network 800-SUICIDE (814-3510)

## 2020-02-08 NOTE — CARE PLAN
Problem: Knowledge Deficit  Goal: Knowledge of disease process/condition, treatment plan, diagnostic tests, and medications will improve  Outcome: PROGRESSING AS EXPECTED  Note:   Pt has been educated on plan of care. Questions answered to patient satisfaction.     Problem: Pain Management  Goal: Pain level will decrease to patient's comfort goal  Outcome: PROGRESSING AS EXPECTED  Note:   Pt reports pain is better controlled with prn pain medication and repositioning.

## 2020-02-08 NOTE — DISCHARGE PLANNING
Anticipated Discharge Disposition: [facility]    Action: Transfer packet completed, placed in chart, nurse Fauzia notified.  Attempted to contact pts son to update per pt request, no answer, left voicemail.    Barriers to Discharge: none    Plan: Transfer to [facility] via [employer]

## 2020-02-08 NOTE — PROGRESS NOTES
"   Orthopaedic PA Progress Note    Interval changes:Doing well, discharge to SNF status    ROS - Patient denies any new issues. No chest pain, dyspnea, or fever.  Pain well controlled.    /59   Pulse 68   Temp 36.4 °C (97.5 °F) (Temporal)   Resp 17   Ht 1.778 m (5' 10\")   Wt 85.7 kg (189 lb)   SpO2 95%     Patient seen and examined  No acute distress  Breathing non labored  RRR  Surgical dressing is clean, dry, and intact. Patient clearly fires tibialis anterior, EHL, and gastrocnemius/soleus. Sensation is intact to light touch throughout superficial peroneal, deep peroneal, tibial, saphenous, and sural nerve distributions. Strong and palpable 2+ dorsalis pedis and posterior tibial pulses with capillary refill less than 2 seconds. No lower leg tenderness or discomfort.    Recent Labs     02/05/20  0233 02/06/20  0114 02/07/20  0223   WBC 9.1 9.4 7.9   RBC 2.40* 3.04* 2.95*   HEMOGLOBIN 6.9* 8.9* 8.4*   HEMATOCRIT 22.7* 28.3* 27.5*   MCV 94.6 93.1 93.2   MCH 28.8 29.3 28.5   MCHC 30.4* 31.4* 30.5*   RDW 45.2 47.0 46.6   PLATELETCT 177 188 229   MPV 10.3 10.5 10.5       Active Hospital Problems    Diagnosis   • Anemia due to chronic kidney disease, on chronic dialysis ([facility]) [N18.6, D63.1, Z99.2]     Priority: High   • Closed fracture of left femur, unspecified fracture morphology, initial encounter ([facility]) [S72.92XA]     Priority: High   • ESRD (end stage renal disease) ([facility]) [N18.6]     Priority: High   • Dysphagia [R13.10]     Priority: Medium   • Constipation [K59.00]   • Vitamin D insufficiency [E55.9]   • HLD (hyperlipidemia) [E78.5]   • Type II diabetes mellitus ([facility]) [E11.9]   • Essential hypertension [I10]   • Hypothyroidism [E03.9]     A/P:     POD #6 s/p L femur nail     Antibiotics: None required  Activity: WBAT operative extremity.  PT today.  Dressing change by RN prior to transfer apreciated  Diet: General  DVT: Mechanical (SCDs) + Pharmacologic (Lovenox or per medicine)  Dispo: Follow-Up: " needs appointment with Dr. Vazquez at [facility] at 10-14 days post-op for re-evaluation, staple removal and radiographs.

## 2020-02-08 NOTE — PROGRESS NOTES
MDs have signed off on patient to discharge today. Patient will be leaving today with med transport to go to [facility]. Discharge instructions given and patient has no questions or concerns at this time. Prescriptions given to transporter at bedside. DME not provided. Patient wheeled down in wheelchair by staff and assisted into vehicle. Attempted to call report called to Deborah at 1030.  Deborah stated she had not received any paperwork yet including dc summary, med rec, and PASAR. KELLY Brewer updated to resend.

## 2020-05-05 NOTE — PROGRESS NOTES
Report received from Cliff KINNEY, awaiting pt arrival via transport.   78F, PMHx Afib on Coumadin, HTN, DM, CKD 4, COPD, presents to ED s/p fall, +HT, +LOC, +AC.   Found to have L. Carotid artery stenosis >80%.     # CKD 4   - serum creatinine at baseline   - previously had HD, now off for > 2 months.   - non-oliguric   - BP on lower side. monitor BP.   -  Ca, Mg, phos at goal.   - found to have severe aortic stenosis.    - Ok to resume diuretics if no surgery planned now   # anemia chronic Hb at goal.  # Fall with head trauma  - Intracranial hemorrhage / stable intrventricular hematoma.   - neurosurgery notes appreciated.    # Carotid artery stenosis   - carotid duplex 80% stenosis of L internal carotid artery  - Vasc surgery: plan for Left CEA on Wed 4/29 canceled due to no cardiac clearance      avoid nephrotoxins   will follow

## 2020-10-12 ENCOUNTER — HOSPITAL ENCOUNTER (OUTPATIENT)
Facility: MEDICAL CENTER | Age: 55
End: 2020-10-13
Attending: INTERNAL MEDICINE | Admitting: INTERNAL MEDICINE
Payer: COMMERCIAL

## 2020-10-12 PROBLEM — T82.9XXA COMPLICATION OF VASCULAR DIALYSIS CATHETER: Status: ACTIVE | Noted: 2020-10-12

## 2020-10-12 LAB
APTT PPP: 32.4 SEC (ref 24.7–36)
COVID ORDER STATUS COVID19: NORMAL
INR PPP: 0.99 (ref 0.87–1.13)
PROTHROMBIN TIME: 13.4 SEC (ref 12–14.6)
SARS-COV-2 RNA RESP QL NAA+PROBE: NOTDETECTED
SPECIMEN SOURCE: NORMAL

## 2020-10-12 PROCEDURE — C9803 HOPD COVID-19 SPEC COLLECT: HCPCS | Performed by: HOSPITALIST

## 2020-10-12 PROCEDURE — 99219 PR INITIAL OBSERVATION CARE,LEVL II: CPT | Mod: AI | Performed by: HOSPITALIST

## 2020-10-12 PROCEDURE — 85610 PROTHROMBIN TIME: CPT

## 2020-10-12 PROCEDURE — 700102 HCHG RX REV CODE 250 W/ 637 OVERRIDE(OP): Performed by: HOSPITALIST

## 2020-10-12 PROCEDURE — U0003 INFECTIOUS AGENT DETECTION BY NUCLEIC ACID (DNA OR RNA); SEVERE ACUTE RESPIRATORY SYNDROME CORONAVIRUS 2 (SARS-COV-2) (CORONAVIRUS DISEASE [COVID-19]), AMPLIFIED PROBE TECHNIQUE, MAKING USE OF HIGH THROUGHPUT TECHNOLOGIES AS DESCRIBED BY CMS-2020-01-R: HCPCS

## 2020-10-12 PROCEDURE — 85730 THROMBOPLASTIN TIME PARTIAL: CPT

## 2020-10-12 PROCEDURE — G0378 HOSPITAL OBSERVATION PER HR: HCPCS

## 2020-10-12 PROCEDURE — A9270 NON-COVERED ITEM OR SERVICE: HCPCS | Performed by: HOSPITALIST

## 2020-10-12 RX ORDER — PROMETHAZINE HYDROCHLORIDE 25 MG/1
12.5-25 TABLET ORAL EVERY 4 HOURS PRN
Status: DISCONTINUED | OUTPATIENT
Start: 2020-10-12 | End: 2020-10-13 | Stop reason: HOSPADM

## 2020-10-12 RX ORDER — TRAMADOL HYDROCHLORIDE 50 MG/1
100 TABLET ORAL EVERY 6 HOURS PRN
Status: DISCONTINUED | OUTPATIENT
Start: 2020-10-12 | End: 2020-10-13 | Stop reason: HOSPADM

## 2020-10-12 RX ORDER — TRAMADOL HYDROCHLORIDE 50 MG/1
100 TABLET ORAL EVERY 12 HOURS PRN
COMMUNITY

## 2020-10-12 RX ORDER — ATORVASTATIN CALCIUM 80 MG/1
80 TABLET, FILM COATED ORAL EVERY EVENING
Status: DISCONTINUED | OUTPATIENT
Start: 2020-10-12 | End: 2020-10-13 | Stop reason: HOSPADM

## 2020-10-12 RX ORDER — FLUOXETINE HYDROCHLORIDE 20 MG/1
40 CAPSULE ORAL DAILY
Status: DISCONTINUED | OUTPATIENT
Start: 2020-10-13 | End: 2020-10-13 | Stop reason: HOSPADM

## 2020-10-12 RX ORDER — AMOXICILLIN 250 MG
2 CAPSULE ORAL 2 TIMES DAILY
Status: DISCONTINUED | OUTPATIENT
Start: 2020-10-12 | End: 2020-10-13 | Stop reason: HOSPADM

## 2020-10-12 RX ORDER — HYDRALAZINE HYDROCHLORIDE 25 MG/1
25 TABLET, FILM COATED ORAL EVERY 8 HOURS
Status: DISCONTINUED | OUTPATIENT
Start: 2020-10-12 | End: 2020-10-13 | Stop reason: HOSPADM

## 2020-10-12 RX ORDER — GABAPENTIN 100 MG/1
100 CAPSULE ORAL 2 TIMES DAILY
COMMUNITY

## 2020-10-12 RX ORDER — PROMETHAZINE HYDROCHLORIDE 12.5 MG/1
12.5-25 SUPPOSITORY RECTAL EVERY 4 HOURS PRN
Status: DISCONTINUED | OUTPATIENT
Start: 2020-10-12 | End: 2020-10-13 | Stop reason: HOSPADM

## 2020-10-12 RX ORDER — GABAPENTIN 100 MG/1
100 CAPSULE ORAL 2 TIMES DAILY
Status: DISCONTINUED | OUTPATIENT
Start: 2020-10-12 | End: 2020-10-13 | Stop reason: HOSPADM

## 2020-10-12 RX ORDER — LEVOTHYROXINE SODIUM 112 UG/1
112 TABLET ORAL DAILY
Status: DISCONTINUED | OUTPATIENT
Start: 2020-10-13 | End: 2020-10-13 | Stop reason: HOSPADM

## 2020-10-12 RX ORDER — BISACODYL 10 MG
10 SUPPOSITORY, RECTAL RECTAL
Status: DISCONTINUED | OUTPATIENT
Start: 2020-10-12 | End: 2020-10-13 | Stop reason: HOSPADM

## 2020-10-12 RX ORDER — HYDROCODONE BITARTRATE AND ACETAMINOPHEN 5; 325 MG/1; MG/1
1 TABLET ORAL EVERY 8 HOURS PRN
Status: DISCONTINUED | OUTPATIENT
Start: 2020-10-12 | End: 2020-10-13 | Stop reason: HOSPADM

## 2020-10-12 RX ORDER — ACETAMINOPHEN 325 MG/1
650 TABLET ORAL EVERY 6 HOURS PRN
Status: DISCONTINUED | OUTPATIENT
Start: 2020-10-12 | End: 2020-10-13 | Stop reason: HOSPADM

## 2020-10-12 RX ORDER — POLYETHYLENE GLYCOL 3350 17 G/17G
1 POWDER, FOR SOLUTION ORAL
Status: DISCONTINUED | OUTPATIENT
Start: 2020-10-12 | End: 2020-10-13 | Stop reason: HOSPADM

## 2020-10-12 RX ORDER — ONDANSETRON 2 MG/ML
4 INJECTION INTRAMUSCULAR; INTRAVENOUS EVERY 4 HOURS PRN
Status: DISCONTINUED | OUTPATIENT
Start: 2020-10-12 | End: 2020-10-13 | Stop reason: HOSPADM

## 2020-10-12 RX ORDER — FLUOXETINE HYDROCHLORIDE 20 MG/1
40 CAPSULE ORAL DAILY
COMMUNITY

## 2020-10-12 RX ORDER — PROCHLORPERAZINE EDISYLATE 5 MG/ML
5-10 INJECTION INTRAMUSCULAR; INTRAVENOUS EVERY 4 HOURS PRN
Status: DISCONTINUED | OUTPATIENT
Start: 2020-10-12 | End: 2020-10-13 | Stop reason: HOSPADM

## 2020-10-12 RX ORDER — AMANTADINE HYDROCHLORIDE 100 MG/1
100 CAPSULE, GELATIN COATED ORAL 2 TIMES DAILY
COMMUNITY

## 2020-10-12 RX ORDER — AMANTADINE HYDROCHLORIDE 100 MG/1
100 CAPSULE, GELATIN COATED ORAL 2 TIMES DAILY
Status: DISCONTINUED | OUTPATIENT
Start: 2020-10-12 | End: 2020-10-13 | Stop reason: HOSPADM

## 2020-10-12 RX ORDER — AMLODIPINE BESYLATE 10 MG/1
10 TABLET ORAL DAILY
Status: DISCONTINUED | OUTPATIENT
Start: 2020-10-12 | End: 2020-10-13 | Stop reason: HOSPADM

## 2020-10-12 RX ORDER — CARVEDILOL 6.25 MG/1
6.25 TABLET ORAL 2 TIMES DAILY WITH MEALS
Status: DISCONTINUED | OUTPATIENT
Start: 2020-10-12 | End: 2020-10-13 | Stop reason: HOSPADM

## 2020-10-12 RX ORDER — HYDROCODONE BITARTRATE AND ACETAMINOPHEN 7.5; 325 MG/1; MG/1
1 TABLET ORAL EVERY 8 HOURS PRN
COMMUNITY

## 2020-10-12 RX ORDER — ONDANSETRON 4 MG/1
4 TABLET, ORALLY DISINTEGRATING ORAL EVERY 4 HOURS PRN
Status: DISCONTINUED | OUTPATIENT
Start: 2020-10-12 | End: 2020-10-13 | Stop reason: HOSPADM

## 2020-10-12 RX ADMIN — GABAPENTIN 100 MG: 100 CAPSULE ORAL at 20:30

## 2020-10-12 RX ADMIN — HYDROCODONE BITARTRATE AND ACETAMINOPHEN 1 TABLET: 5; 325 TABLET ORAL at 21:26

## 2020-10-12 RX ADMIN — AMLODIPINE BESYLATE 10 MG: 10 TABLET ORAL at 20:30

## 2020-10-12 RX ADMIN — HYDRALAZINE HYDROCHLORIDE 25 MG: 25 TABLET, FILM COATED ORAL at 20:30

## 2020-10-12 RX ADMIN — AMANTADINE HYDROCHLORIDE 100 MG: 100 CAPSULE, GELATIN COATED ORAL at 21:07

## 2020-10-12 RX ADMIN — CARVEDILOL 6.25 MG: 6.25 TABLET, FILM COATED ORAL at 20:30

## 2020-10-12 RX ADMIN — ATORVASTATIN CALCIUM 80 MG: 80 TABLET, FILM COATED ORAL at 20:30

## 2020-10-12 RX ADMIN — TRAMADOL HYDROCHLORIDE 100 MG: 50 TABLET ORAL at 20:30

## 2020-10-12 RX ADMIN — DOCUSATE SODIUM 50 MG AND SENNOSIDES 8.6 MG 2 TABLET: 8.6; 5 TABLET, FILM COATED ORAL at 20:30

## 2020-10-12 ASSESSMENT — LIFESTYLE VARIABLES
TOTAL SCORE: 0
HAVE PEOPLE ANNOYED YOU BY CRITICIZING YOUR DRINKING: NO
HOW MANY TIMES IN THE PAST YEAR HAVE YOU HAD 5 OR MORE DRINKS IN A DAY: 0
ON A TYPICAL DAY WHEN YOU DRINK ALCOHOL HOW MANY DRINKS DO YOU HAVE: 0
EVER HAD A DRINK FIRST THING IN THE MORNING TO STEADY YOUR NERVES TO GET RID OF A HANGOVER: NO
DOES PATIENT WANT TO STOP DRINKING: NO
TOTAL SCORE: 0
TOTAL SCORE: 0
AVERAGE NUMBER OF DAYS PER WEEK YOU HAVE A DRINK CONTAINING ALCOHOL: 0
HAVE YOU EVER FELT YOU SHOULD CUT DOWN ON YOUR DRINKING: NO
CONSUMPTION TOTAL: NEGATIVE
ALCOHOL_USE: NO
EVER FELT BAD OR GUILTY ABOUT YOUR DRINKING: NO

## 2020-10-12 ASSESSMENT — ENCOUNTER SYMPTOMS
NAUSEA: 0
VOMITING: 0
COUGH: 0
CHILLS: 0
FEVER: 0
SHORTNESS OF BREATH: 0
ABDOMINAL PAIN: 0

## 2020-10-12 ASSESSMENT — PAIN DESCRIPTION - PAIN TYPE: TYPE: CHRONIC PAIN

## 2020-10-12 ASSESSMENT — PAIN SCALES - WONG BAKER: WONGBAKER_NUMERICALRESPONSE: HURTS A LITTLE MORE

## 2020-10-12 ASSESSMENT — FIBROSIS 4 INDEX: FIB4 SCORE: 1.84

## 2020-10-12 NOTE — PROGRESS NOTES
[facility] HOSPITALIST TRIAGE OFFICER DIRECT ADMISSION REPORT  Transferring facility: [facility]  Transferring physician: Dr Richter  Transferring facility/physician contact number: *  Chief complaint: Needs for hemodialysis, no access  Pertinent history & patient course:   End-stage renal disease on hemodialysis, last hemodialysis on 10/5, missed hemodialysis due to absence of access.  AV fistula is not working,HD port  stopped working and could not replace it at outside facility due to absence of equipment . Following  with [facility] nephrology.  Potassium 5.0.    On room air  Code Status:Full  per transferring provider, I personally verified with the transferring provider patient's code status and the transferring provider has confirmed this with the patient.  Further work up or recommendations per triage officer prior to transfer: IR consult for HD access  Consultants called prior to transfer and pertinent input from consultants: nephro  Patient accepted for transfer: Yes  Consultants to be called upon arrival: nephro  Admission status: Observation.   Floor requested: med  If ICU transfer, name of intensivist case discussed with and pertinent input from critical care: n/a    Please inform the triage officer upon arrival of the patient to [facility] for assignment of a hospitalist to perform admission.     For any question or concerns regarding the care of this patient, please reach out to the assigned hospitalist.

## 2020-10-13 ENCOUNTER — APPOINTMENT (OUTPATIENT)
Dept: RADIOLOGY | Facility: MEDICAL CENTER | Age: 55
End: 2020-10-13
Attending: INTERNAL MEDICINE
Payer: COMMERCIAL

## 2020-10-13 VITALS
OXYGEN SATURATION: 97 % | HEART RATE: 69 BPM | TEMPERATURE: 97.6 F | DIASTOLIC BLOOD PRESSURE: 67 MMHG | SYSTOLIC BLOOD PRESSURE: 126 MMHG | BODY MASS INDEX: 28.78 KG/M2 | WEIGHT: 201.06 LBS | RESPIRATION RATE: 18 BRPM | HEIGHT: 70 IN

## 2020-10-13 PROBLEM — T82.9XXA COMPLICATION OF VASCULAR DIALYSIS CATHETER: Status: RESOLVED | Noted: 2020-10-12 | Resolved: 2020-10-13

## 2020-10-13 LAB
ALBUMIN SERPL BCP-MCNC: 3.2 G/DL (ref 3.2–4.9)
ALBUMIN/GLOB SERPL: 0.9 G/DL
ALP SERPL-CCNC: 114 U/L (ref 30–99)
ALT SERPL-CCNC: 7 U/L (ref 2–50)
ANION GAP SERPL CALC-SCNC: 17 MMOL/L (ref 7–16)
AST SERPL-CCNC: 7 U/L (ref 12–45)
BILIRUB SERPL-MCNC: 0.2 MG/DL (ref 0.1–1.5)
BUN SERPL-MCNC: 43 MG/DL (ref 8–22)
CALCIUM SERPL-MCNC: 9 MG/DL (ref 8.5–10.5)
CHLORIDE SERPL-SCNC: 101 MMOL/L (ref 96–112)
CO2 SERPL-SCNC: 17 MMOL/L (ref 20–33)
CREAT SERPL-MCNC: 6.73 MG/DL (ref 0.5–1.4)
ERYTHROCYTE [DISTWIDTH] IN BLOOD BY AUTOMATED COUNT: 50.3 FL (ref 35.9–50)
GLOBULIN SER CALC-MCNC: 3.4 G/DL (ref 1.9–3.5)
GLUCOSE SERPL-MCNC: 179 MG/DL (ref 65–99)
HAV IGM SERPL QL IA: NORMAL
HBV CORE IGM SER QL: NORMAL
HBV SURFACE AB SERPL IA-ACNC: <3.5 MIU/ML (ref 0–10)
HBV SURFACE AG SER QL: NORMAL
HCT VFR BLD AUTO: 37.2 % (ref 37–47)
HCV AB SER QL: NORMAL
HGB BLD-MCNC: 11.8 G/DL (ref 12–16)
MCH RBC QN AUTO: 30.9 PG (ref 27–33)
MCHC RBC AUTO-ENTMCNC: 31.7 G/DL (ref 33.6–35)
MCV RBC AUTO: 97.4 FL (ref 81.4–97.8)
PLATELET # BLD AUTO: 186 K/UL (ref 164–446)
PMV BLD AUTO: 10.6 FL (ref 9–12.9)
POTASSIUM SERPL-SCNC: 5.6 MMOL/L (ref 3.6–5.5)
PROT SERPL-MCNC: 6.6 G/DL (ref 6–8.2)
RBC # BLD AUTO: 3.82 M/UL (ref 4.2–5.4)
SODIUM SERPL-SCNC: 135 MMOL/L (ref 135–145)
WBC # BLD AUTO: 6.7 K/UL (ref 4.8–10.8)

## 2020-10-13 PROCEDURE — 96365 THER/PROPH/DIAG IV INF INIT: CPT

## 2020-10-13 PROCEDURE — 700102 HCHG RX REV CODE 250 W/ 637 OVERRIDE(OP): Performed by: HOSPITALIST

## 2020-10-13 PROCEDURE — 700111 HCHG RX REV CODE 636 W/ 250 OVERRIDE (IP)

## 2020-10-13 PROCEDURE — 99217 PR OBSERVATION CARE DISCHARGE: CPT | Performed by: INTERNAL MEDICINE

## 2020-10-13 PROCEDURE — 80074 ACUTE HEPATITIS PANEL: CPT

## 2020-10-13 PROCEDURE — 85027 COMPLETE CBC AUTOMATED: CPT

## 2020-10-13 PROCEDURE — 90935 HEMODIALYSIS ONE EVALUATION: CPT

## 2020-10-13 PROCEDURE — 80053 COMPREHEN METABOLIC PANEL: CPT

## 2020-10-13 PROCEDURE — 77001 FLUOROGUIDE FOR VEIN DEVICE: CPT

## 2020-10-13 PROCEDURE — 86706 HEP B SURFACE ANTIBODY: CPT

## 2020-10-13 PROCEDURE — G0378 HOSPITAL OBSERVATION PER HR: HCPCS

## 2020-10-13 PROCEDURE — 700111 HCHG RX REV CODE 636 W/ 250 OVERRIDE (IP): Performed by: RADIOLOGY

## 2020-10-13 PROCEDURE — A9270 NON-COVERED ITEM OR SERVICE: HCPCS | Performed by: HOSPITALIST

## 2020-10-13 PROCEDURE — 36581 REPLACE TUNNELED CV CATH: CPT

## 2020-10-13 RX ORDER — CEFAZOLIN SODIUM 2 G/100ML
2 INJECTION, SOLUTION INTRAVENOUS ONCE
Status: COMPLETED | OUTPATIENT
Start: 2020-10-13 | End: 2020-10-13

## 2020-10-13 RX ORDER — HEPARIN SODIUM 1000 [USP'U]/ML
3800 INJECTION, SOLUTION INTRAVENOUS; SUBCUTANEOUS
Status: DISCONTINUED | OUTPATIENT
Start: 2020-10-13 | End: 2020-10-13 | Stop reason: HOSPADM

## 2020-10-13 RX ORDER — SODIUM CHLORIDE 9 MG/ML
500 INJECTION, SOLUTION INTRAVENOUS
Status: ACTIVE | OUTPATIENT
Start: 2020-10-13 | End: 2020-10-13

## 2020-10-13 RX ORDER — CEFAZOLIN SODIUM 1 G/3ML
INJECTION, POWDER, FOR SOLUTION INTRAMUSCULAR; INTRAVENOUS
Status: COMPLETED
Start: 2020-10-13 | End: 2020-10-13

## 2020-10-13 RX ORDER — HEPARIN SODIUM (PORCINE) LOCK FLUSH IV SOLN 100 UNIT/ML 100 UNIT/ML
SOLUTION INTRAVENOUS
Status: COMPLETED
Start: 2020-10-13 | End: 2020-10-13

## 2020-10-13 RX ORDER — LIDOCAINE HYDROCHLORIDE AND EPINEPHRINE 10; 10 MG/ML; UG/ML
INJECTION, SOLUTION INFILTRATION; PERINEURAL
Status: COMPLETED
Start: 2020-10-13 | End: 2020-10-13

## 2020-10-13 RX ORDER — HEPARIN SODIUM 1000 [USP'U]/ML
INJECTION, SOLUTION INTRAVENOUS; SUBCUTANEOUS
Status: COMPLETED
Start: 2020-10-13 | End: 2020-10-13

## 2020-10-13 RX ORDER — ONDANSETRON 2 MG/ML
4 INJECTION INTRAMUSCULAR; INTRAVENOUS PRN
Status: ACTIVE | OUTPATIENT
Start: 2020-10-13 | End: 2020-10-13

## 2020-10-13 RX ORDER — MIDAZOLAM HYDROCHLORIDE 1 MG/ML
.5-2 INJECTION INTRAMUSCULAR; INTRAVENOUS PRN
Status: ACTIVE | OUTPATIENT
Start: 2020-10-13 | End: 2020-10-13

## 2020-10-13 RX ADMIN — CEFAZOLIN SODIUM 2 G: 2 INJECTION, SOLUTION INTRAVENOUS at 10:55

## 2020-10-13 RX ADMIN — HEPARIN SODIUM 3800 UNITS: 1000 INJECTION, SOLUTION INTRAVENOUS; SUBCUTANEOUS at 15:00

## 2020-10-13 RX ADMIN — FLUOXETINE 40 MG: 20 CAPSULE ORAL at 05:03

## 2020-10-13 RX ADMIN — HYDRALAZINE HYDROCHLORIDE 25 MG: 25 TABLET, FILM COATED ORAL at 05:03

## 2020-10-13 RX ADMIN — HYDRALAZINE HYDROCHLORIDE 25 MG: 25 TABLET, FILM COATED ORAL at 17:00

## 2020-10-13 RX ADMIN — ASPIRIN 81 MG: 81 TABLET, COATED ORAL at 05:03

## 2020-10-13 RX ADMIN — LEVOTHYROXINE SODIUM 112 MCG: 0.11 TABLET ORAL at 05:03

## 2020-10-13 RX ADMIN — TRAMADOL HYDROCHLORIDE 100 MG: 50 TABLET ORAL at 05:14

## 2020-10-13 RX ADMIN — AMANTADINE HYDROCHLORIDE 100 MG: 100 CAPSULE, GELATIN COATED ORAL at 05:03

## 2020-10-13 RX ADMIN — GABAPENTIN 100 MG: 100 CAPSULE ORAL at 05:03

## 2020-10-13 RX ADMIN — TRAMADOL HYDROCHLORIDE 100 MG: 50 TABLET ORAL at 05:12

## 2020-10-13 RX ADMIN — DOCUSATE SODIUM 50 MG AND SENNOSIDES 8.6 MG 2 TABLET: 8.6; 5 TABLET, FILM COATED ORAL at 05:03

## 2020-10-13 ASSESSMENT — PAIN DESCRIPTION - PAIN TYPE: TYPE: CHRONIC PAIN

## 2020-10-13 NOTE — ASSESSMENT & PLAN NOTE
[facility] Nephrology will be consulted in the morning for dialysis after the catheter has been replaced.  Potassium in [place] was 5 prior to transfer  BMP ordered for the morning  Her schedule is M,W,F  She may be an excellent candidate for a fistula.

## 2020-10-13 NOTE — PROGRESS NOTES
IR Nursing Note      Tunneled Hemodialysis Catheter Removal and Placement of New Tunneled Hemodialysis Catheter by MD Balbuena assisted by RT Pham, Right Anterior Chest access site (through existing tract).    Access site with HD catheter in place, secured with sutures, covered with gauze/hypoallergenic tegaderm/biopatch, C/D/I.    Patient tolerated procedure, hemodynamically stable. Patient transported to Dialysis Center via IR RN monitored and report given to Miroslava KINNEY.         Muna Solis, ASHOK Chronic Catheter  Kit,14.5Fr x 23cm, REF# [id number], LOT# [id number], Exp. Date 4/17/2021.

## 2020-10-13 NOTE — DISCHARGE PLANNING
Outpatient Dialysis Note    Confirmed patient is active at:    [facility]  [address] [place] [address]    Schedule: Monday, Wednesday, Friday   Time: 05:00am     Patient is seen by Dr. Kumar in HD clinic.    Spoke with Deborah at facility who confirmed.    Forwarded records for review.    Susy Sebastian- Dialysis Coordinator  [facility] # [phone]

## 2020-10-13 NOTE — H&P
Hospital Medicine History & Physical Note    Date of Service  10/12/2020    Primary Care Physician  Wiley Acevedo M.D.    Consultants  IR  nephro consult in the morning    Code Status  Full Code    Chief Complaint  malfunctioning dialysis catheter    History of Presenting Illness  55 y.o. female who presented 10/12/2020 with malfunctioning dialysis catheter.  Ms. 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  She went to the [facility] dialysis unit in [facility] on Monday and had full dialysis.  On Wednesday they were not able to perform dialysis due to malfunction of her catheter.  On Friday she was able to give out 45 minutes and then the catheter stopped working.  Today she presented and was not able to undergo dialysis therefore she was brought to the emergency room in [place] where her potassium was 5 and she was transferred here.  She notes that she has had a little bit of swelling and some water weight gain but is not short of breath.  She denies closure to persons known to have COVID, she denies fevers and chills, denies cough or shortness of breath, a screening COVID has been ordered per hospital policy  Review of Systems  Review of Systems   Constitutional: Negative for chills and fever.   Respiratory: Negative for cough and shortness of breath.    Cardiovascular: Negative for chest pain.   Gastrointestinal: Negative for abdominal pain, nausea and vomiting.   All other systems reviewed and are negative.      Past Medical History   has a past medical history of Arthritis, Cataract, DVT (deep venous thrombosis) ([facility]), HTN (hypertension) (10/25/2011), Neuropathy in diabetes (HCC) (10/25/2011), Other specified symptom associated with female genital organs, PE (pulmonary embolism), Pneumonia, Presence of IVC filter, Renal  disorder, Type II or unspecified type diabetes mellitus without mention of complication, not stated as uncontrolled (10/25/2011), and Unspecified disorder of thyroid.    Surgical History   has a past surgical history that includes abdominal hysterectomy total; knee arthroscopy; cholecystectomy; gastroscopy (2/22/2013); colonoscopy (2/22/2013); gastroscopy-endo (N/A, 7/3/2018); peg placement (N/A, 7/3/2018); and femur nailing intramedullary (Left, 2/1/2020).     Family History  family history includes Cancer in her father and mother; Heart Disease in her father.     Social History   reports that she has never smoked. She has never used smokeless tobacco. She reports that she does not drink alcohol or use drugs.she lives with her 96 year old grandmother.    Allergies  Allergies   Allergen Reactions   • Toradol Hives   • Levaquin      Blood boils   • Lisinopril Unspecified     Caused potassium build up in blood   • Tape        Medications  Prior to Admission Medications   Prescriptions Last Dose Informant Patient Reported? Taking?   FLUoxetine (PROZAC) 20 MG Cap 10/11/2020 at am  Yes Yes   Sig: Take 40 mg by mouth every day.   HYDROcodone-acetaminophen (NORCO) 7.5-325 MG per tablet 10/11/2020 at pm  Yes Yes   Sig: Take 1 Tab by mouth every 8 hours as needed for Moderate Pain or Severe Pain.   Misc. Devices Misc   No No   Sig: Unilateral diagnostic mammogram right breast with ultrasound if indicated; fax [phone]   Misc. Devices Misc   No No   Sig: Freestyle lite test strips; patient has DM type 2 and tests 3 times per day   amantadine (SYMMETREL) 100 MG Cap 10/11/2020 at pm  Yes Yes   Sig: Take 100 mg by mouth 2 times a day.   amlodipine (NORVASC) 10 MG Tab 10/11/2020 at am   No No   Sig: Take 1 Tab by mouth every day.   artificial tears 1.4 % Solution   No No   Sig: Place 2 Drops in both eyes every 2 hours as needed.   ascorbic acid (VITAMIN C) 1000 MG tablet   No No   Sig: Take 1 Tab by mouth every day.   aspirin 81  MG EC tablet 10/11/2020 at am  No No   Sig: Take 1 Tab by mouth every day.   atorvastatin (LIPITOR) 80 MG tablet 10/11/2020 at am  No No   Si Tab by Per NG Tube route every evening.   carvedilol (COREG) 6.25 MG Tab 10/11/2020 at pm  No No   Sig: Take 1 Tab by mouth 2 times a day, with meals.   ergocalciferol (DRISDOL) 56177 UNIT capsule 10/11/2020 at am  No No   Sig: Take 1 Cap by mouth every 7 days.   famotidine (PEPCID) 20 MG Tab   No No   Si Tab by Per NG Tube route every day.   Patient taking differently: Take 20 mg by mouth as needed (upset stomach).   fluticasone (FLONASE) 50 MCG/ACT nasal spray   No No   Sig: Spray 2 Sprays in nose every day.   gabapentin (NEURONTIN) 100 MG Cap 10/11/2020 at pm  Yes Yes   Sig: Take 100 mg by mouth 2 times a day.   hydrALAZINE (APRESOLINE) 25 MG Tab 10/11/2020 at am  No No   Sig: Take 1 Tab by mouth every 8 hours.   levothyroxine (SYNTHROID) 112 MCG Tab 10/11/2020 at am  No No   Sig: Take 1 Tab by mouth every day.   ondansetron (ZOFRAN ODT) 4 MG TABLET DISPERSIBLE   No No   Sig: Take 1 Tab by mouth every four hours as needed (give PO if no IV route available).   polyethylene glycol 3350 (MIRALAX) Powder   No No   Sig: Take 17 g by mouth 2 times a day.   senna-docusate (PERICOLACE OR SENOKOT S) 8.6-50 MG Tab   No No   Sig: Take 2 Tabs by mouth 2 times a day.   tramadol (ULTRAM) 50 MG Tab 10/11/2020 at om  Yes Yes   Sig: Take 100 mg by mouth every 12 hours as needed for Moderate Pain or Severe Pain.   vitamin D (VITAMIND D3) 1000 UNIT Tab 10/11/2020 at am  No No   Sig: Take 1 Tab by mouth every day.      Facility-Administered Medications: None       Physical Exam  Temp:  [36.6 °C (97.9 °F)] 36.6 °C (97.9 °F)  Pulse:  [72] 72  Resp:  [16] 16  BP: (175)/(79) 175/79  SpO2:  [95 %] 95 %    Physical Exam  Vitals signs and nursing note reviewed.   Constitutional:       Appearance: Normal appearance.   HENT:      Head: Normocephalic and atraumatic.      Mouth/Throat:       Mouth: Mucous membranes are dry.      Pharynx: Oropharynx is clear.   Eyes:      General: No scleral icterus.     Conjunctiva/sclera: Conjunctivae normal.   Neck:      Musculoskeletal: Normal range of motion and neck supple.      Comments: Right IJ tunneled catheter  Cardiovascular:      Rate and Rhythm: Normal rate and regular rhythm.   Pulmonary:      Effort: Pulmonary effort is normal.      Breath sounds: Normal breath sounds.   Abdominal:      General: There is no distension.      Tenderness: There is no abdominal tenderness.   Musculoskeletal:      Right lower leg: No edema.      Left lower leg: No edema.   Skin:     General: Skin is warm and dry.      Coloration: Skin is pale.   Neurological:      General: No focal deficit present.      Mental Status: She is alert and oriented to person, place, and time.   Psychiatric:         Mood and Affect: Mood normal.         Behavior: Behavior normal.         Laboratory:          No results for input(s): ALTSGPT, ASTSGOT, ALKPHOSPHAT, TBILIRUBIN, DBILIRUBIN, GAMMAGT, AMYLASE, LIPASE, ALB, PREALBUMIN, GLUCOSE in the last 72 hours.      No results for input(s): NTPROBNP in the last 72 hours.      No results for input(s): TROPONINT in the last 72 hours.    Imaging:  IR-DUMONT,GROSHONG PLACEMENT >5    (Results Pending)   IR-CVC TUNNEL WITH PORT REMOVAL    (Results Pending)         Assessment/Plan:  I anticipate this patient is appropriate for observation status at this time.    * Complication of vascular dialysis catheter- (present on admission)  Assessment & Plan  Right tunneled catheter is not functioning  She has not been able to tolerate dialysis for one week  NPO midnight for IR to replace the tunneled line--orders placed  Check PT, PTT, platelets in the morning for pre-op  Screening COVID ordered.      ESRD (end stage renal disease) ([facility])- (present on admission)  Assessment & Plan  [facility] Nephrology will be consulted in the morning for dialysis after the catheter has  been replaced.  Potassium in [place] was 5 prior to transfer  BMP ordered for the morning  Her schedule is M,W,F  She may be an excellent candidate for a fistula.        Essential hypertension- (present on admission)  Assessment & Plan  Continue hydralazine, coreg, and norvasc with holding parameters.     Diabetic neuropathy (HCC)- (present on admission)  Assessment & Plan  With chronic pain syndrome followed by Dr. Chatman, pain management.  Continue neurontin as well as norco and ultram.

## 2020-10-13 NOTE — PROGRESS NOTES
Admit profile and assessment completed.  Pt A&Ox4, baseline 4/5 strength to LUE and LLE due to stroke per pt. Pt reports 8/10 left hip, leg and knee pain- no orders. Respirations even, unlabored on 2L O2, baseline home 02.  Fall precaution in place. Bed in locked, lowest position. Call light and belongings within reach. Needs met, will continue to monitor.

## 2020-10-13 NOTE — ASSESSMENT & PLAN NOTE
Right tunneled catheter is not functioning  She has not been able to tolerate dialysis for one week  NPO midnight for IR to replace the tunneled line--orders placed  Check PT, PTT, platelets in the morning for pre-op  Screening COVID ordered.

## 2020-10-13 NOTE — ASSESSMENT & PLAN NOTE
With chronic pain syndrome followed by Dr. Chatman, pain management.  Continue neurontin as well as norco and ultram.

## 2020-10-13 NOTE — PROGRESS NOTES
Assessment completed. Pt A&Ox 4. Respirations are even and unlabored on 2L n/c. Pt reports chronic pain at this time. Monitors applied, VS stable, call light and belongings within reach. POC updated (IR removal and IR Placement). Pt educated on room and call light, pt verbalized understanding. Communication board updated. Needs met.

## 2020-10-13 NOTE — PROGRESS NOTES
HEMODIALYSIS NOTES:     HD today x 3 hours per Dr. Orellana . Initiated at 1158 and ended at 1500. Patient had low BP during tx, asymptomatic, UF goal decreased. Patient tolerated treatment. See paper flowsheet for details.    UF Net: 2,000 mL    R permacath intact and patent with good flow during dialysis. No s/sx of infection, no redness nor bleeding noted on the CVC site. CVC dressing clean, dry and intact. Blood returned and CVC port flushed with NS and Heparin 1000 units/mL used  to lock catheter given per designated amount. CVC port clamped and capped.     Report given to TROY Roman RN.

## 2020-10-13 NOTE — CONSULTS
[facility] Nephrology Consult Note     Date of Service       Author: Rasheed Orellana Jr., MD    Reason for consult:  ESRD    HPI:  54 yo with h/o ESRD since January of this year, PKD, HD MWF at [facility] with Dr. Kumar, who presents from [facility] after HD cath not working x 1 week. She reports she was due to get AVF with Dr. Rosa earlier this year, but COVID has made it difficult. She dialyzes through right IJ catheter. She started having issues since last Monday and has not had full dialysis since that time.     She was admitted for dialysis cath exchange and dialysis afterwards. Patient feels edematous.      Past Medical History  Past Medical History:   Diagnosis Date   • Arthritis    • Cataract    • DVT (deep venous thrombosis) ([facility])    • HTN (hypertension) 10/25/2011   • Neuropathy in diabetes (HCC) 10/25/2011   • Other specified symptom associated with female genital organs    • PE (pulmonary embolism)    • Pneumonia    • Presence of IVC filter    • Renal disorder    • Type II or unspecified type diabetes mellitus without mention of complication, not stated as uncontrolled 10/25/2011   • Unspecified disorder of thyroid        Past Surgical History  Past Surgical History:   Procedure Laterality Date   • FEMUR NAILING INTRAMEDULLARY Left 2/1/2020    Procedure: INSERTION, INTRAMEDULLARY VAUGHN, FEMUR, OIC;  Surgeon: Douglas Vazquez M.D.;  Location: SURGERY [facility];  Service: Orthopedics   • GASTROSCOPY-ENDO N/A 7/3/2018    Procedure: GASTROSCOPY-ENDO;  Surgeon: Juan Miguel Soares M.D.;  Location: ENDOSCOPY [facility];  Service: Gastroenterology   • PEG PLACEMENT N/A 7/3/2018    Procedure: PEG PLACEMENT;  Surgeon: Juan Miguel Soares M.D.;  Location: ENDOSCOPY [facility];  Service: Gastroenterology   • GASTROSCOPY  2/22/2013    Performed by Reid Yi D.O. at [facility]   • COLONOSCOPY  2/22/2013    Performed by Reid Yi D.O. at [facility]  [facility]   • ABDOMINAL HYSTERECTOMY TOTAL      Right ovary remains   • CHOLECYSTECTOMY     • KNEE ARTHROSCOPY      3 surgeries right knee       Social History:  Social History     Socioeconomic History   • Marital status: Legally      Spouse name: Not on file   • Number of children: Not on file   • Years of education: Not on file   • Highest education level: Not on file   Occupational History   • Not on file   Social Needs   • Financial resource strain: Not on file   • Food insecurity     Worry: Not on file     Inability: Not on file   • Transportation needs     Medical: Not on file     Non-medical: Not on file   Tobacco Use   • Smoking status: Never Smoker   • Smokeless tobacco: Never Used   Substance and Sexual Activity   • Alcohol use: No     Alcohol/week: 0.0 oz   • Drug use: No   • Sexual activity: Yes     Partners: Male     Birth control/protection: Post-Menopausal   Lifestyle   • Physical activity     Days per week: Not on file     Minutes per session: Not on file   • Stress: Not on file   Relationships   • Social connections     Talks on phone: Not on file     Gets together: Not on file     Attends Congregational service: Not on file     Active member of club or organization: Not on file     Attends meetings of clubs or organizations: Not on file     Relationship status: Not on file   • Intimate partner violence     Fear of current or ex partner: Not on file     Emotionally abused: Not on file     Physically abused: Not on file     Forced sexual activity: Not on file   Other Topics Concern   • Not on file   Social History Narrative   • Not on file       Review of Systems  GEN: no fevers/chills/weight loss  HEENT: No vision changes  RESP: No shortness of breath  CV:  no chest pain  ABD: no N/V,   EXT: + edema  Musculoskeletal: no joint pain  NEURO: no headaches, no weakness  PSYCH: no confusion, no depression      Medications  Administrations This Visit     amantadine (SYMMETREL) capsule 100 mg     Admin  Date  10/12/2020 Action  Given Dose  100 mg Route  Oral Site             Admin Date  10/13/2020 Action  Given Dose  100 mg Route  Oral Site            amLODIPine (NORVASC) tablet 10 mg     Admin Date  10/12/2020 Action  Given Dose  10 mg Route  Oral Site            aspirin EC (ECOTRIN) tablet 81 mg     Admin Date  10/13/2020 Action  Given Dose  81 mg Route  Oral Site            atorvastatin (LIPITOR) tablet 80 mg     Admin Date  10/12/2020 Action  Given Dose  80 mg Route  Oral Site            carvedilol (COREG) tablet 6.25 mg     Admin Date  10/12/2020 Action  Given Dose  6.25 mg Route  Oral Site            FLUoxetine (PROZAC) capsule 40 mg     Admin Date  10/13/2020 Action  Given Dose  40 mg Route  Oral Site            gabapentin (NEURONTIN) capsule 100 mg     Admin Date  10/12/2020 Action  Given Dose  100 mg Route  Oral Site             Admin Date  10/13/2020 Action  Given Dose  100 mg Route  Oral Site            hydrALAZINE (APRESOLINE) tablet 25 mg     Admin Date  10/12/2020 Action  Given Dose  25 mg Route  Oral Site             Admin Date  10/13/2020 Action  Given Dose  25 mg Route  Oral Site            HYDROcodone-acetaminophen (NORCO) 5-325 MG per tablet 1 Tab     Admin Date  10/12/2020 Action  Given Dose  1 Tab Route  Oral Site            levothyroxine (SYNTHROID) tablet 112 mcg     Admin Date  10/13/2020 Action  Given Dose  112 mcg Route  Oral Site            senna-docusate (PERICOLACE or SENOKOT S) 8.6-50 MG per tablet 2 Tab     Admin Date  10/12/2020 Action  Given Dose  2 Tab Route  Oral Site             Admin Date  10/13/2020 Action  Given Dose  2 Tab Route  Oral Site            tramadol (ULTRAM) 50 MG tablet 100 mg     Admin Date  10/12/2020 Action  Given Dose  100 mg Route  Oral Site             Admin Date  10/13/2020 Action  Given Dose  100 mg Route  Oral Site             Admin Date  10/13/2020 Action  Given Dose  100 mg Route  Oral Site                  Physical Exam  Vitals:    10/13/20 0415   BP:  106/57   Pulse: 67   Resp: 18   Temp: 36.4 °C (97.5 °F)   SpO2: 96%          Physical Exam  Constitutional:       Appearance: Normal appearance. He is normal weight.   HENT:      Head: Normocephalic and atraumatic.      Nose: Nose normal.   Eyes:      Extraocular Movements: Extraocular movements intact.      Conjunctiva/sclera: Conjunctivae normal.      Pupils: Pupils are equal, round, and reactive to light.   Neck:      Musculoskeletal: Normal range of motion and neck supple.   Cardiovascular:      Rate and Rhythm: Normal rate and regular rhythm.      Pulses: Normal pulses.      Heart sounds: No murmur. No friction rub. No gallop.    Pulmonary:      Effort: Pulmonary effort is normal.      Breath sounds: Normal breath sounds.   Abdominal:      General: Abdomen is flat. Bowel sounds are normal.      Palpations: Abdomen is soft.   Musculoskeletal:         General: No edema  Skin:     General: Skin is warm and dry.   Neurological:      General: No focal deficit present.      Mental Status: He is alert and oriented to person, place, and time.   Psychiatric:         Mood and Affect: Mood normal.         Behavior: Behavior normal.         Thought Content: Thought content normal.         Judgment: Judgment normal.        Fluids     No intake or output data in the 24 hours ending 10/13/20 0811        Labs    Lab Results   Component Value Date/Time    SODIUM 135 10/13/2020 06:32 AM    POTASSIUM 5.6 (H) 10/13/2020 06:32 AM    CHLORIDE 101 10/13/2020 06:32 AM    CO2 17 (L) 10/13/2020 06:32 AM    GLUCOSE 179 (H) 10/13/2020 06:32 AM    BUN 43 (H) 10/13/2020 06:32 AM    CREATININE 6.73 (HH) 10/13/2020 06:32 AM       Recent Labs     10/13/20  0632   WBC 6.7   RBC 3.82*   HEMOGLOBIN 11.8*   HEMATOCRIT 37.2   MCV 97.4   MCH 30.9   RDW 50.3*   PLATELETCT 186   MPV 10.6       Assessment  1. ESRD - HD MWF at [facility] with Dr. Kumar. RHEA Mitchell  2. Non functioning access - needs exchanged by IR  3. Hyperkalemia - mild. Hd after  catheter exchanged.   4. Anemia in ESRD - at goal  5. PKD     PLAN:  IR consult to exchange line  HD afterward  Once line functioning and dialysis complete, patient can discharge home to outpatient unit.     Thank you for the consult, please call with any questions.    aRsheed Orellana Jr, MD  [facility] Nephrology

## 2020-10-13 NOTE — OR SURGEON
Immediate Post- Operative Note    PostOp Diagnosis: RENAL FAILURE.TUNNELED HD CATH DYSFUNCTION     Procedure(s): RIGHT INTERNAL JUGULAR 14.5 PALINDROME TUNNELED HEMODIALYSIS CATHETER EXCHANGE FOR 23 CM LENGTH PALINDROME 14.5F TUNNELED DIALYSIS CATHETER WITH FLUOROSCOPIC GUIDANCE    CATHETER TIP AT FLOOR OF RA      Estimated Blood Loss: <20CC (FLUSHES)        Complications: NONE        10/13/2020 11:33 AM Wiley Balbuena M.D.

## 2020-10-13 NOTE — PROGRESS NOTES
Pt arrived to unit as direct admit via EMS. Pt oriented to unit and call light. Pt's personal wheelchair at bedside.   Med Rec completed. Admitting physician notified of pt's arrival.

## 2020-10-14 NOTE — PROGRESS NOTES
IV Dc 'd. Discharge instructions provided to patient. Pt verbalizes understanding. Pt states all questions have been answered. Copy of DC provided to patient. Signed copy in chart. Pt states all personal belongings are in possession. Pt escorted off unit by [employer] via her w/c without incident. Taxi provided by [facility].

## 2020-10-14 NOTE — PROGRESS NOTES
Assessment completed. Pt A&Ox4. Respirations are even and unlabored on 2L n/c-baseline. Pt reports chronic BLE pain at this time-tolerable. Monitors applied, VS stable, call light and belongings within reach. POC updated (IR, dialysis). Pt educated on room and call light, pt verbalized understanding. Communication board updated. Needs met.

## 2020-10-14 NOTE — DISCHARGE INSTRUCTIONS
Discharge Instructions    Discharged to home by taxi with self. Discharged via wheelchair, hospital escort: Yes.  Special equipment needed: Not Applicable    Be sure to schedule a follow-up appointment with your primary care doctor or any specialists as instructed.     Discharge Plan:   Diet Plan: Discussed  Activity Level: Discussed  Confirmed Follow up Appointment: Patient to Call and Schedule Appointment  Confirmed Symptoms Management: Discussed  Medication Reconciliation Updated: Yes  Influenza Vaccine Indication: Not indicated: Previously immunized this influenza season and > 8 years of age    I understand that a diet low in cholesterol, fat, and sodium is recommended for good health. Unless I have been given specific instructions below for another diet, I accept this instruction as my diet prescription.   Other diet: heart healthy    Special Instructions: None    · Is patient discharged on Warfarin / Coumadin?            Depression / Suicide Risk    As you are discharged from this [facility] facility, it is important to learn how to keep safe from harming yourself.    Recognize the warning signs:  · Abrupt changes in personality, positive or negative- including increase in energy   · Giving away possessions  · Change in eating patterns- significant weight changes-  positive or negative  · Change in sleeping patterns- unable to sleep or sleeping all the time   · Unwillingness or inability to communicate  · Depression  · Unusual sadness, discouragement and loneliness  · Talk of wanting to die  · Neglect of personal appearance   · Rebelliousness- reckless behavior  · Withdrawal from people/activities they love  · Confusion- inability to concentrate     If you or a loved one observes any of these behaviors or has concerns about self-harm, here's what you can do:  · Talk about it- your feelings and reasons for harming yourself  · Remove any means that you might use to hurt yourself (examples: pills, rope,  extension cords, firearm)  · Get professional help from the community (Mental Health, Substance Abuse, psychological counseling)  · Do not be alone:Call your Safe Contact- someone whom you trust who will be there for you.  · Call your local CRISIS HOTLINE 914-0433 or 620-252-0514  · Call your local Children's Mobile Crisis Response Team [place] (650) 955-7510 or www.HackerHAND  · Call the toll free National Suicide Prevention Hotlines   · National Suicide Prevention Lifeline 543-244-KWDQ (5127)  · National Hope Line Network 800-SUICIDE (883-8522)

## 2020-11-02 NOTE — THERAPY
"Speech Language Therapy dysphagia treatment completed.     Functional Status:  Pt complaining of pain at the site of her cortrak, RN aware.  Pt had wet vocal quality prior to starting tx, and was unable to clear voice with max throat clearing and use of oral suction.  Pt was given PO trials of 2 ice chips and three 1/2 tsp of nectars.  She continues to present with s/sx of aspiration with all PO trials, evidenced by coughing and throat clearing, and reported, \"I feel like I am drowning\".  Pt has not made significant gains with her swallowing at this point, and is not ready for a FEES or MBS at this time.  Recommend to continue strict NPO, with consideration of a more permanent source of non-oral nutrition.  Pt was educated regarding possibility of a PEG tube as an alternative to cortrak, if deemed appropriate by MD, and reported \"I just want to get this thing out of my nose\".  SLP continues to follow.    Recommendations: 1) strict NPO, 2) consider a more permanent source of non-oral nutrition as pt is very slow to make gains with dysphagia      Plan of Care: Will benefit from Speech Therapy 5 times per week    Post-Acute Therapy: Discharge to a transitional care facility for continued skilled therapy services.    See \"Rehab Therapy-Acute\" Patient Summary Report for complete documentation.     " Never

## 2022-08-24 NOTE — DISCHARGE SUMMARY
Discharge Summary    CHIEF COMPLAINT ON ADMISSION  Catheter not working for 1 week    Reason for Admission  Catheter problem, needs dialysis     Admission Date  10/12/2020    CODE STATUS  Full Code    HPI & HOSPITAL COURSE  55 y.o. female who presented 10/12/2020 with malfunctioning dialysis catheter.  Ms. 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  She went to the [facility] dialysis unit in [facility] on Monday and had full dialysis.  On Wednesday they were not able to perform dialysis due to malfunction of her catheter.  On Friday she was able to give out 45 minutes and then the catheter stopped working.  She was not able to undergo dialysis therefore she was brought to the emergency room in [place] where her potassium was 5 and she was transferred here.  She notes that she has had a little bit of swelling and some water weight gain but is not short of breath.  She denies closure to persons known to have COVID, she denies fevers and chills, denies cough or shortness of breath, a screening COVID test was negative. Hepatitis screening was also negative.   Dr. Orellana with [facility] was consulted. The patient underwent Right IJ hemodialysis catheter exchange with IR and subsequent dialysis on 10/13 without incident.   The patient feels much improved and feels well enough to discharge home. Her edema is significantly improved. She denies pain and any other problems.     Therefore, she is discharged in good and stable condition to home with close outpatient follow-up.    The patient recovered much more quickly than anticipated on admission.    Discharge Date  10/13/20    FOLLOW UP ITEMS POST DISCHARGE  Please continue dialysis as scheduled    DISCHARGE DIAGNOSES  Principal Problem (Resolved):    Complication of vascular  dialysis catheter POA: Yes  Active Problems:    ESRD (end stage renal disease) ([facility]) POA: Yes    Essential hypertension (Chronic) POA: Yes    Diabetic neuropathy ([facility]) POA: Yes  Resolved Problems:    Diabetes mellitus type 2, uncontrolled, with complications ([facility]) POA: Yes      FOLLOW UP  Wiley Acevedo M.D.  [address]  [facility] [address]  [phone]    In 1 week      Marleen Kumar M.D.  [address]  [facility] [address]  [phone]    Schedule an appointment as soon as possible for a visit in 1 week  As needed, as scheduled      MEDICATIONS ON DISCHARGE     Medication List      CHANGE how you take these medications      Instructions   famotidine 20 MG Tabs  What changed:   · how to take this  · when to take this  · reasons to take this  Commonly known as: PEPCID   1 Tab by Per NG Tube route every day.  Dose: 20 mg        CONTINUE taking these medications      Instructions   amantadine 100 MG Caps  Commonly known as: SYMMETREL   Take 100 mg by mouth 2 times a day.  Dose: 100 mg     amLODIPine 10 MG Tabs  Commonly known as: NORVASC   Take 1 Tab by mouth every day.  Dose: 10 mg     artificial tears 1.4 % Soln   Place 2 Drops in both eyes every 2 hours as needed.  Dose: 2 Drop     ascorbic acid 1000 MG tablet  Commonly known as: VITAMIN C   Take 1 Tab by mouth every day.  Dose: 1,000 mg     aspirin 81 MG EC tablet   Take 1 Tab by mouth every day.  Dose: 81 mg     atorvastatin 80 MG tablet  Commonly known as: LIPITOR   1 Tab by Per NG Tube route every evening.  Dose: 80 mg     carvedilol 6.25 MG Tabs  Commonly known as: COREG   Take 1 Tab by mouth 2 times a day, with meals.  Dose: 6.25 mg     ergocalciferol 54716 UNIT capsule  Commonly known as: DRISDOL   Take 1 Cap by mouth every 7 days.  Dose: 50,000 Units     FLUoxetine 20 MG Caps  Commonly known as: PROZAC   Take 40 mg by mouth every day.  Dose: 40 mg     fluticasone 50 MCG/ACT nasal spray  Commonly known as: FLONASE   Spray 2 Sprays in nose  every day.  Dose: 2 Spray     gabapentin 100 MG Caps  Commonly known as: NEURONTIN   Take 100 mg by mouth 2 times a day.  Dose: 100 mg     hydrALAZINE 25 MG Tabs  Commonly known as: APRESOLINE   Take 1 Tab by mouth every 8 hours.  Dose: 25 mg     HYDROcodone-acetaminophen 7.5-325 MG per tablet  Commonly known as: NORCO   Take 1 Tab by mouth every 8 hours as needed for Moderate Pain or Severe Pain.  Dose: 1 Tab     levothyroxine 112 MCG Tabs  Commonly known as: SYNTHROID   Take 1 Tab by mouth every day.  Dose: 112 mcg     * Misc. Devices Misc   Unilateral diagnostic mammogram right breast with ultrasound if indicated; fax [phone]     * Misc. Devices Misc   Freestyle lite test strips; patient has DM type 2 and tests 3 times per day     ondansetron 4 MG Tbdp  Commonly known as: ZOFRAN ODT   Take 1 Tab by mouth every four hours as needed (give PO if no IV route available).  Dose: 4 mg     polyethylene glycol 3350 17 GM/SCOOP Powd  Commonly known as: MIRALAX   Take 17 g by mouth 2 times a day.  Dose: 17 g     senna-docusate 8.6-50 MG Tabs  Commonly known as: PERICOLACE or SENOKOT S   Take 2 Tabs by mouth 2 times a day.  Dose: 2 Tab     tramadol 50 MG Tabs  Commonly known as: ULTRAM   Take 100 mg by mouth every 12 hours as needed for Moderate Pain or Severe Pain.  Dose: 100 mg     vitamin D 1000 UNIT Tabs  Commonly known as: VITAMIND D3   Take 1 Tab by mouth every day.  Dose: 1,000 Units         * This list has 2 medication(s) that are the same as other medications prescribed for you. Read the directions carefully, and ask your doctor or other care provider to review them with you.                Allergies  Allergies   Allergen Reactions   • Toradol Hives   • Levaquin      Blood boils   • Lisinopril Unspecified     Caused potassium build up in blood   • Tape        DIET  Orders Placed This Encounter   Procedures   • Diet Order Renal     Standing Status:   Standing     Number of Occurrences:   1     Order Specific  Question:   Diet:     Answer:   Renal [8]       ACTIVITY  As tolerated.  Weight bearing as tolerated    CONSULTATIONS  Nephrology    PROCEDURES  Dialysis cath placement and dialysis 10/13    LABORATORY  Lab Results   Component Value Date    SODIUM 135 10/13/2020    POTASSIUM 5.6 (H) 10/13/2020    CHLORIDE 101 10/13/2020    CO2 17 (L) 10/13/2020    GLUCOSE 179 (H) 10/13/2020    BUN 43 (H) 10/13/2020    CREATININE 6.73 (HH) 10/13/2020        Lab Results   Component Value Date    WBC 6.7 10/13/2020    HEMOGLOBIN 11.8 (L) 10/13/2020    HEMATOCRIT 37.2 10/13/2020    PLATELETCT 186 10/13/2020      Lab Results   Component Value Date/Time    PROTHROMBTM 13.4 10/12/2020 2102    INR 0.99 10/12/2020 2102    APTT 32.4 10/12/2020 2102     Lab Results   Component Value Date/Time    PROTEINCFU 157 03/17/2012 1312     Lab Results   Component Value Date/Time    HEPAIGM Non-Reactive 10/13/2020 1300    HEPAIGM Negative 02/03/2020 0805    HEPBSAG Non-Reactive 10/13/2020 1300    HEPBSAG Negative 02/03/2020 0805    HEPBCORIGM Non-Reactive 10/13/2020 1300    HEPBCORIGM Negative 02/03/2020 0805    HEPCAB Non-Reactive 10/13/2020 1300    HEPCAB Negative 02/03/2020 0805       Total time of the discharge process exceeds 31 minutes.    ORA Arita.     X Size Of Lesion In Cm (Optional): 0.8

## 2022-09-22 NOTE — PROGRESS NOTES
Bladder scan results = 200 ml   [Midline] : trachea located in midline position [Normal] : no rashes [de-identified] : no bruits [de-identified] : scant wax b/l -removed

## 2022-10-09 NOTE — THERAPY
"Occupational Therapy Treatment completed with focus on activity tolerance and ADLs.   Functional Status: Pt is SBA supine to sit and sit to supine with HOB raised and rail, SUA for seated grooming, mod a to don socks, CGA sit to stand. Pt c/o diffculty breathing and fatigue with activity.   Plan of Care: Continue POC.   Discharge Recommendations:  Equipment Will Continue to Assess for Equipment Needs. Post-acute therapy Discharge to a transitional care facility for continued skilled therapy services.    See \"Rehab Therapy-Acute\" Patient Summary Report for complete documentation.   " none
